# Patient Record
Sex: FEMALE | Race: BLACK OR AFRICAN AMERICAN | NOT HISPANIC OR LATINO | Employment: UNEMPLOYED | URBAN - METROPOLITAN AREA
[De-identification: names, ages, dates, MRNs, and addresses within clinical notes are randomized per-mention and may not be internally consistent; named-entity substitution may affect disease eponyms.]

---

## 2022-12-05 ENCOUNTER — HOSPITAL ENCOUNTER (EMERGENCY)
Facility: OTHER | Age: 47
Discharge: HOME | End: 2022-12-05
Attending: EMERGENCY MEDICINE

## 2022-12-05 VITALS
BODY MASS INDEX: 39.27 KG/M2 | TEMPERATURE: 99 F | RESPIRATION RATE: 18 BRPM | SYSTOLIC BLOOD PRESSURE: 97 MMHG | HEIGHT: 60 IN | HEART RATE: 79 BPM | DIASTOLIC BLOOD PRESSURE: 55 MMHG | OXYGEN SATURATION: 97 % | WEIGHT: 200 LBS

## 2022-12-05 DIAGNOSIS — D64.9 ANEMIA, UNSPECIFIED TYPE: ICD-10-CM

## 2022-12-05 DIAGNOSIS — T40.1X1A HEROIN OVERDOSE, ACCIDENTAL OR UNINTENTIONAL, INITIAL ENCOUNTER: Primary | ICD-10-CM

## 2022-12-05 LAB
ALBUMIN SERPL BCP-MCNC: 2.8 G/DL (ref 3.5–5.2)
ALP SERPL-CCNC: 62 U/L (ref 55–135)
ALT SERPL W/O P-5'-P-CCNC: 8 U/L (ref 10–44)
ANION GAP SERPL CALC-SCNC: 9 MMOL/L (ref 8–16)
AST SERPL-CCNC: 13 U/L (ref 10–40)
BASOPHILS # BLD AUTO: 0.03 K/UL (ref 0–0.2)
BASOPHILS NFR BLD: 0.2 % (ref 0–1.9)
BILIRUB SERPL-MCNC: 0.2 MG/DL (ref 0.1–1)
BUN SERPL-MCNC: 7 MG/DL (ref 6–20)
CALCIUM SERPL-MCNC: 8.6 MG/DL (ref 8.7–10.5)
CHLORIDE SERPL-SCNC: 104 MMOL/L (ref 95–110)
CK SERPL-CCNC: 160 U/L (ref 20–180)
CO2 SERPL-SCNC: 25 MMOL/L (ref 23–29)
CREAT SERPL-MCNC: 0.7 MG/DL (ref 0.5–1.4)
CREAT SERPL-MCNC: 0.8 MG/DL (ref 0.5–1.4)
DIFFERENTIAL METHOD: ABNORMAL
EOSINOPHIL # BLD AUTO: 0 K/UL (ref 0–0.5)
EOSINOPHIL NFR BLD: 0.2 % (ref 0–8)
ERYTHROCYTE [DISTWIDTH] IN BLOOD BY AUTOMATED COUNT: 16.9 % (ref 11.5–14.5)
EST. GFR  (NO RACE VARIABLE): >60 ML/MIN/1.73 M^2
GLUCOSE SERPL-MCNC: 110 MG/DL (ref 70–110)
HCT VFR BLD AUTO: 26.5 % (ref 37–48.5)
HCT VFR BLD CALC: 25 %PCV (ref 36–54)
HGB BLD-MCNC: 7.5 G/DL (ref 12–16)
HGB BLD-MCNC: 9 G/DL
IMM GRANULOCYTES # BLD AUTO: 0.03 K/UL (ref 0–0.04)
IMM GRANULOCYTES NFR BLD AUTO: 0.2 % (ref 0–0.5)
LYMPHOCYTES # BLD AUTO: 2.3 K/UL (ref 1–4.8)
LYMPHOCYTES NFR BLD: 16.9 % (ref 18–48)
MCH RBC QN AUTO: 19.8 PG (ref 27–31)
MCHC RBC AUTO-ENTMCNC: 28.3 G/DL (ref 32–36)
MCV RBC AUTO: 70 FL (ref 82–98)
MONOCYTES # BLD AUTO: 0.6 K/UL (ref 0.3–1)
MONOCYTES NFR BLD: 4.1 % (ref 4–15)
NEUTROPHILS # BLD AUTO: 10.5 K/UL (ref 1.8–7.7)
NEUTROPHILS NFR BLD: 78.4 % (ref 38–73)
NRBC BLD-RTO: 0 /100 WBC
PLATELET # BLD AUTO: 319 K/UL (ref 150–450)
PMV BLD AUTO: 10.5 FL (ref 9.2–12.9)
POC IONIZED CALCIUM: 1.14 MMOL/L (ref 1.06–1.42)
POTASSIUM BLD-SCNC: 4 MMOL/L (ref 3.5–5.1)
POTASSIUM SERPL-SCNC: 4.1 MMOL/L (ref 3.5–5.1)
PROT SERPL-MCNC: 7.1 G/DL (ref 6–8.4)
RBC # BLD AUTO: 3.78 M/UL (ref 4–5.4)
SAMPLE: ABNORMAL
SAMPLE: NORMAL
SODIUM BLD-SCNC: 138 MMOL/L (ref 136–145)
SODIUM SERPL-SCNC: 138 MMOL/L (ref 136–145)
TROPONIN I SERPL DL<=0.01 NG/ML-MCNC: 0.01 NG/ML (ref 0–0.03)
WBC # BLD AUTO: 13.35 K/UL (ref 3.9–12.7)

## 2022-12-05 PROCEDURE — 80053 COMPREHEN METABOLIC PANEL: CPT | Performed by: EMERGENCY MEDICINE

## 2022-12-05 PROCEDURE — 83540 ASSAY OF IRON: CPT | Performed by: EMERGENCY MEDICINE

## 2022-12-05 PROCEDURE — 36415 COLL VENOUS BLD VENIPUNCTURE: CPT | Performed by: EMERGENCY MEDICINE

## 2022-12-05 PROCEDURE — 82550 ASSAY OF CK (CPK): CPT | Performed by: EMERGENCY MEDICINE

## 2022-12-05 PROCEDURE — 82728 ASSAY OF FERRITIN: CPT | Performed by: EMERGENCY MEDICINE

## 2022-12-05 PROCEDURE — 84484 ASSAY OF TROPONIN QUANT: CPT | Performed by: EMERGENCY MEDICINE

## 2022-12-05 PROCEDURE — 63600175 PHARM REV CODE 636 W HCPCS: Performed by: EMERGENCY MEDICINE

## 2022-12-05 PROCEDURE — 99283 EMERGENCY DEPT VISIT LOW MDM: CPT | Mod: 25

## 2022-12-05 PROCEDURE — 85025 COMPLETE CBC W/AUTO DIFF WBC: CPT | Performed by: EMERGENCY MEDICINE

## 2022-12-05 RX ORDER — NALOXONE HYDROCHLORIDE 4 MG/.1ML
SPRAY NASAL
Qty: 1 EACH | Refills: 11 | Status: SHIPPED | OUTPATIENT
Start: 2022-12-05

## 2022-12-05 RX ORDER — NALOXONE HYDROCHLORIDE 4 MG/.1ML
SPRAY NASAL
Qty: 1 EACH | Refills: 11 | Status: SHIPPED | OUTPATIENT
Start: 2022-12-05 | End: 2022-12-05 | Stop reason: SDUPTHER

## 2022-12-05 RX ORDER — NALOXONE HYDROCHLORIDE 4 MG/.1ML
SPRAY NASAL
Qty: 1 EACH | Refills: 11 | Status: SHIPPED | OUTPATIENT
Start: 2022-12-05 | End: 2022-12-05 | Stop reason: ALTCHOICE

## 2022-12-05 RX ADMIN — SODIUM CHLORIDE, SODIUM LACTATE, POTASSIUM CHLORIDE, AND CALCIUM CHLORIDE 1000 ML: .6; .31; .03; .02 INJECTION, SOLUTION INTRAVENOUS at 05:12
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
## 2022-12-05 NOTE — ED NOTES
1st contact with pt, in stretcher, restless, moving all over stretcher, extending arms, kicking legs, moving from left to right in stretcher,multiple staff members at bedside, charge nurse Johnnie, ICU nurse, multiple security guards, multiple tech, Vannesa RN, Dr. Long, informed pt was dropped of by family in the street for  drug overdose and narcan given by family member

## 2022-12-05 NOTE — ED NOTES
Pt resting quietly on stretcher with eyes closed; opens eyes to verbal stimuli.  Pt remains on continuous cardiac and pulse ox monitoring with non-invasive blood pressure to cycle every 30 minutes. Pt is hypotensive with a BP of 80/40's. Dr Long made aware; states we will continue to monitor pt. VS otherwise stable; NSR noted. Pt denies pain at this time; no acute distress or discomfort reported or observed.  Pt denies restroom needs at this time; is able to reposition self on stretcher. Bed locked in lowest position; side rails up and locked x 2; call light, bedside table, and personal belongings within reach. Room assessed for safety measures and cleanliness; no action needed at this time. Plan of care discussed.  Pt instructed to alert nurse for assistance and before attempting to get out of bed; verbalizes understanding. Pt denies needs or complaints at this time; will continue to monitor.

## 2022-12-05 NOTE — ED TRIAGE NOTES
Present to the er due to overdose,informed by staff, pt's family dropped pt off for c/o overdose, narcan  given by family pta, on arrival, awake, restless,anxious,admits to Heroin use,

## 2022-12-06 ENCOUNTER — TELEPHONE (OUTPATIENT)
Dept: ADMINISTRATIVE | Facility: OTHER | Age: 47
End: 2022-12-06

## 2022-12-06 LAB
FERRITIN SERPL-MCNC: 5 NG/ML (ref 20–300)
IRON SERPL-MCNC: 13 UG/DL (ref 30–160)

## 2022-12-06 NOTE — DISCHARGE INSTRUCTIONS
Thank you for letting us take care of you today! It was nice meeting you, and I hope you feel better soon.     Call your primary care doctor to make the first available appointment.     Keep all your medical appointments.     Take your regular medication as prescribed. Contact your primary care provider before running out of medication, or for any problems obtaining them.    Do not drive or operate heavy machinery while taking opioid or muscle relaxing medications. Examples include norco, percocet, xanax, valium, flexeril.     Overuse or long term use of pain and sedating medication may lead to addiction, dependence, organ failure, family and work problems, legal problems, accidental overdose and death.    If you do not have health insurance, you probably can afford it:  Call [phone] ([facility] hotline) or go to www[place].gov    Your evaluation in the ED does not suggest any emergent or life threatening medical condition requiring admission or immediate intervention beyond that provided in the ED.   However, the signs of a serious problem sometimes take more time to appear.     Do not hesitate to return to the ER if any of the following occur:    Weakness, dizziness, fainting, or loss of consciousness   Fever of 100.4ºF (38ºC) or higher  Any worse symptoms  Any new or concerning symptoms        To protect yourself and others from COVID19:  Get vaccinated.   Anyone over 5 years old is eligible for vaccination.   Everyone 18 and older should get 3 total vaccine doses. Anyone over 50 years old or with certain chronic conditions should get a 4th dose.   Vaccination is shown to prevent getting sick, ending up in the hospital, or dying because of COVID19.   If you are vaccinated, help friends and family get the vaccine.    If not vaccinated:  Your shot is waiting for you. To get it:   Text your ZIP code to GETVAX ([id number]) or VACUNA ([id number]) in [place]  call 311, or [phone], or [phone], or [phone],    go to www.vaccines.gov, or  Call your health provider  If exposed to someone with cold, flu, or COVID19 symptoms, you must quarantine for at least 5 days.   Even if you have no symptoms   Otherwise you could give the virus to someone who dies from it  Some symptoms of COVID19 include fever, cough, sore throat, breathing troubles, loss of taste/smell, headaches, stomach upset, diarrhea.     For help with substance abuse problems:    If you do not have insurance contact [facility] at [phone].    You may also contact [facility] ([phone]) or [facility] ([phone]).    [facility] ([facility]) Crisis Services for problems with mental health (suicidal, overwhelmed, agitated, despressed, withdrawn, etc.), problems with drugs/alcohol, or developmental disabilities. Accepts uninsured and medicaid:   [phone] or [phone]   [facility] websites:   [email or web address]/services/crisis   www.[email or web address].org     [facility]:   Accepts Medicaid and uninsured [place] residents.   Accepts detox walk-ins weekdays between 7 a.m. and 3 p.m. Acceptance is based on bed availability.  [place] PHOTO ID REQUIRED.   Requirements: must be at least 22 years old.   [facility] also has residential substance treatment/rehab programs after intial detox is accomplished.   [phone]   www.[facility].org       [facility] (men, at least 18 years old) & [facility] (women, at least 18 years old):   NO initial Detox. They don't do initial detox nor medically assisted withdrawals. Clients can go to their choice of initial detox at [facility], [facility], [place], [place], or wherever [facility] would advise.   Has long-term residential substance abuse treatment programs, where clients live and participate in intense rehabilitation.   [phone]   www.[email or web address]     [facility]:   Accepts Medicaid, Medicare, , and many private insurances.  Call daily at 10:30 a.m. to see  if a bed is available.   Usually has a waiting list, call for more info.  [phone] or [phone]   www.[place]     [facility]:   [address], [place] [address]   [phone]   Accepts Medicaid ([employer] Car[employer], Healthy Blue, and Aetna) and private insurances. Does not accept Medicare.     [facility] ([place], [place]):  [phone]  Accepts Medicaid and many private insurances.   Call daily between 8:30 and 9 a.m. to see if a bed is available.   Provides transportation to and from facility.    [facility] [address], [place] [address]   [phone]   Accepts Medicaid, Medicare, , and many private insurances.   Call for more information.     [facility] ([place], [place]):  [phone]  Accepts Medicaid, uninsured, and many private insurances.   Usually has a waiting list, call for more info.     [facility] ([place], [place]):  [phone]  Accepts certain Medicaid plans and many private insurances.  Usually has a waiting list, call for more info.    [facility] ([place], [place]):  [address] [place] [address] [phone]    OUT-Patient resources:   [facility] (No age limits)   Offers intensive outpatient treatment, medically-assisted outpatient detox with suboxone, counseling, AA/12 Steps, etc  Accepts uninsured residents of [place], [place], or [facility]. Residents of other [facility] must contact the [facility] in their [place] for options.   [facility] locations:   [place] [phone]   [place]: [phone]   [place]: [phone]           [place]:   Accepts insurances, cash, credit card or financing plan for payment  [phone]   In [place]: [phone]   In [place]: [phone]   ---- ----       ADDITIONAL Addiction & Mental Health RESOURCES:   Call [facility] or the 24/7 [facility] Crisis Response Team at [phone].   Call for information on current local resources for addiction,  suicide prevention, help on how to cope, obtain services, etc.       [facility] - Crisis Services (For . Residents needing care for mental health, addiction or developmental disabilities)   [phone]   www.[facility].org     [facility] - Substance Abuse & Mental Health Services Administration (Educational, not a Crisis resource)   www.[facility].org   Various meds used for M.A.T. (medically assisted treatment) of opioid addiction:   http://www.samhsa.gov/medication-assisted-treatment         Other Mental Health Clinics include:     [facility] [address] [facility] [phone]     [facility] [address] [phone]     [facility] [address] [phone]     [facility] [address] [phone]     Alcoholics Anonymous:  Go to www.[place].org for locations and times  Locations with multiple meetings per day:  [facility] - [address] ([facility])  [facility] - [address] ([facility])  [facility] - [address] ([facility])  [facility] - [address] ([place])    Anyone who is suicidal may receive immediate help by going to the ER, calling 911, going to Suicide.org or by calling [phone]. Suicide is preventable, and if you are feeling suicidal, you must get help.

## 2023-03-25 ENCOUNTER — HOSPITAL ENCOUNTER (EMERGENCY)
Facility: OTHER | Age: 48
Discharge: HOME | End: 2023-03-25
Attending: EMERGENCY MEDICINE

## 2023-03-25 VITALS
HEIGHT: 60 IN | BODY MASS INDEX: 38.68 KG/M2 | OXYGEN SATURATION: 99 % | DIASTOLIC BLOOD PRESSURE: 64 MMHG | TEMPERATURE: 98 F | WEIGHT: 197 LBS | HEART RATE: 68 BPM | RESPIRATION RATE: 17 BRPM | SYSTOLIC BLOOD PRESSURE: 118 MMHG

## 2023-03-25 DIAGNOSIS — M79.89 SWELLING OF RIGHT FOOT: Primary | ICD-10-CM

## 2023-03-25 DIAGNOSIS — M79.89 LEG SWELLING: ICD-10-CM

## 2023-03-25 DIAGNOSIS — M79.673 FOOT PAIN: ICD-10-CM

## 2023-03-25 LAB
ALBUMIN SERPL BCP-MCNC: 2.9 G/DL (ref 3.5–5.2)
ALP SERPL-CCNC: 74 U/L (ref 55–135)
ALT SERPL W/O P-5'-P-CCNC: <5 U/L (ref 10–44)
ANION GAP SERPL CALC-SCNC: 8 MMOL/L (ref 8–16)
AST SERPL-CCNC: 9 U/L (ref 10–40)
B-HCG UR QL: NEGATIVE
BACTERIA #/AREA URNS HPF: ABNORMAL /HPF
BASOPHILS # BLD AUTO: 0.03 K/UL (ref 0–0.2)
BASOPHILS NFR BLD: 0.4 % (ref 0–1.9)
BILIRUB SERPL-MCNC: 0.2 MG/DL (ref 0.1–1)
BILIRUB UR QL STRIP: NEGATIVE
BNP SERPL-MCNC: <10 PG/ML (ref 0–99)
BUN SERPL-MCNC: 9 MG/DL (ref 6–20)
CALCIUM SERPL-MCNC: 8.3 MG/DL (ref 8.7–10.5)
CHLORIDE SERPL-SCNC: 104 MMOL/L (ref 95–110)
CLARITY UR: CLEAR
CO2 SERPL-SCNC: 25 MMOL/L (ref 23–29)
COLOR UR: YELLOW
CREAT SERPL-MCNC: 0.9 MG/DL (ref 0.5–1.4)
CTP QC/QA: YES
DIFFERENTIAL METHOD: ABNORMAL
EOSINOPHIL # BLD AUTO: 0.2 K/UL (ref 0–0.5)
EOSINOPHIL NFR BLD: 2.1 % (ref 0–8)
ERYTHROCYTE [DISTWIDTH] IN BLOOD BY AUTOMATED COUNT: 17.2 % (ref 11.5–14.5)
EST. GFR  (NO RACE VARIABLE): >60 ML/MIN/1.73 M^2
GLUCOSE SERPL-MCNC: 104 MG/DL (ref 70–110)
GLUCOSE UR QL STRIP: NEGATIVE
HCT VFR BLD AUTO: 26.9 % (ref 37–48.5)
HGB BLD-MCNC: 7.2 G/DL (ref 12–16)
HGB UR QL STRIP: NEGATIVE
IMM GRANULOCYTES # BLD AUTO: 0.02 K/UL (ref 0–0.04)
IMM GRANULOCYTES NFR BLD AUTO: 0.2 % (ref 0–0.5)
KETONES UR QL STRIP: NEGATIVE
LEUKOCYTE ESTERASE UR QL STRIP: ABNORMAL
LYMPHOCYTES # BLD AUTO: 2.5 K/UL (ref 1–4.8)
LYMPHOCYTES NFR BLD: 28.8 % (ref 18–48)
MCH RBC QN AUTO: 19.1 PG (ref 27–31)
MCHC RBC AUTO-ENTMCNC: 26.8 G/DL (ref 32–36)
MCV RBC AUTO: 71 FL (ref 82–98)
MICROSCOPIC COMMENT: ABNORMAL
MONOCYTES # BLD AUTO: 0.9 K/UL (ref 0.3–1)
MONOCYTES NFR BLD: 11.1 % (ref 4–15)
NEUTROPHILS # BLD AUTO: 4.9 K/UL (ref 1.8–7.7)
NEUTROPHILS NFR BLD: 57.4 % (ref 38–73)
NITRITE UR QL STRIP: NEGATIVE
NRBC BLD-RTO: 0 /100 WBC
PH UR STRIP: 6 [PH] (ref 5–8)
PLATELET # BLD AUTO: 304 K/UL (ref 150–450)
PMV BLD AUTO: 10.1 FL (ref 9.2–12.9)
POTASSIUM SERPL-SCNC: 4 MMOL/L (ref 3.5–5.1)
PROT SERPL-MCNC: 8 G/DL (ref 6–8.4)
PROT UR QL STRIP: NEGATIVE
RBC # BLD AUTO: 3.77 M/UL (ref 4–5.4)
RBC #/AREA URNS HPF: 3 /HPF (ref 0–4)
SODIUM SERPL-SCNC: 137 MMOL/L (ref 136–145)
SP GR UR STRIP: 1.01 (ref 1–1.03)
SQUAMOUS #/AREA URNS HPF: 1 /HPF
URN SPEC COLLECT METH UR: ABNORMAL
UROBILINOGEN UR STRIP-ACNC: NEGATIVE EU/DL
WBC # BLD AUTO: 8.5 K/UL (ref 3.9–12.7)
WBC #/AREA URNS HPF: 31 /HPF (ref 0–5)
WBC CLUMPS URNS QL MICRO: ABNORMAL

## 2023-03-25 PROCEDURE — 25000003 PHARM REV CODE 250: Performed by: EMERGENCY MEDICINE

## 2023-03-25 PROCEDURE — 81025 URINE PREGNANCY TEST: CPT | Performed by: EMERGENCY MEDICINE

## 2023-03-25 PROCEDURE — 99285 EMERGENCY DEPT VISIT HI MDM: CPT | Mod: 25

## 2023-03-25 PROCEDURE — 96374 THER/PROPH/DIAG INJ IV PUSH: CPT

## 2023-03-25 PROCEDURE — 83880 ASSAY OF NATRIURETIC PEPTIDE: CPT | Performed by: EMERGENCY MEDICINE

## 2023-03-25 PROCEDURE — 87186 SC STD MICRODIL/AGAR DIL: CPT | Performed by: EMERGENCY MEDICINE

## 2023-03-25 PROCEDURE — 96361 HYDRATE IV INFUSION ADD-ON: CPT

## 2023-03-25 PROCEDURE — 87086 URINE CULTURE/COLONY COUNT: CPT | Performed by: EMERGENCY MEDICINE

## 2023-03-25 PROCEDURE — 85025 COMPLETE CBC W/AUTO DIFF WBC: CPT | Performed by: EMERGENCY MEDICINE

## 2023-03-25 PROCEDURE — 87088 URINE BACTERIA CULTURE: CPT | Performed by: EMERGENCY MEDICINE

## 2023-03-25 PROCEDURE — 87077 CULTURE AEROBIC IDENTIFY: CPT | Performed by: EMERGENCY MEDICINE

## 2023-03-25 PROCEDURE — 80053 COMPREHEN METABOLIC PANEL: CPT | Performed by: EMERGENCY MEDICINE

## 2023-03-25 PROCEDURE — 63600175 PHARM REV CODE 636 W HCPCS: Performed by: EMERGENCY MEDICINE

## 2023-03-25 PROCEDURE — 81000 URINALYSIS NONAUTO W/SCOPE: CPT | Performed by: EMERGENCY MEDICINE

## 2023-03-25 RX ORDER — KETOROLAC TROMETHAMINE 30 MG/ML
30 INJECTION, SOLUTION INTRAMUSCULAR; INTRAVENOUS
Status: COMPLETED | OUTPATIENT
Start: 2023-03-25 | End: 2023-03-25

## 2023-03-25 RX ORDER — IBUPROFEN 800 MG/1
800 TABLET ORAL EVERY 6 HOURS PRN
Qty: 20 TABLET | Refills: 0 | Status: SHIPPED | OUTPATIENT
Start: 2023-03-25

## 2023-03-25 RX ADMIN — KETOROLAC TROMETHAMINE 30 MG: 30 INJECTION, SOLUTION INTRAMUSCULAR; INTRAVENOUS at 03:03

## 2023-03-25 RX ADMIN — SODIUM CHLORIDE 1000 ML: 9 INJECTION, SOLUTION INTRAVENOUS at 04:03

## 2023-03-25 NOTE — ED PROVIDER NOTES
Encounter Date: 3/25/2023    SCRIBE #1 NOTE: I, Danilo Suarez, am scribing for, and in the presence of,  Rajwinder Alvares MD. I have scribed the following portions of the note - Other sections scribed: HPI, ROS, PE.     History     Chief Complaint   Patient presents with    Foot Pain     Pain to R foot that started 3 days ago the pain starts at the top of the foot and radiates down to the bottom. Swelling noted to the foot.      Time seen by provider: 2:11 AM    This is a 47 y.o. female who presents with complaint of pain and swelling to the top of the right foot for three days. Patient states the pain is worse at night or when she tries to elevate the foot, but it does not change when walking or bearing weight. She has been taking Tylenol and ibuprofen with minimal relief. Patient denies any preceding injury, although she recalls an instance of similar pain and swelling to the right lateral ankle three months ago that self resolved. She has not experienced any chest pain, shortness of breath, nausea, vomiting, or diarrhea.  Denies numbness/tingling.  No PMHx of DVT. This is the extent of the patient's complaints at this time.    The history is provided by the patient.   Review of patient's allergies indicates:  No Known Allergies  No past medical history on file.  No past surgical history on file.  No family history on file.     Review of Systems   Constitutional:  Negative for chills and fever.   HENT:  Negative for congestion, rhinorrhea and sore throat.    Respiratory:  Negative for chest tightness and shortness of breath.    Cardiovascular:  Positive for leg swelling. Negative for chest pain and palpitations.   Gastrointestinal:  Negative for abdominal pain, diarrhea, nausea and vomiting.   Genitourinary:  Negative for dysuria and flank pain.   Musculoskeletal:  Negative for back pain and neck pain.        Positive for swelling.   Skin:  Negative for color change, rash and wound.   Neurological:  Negative for  dizziness, weakness and headaches.   Hematological:  Does not bruise/bleed easily.     Physical Exam     Initial Vitals   BP Pulse Resp Temp SpO2   03/25/23 0156 03/25/23 0156 03/25/23 0211 03/25/23 0156 03/25/23 0156   (!) 105/58 79 18 99.5 °F (37.5 °C) 100 %      MAP       --                Physical Exam    Nursing note and vitals reviewed.  Constitutional: She appears well-developed and well-nourished. She is not diaphoretic. No distress.   HENT:   Head: Normocephalic and atraumatic.   Eyes: Conjunctivae are normal.   Neck: Neck supple.   Normal range of motion.  Cardiovascular:  Normal rate, regular rhythm and normal heart sounds.           2+ DP/PT pulses bilaterally   Pulmonary/Chest: Breath sounds normal. No respiratory distress. She has no wheezes. She has no rales.   Abdominal: Abdomen is soft. Bowel sounds are normal. She exhibits no distension. There is no abdominal tenderness. There is no rebound.   Musculoskeletal:         General: Normal range of motion.      Cervical back: Normal range of motion and neck supple.      Comments: Right lower extremity:  Diffuse swelling with warmth to the dorsal aspect of the right foot, extending proximally up the extremity towards the knee.  No overlying erythema.  Mild tenderness to touch.  Full range of motion intact      Neurological: She is alert and oriented to person, place, and time.   Ambulatory with steady gait.   Skin: Skin is warm and dry. Capillary refill takes less than 2 seconds.   Large open wound to left arm.  No surrounding erythema or purulent drainage.   Psychiatric: She has a normal mood and affect.       ED Course   Procedures  Labs Reviewed   COMPREHENSIVE METABOLIC PANEL - Abnormal; Notable for the following components:       Result Value    Calcium 8.3 (*)     Albumin 2.9 (*)     AST 9 (*)     ALT <5 (*)     All other components within normal limits   CBC W/ AUTO DIFFERENTIAL - Abnormal; Notable for the following components:    RBC 3.77 (*)      Hemoglobin 7.2 (*)     Hematocrit 26.9 (*)     MCV 71 (*)     MCH 19.1 (*)     MCHC 26.8 (*)     RDW 17.2 (*)     All other components within normal limits   URINALYSIS - Abnormal; Notable for the following components:    Leukocytes, UA 3+ (*)     All other components within normal limits   URINALYSIS MICROSCOPIC - Abnormal; Notable for the following components:    WBC, UA 31 (*)     WBC Clumps, UA Occasional (*)     Bacteria Moderate (*)     All other components within normal limits   CULTURE, URINE   B-TYPE NATRIURETIC PEPTIDE   POCT URINE PREGNANCY          Imaging Results              X-Ray Foot Complete Right (Final result)  Result time 03/25/23 04:18:36      Final result by Manan Mae MD (03/25/23 04:18:36)                   Impression:      No radiographic evidence of acute displaced fracture or dislocation of the right foot.  Mild nonspecific dorsal subcutaneous soft tissue edema.  Correlation with physical exam advised.      Electronically signed by: Manan Mae MD  Date:    03/25/2023  Time:    04:18               Narrative:    EXAMINATION:  XR FOOT COMPLETE 3 VIEW RIGHT    CLINICAL HISTORY:  . Pain in unspecified foot    TECHNIQUE:  AP, lateral, and oblique views of the right foot were performed.    COMPARISON:  None    FINDINGS:  There is no radiographic evidence of acute displaced fracture or dislocation of the right foot.  No abnormal widening of the Lisfranc interval appreciated.  Joint spaces appear appropriately maintained.  There is calcaneal enthesopathic change at the distal Achilles insertion and plantar fascia attachment..  There is mild nonspecific soft tissue edema along the dorsum of the foot at the level of the metatarsals.  No retained radiopaque foreign body identified.                                       US Lower Extremity Veins Right (Final result)  Result time 03/25/23 03:30:44      Final result by Manan Mae MD (03/25/23 03:30:44)                   Impression:       No evidence of deep venous thrombosis in the right lower extremity.      Electronically signed by: Manan Mae MD  Date:    03/25/2023  Time:    03:30               Narrative:    EXAMINATION:  US LOWER EXTREMITY VEINS RIGHT    CLINICAL HISTORY:  Other specified soft tissue disorders    TECHNIQUE:  Duplex and color flow Doppler evaluation and graded compression of the right lower extremity veins was performed.    COMPARISON:  None    FINDINGS:  Right thigh veins: The common femoral, femoral, popliteal, upper greater saphenous, and deep femoral veins are patent and free of thrombus. The veins are normally compressible and have normal phasic flow and augmentation response.    Right calf veins: The visualized calf veins are patent.    Contralateral CFV: The contralateral (left) common femoral vein is patent and free of thrombus.    Miscellaneous: None                                    X-Rays:   Independently Interpreted Readings:   Other Readings:  Right foot XR: no fracture or dislocation.  Medications   ketorolac injection 30 mg (30 mg Intravenous Given 3/25/23 0308)   sodium chloride 0.9% bolus 1,000 mL 1,000 mL (0 mLs Intravenous Stopped 3/25/23 0536)     Medical Decision Making:   History:   Old Medical Records: I decided to obtain old medical records.  Old Records Summarized: other records and records from another hospital.  Initial Assessment:   2:11AM:  Patient is a 47-year-old female who presents to the emergency department with right foot pain and swelling.  Patient appears well, nontoxic.  No inciting injury noted.  Will plan to check labs, imaging, will continue to follow and reassess.  Clinical Tests:   Lab Tests: Ordered and Reviewed  Radiological Study: Ordered and Reviewed     7:01 AM:  Patient doing well, remains stable.  She is feeling better.  Her ultrasound is negative for any acute findings and her x-ray is negative as well.  Her labs are otherwise unremarkable with no evidence of liver  failure, kidney failure or cardiac failure.  No clear etiology of her leg swelling at this time, though given her reassuring workup, I do feel the patient can be further managed as an outpatient.  Will plan for NSAIDs, Ace wrap here along with crutches to use as needed.  Her UA does show evidence of a possible urinary tract infection with 3+ leukocytes and moderate bacteria.  However the patient denies any symptoms of a UTI and therefore will plan for urine culture and defer treatment at this time.  I do not feel that further work up in the ED is indicated at this time.  I updated pt regarding results and I counseled pt regarding supportive care measures.  I have discussed with the pt ED return warnings and need for close PCP f/u.  Pt agreeable to plan and all questions answered.  I feel that pt is stable for discharge and management as an outpatient and no further intervention is needed at this time.  Pt is comfortable returning to the ED if needed.  Will DC home in stable condition.       Scribe Attestation:   Scribe #1: I performed the above scribed service and the documentation accurately describes the services I performed. I attest to the accuracy of the note.  Physician Attestation for Scribe: I, Rajwinder Alvares, reviewed documentation as scribed in my presence, which is both accurate and complete.                     Clinical Impression:   Final diagnoses:  [M79.89] Leg swelling  [M79.673] Foot pain  [M79.89] Swelling of right foot (Primary)        ED Disposition Condition    Discharge Stable          ED Prescriptions       Medication Sig Dispense Start Date End Date Auth. Provider    ibuprofen (ADVIL,MOTRIN) 800 MG tablet Take 1 tablet (800 mg total) by mouth every 6 (six) hours as needed for Pain. 20 tablet 3/25/2023 -- Rajwinder Alvares MD          Follow-up Information       Follow up With Specialties Details Why Contact Info    Primary Care Physician                 Rajwinder Alvares MD  03/25/23 3365

## 2023-03-25 NOTE — ED TRIAGE NOTES
Pt to ER with complaint of pain and swelling to R foot. Pt reports pain has been there for several days and has progressively gotten worse. Pt noted to have 1+ edema to R foot and ankle. Pt noted to have DP pulse detected with doppler. Bilateral feet are same temperature with no obvious redness. Pt in no acute distress at this time with chest noted to equal rise and fall. Pt AAOx4.   detailed exam PERRL/conjunctiva clear

## 2023-03-27 LAB — BACTERIA UR CULT: ABNORMAL

## 2023-06-29 ENCOUNTER — HOSPITAL ENCOUNTER (INPATIENT)
Facility: HOSPITAL | Age: 48
LOS: 4 days | Discharge: HOME | DRG: 918 | End: 2023-07-03
Attending: EMERGENCY MEDICINE | Admitting: STUDENT IN AN ORGANIZED HEALTH CARE EDUCATION/TRAINING PROGRAM
Payer: MEDICAID

## 2023-06-29 DIAGNOSIS — R07.9 RIGHT-SIDED CHEST PAIN: ICD-10-CM

## 2023-06-29 DIAGNOSIS — R00.1 BRADYCARDIA: ICD-10-CM

## 2023-06-29 DIAGNOSIS — D50.9 IRON DEFICIENCY ANEMIA, UNSPECIFIED IRON DEFICIENCY ANEMIA TYPE: ICD-10-CM

## 2023-06-29 DIAGNOSIS — D64.9 SYMPTOMATIC ANEMIA: Primary | ICD-10-CM

## 2023-06-29 DIAGNOSIS — R06.00 DYSPNEA: ICD-10-CM

## 2023-06-29 DIAGNOSIS — I49.9 ABNORMAL HEART RHYTHM: ICD-10-CM

## 2023-06-29 DIAGNOSIS — R07.9 CHEST PAIN: ICD-10-CM

## 2023-06-29 LAB
ALBUMIN SERPL BCP-MCNC: 3.3 G/DL (ref 3.5–5.2)
ALP SERPL-CCNC: 60 U/L (ref 38–126)
ALT SERPL W/O P-5'-P-CCNC: 10 U/L (ref 10–44)
ANION GAP SERPL CALC-SCNC: 7 MMOL/L (ref 8–16)
AST SERPL-CCNC: 16 U/L (ref 15–46)
BASOPHILS # BLD AUTO: 0.03 K/UL (ref 0–0.2)
BASOPHILS NFR BLD: 0.4 % (ref 0–1.9)
BILIRUB SERPL-MCNC: <0.1 MG/DL (ref 0.1–1)
CALCIUM SERPL-MCNC: 8.5 MG/DL (ref 8.7–10.5)
CHLORIDE SERPL-SCNC: 106 MMOL/L (ref 95–110)
CO2 SERPL-SCNC: 29 MMOL/L (ref 23–29)
CREAT SERPL-MCNC: 0.83 MG/DL (ref 0.5–1.4)
DIFFERENTIAL METHOD: ABNORMAL
EOSINOPHIL # BLD AUTO: 0.1 K/UL (ref 0–0.5)
EOSINOPHIL NFR BLD: 1.8 % (ref 0–8)
ERYTHROCYTE [DISTWIDTH] IN BLOOD BY AUTOMATED COUNT: 17.5 % (ref 11.5–14.5)
EST. GFR  (NO RACE VARIABLE): >60 ML/MIN/1.73 M^2
GLUCOSE SERPL-MCNC: 122 MG/DL (ref 70–110)
HCT VFR BLD AUTO: 22.4 % (ref 37–48.5)
HGB BLD-MCNC: 6.2 G/DL (ref 12–16)
IMM GRANULOCYTES # BLD AUTO: 0.02 K/UL (ref 0–0.04)
IMM GRANULOCYTES NFR BLD AUTO: 0.3 % (ref 0–0.5)
LYMPHOCYTES # BLD AUTO: 1.8 K/UL (ref 1–4.8)
LYMPHOCYTES NFR BLD: 27.3 % (ref 18–48)
MCH RBC QN AUTO: 18.9 PG (ref 27–31)
MCHC RBC AUTO-ENTMCNC: 27.7 G/DL (ref 32–36)
MCV RBC AUTO: 68 FL (ref 82–98)
MONOCYTES # BLD AUTO: 0.4 K/UL (ref 0.3–1)
MONOCYTES NFR BLD: 6.1 % (ref 4–15)
NEUTROPHILS # BLD AUTO: 4.3 K/UL (ref 1.8–7.7)
NEUTROPHILS NFR BLD: 64.1 % (ref 38–73)
NRBC BLD-RTO: 0 /100 WBC
PLATELET # BLD AUTO: 324 K/UL (ref 150–450)
PMV BLD AUTO: 10.4 FL (ref 9.2–12.9)
POTASSIUM SERPL-SCNC: 3.7 MMOL/L (ref 3.5–5.1)
PROT SERPL-MCNC: 7.5 G/DL (ref 6–8.4)
RBC # BLD AUTO: 3.28 M/UL (ref 4–5.4)
SODIUM SERPL-SCNC: 142 MMOL/L (ref 136–145)
TROPONIN I SERPL-MCNC: <0.012 NG/ML (ref 0.01–0.03)
UUN UR-MCNC: 11 MG/DL (ref 7–17)
WBC # BLD AUTO: 6.67 K/UL (ref 3.9–12.7)

## 2023-06-29 PROCEDURE — 84484 ASSAY OF TROPONIN QUANT: CPT | Mod: ER | Performed by: NURSE PRACTITIONER

## 2023-06-29 PROCEDURE — 93005 ELECTROCARDIOGRAM TRACING: CPT | Mod: ER

## 2023-06-29 PROCEDURE — 80053 COMPREHEN METABOLIC PANEL: CPT | Mod: ER | Performed by: NURSE PRACTITIONER

## 2023-06-29 PROCEDURE — 93010 ELECTROCARDIOGRAM REPORT: CPT | Mod: ,,, | Performed by: INTERNAL MEDICINE

## 2023-06-29 PROCEDURE — 99285 EMERGENCY DEPT VISIT HI MDM: CPT | Mod: 25,ER

## 2023-06-29 PROCEDURE — 93010 EKG 12-LEAD: ICD-10-PCS | Mod: ,,, | Performed by: INTERNAL MEDICINE

## 2023-06-29 PROCEDURE — 11000001 HC ACUTE MED/SURG PRIVATE ROOM

## 2023-06-29 PROCEDURE — 85025 COMPLETE CBC W/AUTO DIFF WBC: CPT | Mod: ER | Performed by: NURSE PRACTITIONER

## 2023-06-30 ENCOUNTER — ANESTHESIA (OUTPATIENT)
Dept: INTENSIVE CARE | Facility: HOSPITAL | Age: 48
DRG: 918 | End: 2023-06-30
Payer: MEDICAID

## 2023-06-30 ENCOUNTER — CLINICAL SUPPORT (OUTPATIENT)
Dept: SMOKING CESSATION | Facility: CLINIC | Age: 48
End: 2023-06-30
Payer: MEDICAID

## 2023-06-30 ENCOUNTER — ANESTHESIA EVENT (OUTPATIENT)
Dept: INTENSIVE CARE | Facility: HOSPITAL | Age: 48
DRG: 918 | End: 2023-06-30
Payer: MEDICAID

## 2023-06-30 DIAGNOSIS — F17.210 CIGARETTE SMOKER: Primary | ICD-10-CM

## 2023-06-30 PROBLEM — T40.2X1A OPIOID OVERDOSE: Status: ACTIVE | Noted: 2023-06-30

## 2023-06-30 PROBLEM — I44.1 SECOND DEGREE HEART BLOCK: Status: ACTIVE | Noted: 2023-06-30

## 2023-06-30 PROBLEM — R00.1 BRADYCARDIA: Status: ACTIVE | Noted: 2023-06-30

## 2023-06-30 PROBLEM — R93.89 ABNORMAL CT OF THE CHEST: Status: ACTIVE | Noted: 2023-06-30

## 2023-06-30 PROBLEM — D64.9 ACUTE ON CHRONIC ANEMIA: Status: ACTIVE | Noted: 2023-06-30

## 2023-06-30 PROBLEM — F19.10 DRUG ABUSE, IV: Status: ACTIVE | Noted: 2023-06-30

## 2023-06-30 PROBLEM — L02.419 ARM ABSCESS: Status: ACTIVE | Noted: 2023-06-30

## 2023-06-30 LAB
ABO + RH BLD: NORMAL
ALBUMIN SERPL BCP-MCNC: 2.7 G/DL (ref 3.5–5.2)
ALLENS TEST: ABNORMAL
ALP SERPL-CCNC: 71 U/L (ref 55–135)
ALT SERPL W/O P-5'-P-CCNC: <5 U/L (ref 10–44)
AMPHET+METHAMPHET UR QL: NEGATIVE
ANION GAP SERPL CALC-SCNC: 5 MMOL/L (ref 8–16)
ANISOCYTOSIS BLD QL SMEAR: SLIGHT
ANISOCYTOSIS BLD QL SMEAR: SLIGHT
ASCENDING AORTA: 2.55 CM
AST SERPL-CCNC: 11 U/L (ref 10–40)
AV INDEX (PROSTH): 0.78
AV MEAN GRADIENT: 7 MMHG
AV PEAK GRADIENT: 14 MMHG
AV VALVE AREA: 2.06 CM2
AV VELOCITY RATIO: 0.78
BARBITURATES UR QL SCN>200 NG/ML: NEGATIVE
BASOPHILS # BLD AUTO: 0.05 K/UL (ref 0–0.2)
BASOPHILS NFR BLD: 0 % (ref 0–1.9)
BASOPHILS NFR BLD: 0.8 % (ref 0–1.9)
BENZODIAZ UR QL SCN>200 NG/ML: NEGATIVE
BILIRUB SERPL-MCNC: 0.2 MG/DL (ref 0.1–1)
BLD GP AB SCN CELLS X3 SERPL QL: NORMAL
BLD PROD TYP BPU: NORMAL
BLOOD UNIT EXPIRATION DATE: NORMAL
BLOOD UNIT TYPE CODE: 5100
BLOOD UNIT TYPE: NORMAL
BSA FOR ECHO PROCEDURE: 1.82 M2
BUN SERPL-MCNC: 11 MG/DL (ref 6–20)
BURR CELLS BLD QL SMEAR: ABNORMAL
BURR CELLS BLD QL SMEAR: ABNORMAL
BZE UR QL SCN: ABNORMAL
CALCIUM SERPL-MCNC: 8.4 MG/DL (ref 8.7–10.5)
CANNABINOIDS UR QL SCN: NEGATIVE
CHLORIDE SERPL-SCNC: 109 MMOL/L (ref 95–110)
CO2 SERPL-SCNC: 26 MMOL/L (ref 23–29)
CODING SYSTEM: NORMAL
CREAT SERPL-MCNC: 0.7 MG/DL (ref 0.5–1.4)
CREAT UR-MCNC: 150.2 MG/DL (ref 15–325)
CROSSMATCH INTERPRETATION: NORMAL
CV ECHO LV RWT: 0.42 CM
DACRYOCYTES BLD QL SMEAR: ABNORMAL
DACRYOCYTES BLD QL SMEAR: ABNORMAL
DELSYS: ABNORMAL
DIFFERENTIAL METHOD: ABNORMAL
DIFFERENTIAL METHOD: ABNORMAL
DISPENSE STATUS: NORMAL
DOP CALC AO PEAK VEL: 1.89 M/S
DOP CALC AO VTI: 44.4 CM
DOP CALC LVOT AREA: 2.6 CM2
DOP CALC LVOT DIAMETER: 1.83 CM
DOP CALC LVOT PEAK VEL: 1.47 M/S
DOP CALC LVOT STROKE VOLUME: 91.49 CM3
DOP CALCLVOT PEAK VEL VTI: 34.8 CM
E WAVE DECELERATION TIME: 316.64 MSEC
E/A RATIO: 1.8
E/E' RATIO: 10.64 M/S
ECHO LV POSTERIOR WALL: 1.02 CM (ref 0.6–1.1)
EJECTION FRACTION: 65 %
EOSINOPHIL # BLD AUTO: 0.1 K/UL (ref 0–0.5)
EOSINOPHIL NFR BLD: 0 % (ref 0–8)
EOSINOPHIL NFR BLD: 1.1 % (ref 0–8)
ERYTHROCYTE [DISTWIDTH] IN BLOOD BY AUTOMATED COUNT: 18 % (ref 11.5–14.5)
ERYTHROCYTE [DISTWIDTH] IN BLOOD BY AUTOMATED COUNT: 19.8 % (ref 11.5–14.5)
EST. GFR  (NO RACE VARIABLE): >60 ML/MIN/1.73 M^2
FERRITIN SERPL-MCNC: 10 NG/ML (ref 20–300)
FRACTIONAL SHORTENING: 44 % (ref 28–44)
GIANT PLATELETS BLD QL SMEAR: PRESENT
GLUCOSE SERPL-MCNC: 100 MG/DL (ref 70–110)
HCO3 UR-SCNC: 25.1 MMOL/L (ref 24–28)
HCT VFR BLD AUTO: 23.9 % (ref 37–48.5)
HCT VFR BLD AUTO: 28.7 % (ref 37–48.5)
HCT VFR BLD CALC: 29 %PCV (ref 36–54)
HGB BLD-MCNC: 10 G/DL
HGB BLD-MCNC: 6.5 G/DL (ref 12–16)
HGB BLD-MCNC: 8.2 G/DL (ref 12–16)
HYPOCHROMIA BLD QL SMEAR: ABNORMAL
HYPOCHROMIA BLD QL SMEAR: ABNORMAL
IMM GRANULOCYTES # BLD AUTO: 0.04 K/UL (ref 0–0.04)
IMM GRANULOCYTES # BLD AUTO: ABNORMAL K/UL (ref 0–0.04)
IMM GRANULOCYTES NFR BLD AUTO: 0.6 % (ref 0–0.5)
IMM GRANULOCYTES NFR BLD AUTO: ABNORMAL % (ref 0–0.5)
INTERVENTRICULAR SEPTUM: 0.62 CM (ref 0.6–1.1)
IRON SERPL-MCNC: 17 UG/DL (ref 30–160)
IVC DIAMETER: 1.54 CM
LA MAJOR: 5.83 CM
LA MINOR: 4.39 CM
LA WIDTH: 4.4 CM
LEFT ATRIUM SIZE: 3.37 CM
LEFT ATRIUM VOLUME INDEX MOD: 33.7 ML/M2
LEFT ATRIUM VOLUME INDEX: 35.9 ML/M2
LEFT ATRIUM VOLUME MOD: 59.33 CM3
LEFT ATRIUM VOLUME: 63.13 CM3
LEFT INTERNAL DIMENSION IN SYSTOLE: 2.68 CM (ref 2.1–4)
LEFT VENTRICLE DIASTOLIC VOLUME INDEX: 61.26 ML/M2
LEFT VENTRICLE DIASTOLIC VOLUME: 107.82 ML
LEFT VENTRICLE MASS INDEX: 75 G/M2
LEFT VENTRICLE SYSTOLIC VOLUME INDEX: 15.1 ML/M2
LEFT VENTRICLE SYSTOLIC VOLUME: 26.52 ML
LEFT VENTRICULAR INTERNAL DIMENSION IN DIASTOLE: 4.81 CM (ref 3.5–6)
LEFT VENTRICULAR MASS: 131.27 G
LV LATERAL E/E' RATIO: 9.75 M/S
LV SEPTAL E/E' RATIO: 11.7 M/S
LVOT MG: 4.42 MMHG
LVOT MV: 0.97 CM/S
LYMPHOCYTES # BLD AUTO: 1.4 K/UL (ref 1–4.8)
LYMPHOCYTES NFR BLD: 16 % (ref 18–48)
LYMPHOCYTES NFR BLD: 22.2 % (ref 18–48)
MCH RBC QN AUTO: 18.4 PG (ref 27–31)
MCH RBC QN AUTO: 19.5 PG (ref 27–31)
MCHC RBC AUTO-ENTMCNC: 27.2 G/DL (ref 32–36)
MCHC RBC AUTO-ENTMCNC: 28.6 G/DL (ref 32–36)
MCV RBC AUTO: 68 FL (ref 82–98)
MCV RBC AUTO: 68 FL (ref 82–98)
METHADONE UR QL SCN>300 NG/ML: NEGATIVE
MONOCYTES # BLD AUTO: 0.3 K/UL (ref 0.3–1)
MONOCYTES NFR BLD: 5 % (ref 4–15)
MONOCYTES NFR BLD: 5.3 % (ref 4–15)
MV PEAK A VEL: 0.65 M/S
MV PEAK E VEL: 1.17 M/S
MV STENOSIS PRESSURE HALF TIME: 69 MS
MV VALVE AREA P 1/2 METHOD: 3.19 CM2
NEUTROPHILS # BLD AUTO: 4.4 K/UL (ref 1.8–7.7)
NEUTROPHILS NFR BLD: 70 % (ref 38–73)
NEUTROPHILS NFR BLD: 79 % (ref 38–73)
NRBC BLD-RTO: 0 /100 WBC
NRBC BLD-RTO: 0 /100 WBC
OHS LV EJECTION FRACTION SIMPSONS BIPLANE MOD: 7 %
OPIATES UR QL SCN: ABNORMAL
OVALOCYTES BLD QL SMEAR: ABNORMAL
OVALOCYTES BLD QL SMEAR: ABNORMAL
PCO2 BLDA: 33.3 MMHG (ref 35–45)
PCP UR QL SCN>25 NG/ML: NEGATIVE
PH SMN: 7.48 [PH] (ref 7.35–7.45)
PISA TR MAX VEL: 2.71 M/S
PLATELET # BLD AUTO: 302 K/UL (ref 150–450)
PLATELET # BLD AUTO: 315 K/UL (ref 150–450)
PLATELET BLD QL SMEAR: ABNORMAL
PLATELET BLD QL SMEAR: ABNORMAL
PMV BLD AUTO: 11.2 FL (ref 9.2–12.9)
PMV BLD AUTO: ABNORMAL FL (ref 9.2–12.9)
PO2 BLDA: 166 MMHG (ref 80–100)
POC BE: 2 MMOL/L
POC SATURATED O2: 100 % (ref 95–100)
POC TCO2: 26 MMOL/L (ref 23–27)
POCT GLUCOSE: 79 MG/DL (ref 70–110)
POCT GLUCOSE: 94 MG/DL (ref 70–110)
POIKILOCYTOSIS BLD QL SMEAR: SLIGHT
POIKILOCYTOSIS BLD QL SMEAR: SLIGHT
POLYCHROMASIA BLD QL SMEAR: ABNORMAL
POLYCHROMASIA BLD QL SMEAR: ABNORMAL
POTASSIUM BLD-SCNC: 3.6 MMOL/L (ref 3.5–5.1)
POTASSIUM SERPL-SCNC: 3.8 MMOL/L (ref 3.5–5.1)
PROT SERPL-MCNC: 7 G/DL (ref 6–8.4)
PULM VEIN S/D RATIO: 1
PV MV: 0.81 M/S
PV PEAK D VEL: 0.68 M/S
PV PEAK S VEL: 0.68 M/S
PV PEAK VELOCITY: 1.12 CM/S
RA MAJOR: 4.6 CM
RA PRESSURE: 3 MMHG
RA VOL SYS: 45.98 ML
RA WIDTH: 4.1 CM
RBC # BLD AUTO: 3.54 M/UL (ref 4–5.4)
RBC # BLD AUTO: 4.21 M/UL (ref 4–5.4)
RETICS/RBC NFR AUTO: 1.3 % (ref 0.5–2.5)
RIGHT ATRIAL AREA: 16.1 CM2
SAMPLE: ABNORMAL
SATURATED IRON: 5 % (ref 20–50)
SCHISTOCYTES BLD QL SMEAR: ABNORMAL
SCHISTOCYTES BLD QL SMEAR: ABNORMAL
SITE: ABNORMAL
SODIUM BLD-SCNC: 141 MMOL/L (ref 136–145)
SODIUM SERPL-SCNC: 140 MMOL/L (ref 136–145)
SPECIMEN OUTDATE: NORMAL
STJ: 2.21 CM
TARGETS BLD QL SMEAR: ABNORMAL
TARGETS BLD QL SMEAR: ABNORMAL
TDI LATERAL: 0.12 M/S
TDI SEPTAL: 0.1 M/S
TDI: 0.11 M/S
TOTAL IRON BINDING CAPACITY: 370 UG/DL (ref 250–450)
TOXICOLOGY INFORMATION: ABNORMAL
TR MAX PG: 29 MMHG
TRANS ERYTHROCYTES VOL PATIENT: NORMAL ML
TRANSFERRIN SERPL-MCNC: 250 MG/DL (ref 200–375)
TSH SERPL DL<=0.005 MIU/L-ACNC: 1.9 UIU/ML (ref 0.4–4)
TV REST PULMONARY ARTERY PRESSURE: 32 MMHG
WBC # BLD AUTO: 13.96 K/UL (ref 3.9–12.7)
WBC # BLD AUTO: 6.23 K/UL (ref 3.9–12.7)

## 2023-06-30 PROCEDURE — P9021 RED BLOOD CELLS UNIT: HCPCS | Performed by: INTERNAL MEDICINE

## 2023-06-30 PROCEDURE — 63600175 PHARM REV CODE 636 W HCPCS: Performed by: INTERNAL MEDICINE

## 2023-06-30 PROCEDURE — 63600175 PHARM REV CODE 636 W HCPCS

## 2023-06-30 PROCEDURE — 36410 VNPNXR 3YR/> PHY/QHP DX/THER: CPT | Mod: ,,, | Performed by: ANESTHESIOLOGY

## 2023-06-30 PROCEDURE — 93005 ELECTROCARDIOGRAM TRACING: CPT

## 2023-06-30 PROCEDURE — 36600 WITHDRAWAL OF ARTERIAL BLOOD: CPT

## 2023-06-30 PROCEDURE — 82728 ASSAY OF FERRITIN: CPT | Performed by: INTERNAL MEDICINE

## 2023-06-30 PROCEDURE — 80307 DRUG TEST PRSMV CHEM ANLYZR: CPT | Performed by: INTERNAL MEDICINE

## 2023-06-30 PROCEDURE — 11000001 HC ACUTE MED/SURG PRIVATE ROOM

## 2023-06-30 PROCEDURE — 93010 ELECTROCARDIOGRAM REPORT: CPT | Mod: 59,,, | Performed by: INTERNAL MEDICINE

## 2023-06-30 PROCEDURE — 63600175 PHARM REV CODE 636 W HCPCS: Performed by: STUDENT IN AN ORGANIZED HEALTH CARE EDUCATION/TRAINING PROGRAM

## 2023-06-30 PROCEDURE — 93010 EKG 12-LEAD: ICD-10-PCS | Mod: ,,, | Performed by: INTERNAL MEDICINE

## 2023-06-30 PROCEDURE — 25000003 PHARM REV CODE 250

## 2023-06-30 PROCEDURE — 86920 COMPATIBILITY TEST SPIN: CPT | Performed by: INTERNAL MEDICINE

## 2023-06-30 PROCEDURE — 93010 EKG 12-LEAD: ICD-10-PCS | Mod: 59,,, | Performed by: INTERNAL MEDICINE

## 2023-06-30 PROCEDURE — 99222 1ST HOSP IP/OBS MODERATE 55: CPT | Mod: ,,, | Performed by: NURSE PRACTITIONER

## 2023-06-30 PROCEDURE — 36000 PLACE NEEDLE IN VEIN: CPT | Performed by: ANESTHESIOLOGY

## 2023-06-30 PROCEDURE — 85027 COMPLETE CBC AUTOMATED: CPT | Performed by: FAMILY MEDICINE

## 2023-06-30 PROCEDURE — 36410 PERIPHERAL IV INSERTION: ICD-10-PCS | Mod: ,,, | Performed by: ANESTHESIOLOGY

## 2023-06-30 PROCEDURE — 93010 ELECTROCARDIOGRAM REPORT: CPT | Mod: ,,, | Performed by: INTERNAL MEDICINE

## 2023-06-30 PROCEDURE — 99406 PT REFUSED TOBACCO CESSATION: ICD-10-PCS | Mod: S$PBB,,,

## 2023-06-30 PROCEDURE — 85025 COMPLETE CBC W/AUTO DIFF WBC: CPT | Performed by: INTERNAL MEDICINE

## 2023-06-30 PROCEDURE — 27000221 HC OXYGEN, UP TO 24 HOURS

## 2023-06-30 PROCEDURE — 99900035 HC TECH TIME PER 15 MIN (STAT)

## 2023-06-30 PROCEDURE — 36415 COLL VENOUS BLD VENIPUNCTURE: CPT | Performed by: FAMILY MEDICINE

## 2023-06-30 PROCEDURE — 86900 BLOOD TYPING SEROLOGIC ABO: CPT | Performed by: INTERNAL MEDICINE

## 2023-06-30 PROCEDURE — 84466 ASSAY OF TRANSFERRIN: CPT | Performed by: INTERNAL MEDICINE

## 2023-06-30 PROCEDURE — 85045 AUTOMATED RETICULOCYTE COUNT: CPT | Performed by: INTERNAL MEDICINE

## 2023-06-30 PROCEDURE — 94761 N-INVAS EAR/PLS OXIMETRY MLT: CPT

## 2023-06-30 PROCEDURE — 25000003 PHARM REV CODE 250: Performed by: STUDENT IN AN ORGANIZED HEALTH CARE EDUCATION/TRAINING PROGRAM

## 2023-06-30 PROCEDURE — 85007 BL SMEAR W/DIFF WBC COUNT: CPT | Performed by: FAMILY MEDICINE

## 2023-06-30 PROCEDURE — 93010 ELECTROCARDIOGRAM REPORT: CPT | Mod: 59,76,, | Performed by: INTERNAL MEDICINE

## 2023-06-30 PROCEDURE — 36430 TRANSFUSION BLD/BLD COMPNT: CPT

## 2023-06-30 PROCEDURE — 99406 BEHAV CHNG SMOKING 3-10 MIN: CPT | Mod: S$PBB,,,

## 2023-06-30 PROCEDURE — 84443 ASSAY THYROID STIM HORMONE: CPT | Performed by: INTERNAL MEDICINE

## 2023-06-30 PROCEDURE — 36415 COLL VENOUS BLD VENIPUNCTURE: CPT | Performed by: INTERNAL MEDICINE

## 2023-06-30 PROCEDURE — 82803 BLOOD GASES ANY COMBINATION: CPT

## 2023-06-30 PROCEDURE — 99222 PR INITIAL HOSPITAL CARE,LEVL II: ICD-10-PCS | Mod: ,,, | Performed by: NURSE PRACTITIONER

## 2023-06-30 PROCEDURE — 80053 COMPREHEN METABOLIC PANEL: CPT | Performed by: INTERNAL MEDICINE

## 2023-06-30 RX ORDER — POTASSIUM CHLORIDE 7.45 MG/ML
80 INJECTION INTRAVENOUS
Status: DISCONTINUED | OUTPATIENT
Start: 2023-06-30 | End: 2023-07-02

## 2023-06-30 RX ORDER — LIDOCAINE HYDROCHLORIDE 10 MG/ML
1 INJECTION, SOLUTION EPIDURAL; INFILTRATION; INTRACAUDAL; PERINEURAL ONCE
Status: COMPLETED | OUTPATIENT
Start: 2023-06-30 | End: 2023-06-30

## 2023-06-30 RX ORDER — HYDROXYZINE HYDROCHLORIDE 25 MG/1
50 TABLET, FILM COATED ORAL NIGHTLY PRN
Status: DISCONTINUED | OUTPATIENT
Start: 2023-06-30 | End: 2023-07-03 | Stop reason: HOSPADM

## 2023-06-30 RX ORDER — NALOXONE HCL 0.4 MG/ML
0.4 VIAL (ML) INJECTION
Status: DISCONTINUED | OUTPATIENT
Start: 2023-06-30 | End: 2023-07-03 | Stop reason: HOSPADM

## 2023-06-30 RX ORDER — HYDROCODONE BITARTRATE AND ACETAMINOPHEN 500; 5 MG/1; MG/1
TABLET ORAL
Status: DISCONTINUED | OUTPATIENT
Start: 2023-06-30 | End: 2023-06-30

## 2023-06-30 RX ORDER — LIDOCAINE HYDROCHLORIDE 10 MG/ML
INJECTION, SOLUTION EPIDURAL; INFILTRATION; INTRACAUDAL; PERINEURAL
Status: COMPLETED
Start: 2023-06-30 | End: 2023-06-30

## 2023-06-30 RX ORDER — NALOXONE HCL 0.4 MG/ML
VIAL (ML) INJECTION
Status: DISPENSED
Start: 2023-06-30 | End: 2023-06-30

## 2023-06-30 RX ORDER — SODIUM CHLORIDE 0.9 % (FLUSH) 0.9 %
10 SYRINGE (ML) INJECTION
Status: DISCONTINUED | OUTPATIENT
Start: 2023-06-30 | End: 2023-07-03 | Stop reason: HOSPADM

## 2023-06-30 RX ORDER — LOPERAMIDE HYDROCHLORIDE 2 MG/1
2 CAPSULE ORAL
Status: DISCONTINUED | OUTPATIENT
Start: 2023-06-30 | End: 2023-07-03 | Stop reason: HOSPADM

## 2023-06-30 RX ORDER — MAGNESIUM SULFATE HEPTAHYDRATE 40 MG/ML
2 INJECTION, SOLUTION INTRAVENOUS
Status: DISCONTINUED | OUTPATIENT
Start: 2023-06-30 | End: 2023-07-02

## 2023-06-30 RX ORDER — NALOXONE HCL 0.4 MG/ML
0.4 VIAL (ML) INJECTION ONCE
Status: COMPLETED | OUTPATIENT
Start: 2023-06-30 | End: 2023-06-30

## 2023-06-30 RX ORDER — ACETAMINOPHEN 325 MG/1
650 TABLET ORAL EVERY 4 HOURS PRN
Status: DISCONTINUED | OUTPATIENT
Start: 2023-06-30 | End: 2023-07-03 | Stop reason: HOSPADM

## 2023-06-30 RX ORDER — ONDANSETRON 2 MG/ML
4 INJECTION INTRAMUSCULAR; INTRAVENOUS EVERY 8 HOURS PRN
Status: DISCONTINUED | OUTPATIENT
Start: 2023-06-30 | End: 2023-07-03 | Stop reason: HOSPADM

## 2023-06-30 RX ORDER — POTASSIUM CHLORIDE 7.45 MG/ML
40 INJECTION INTRAVENOUS
Status: DISCONTINUED | OUTPATIENT
Start: 2023-06-30 | End: 2023-07-02

## 2023-06-30 RX ORDER — LOPERAMIDE HYDROCHLORIDE 2 MG/1
4 CAPSULE ORAL ONCE
Status: COMPLETED | OUTPATIENT
Start: 2023-06-30 | End: 2023-06-30

## 2023-06-30 RX ORDER — POTASSIUM CHLORIDE 7.45 MG/ML
60 INJECTION INTRAVENOUS
Status: DISCONTINUED | OUTPATIENT
Start: 2023-06-30 | End: 2023-07-02

## 2023-06-30 RX ORDER — NALOXONE HCL 0.4 MG/ML
0.4 VIAL (ML) INJECTION ONCE
Status: DISCONTINUED | OUTPATIENT
Start: 2023-06-30 | End: 2023-06-30

## 2023-06-30 RX ORDER — ATROPINE SULFATE 0.1 MG/ML
1 INJECTION INTRAVENOUS
Status: DISCONTINUED | OUTPATIENT
Start: 2023-06-30 | End: 2023-07-03 | Stop reason: HOSPADM

## 2023-06-30 RX ORDER — DICYCLOMINE HYDROCHLORIDE 10 MG/1
10 CAPSULE ORAL EVERY 6 HOURS PRN
Status: DISCONTINUED | OUTPATIENT
Start: 2023-06-30 | End: 2023-07-03 | Stop reason: HOSPADM

## 2023-06-30 RX ORDER — NALOXONE HCL 0.4 MG/ML
VIAL (ML) INJECTION
Status: COMPLETED
Start: 2023-06-30 | End: 2023-06-30

## 2023-06-30 RX ORDER — MAGNESIUM SULFATE HEPTAHYDRATE 40 MG/ML
4 INJECTION, SOLUTION INTRAVENOUS
Status: DISCONTINUED | OUTPATIENT
Start: 2023-06-30 | End: 2023-07-02

## 2023-06-30 RX ADMIN — NALXONE HYDROCHLORIDE 0.4 MG: 0.4 INJECTION INTRAMUSCULAR; INTRAVENOUS; SUBCUTANEOUS at 01:06

## 2023-06-30 RX ADMIN — IRON SUCROSE 200 MG: 20 INJECTION, SOLUTION INTRAVENOUS at 11:06

## 2023-06-30 RX ADMIN — NALXONE HYDROCHLORIDE 0.4 MG: 0.4 INJECTION INTRAMUSCULAR; INTRAVENOUS; SUBCUTANEOUS at 12:06

## 2023-06-30 RX ADMIN — NALOXONE HYDROCHLORIDE 0.4 MG/HR: 1 INJECTION PARENTERAL at 03:06

## 2023-06-30 RX ADMIN — LOPERAMIDE HYDROCHLORIDE 4 MG: 2 CAPSULE ORAL at 03:06

## 2023-06-30 RX ADMIN — DICYCLOMINE HYDROCHLORIDE 10 MG: 10 CAPSULE ORAL at 03:06

## 2023-06-30 RX ADMIN — NALXONE HYDROCHLORIDE 0.4 MG: 0.4 INJECTION INTRAMUSCULAR; INTRAVENOUS; SUBCUTANEOUS at 09:06

## 2023-06-30 RX ADMIN — LIDOCAINE HYDROCHLORIDE 10 MG: 10 INJECTION, SOLUTION EPIDURAL; INFILTRATION; INTRACAUDAL; PERINEURAL at 03:06

## 2023-06-30 NOTE — H&P
"[facility] Medicine  History & Physical    Patient Name: Margarita Wiseman  MRN: [id number]  Patient Class: OP- Observation  Admission Date: 6/29/2023  Attending Physician: Kim Solitario MD   Primary Care Provider: Primary Doctor No         Patient information was obtained from patient, past medical records and ER records.     Subjective:     Principal Problem:Opioid overdose    Chief Complaint:   Chief Complaint   Patient presents with    Chest Pain     Reports to ED c c/o chest pain. Pt involved in a shoplifting case that turned into an aggravated flight. [place] Pt c/o chest pain started intermittently ever since her R leg pain started.         HPI: 47 yo female with PMHx of IVDU here for apparent chest pain.    Per ED note: "Patient brought in by police after a shop lifting incident.  The vehicle that patient was in elevated police [place]  Patient then got out and ran [place] the [place].  After the incident and when patient was detained by police she states that she had increased shortness of breath and felt like she was having an asthma attack."    After further discussion with patient, it turns out that she was lying about her symptoms.  When I found her on interview, she was lethargic and difficult to arouse despite sternal rub.  She then got Narcan 0.4 mg with a terrific response.  However, she continued to deny any recent drugs.  Her mental status than waned again to lethargy.  We gave her 2nd dose of Narcan with somewhat good response.  Due to continued Narcan pushes and mental status changes with concern for airway protection, she was transferred to the ICU.    Further medical history includes labs today showing a hemoglobin of 6.1 down from a baseline of 7s.  She apparently has a history of iron-deficient anemia, but she is not on any medications.      History reviewed. No pertinent past medical " history.    History reviewed. No pertinent surgical history.    Review of patient's allergies indicates:  No Known Allergies    No current facility-administered medications on file prior to encounter.     Current Outpatient Medications on File Prior to Encounter   Medication Sig    ibuprofen (ADVIL,MOTRIN) 800 MG tablet Take 1 tablet (800 mg total) by mouth every 6 (six) hours as needed for Pain.    naloxone (NARCAN) 4 mg/actuation Spry 4mg by nasal route as needed for opioid overdose; may repeat every 2-3 minutes in alternating nostrils until medical help arrives. Call 911     Family History    None       Tobacco Use    Smoking status: Every Day     Types: Cigarettes    Smokeless tobacco: Not on file   Substance and Sexual Activity    Alcohol use: Not Currently    Drug use: Not on file    Sexual activity: Not Currently     Review of Systems   Unable to perform ROS: Mental status change   Objective:     Vital Signs (Most Recent):  Temp: 97.3 °F (36.3 °C) (06/30/23 0140)  Pulse: (!) 51 (06/30/23 0215)  Resp: (!) 24 (06/30/23 0215)  BP: (!) 108/54 (06/30/23 0215)  SpO2: 100 % (06/30/23 0215) Vital Signs (24h Range):  Temp:  [96.2 °F (35.7 °C)-98.6 °F (37 °C)] 97.3 °F (36.3 °C)  Pulse:  [39-83] 51  Resp:  [16-45] 24  SpO2:  [94 %-100 %] 100 %  BP: ()/(54-64) 108/54     Weight: 78.5 kg (173 lb 1 oz)  Body mass index is 33.8 kg/m².     Physical Exam  Vitals and nursing note reviewed.   Constitutional:       Appearance: She is obese. She is ill-appearing and diaphoretic.   HENT:      Head: Normocephalic and atraumatic.      Mouth/Throat:      Mouth: Mucous membranes are dry.   Eyes:      Extraocular Movements: Extraocular movements intact.      Pupils: Pupils are equal, round, and reactive to light.   Cardiovascular:      Rate and Rhythm: Regular rhythm. Bradycardia present.      Pulses: Normal pulses.      Heart sounds: Normal heart sounds. No murmur heard.  Pulmonary:      Effort: Pulmonary effort is  normal.      Breath sounds: Normal breath sounds. No wheezing or rales.   Abdominal:      General: Abdomen is flat. Bowel sounds are normal. There is no distension.      Palpations: Abdomen is soft.      Tenderness: There is no abdominal tenderness.   Musculoskeletal:         General: No swelling.      Comments: Left forearm with ulcerated wound that is healing without purulent drainage or erythema   Skin:     General: Skin is warm.      Capillary Refill: Capillary refill takes less than 2 seconds.      Comments: Multiple skin scars from needles? along legs bilaterally   Neurological:      Mental Status: She is lethargic and confused.            CRANIAL NERVES     CN III, IV, VI   Pupils are equal, round, and reactive to light.     Significant Labs: All pertinent labs within the past 24 hours have been reviewed.    Significant Imaging: I have reviewed all pertinent imaging results/findings within the past 24 hours.    Assessment/Plan:     * Opioid overdose  - S/p Narcan 0.4mg x 2 with terrific response acutely. Patient woke up, moved around, starting showing signs of opioid withdrawal -- yawning, salivation, pain, etc. However, narcan did not have continued response  - Will start Narcan gtt @ 0.4 mg/hr and titrate as needed for RAAS 0 to -1  - UDS pending  - Repeat labs in general  - Patient denying any drug use. Does have history of Iv drug    Acute on chronic anemia  - Hgb 6.1 down from 7s. Initially thought to be symptomatic anemia but patient said she lied about symptoms  - Unsure of etiology but did deny any hematochezia, hematemesis, or melena  - Type and screen now  - transfuse 1U pRBC; consent obtained  - CBC q8h  - Iron panel, ferritin, retic pending  - TSH pending      Arm abscess  - Chronic abscess on arm that is healing. No drainage or erythema  - No signs of infection  - Consult wound care    Drug abuse, IV  - Will need counseling on drug abuse  - Social work consult  - UDS pending        VTE Risk  Mitigation (From admission, onward)         Ordered     IP VTE HIGH RISK PATIENT  Once         06/30/23 0112     Place sequential compression device  Until discontinued         06/30/23 0112              Critical care time spent on the evaluation and treatment of severe organ dysfunction, review of pertinent labs and imaging studies, discussions with consulting providers and discussions with patient/family: > 60 minutes.         Latrell Santiago MD  Department of Hospital Medicine  [facility] - Intensive Care

## 2023-06-30 NOTE — PLAN OF CARE
Problem: Adult Inpatient Plan of Care  Goal: Plan of Care Review  Outcome: Ongoing, Progressing  Goal: Patient-Specific Goal (Individualized)  Outcome: Ongoing, Progressing  Goal: Absence of Hospital-Acquired Illness or Injury  Outcome: Ongoing, Progressing  Goal: Optimal Comfort and Wellbeing  Outcome: Ongoing, Progressing  Goal: Readiness for Transition of Care  Outcome: Ongoing, Progressing     Problem: Anemia  Goal: Anemia Symptom Improvement  Outcome: Ongoing, Progressing     Problem: Fall Injury Risk  Goal: Absence of Fall and Fall-Related Injury  Outcome: Ongoing, Progressing     Problem: Asthma Comorbidity  Goal: Maintenance of Asthma Control  Outcome: Ongoing, Progressing     Problem: Hypertension Comorbidity  Goal: Blood Pressure in Desired Range  Outcome: Ongoing, Progressing     Problem: Pain Chronic (Persistent) (Comorbidity Management)  Goal: Acceptable Pain Control and Functional Ability  Outcome: Ongoing, Progressing     Problem: Physiologic Impairment (Excessive Substance Use)  Goal: Improved Physiologic Symptoms (Excessive Substance Use)  Outcome: Ongoing, Progressing     Problem: Social, Occupational or Functional Impairment (Excessive Substance Use)  Goal: Enhanced Social, Occupational or Functional Skills (Excessive Substance Use)  Outcome: Ongoing, Progressing     Problem: Impaired Wound Healing  Goal: Optimal Wound Healing  Outcome: Ongoing, Progressing

## 2023-06-30 NOTE — CONSULTS
[facility] - Intensive Care  Pulmonology  Consult Note    Patient Name: Margarita Wiseman  MRN: [id number]  Admission Date: 6/29/2023  Hospital Length of Stay: 0 days  Code Status: Full Code  Attending Physician: Shelbie He*  Primary Care Provider: Primary Doctor No   Principal Problem: Opioid overdose    Consults  Subjective:     HPI:  48 year old with no known past medical history who presented to [facility] with an opioid overdose. Patient is somonlent and minimal arousable so history is provided via chart review. It is unclear what the events leading up to her presentation were but it sounded as if patient was initially agitated, and was detained by police and complaining of an asthma attack with shortness of breath. Those symptoms appaered to have resolved and by the time she presented to the ED she was unresponsive and somnolent. She received 2 narcans with excellent response. She was admitted to the ICU on a continuous narcan gtt. Patient was somnolent at the time of my interview, bradypneic because narcan gtt had to be paused to administer blood. She received an additional narcan with excellent response and narcan gtt resumed. Critical care was consulted for comanagement for opiate overdose.      History reviewed. No pertinent past medical history.    History reviewed. No pertinent surgical history.    Review of patient's allergies indicates:  No Known Allergies    Family History    None       Tobacco Use    Smoking status: Every Day     Types: Cigarettes    Smokeless tobacco: Not on file   Substance and Sexual Activity    Alcohol use: Not Currently    Drug use: Not on file    Sexual activity: Not Currently         Review of Systems   Unable to perform ROS: Mental status change   Objective:     Vital Signs (Most Recent):  Temp: 99 °F (37.2 °C) (06/30/23 1115)  Pulse: (!) 54 (06/30/23 1115)  Resp: (!) 31 (06/30/23 1115)  BP: 112/62 (06/30/23 0900)  SpO2: 97 % (06/30/23 1115) Vital Signs (24h  Range):  Temp:  [96.2 °F (35.7 °C)-99.3 °F (37.4 °C)] 99 °F (37.2 °C)  Pulse:  [39-83] 54  Resp:  [8-45] 31  SpO2:  [87 %-100 %] 97 %  BP: ()/(52-64) 112/62     Weight: 78.5 kg (173 lb 1 oz)  Body mass index is 33.8 kg/m².      Intake/Output Summary (Last 24 hours) at 6/30/2023 1327  Last data filed at 6/30/2023 0800  Gross per 24 hour   Intake 227.18 ml   Output --   Net 227.18 ml        Physical Exam  Vitals and nursing note reviewed.   Constitutional:       Appearance: She is obese.      Comments: Somnolent, intermittently responsive   HENT:      Head: Normocephalic and atraumatic.      Nose: Nose normal.      Mouth/Throat:      Mouth: Mucous membranes are dry.   Eyes:      Extraocular Movements: Extraocular movements intact.      Pupils: Pupils are equal, round, and reactive to light.   Cardiovascular:      Rate and Rhythm: Regular rhythm. Bradycardia present.      Pulses: Normal pulses.      Heart sounds: Normal heart sounds. No murmur heard.  Pulmonary:      Effort: Pulmonary effort is normal.      Breath sounds: Normal breath sounds. No wheezing or rales.   Abdominal:      General: Abdomen is flat. Bowel sounds are normal. There is no distension.      Palpations: Abdomen is soft.      Tenderness: There is no abdominal tenderness.   Musculoskeletal:         General: No swelling.      Comments: Left arm wound, does not appear infected   Skin:     General: Skin is warm.      Capillary Refill: Capillary refill takes less than 2 seconds.   Neurological:      General: No focal deficit present.      Mental Status: She is lethargic and confused.        Vents:  Oxygen Concentration (%): 35 (06/30/23 0134)    Lines/Drains/Airways       Drain  Duration             Female External Urinary Catheter 06/30/23 0140 <1 day              Peripheral Intravenous Line  Duration                  Peripheral IV - Single Lumen 06/30/23 0235 18 G <1 day                    Significant Labs:    CBC/Anemia Profile:  Recent Labs    Lab 06/29/23 2044 06/30/23  0135 06/30/23  0340   WBC 6.67  --  6.23   HGB 6.2*  --  6.5*   HCT 22.4* 29* 23.9*     --  315   MCV 68*  --  68*   RDW 17.5*  --  18.0*   IRON  --   --  17*   FERRITIN  --   --  10*   RETIC  --   --  1.3        Chemistries:  Recent Labs   Lab 06/29/23 2044 06/30/23  0340    140   K 3.7 3.8    109   CO2 29 26   BUN 11 11   CREATININE 0.83 0.7   CALCIUM 8.5* 8.4*   ALBUMIN 3.3* 2.7*   PROT 7.5 7.0   BILITOT <0.1* 0.2   ALKPHOS 60 71   ALT 10 <5*   AST 16 11       All pertinent labs within the past 24 hours have been reviewed.    Significant Imaging:   I have reviewed all pertinent imaging results/findings within the past 24 hours.    Assessment/Plan:     Other  * Opioid overdose  Bradycardia    Patient admitted with opiate overdose. Tox screen positive for opiates and coccaine. Methadone was negative, but unclear what patient took so can't  how long it will take for opiate to metabolize out. Bradypneic while off narcan gtt briefly, rebolused and started back on drip. Patient had an episode of bradycardia with what appears to be a 2nd degree type 1 heart block briefly but patient's blood pressure remained normal, easily woken up and heart rate ayleen appropriately with stimulation. No medications patient has received appear to cause this. Cardiology has been consulted and feel it was due to a vagal episode, which I agree with.    - continue narcan gtt  - NPO until patient more alert and able to eat  - Continue to monitor on telemetry             Thank you for your consult. I will follow-up with patient. Please contact us if you have any additional questions.      Reyes Ontiveros MD  Pulmonology  [facility] - Intensive Care    Pt seen and examined with Pulmonary/Critical Care team. Critical Care time was spent validating the history and physical exam, reviewing the lab and imaging results, and discussing the care of the patient with the bedside nurse. The following  additional comments are made:    We had to redose her with 0.4 mg IVP for bradypnea and respiratory depression a full 8 hours after her presentation. This suggests that she had injected a longer acting opioid.  She has intermittent Mobitz II 2o heart block and 3o heart block with rates in the 30s.  BP unchanged. It seems to resolve with activity.  Perhaps it is vagally mediated.  Will observe with pacing pads on.    Critical Care time 40 minutes    Alverto Nguyen MD  Phone [phone]

## 2023-06-30 NOTE — PROGRESS NOTES
"The sw met with the pt to complete the assessment but she kept falling asleep during the interaction. The pt screamed "She wants to sleep" and rolled over and went to sleep. The sw kept trying to wake the pt but was unsuccessful. The pt reviewed old notes from [facility] and found contact info for the pt's mother Ashley Wiseman [phone] and Dontae Monsivais(friend)[phone]. The pt's mother phone just rang but the sw did speak with Dontae. He states he hasn't seen the pt in a minute but states he will attempt to reach some of her family and give them this sw's name and contact info. The sw completed the white board in the pt's room with her name and contact info. The sw will continue to follow the pt throughout her transitions of care and will assist with any d/c needs.   "

## 2023-06-30 NOTE — ASSESSMENT & PLAN NOTE
- Chronic abscess on arm that is healing. No drainage or erythema  - No signs of infection  - Consult wound care

## 2023-06-30 NOTE — ED NOTES
This RN atempted x 2 for IV access unsuccessfully, pt tolerated well. Pt reports she is an IV drug user. Large open abscess noted to L forearm, provider has evaluated, no d/c or surrounding redness/warmth noted. This RN cleansed and dressed wound. Anatoly, paramedic, at bedside at this time to obtain bloodwork. Pt lethargic and is sleeping between care, responding only to loud voice or touch to answer questions. Requiring frequent redirection to participate in exam . Provider notified of pt's mental status. Pt is oriented x 4, no apnea noted w/ SPO2 100%. Pt denies drug use PTA. Will continue to monitor closely

## 2023-06-30 NOTE — SUBJECTIVE & OBJECTIVE
History reviewed. No pertinent past medical history.    History reviewed. No pertinent surgical history.    Review of patient's allergies indicates:  No Known Allergies    No current facility-administered medications on file prior to encounter.     Current Outpatient Medications on File Prior to Encounter   Medication Sig    ibuprofen (ADVIL,MOTRIN) 800 MG tablet Take 1 tablet (800 mg total) by mouth every 6 (six) hours as needed for Pain.    naloxone (NARCAN) 4 mg/actuation Spry 4mg by nasal route as needed for opioid overdose; may repeat every 2-3 minutes in alternating nostrils until medical help arrives. Call 911     Family History    None       Tobacco Use    Smoking status: Every Day     Types: Cigarettes    Smokeless tobacco: Not on file   Substance and Sexual Activity    Alcohol use: Not Currently    Drug use: Not on file    Sexual activity: Not Currently     Review of Systems   Unable to perform ROS: Mental status change   Objective:     Vital Signs (Most Recent):  Temp: 97.3 °F (36.3 °C) (06/30/23 0140)  Pulse: (!) 51 (06/30/23 0215)  Resp: (!) 24 (06/30/23 0215)  BP: (!) 108/54 (06/30/23 0215)  SpO2: 100 % (06/30/23 0215) Vital Signs (24h Range):  Temp:  [96.2 °F (35.7 °C)-98.6 °F (37 °C)] 97.3 °F (36.3 °C)  Pulse:  [39-83] 51  Resp:  [16-45] 24  SpO2:  [94 %-100 %] 100 %  BP: ()/(54-64) 108/54     Weight: 78.5 kg (173 lb 1 oz)  Body mass index is 33.8 kg/m².     Physical Exam  Vitals and nursing note reviewed.   Constitutional:       Appearance: She is obese. She is ill-appearing and diaphoretic.   HENT:      Head: Normocephalic and atraumatic.      Mouth/Throat:      Mouth: Mucous membranes are dry.   Eyes:      Extraocular Movements: Extraocular movements intact.      Pupils: Pupils are equal, round, and reactive to light.   Cardiovascular:      Rate and Rhythm: Regular rhythm. Bradycardia present.      Pulses: Normal pulses.      Heart sounds: Normal heart sounds. No murmur heard.  Pulmonary:       Effort: Pulmonary effort is normal.      Breath sounds: Normal breath sounds. No wheezing or rales.   Abdominal:      General: Abdomen is flat. Bowel sounds are normal. There is no distension.      Palpations: Abdomen is soft.      Tenderness: There is no abdominal tenderness.   Musculoskeletal:         General: No swelling.      Comments: Left forearm with ulcerated wound that is healing without purulent drainage or erythema   Skin:     General: Skin is warm.      Capillary Refill: Capillary refill takes less than 2 seconds.      Comments: Multiple skin scars from needles? along legs bilaterally   Neurological:      Mental Status: She is lethargic and confused.            CRANIAL NERVES     CN III, IV, VI   Pupils are equal, round, and reactive to light.     Significant Labs: All pertinent labs within the past 24 hours have been reviewed.    Significant Imaging: I have reviewed all pertinent imaging results/findings within the past 24 hours.

## 2023-06-30 NOTE — NURSING
RAPID RESPONSE NURSE PROACTIVE ROUNDING NOTE     Time of Visit: 0058    Admit Date: 2023  LOS: 0  Code Status: Full Code   Date of Visit: 2023  : 1975  Age: 48 y.o.  Sex: female  Race: Black or   Bed: K559/K559 A:   MRN: [id number]  Was the patient discharged from an ICU this admission? No   Was the patient discharged from a PACU within last 24 hours?  No  Did the patient receive conscious sedation/general anesthesia in last 24 hours?  No  Was the patient in the ED within the past 24 hours?  Yes  Was the patient started on NIPPV within the past 24 hours?  No  Attending Physician: Kim Solitario MD  Primary Service: Networked reference to record PCT     ASSESSMENT     Notified by charge RN via phone call.  Reason for alert: AMS    Diagnosis: <principal problem not specified>    Abnormal Vital Signs: BP (!) 109/54   Pulse (!) 53   Temp 97.3 °F (36.3 °C) (Axillary)   Resp (!) 39   Ht 5' (1.524 m)   Wt 78.5 kg (173 lb 1 oz)   LMP 2023   SpO2 100%   Breastfeeding No   BMI 33.80 kg/m²      Clinical Issues: Neuro    Patient  has no past medical history on file.      On arrival pt noted to be agitated and thrashing in bed. Admitting MD, charge, and primary RN at bedside. Pavan, RN verbalizing that on pt arrival from [facility] ED pt AAOx4, and as RN started bedside assessment pt became minimally responsive to voice/pain and becoming agitated once Narcan admin. On MO RN assessment pt noted to become minimally responsive to voice/pain and Bradycardic (45-55), Dr Santiago ordering second narcan dose.Second dose of Narcan admin and pt continuing to be minimally responsive to voice/pain.       INTERVENTIONS/ RECOMMENDATIONS   -MD placing orders for ICU transfer  -Stat ABG   -Blood transfusion for H/H: 6.5/23.9  -Drug tox lab  -Narcan gtt  -Close Neuro and Respiratory monitoring      Discussed plan of care with RN, Pavan.    PHYSICIAN ESCALATION     Yes/No  Yes    Orders received and  case discussed with Dr. Santiago .    Disposition: Tx in ICU bed 559.    FOLLOW-UP     Call back the Rapid Response Nurse, MOLINA MACK RN at [phone] for additional questions or concerns.

## 2023-06-30 NOTE — CARE UPDATE
Report bradycardia into the 130s with decreased respiration rate into single digit.  Reported a new second-degree AV block on 12 lead   UDS with cocaine and opiate.  Restart Narcan drip   TTE once clinically stable  Initial chest x-ray-no acute finding.  May have to repeat if dyspnea progressive  Consult cardiology

## 2023-06-30 NOTE — CONSULTS
[facility] - Intensive Care  Wound Care    Patient Name:  Margarita Wiseman   MRN:  [id number]  Date: 6/30/2023  Diagnosis: Opioid overdose    History:     History reviewed. No pertinent past medical history.    Social History     Socioeconomic History    Marital status: Unknown   Tobacco Use    Smoking status: Every Day     Types: Cigarettes   Substance and Sexual Activity    Alcohol use: Not Currently    Sexual activity: Not Currently       Precautions:     Allergies as of 06/29/2023    (No Known Allergies)       WOC Assessment Details/Treatment       L arm- dried ulceration- 4x2x.1cm- no drainage      Recommendations discussed with nurse and Dr. Milligan:  - Xeroform dressing daily    06/30/2023

## 2023-06-30 NOTE — PROGRESS NOTES
Smoking cessation education note: Pt is arousable, but very lethargic. Will follow up at a later time.

## 2023-06-30 NOTE — PROGRESS NOTES
eICU Brief Note    Issue: 48 y old woman with possible drug overdose or drug effect with response to narcan. Had reported chest pain that was later reported as not accurate per bedside MD who saw patient. Now awaiting IV access and plan for narcan infusion.    Anemia noted and awaiting access for PRBC    On Ventimask with oxygen with good pO2 on ABG   BP (!) 106/58   Pulse (!) 56   Temp 97.3 °F (36.3 °C) (Axillary)   Resp (!) 29   Ht 5' (1.524 m)   Wt 78.5 kg (173 lb 1 oz)   LMP 05/01/2023   SpO2 100%   Breastfeeding No   BMI 33.80 kg/m²   Intake/Output - Last 3 Shifts       None              Plan :  Monitor mental status and assess narcan response   Watch for seizures as pt may have chronic opiate use  Anemia- recheck   June 2022 CT chest with lymph nodes and RUL consolidation- when stable to reimage     SCD    D/w RN and MD

## 2023-06-30 NOTE — ED NOTES
Discharge phone call attempted. Voicemail left with return phone number for any questions or concerns.         Report given to Marjan NEELY at [facility] telemetry unit, awaiting EMS at this time. Pt remains in stable condition, VSS, NSR on cardiac monitor

## 2023-06-30 NOTE — NURSING
Patient went braycardic into the 30s as well as decreased respirations around 7. Critical Care team was at bedside and ordered a push of narcan and to restart the narcan drip. We did a 12 lead and it showed a new second degree AV block. Dr. Milligan was notified,

## 2023-06-30 NOTE — PLAN OF CARE
Problem: Physiologic Impairment (Excessive Substance Use)  Goal: Improved Physiologic Symptoms (Excessive Substance Use)  Outcome: Ongoing, Progressing     Problem: Adult Inpatient Plan of Care  Goal: Plan of Care Review  Outcome: Met     Problem: Impaired Wound Healing  Goal: Optimal Wound Healing  Outcome: Met      12/11/2020 NPLR    12/10/2021 NPLR upcoming appt    Isosorbide was d/c 10/2020, please advise if you would like to prescribe?

## 2023-06-30 NOTE — ANESTHESIA PROCEDURE NOTES
Peripheral IV Insertion    Diagnosis: I87.9 Disorder of vein, unspecified.    Patient location during procedure: ICU  Procedure start time: 6/30/2023 2:35 AM  Timeout: 6/30/2023 2:30 AM  Procedure end time: 6/30/2023 3:15 AM    Staffing  Authorizing Provider: Hugo Turcios MD  Performing Provider: Hugo Turcios MD    Anesthesiologist was present at the time of the procedure.  Peripheral IV Insertion  Catheter Type: peripheral IV (single lumen)  Catheter Size: 18 G  Catheter placement by Ultrasound guidance. Heme positive aspiration all ports.   Vessel Caliber: patent  Needle advanced into vessel with real time Ultrasound guidance.Insertion Attempts: 2  Assessment  Dressing: secured with tape and tegaderm  Patient: Tolerated well  Line flushed easily.

## 2023-06-30 NOTE — ASSESSMENT & PLAN NOTE
- Hgb 6.1 down from 7s. Initially thought to be symptomatic anemia but patient said she lied about symptoms  - Unsure of etiology but did deny any hematochezia, hematemesis, or melena  - Type and screen now  - transfuse 1U pRBC; consent obtained  - CBC q8h  - Iron panel, ferritin, retic pending  - TSH pending

## 2023-06-30 NOTE — ED NOTES
Explained to Lt C Jamal pt is being admitted for hospitalization. Per lt and arresting officer. Pt is being released on summons.

## 2023-06-30 NOTE — HPI
"HPI retrieved from chart, patient minimally verbal with constant tactile stimulation. "Patient brought in by police after a shop lifting incident.  The vehicle that patient was in elevated [place]  [place] then got out and [place] the [place].  After the incident and when patient was detained by police she states that she had increased shortness of breath and felt like she was having an asthma attack."     After further discussion with patient, it turns out that she was lying about her symptoms.  When I found her on interview, she was lethargic and difficult to arouse despite sternal rub.  She then got Narcan 0.4 mg with a terrific response.  However, she continued to deny any recent drugs.  Her mental status than waned again to lethargy.  We gave her 2nd dose of Narcan with somewhat good response.  Due to continued Narcan pushes and mental status changes with concern for airway protection, she was transferred to the ICU.     Further medical history includes labs today showing a hemoglobin of 6.1 down from a baseline of 7s.  She apparently has a history of iron-deficient anemia, but she is not on any medications.  Overnight while on the floor patient's respiratory and mental status declined, ? Additional IVDU. She admits to IV heroin use and cocaine use. Presently denies any chest pain, palpitations, SOB. Currently on a Narcan gtt. Noted isolated event of 2nd degree 2:1 HB which resolved spontaneously. Patient noted bradycardic with HR in the 40s. HR responds appropriately with tactile stimulation.   "

## 2023-06-30 NOTE — ED PROVIDER NOTES
Source of History:  Patient, chart, police    Chief complaint:  Chest Pain (Reports to ED c c/o chest pain. Pt involved in a shoplifting case that turned into an aggravated flight. [place]. Pt c/o chest pain started intermittently ever since her R leg pain started. )      HPI:  Margarita Wiseman is a 48 y.o. female with medical history of IV drug abuse presenting with chest pain and shortness of breath after being detained by police.  Patient also endorsing chronic right leg pain.  Patient states she recently got off crutches.  Patient states pain started after being detained. Patient brought in by police after a shot lifting incident.  The vehicle that patient was in elevated police [place] the [place].  Patient then got out and ran [place] the interstate.  After the incident and when patient was detained by police she states that she had increased shortness of breath and felt like she was having an asthma attack.  Patient states she does have a history of asthma.  Patient is unsure of her last menstrual cycle.  Patient states chest pain has been intermittent for the past few months and started again 3 days ago.    This is the extent to the patients complaints today here in the emergency department.    ROS: As per HPI and below:    Constitutional: No fever.  No chills.  Eyes: No visual changes.  ENT: No sore throat. No ear pain    Cardiovascular:  Positive for chest pain.  Respiratory: No shortness of breath.  GI: No abdominal pain.  No nausea or vomiting.  Genitourinary: No abnormal urination.  Neurologic: No headache. No focal weakness.  No numbness.  MSK:  Positive for right leg pain.  no back pain.  Integument:  Positive for scattered abscesses.  Hematologic: No easy bruising.  Endocrine: No excessive thirst or urination.    Review of patient's allergies indicates:  No Known Allergies    PMH:  As per HPI and below:  History reviewed. No pertinent past medical  history.  History reviewed. No pertinent surgical history.    Social History     Tobacco Use    Smoking status: Every Day     Types: Cigarettes   Substance Use Topics    Alcohol use: Not Currently       Physical Exam:    BP (!) 104/57 (BP Location: Left arm, Patient Position: Sitting)   Pulse 83   Temp 98.6 °F (37 °C) (Oral)   Resp 19   Ht 5' (1.524 m)   Wt 89.4 kg (197 lb)   LMP 05/01/2023   SpO2 100%   Breastfeeding No   BMI 38.47 kg/m²     Nursing note and vital signs reviewed.    Constitutional: No acute distress.  Nontoxic  Eyes: No conjunctival injection.Extraocular muscles are intact.  ENT: Oropharynx clear.  Normal phonation.   Cardiovascular: Regular rate and rhythm.  No murmurs. No gallops. No rubs  Respiratory: Clear to auscultation bilaterally.  Good air movement.  No wheezes.  No rhonchi. No rales. No accessory muscle use..  Patient is speaking in full sentences.  No tachypnea.  No accessory muscle usage.  Abdomen: Soft.  Not distended.  Nontender.  No guarding.  No rebound. Non-peritoneal.  Musculoskeletal: Good range of motion all joints.  No deformities.  Neck supple.  No meningismus.  Skin:  Multiple small abrasions and abscesses known to bilateral upper extremity.  Large abscess noted to right forearm.  No active draining.  Area appears like an open wound.  Patient states she gets care at [facility] for abscess.  No rashes seen.  Good turgor.  No abrasions.  No ecchymoses.  Neurologic: Motor intact.  Sensation intact.  No ataxia. No focal neurological deficits.  Psych: Appropriate, conversant    MDM    Emergent evaluation of a 47 yo female presenting for chest pain and right leg pain after being detained by police. On exam pt is A&Ox3. VSS. Nonfebrile and nontoxic appearing.  Pupils equal round reactive 3-2 mm.  Mucous membranes pink and moist. Breath sounds clear bilaterally.  Abdomen soft and nontender. No rebound or guarding appreciated on exam.   BS WNL.  Multiple small abrasions and  abscesses known to bilateral upper extremity.  Large abscess noted to right forearm.  No active draining.  Area appears like an open wound.  Patient states she gets care at [facility] for abscess.   Pt speaking in full sentences.  Steady gait appreciated. Cap refill < 3 seconds.      History Acquisition   Additional history was acquired from other historians.  Police  The patient's list of active medical problems, social history, medications, and allergies as documented per RN staff has been reviewed.     Differential Diagnoses   Based on available information and the initial assessment, the working differential diagnoses considered during this evaluation include but are not limited to pneumonia, bronchitis, ischemic chest pain, electrolyte abnormality, malingering, reactive airway disease, others.    I will get labs, imaging and reassess.      LABS     Labs Reviewed   CBC W/ AUTO DIFFERENTIAL - Abnormal; Notable for the following components:       Result Value    RBC 3.28 (*)     Hemoglobin 6.2 (*)     Hematocrit 22.4 (*)     MCV 68 (*)     MCH 18.9 (*)     MCHC 27.7 (*)     RDW 17.5 (*)     All other components within normal limits   COMPREHENSIVE METABOLIC PANEL - Abnormal; Notable for the following components:    Glucose 122 (*)     Calcium 8.5 (*)     Albumin 3.3 (*)     Total Bilirubin <0.1 (*)     Anion Gap 7 (*)     All other components within normal limits   TROPONIN I         Imaging     Imaging Results              X-Ray Chest AP Portable (Final result)  Result time 06/29/23 21:02:18      Final result by Blaise Alcantara MD (06/29/23 21:02:18)                   Impression:      No acute abnormality.      Electronically signed by: Blaise Alcantara  Date:    06/29/2023  Time:    21:02               Narrative:    EXAMINATION:  XR CHEST AP PORTABLE    CLINICAL HISTORY:  Chest pain, unspecified    TECHNIQUE:  Single frontal view of the chest was performed.    COMPARISON:  None    FINDINGS:  The lungs are clear, with  normal appearance of pulmonary vasculature and no pleural effusion or pneumothorax.    The cardiac silhouette is normal in size. The hilar and mediastinal contours are unremarkable.    Bones are intact.                                      A radiology report was available for my review at the time of the encounter.    EKG   EKG Readings: (Independently Interpreted)   Initial Reading: No STEMI. Previous EKG: Compared with most recent EKG Rhythm: Normal Sinus Rhythm. Heart Rate: 72. Ectopy: No Ectopy. Conduction: Normal. ST Segments: Normal ST Segments. T Waves: Normal. Clinical Impression: Normal Sinus Rhythm   My interpretation     Additional Consideration   All available testing was considered during the course of this workup.  Comorbidities taken into consideration during the patient's evaluation and treatment include weight, age.    Social determinants of health were taken into consideration during development of our treatment plan.    Medications - No data to display   ED Course as of 06/29/23 2205   Thu Jun 29, 2023 2113 Chest x-ray negative for any acute abnormalities.  CBC shows anemia with a hemoglobin of 6.2.  Hematocrit 22.4.  Reviewed patient's chart.  Last labs were 3 months ago and showed a hemoglobin of 7.2.  Chest pain with exertion could be due to symptomatic anemia.  Awaiting CMP for admission. [RZ]   2201 CMP unremarkable.  Will discussed case with hospital medicine at [facility] for admission.  Patient does not have a PCP and is self pay so will contact [facility] hospitalist for admission.  Awaiting bed assignment. [RZ]      ED Course User Index  [RZ] Akanksha Hobbs NP             CLINICAL IMPRESSION  1. Symptomatic anemia    2. Right-sided chest pain    3. Chest pain    4. Iron deficiency anemia, unspecified iron deficiency anemia type         Akanksha Hobbs NP  06/29/23 2205

## 2023-06-30 NOTE — HPI
48 year old with no known past medical history who presented to [facility] with an opioid overdose. Patient is somonlent and minimal arousable so history is provided via chart review. It is unclear what the events leading up to her presentation were but it sounded as if patient was initially agitated, and was detained by police and complaining of an asthma attack with shortness of breath. Those symptoms appaered to have resolved and by the time she presented to the ED she was unresponsive and somnolent. She received 2 narcans with excellent response. She was admitted to the ICU on a continuous narcan gtt. Patient was somnolent at the time of my interview, bradypneic because narcan gtt had to be paused to administer blood. She received an additional narcan with excellent response and narcan gtt resumed. Critical care was consulted for comanagement for opiate overdose.

## 2023-06-30 NOTE — ASSESSMENT & PLAN NOTE
Isolated, spontaneously resolved   ? Etiology- apneic episode  Presently SB with appropriate response to tactile stimulation   Place pacer pads  Atropine to bedside for symptomatic bradycardia unresponsive to tactile stimuli   Continue to monitor on tele   TTE pending

## 2023-06-30 NOTE — HPI
"47 yo female with PMHx of IVDU here for apparent chest pain.    Per ED note: "Patient brought in by police after a shop lifting incident.  The [place] that patient was in [place] police [place]  Patient then got out and [place] the interstate.  After the incident and when patient was detained by police she states that she had increased shortness of breath and felt like she was having an asthma attack."    After further discussion with patient, it turns out that she was lying about her symptoms.  When I found her on interview, she was lethargic and difficult to arouse despite sternal rub.  She then got Narcan 0.4 mg with a terrific response.  However, she continued to deny any recent drugs.  Her mental status than waned again to lethargy.  We gave her 2nd dose of Narcan with somewhat good response.  Due to continued Narcan pushes and mental status changes with concern for airway protection, she was transferred to the ICU.    Further medical history includes labs today showing a hemoglobin of 6.1 down from a baseline of 7s.  She apparently has a history of iron-deficient anemia, but she is not on any medications.  "

## 2023-06-30 NOTE — ED NOTES
Report given to [facility], pt in stable condition at time of d/c. This RN and Charge RN Margaret went to bedside to evaluate pt as she remains lethargic and difficult to arouse for medics. Pt responsive to speech, was able to sit upright independently and converse w/ staff. Remains oriented x 4, respirations regular/unlabored, VSS

## 2023-06-30 NOTE — SUBJECTIVE & OBJECTIVE
History reviewed. No pertinent past medical history.    History reviewed. No pertinent surgical history.    Review of patient's allergies indicates:  No Known Allergies    Family History    None       Tobacco Use    Smoking status: Every Day     Types: Cigarettes    Smokeless tobacco: Not on file   Substance and Sexual Activity    Alcohol use: Not Currently    Drug use: Not on file    Sexual activity: Not Currently         Review of Systems   Unable to perform ROS: Mental status change   Objective:     Vital Signs (Most Recent):  Temp: 99 °F (37.2 °C) (06/30/23 1115)  Pulse: (!) 54 (06/30/23 1115)  Resp: (!) 31 (06/30/23 1115)  BP: 112/62 (06/30/23 0900)  SpO2: 97 % (06/30/23 1115) Vital Signs (24h Range):  Temp:  [96.2 °F (35.7 °C)-99.3 °F (37.4 °C)] 99 °F (37.2 °C)  Pulse:  [39-83] 54  Resp:  [8-45] 31  SpO2:  [87 %-100 %] 97 %  BP: ()/(52-64) 112/62     Weight: 78.5 kg (173 lb 1 oz)  Body mass index is 33.8 kg/m².      Intake/Output Summary (Last 24 hours) at 6/30/2023 1327  Last data filed at 6/30/2023 0800  Gross per 24 hour   Intake 227.18 ml   Output --   Net 227.18 ml        Physical Exam  Vitals and nursing note reviewed.   Constitutional:       Appearance: She is obese.      Comments: Somnolent, intermittently responsive   HENT:      Head: Normocephalic and atraumatic.      Nose: Nose normal.      Mouth/Throat:      Mouth: Mucous membranes are dry.   Eyes:      Extraocular Movements: Extraocular movements intact.      Pupils: Pupils are equal, round, and reactive to light.   Cardiovascular:      Rate and Rhythm: Regular rhythm. Bradycardia present.      Pulses: Normal pulses.      Heart sounds: Normal heart sounds. No murmur heard.  Pulmonary:      Effort: Pulmonary effort is normal.      Breath sounds: Normal breath sounds. No wheezing or rales.   Abdominal:      General: Abdomen is flat. Bowel sounds are normal. There is no distension.      Palpations: Abdomen is soft.      Tenderness: There is no  abdominal tenderness.   Musculoskeletal:         General: No swelling.      Comments: Left arm wound, does not appear infected   Skin:     General: Skin is warm.      Capillary Refill: Capillary refill takes less than 2 seconds.   Neurological:      General: No focal deficit present.      Mental Status: She is lethargic and confused.        Vents:  Oxygen Concentration (%): 35 (06/30/23 0134)    Lines/Drains/Airways       Drain  Duration             Female External Urinary Catheter 06/30/23 0140 <1 day              Peripheral Intravenous Line  Duration                  Peripheral IV - Single Lumen 06/30/23 0235 18 G <1 day                    Significant Labs:    CBC/Anemia Profile:  Recent Labs   Lab 06/29/23 2044 06/30/23 0135 06/30/23  0340   WBC 6.67  --  6.23   HGB 6.2*  --  6.5*   HCT 22.4* 29* 23.9*     --  315   MCV 68*  --  68*   RDW 17.5*  --  18.0*   IRON  --   --  17*   FERRITIN  --   --  10*   RETIC  --   --  1.3        Chemistries:  Recent Labs   Lab 06/29/23 2044 06/30/23  0340    140   K 3.7 3.8    109   CO2 29 26   BUN 11 11   CREATININE 0.83 0.7   CALCIUM 8.5* 8.4*   ALBUMIN 3.3* 2.7*   PROT 7.5 7.0   BILITOT <0.1* 0.2   ALKPHOS 60 71   ALT 10 <5*   AST 16 11       All pertinent labs within the past 24 hours have been reviewed.    Significant Imaging:   I have reviewed all pertinent imaging results/findings within the past 24 hours.

## 2023-06-30 NOTE — CONSULTS
"[facility] - Intensive Care  Cardiology  Consult Note    Patient Name: Margarita Wiseman  MRN: [id number]  Admission Date: 6/29/2023  Hospital Length of Stay: 0 days  Code Status: Full Code   Attending Provider: Shelbie He*   Consulting Provider: Ben Meyers NP  Primary Care Physician: Primary Doctor No  Principal Problem:Opioid overdose    Patient information was obtained from ER records.     Inpatient consult to [facility]iology[facility]  Consult performed by: Ben Meyers NP  Consult ordered by: Shelbie He MD        Subjective:     Chief Complaint:  Overdose      HPI:   HPI retrieved from chart, patient minimally verbal with constant tactile stimulation. "Patient brought in by police after a shop lifting incident.  The [place] that patient was in [place] police [place]  Patient then got out and [place] the interstate.  After the incident and when patient was detained by police she states that she had increased shortness of breath and felt like she was having an asthma attack."     After further discussion with patient, it turns out that she was lying about her symptoms.  When I found her on interview, she was lethargic and difficult to arouse despite sternal rub.  She then got Narcan 0.4 mg with a terrific response.  However, she continued to deny any recent drugs.  Her mental status than waned again to lethargy.  We gave her 2nd dose of Narcan with somewhat good response.  Due to continued Narcan pushes and mental status changes with concern for airway protection, she was transferred to the ICU.     Further medical history includes labs today showing a hemoglobin of 6.1 down from a baseline of 7s.  She apparently has a history of iron-deficient anemia, but she is not on any medications.  Overnight while on the floor patient's respiratory and mental status declined, ? Additional IVDU. She admits to IV heroin use and cocaine use. Presently denies any chest " pain, palpitations, SOB. Currently on a Narcan gtt. Noted isolated event of 2nd degree 2:1 HB which resolved spontaneously. Patient noted bradycardic with HR in the 40s. HR responds appropriately with tactile stimulation.       History reviewed. No pertinent past medical history.    History reviewed. No pertinent surgical history.    Review of patient's allergies indicates:  No Known Allergies    No current facility-administered medications on file prior to encounter.     Current Outpatient Medications on File Prior to Encounter   Medication Sig    ibuprofen (ADVIL,MOTRIN) 800 MG tablet Take 1 tablet (800 mg total) by mouth every 6 (six) hours as needed for Pain.    naloxone (NARCAN) 4 mg/actuation Spry 4mg by nasal route as needed for opioid overdose; may repeat every 2-3 minutes in alternating nostrils until medical help arrives. Call 911     Family History    None       Tobacco Use    Smoking status: Every Day     Types: Cigarettes    Smokeless tobacco: Not on file   Substance and Sexual Activity    Alcohol use: Not Currently    Drug use: Not on file    Sexual activity: Not Currently     Review of Systems   Unable to perform ROS: Other   Objective:     Vital Signs (Most Recent):  Temp: 99 °F (37.2 °C) (06/30/23 1115)  Pulse: (!) 54 (06/30/23 1115)  Resp: (!) 31 (06/30/23 1115)  BP: 112/62 (06/30/23 0900)  SpO2: 97 % (06/30/23 1115) Vital Signs (24h Range):  Temp:  [96.2 °F (35.7 °C)-99.3 °F (37.4 °C)] 99 °F (37.2 °C)  Pulse:  [39-83] 54  Resp:  [8-45] 31  SpO2:  [87 %-100 %] 97 %  BP: ()/(52-64) 112/62     Weight: 78.5 kg (173 lb 1 oz)  Body mass index is 33.8 kg/m².    SpO2: 97 %         Intake/Output Summary (Last 24 hours) at 6/30/2023 1208  Last data filed at 6/30/2023 0800  Gross per 24 hour   Intake 227.18 ml   Output --   Net 227.18 ml       Lines/Drains/Airways       Drain  Duration             Female External Urinary Catheter 06/30/23 0140 <1 day              Peripheral Intravenous Line   Duration                  Peripheral IV - Single Lumen 06/30/23 0235 18 G <1 day                     Physical Exam  Constitutional:       General: She is not in acute distress.     Appearance: She is diaphoretic.   HENT:      Head: Atraumatic.   Cardiovascular:      Rate and Rhythm: Regular rhythm. Bradycardia present.      Heart sounds: Murmur heard.   Pulmonary:      Effort: Bradypnea present.      Breath sounds: Decreased air movement present. Decreased breath sounds present.   Musculoskeletal:      Right lower leg: No edema.      Left lower leg: No edema.   Skin:     General: Skin is warm.        Significant Labs: BMP:   Recent Labs   Lab 06/29/23 2044 06/30/23  0340   * 100    140   K 3.7 3.8    109   CO2 29 26   BUN 11 11   CREATININE 0.83 0.7   CALCIUM 8.5* 8.4*   , CMP   Recent Labs   Lab 06/29/23 2044 06/30/23  0340    140   K 3.7 3.8    109   CO2 29 26   * 100   BUN 11 11   CREATININE 0.83 0.7   CALCIUM 8.5* 8.4*   PROT 7.5 7.0   ALBUMIN 3.3* 2.7*   BILITOT <0.1* 0.2   ALKPHOS 60 71   AST 16 11   ALT 10 <5*   ANIONGAP 7* 5*   , CBC   Recent Labs   Lab 06/29/23 2044 06/30/23  0135 06/30/23  0340   WBC 6.67  --  6.23   HGB 6.2*  --  6.5*   HCT 22.4*   < > 23.9*     --  315    < > = values in this interval not displayed.   , INR No results for input(s): INR, PROTIME in the last 48 hours., Lipid Panel No results for input(s): CHOL, HDL, LDLCALC, TRIG, CHOLHDL in the last 48 hours., Troponin   Recent Labs   Lab 06/29/23 2044   TROPONINI <0.012   , and All pertinent lab results from the last 24 hours have been reviewed.    Significant Imaging: Echocardiogram: Transthoracic echo (TTE) complete ([facility] Only): No results found for this or any previous visit.    Assessment and Plan:     Second degree heart block  Isolated, spontaneously resolved   ? Etiology- apneic episode  Presently SB with appropriate response to tactile stimulation   Place pacer pads  Atropine to  bedside for symptomatic bradycardia unresponsive to tactile stimuli   Continue to monitor on tele   TTE pending     Bradycardia  Per second degree AVB        VTE Risk Mitigation (From admission, onward)         Ordered     IP VTE HIGH RISK PATIENT  Once         06/30/23 0112     Place sequential compression device  Until discontinued         06/30/23 0112                Thank you for your consult. I will follow-up with patient. Please contact us if you have any additional questions.    Ben Meyers NP  Cardiology   [place] - Intensive Care

## 2023-06-30 NOTE — SUBJECTIVE & OBJECTIVE
History reviewed. No pertinent past medical history.    History reviewed. No pertinent surgical history.    Review of patient's allergies indicates:  No Known Allergies    No current facility-administered medications on file prior to encounter.     Current Outpatient Medications on File Prior to Encounter   Medication Sig    ibuprofen (ADVIL,MOTRIN) 800 MG tablet Take 1 tablet (800 mg total) by mouth every 6 (six) hours as needed for Pain.    naloxone (NARCAN) 4 mg/actuation Spry 4mg by nasal route as needed for opioid overdose; may repeat every 2-3 minutes in alternating nostrils until medical help arrives. Call 911     Family History    None       Tobacco Use    Smoking status: Every Day     Types: Cigarettes    Smokeless tobacco: Not on file   Substance and Sexual Activity    Alcohol use: Not Currently    Drug use: Not on file    Sexual activity: Not Currently     Review of Systems   Unable to perform ROS: Other   Objective:     Vital Signs (Most Recent):  Temp: 99 °F (37.2 °C) (06/30/23 1115)  Pulse: (!) 54 (06/30/23 1115)  Resp: (!) 31 (06/30/23 1115)  BP: 112/62 (06/30/23 0900)  SpO2: 97 % (06/30/23 1115) Vital Signs (24h Range):  Temp:  [96.2 °F (35.7 °C)-99.3 °F (37.4 °C)] 99 °F (37.2 °C)  Pulse:  [39-83] 54  Resp:  [8-45] 31  SpO2:  [87 %-100 %] 97 %  BP: ()/(52-64) 112/62     Weight: 78.5 kg (173 lb 1 oz)  Body mass index is 33.8 kg/m².    SpO2: 97 %         Intake/Output Summary (Last 24 hours) at 6/30/2023 1208  Last data filed at 6/30/2023 0800  Gross per 24 hour   Intake 227.18 ml   Output --   Net 227.18 ml       Lines/Drains/Airways       Drain  Duration             Female External Urinary Catheter 06/30/23 0140 <1 day              Peripheral Intravenous Line  Duration                  Peripheral IV - Single Lumen 06/30/23 0235 18 G <1 day                     Physical Exam  Constitutional:       General: She is not in acute distress.     Appearance: She is diaphoretic.   HENT:      Head:  Atraumatic.   Cardiovascular:      Rate and Rhythm: Regular rhythm. Bradycardia present.      Heart sounds: Murmur heard.   Pulmonary:      Effort: Bradypnea present.      Breath sounds: Decreased air movement present. Decreased breath sounds present.   Musculoskeletal:      Right lower leg: No edema.      Left lower leg: No edema.   Skin:     General: Skin is warm.        Significant Labs: BMP:   Recent Labs   Lab 06/29/23 2044 06/30/23  0340   * 100    140   K 3.7 3.8    109   CO2 29 26   BUN 11 11   CREATININE 0.83 0.7   CALCIUM 8.5* 8.4*   , CMP   Recent Labs   Lab 06/29/23 2044 06/30/23  0340    140   K 3.7 3.8    109   CO2 29 26   * 100   BUN 11 11   CREATININE 0.83 0.7   CALCIUM 8.5* 8.4*   PROT 7.5 7.0   ALBUMIN 3.3* 2.7*   BILITOT <0.1* 0.2   ALKPHOS 60 71   AST 16 11   ALT 10 <5*   ANIONGAP 7* 5*   , CBC   Recent Labs   Lab 06/29/23 2044 06/30/23 0135 06/30/23 0340   WBC 6.67  --  6.23   HGB 6.2*  --  6.5*   HCT 22.4*   < > 23.9*     --  315    < > = values in this interval not displayed.   , INR No results for input(s): INR, PROTIME in the last 48 hours., Lipid Panel No results for input(s): CHOL, HDL, LDLCALC, TRIG, CHOLHDL in the last 48 hours., Troponin   Recent Labs   Lab 06/29/23 2044   TROPONINI <0.012   , and All pertinent lab results from the last 24 hours have been reviewed.    Significant Imaging: Echocardiogram: Transthoracic echo (TTE) complete ([facility] Only): No results found for this or any previous visit.

## 2023-06-30 NOTE — ASSESSMENT & PLAN NOTE
- S/p Narcan 0.4mg x 2 with terrific response acutely. Patient woke up, moved around, starting showing signs of opioid withdrawal -- yawning, salivation, pain, etc. However, narcan did not have continued response  - Will start Narcan gtt @ 0.4 mg/hr and titrate as needed for RAAS 0 to -1  - UDS pending  - Repeat labs in general  - Patient denying any drug use. Does have history of Iv drug

## 2023-06-30 NOTE — ASSESSMENT & PLAN NOTE
Bradycardia    Patient admitted with opiate overdose. Tox screen positive for opiates and coccaine. Methadone was negative, but unclear what patient took so can't  how long it will take for opiate to metabolize out. Bradypneic while off narcan gtt briefly, rebolused and started back on drip. Patient had an episode of bradycardia with what appears to be a 2nd degree type 1 heart block briefly but patient's blood pressure remained normal, easily woken up and heart rate ayleen appropriately with stimulation. No medications patient has received appear to cause this. Cardiology has been consulted and feel it was due to a vagal episode, which I agree with.    - continue narcan gtt  - NPO until patient more alert and able to eat  - Continue to monitor on telemetry

## 2023-07-01 PROBLEM — D50.9 IRON DEFICIENCY ANEMIA: Status: ACTIVE | Noted: 2023-07-01

## 2023-07-01 LAB
ANION GAP SERPL CALC-SCNC: 13 MMOL/L (ref 8–16)
BUN SERPL-MCNC: 14 MG/DL (ref 6–20)
CALCIUM SERPL-MCNC: 8.8 MG/DL (ref 8.7–10.5)
CHLORIDE SERPL-SCNC: 107 MMOL/L (ref 95–110)
CO2 SERPL-SCNC: 18 MMOL/L (ref 23–29)
CREAT SERPL-MCNC: 0.8 MG/DL (ref 0.5–1.4)
EST. GFR  (NO RACE VARIABLE): >60 ML/MIN/1.73 M^2
GLUCOSE SERPL-MCNC: 116 MG/DL (ref 70–110)
MAGNESIUM SERPL-MCNC: 1.8 MG/DL (ref 1.6–2.6)
PHOSPHATE SERPL-MCNC: 3.5 MG/DL (ref 2.7–4.5)
POTASSIUM SERPL-SCNC: 3.6 MMOL/L (ref 3.5–5.1)
SODIUM SERPL-SCNC: 138 MMOL/L (ref 136–145)

## 2023-07-01 PROCEDURE — 36415 COLL VENOUS BLD VENIPUNCTURE: CPT | Performed by: STUDENT IN AN ORGANIZED HEALTH CARE EDUCATION/TRAINING PROGRAM

## 2023-07-01 PROCEDURE — 83735 ASSAY OF MAGNESIUM: CPT | Performed by: STUDENT IN AN ORGANIZED HEALTH CARE EDUCATION/TRAINING PROGRAM

## 2023-07-01 PROCEDURE — 80048 BASIC METABOLIC PNL TOTAL CA: CPT | Performed by: STUDENT IN AN ORGANIZED HEALTH CARE EDUCATION/TRAINING PROGRAM

## 2023-07-01 PROCEDURE — 11000001 HC ACUTE MED/SURG PRIVATE ROOM

## 2023-07-01 PROCEDURE — 63600175 PHARM REV CODE 636 W HCPCS: Performed by: STUDENT IN AN ORGANIZED HEALTH CARE EDUCATION/TRAINING PROGRAM

## 2023-07-01 PROCEDURE — 25000003 PHARM REV CODE 250: Performed by: FAMILY MEDICINE

## 2023-07-01 PROCEDURE — 63600175 PHARM REV CODE 636 W HCPCS: Performed by: FAMILY MEDICINE

## 2023-07-01 PROCEDURE — 25000003 PHARM REV CODE 250: Performed by: STUDENT IN AN ORGANIZED HEALTH CARE EDUCATION/TRAINING PROGRAM

## 2023-07-01 PROCEDURE — 84100 ASSAY OF PHOSPHORUS: CPT | Performed by: STUDENT IN AN ORGANIZED HEALTH CARE EDUCATION/TRAINING PROGRAM

## 2023-07-01 PROCEDURE — 94761 N-INVAS EAR/PLS OXIMETRY MLT: CPT

## 2023-07-01 PROCEDURE — 63600175 PHARM REV CODE 636 W HCPCS: Performed by: INTERNAL MEDICINE

## 2023-07-01 RX ORDER — MUPIROCIN 20 MG/G
OINTMENT TOPICAL 2 TIMES DAILY
Status: DISCONTINUED | OUTPATIENT
Start: 2023-07-01 | End: 2023-07-03 | Stop reason: HOSPADM

## 2023-07-01 RX ADMIN — MUPIROCIN: 20 OINTMENT TOPICAL at 11:07

## 2023-07-01 RX ADMIN — MUPIROCIN: 20 OINTMENT TOPICAL at 08:07

## 2023-07-01 RX ADMIN — ONDANSETRON 4 MG: 2 INJECTION INTRAMUSCULAR; INTRAVENOUS at 12:07

## 2023-07-01 RX ADMIN — NALOXONE HYDROCHLORIDE 0.4 MG/HR: 1 INJECTION PARENTERAL at 02:07

## 2023-07-01 RX ADMIN — IRON SUCROSE 200 MG: 20 INJECTION, SOLUTION INTRAVENOUS at 11:07

## 2023-07-01 NOTE — ASSESSMENT & PLAN NOTE
- S/p Narcan 0.4mg x 2 with terrific response acutely. Patient woke up, moved around, starting showing signs of opioid withdrawal -- yawning, salivation, pain, etc. However, narcan did not have continued response  - Will start Narcan gtt @ 0.4 mg/hr and titrate as needed for RAAS 0 to -1  - UDS positive for opiates and cocaine  - Repeat labs in general  - Patient denying any drug use. Does have history of Iv drug

## 2023-07-01 NOTE — SUBJECTIVE & OBJECTIVE
Interval History: awake and alert, resume Narcan drip yesterday with heart rate dipping into the 30s  Heart rate stable in the 40s with Narcan drip-pulse and monitor  Resume diet  Patient with severe microcytic anemia likely due to iron deficiency-Venofer infusion    Review of Systems   Reason unable to perform ROS: Drowsy.   Constitutional:  Positive for activity change.   Objective:     Vital Signs (Most Recent):  Temp: 98.4 °F (36.9 °C) (07/01/23 0730)  Pulse: (!) 41 (07/01/23 0750)  Resp: (!) 47 (07/01/23 0750)  BP: 90/60 (07/01/23 0730)  SpO2: 98 % (07/01/23 0750) Vital Signs (24h Range):  Temp:  [98.4 °F (36.9 °C)-99 °F (37.2 °C)] 98.4 °F (36.9 °C)  Pulse:  [41-54] 41  Resp:  [0-47] 47  SpO2:  [96 %-100 %] 98 %  BP: ()/(41-68) 90/60     Weight: 78.5 kg (173 lb)  Body mass index is 33.79 kg/m².    Intake/Output Summary (Last 24 hours) at 7/1/2023 1022  Last data filed at 7/1/2023 0700  Gross per 24 hour   Intake 186.91 ml   Output 100 ml   Net 86.91 ml         Physical Exam  Vitals and nursing note reviewed.   Constitutional:       Appearance: She is obese. She is not ill-appearing or diaphoretic.   HENT:      Head: Normocephalic and atraumatic.   Cardiovascular:      Rate and Rhythm: Regular rhythm. Bradycardia present.      Pulses: Normal pulses.      Heart sounds: Normal heart sounds. No murmur heard.  Pulmonary:      Effort: Pulmonary effort is normal.      Breath sounds: Normal breath sounds. No wheezing or rales.   Abdominal:      General: Abdomen is flat. Bowel sounds are normal. There is no distension.      Palpations: Abdomen is soft.      Tenderness: There is no abdominal tenderness.   Musculoskeletal:         General: No swelling.      Comments: Left forearm with ulcerated wound that is healing without purulent drainage or erythema   Skin:     General: Skin is warm.      Capillary Refill: Capillary refill takes less than 2 seconds.      Comments: Multiple skin scars from needles? along legs  bilaterally   Neurological:      Mental Status: She is lethargic and confused.           Significant Labs: A1C: No results for input(s): HGBA1C in the last 4320 hours.  ABGs:   Recent Labs   Lab 06/30/23 0135   PH 7.484*   PCO2 33.3*   HCO3 25.1   POCSATURATED 100   BE 2   PO2 166*     Blood Culture: No results for input(s): LABBLOO in the last 48 hours.  CBC:   Recent Labs   Lab 06/29/23 2044 06/30/23 0135 06/30/23 0340 06/30/23 2124   WBC 6.67  --  6.23 13.96*   HGB 6.2*  --  6.5* 8.2*   HCT 22.4* 29* 23.9* 28.7*     --  315 302     CMP:   Recent Labs   Lab 06/29/23 2044 06/30/23 0340    140   K 3.7 3.8    109   CO2 29 26   * 100   BUN 11 11   CREATININE 0.83 0.7   CALCIUM 8.5* 8.4*   PROT 7.5 7.0   ALBUMIN 3.3* 2.7*   BILITOT <0.1* 0.2   ALKPHOS 60 71   AST 16 11   ALT 10 <5*   ANIONGAP 7* 5*     Cardiac Markers: No results for input(s): CKMB, MYOGLOBIN, BNP, TROPISTAT in the last 48 hours.  Lactic Acid: No results for input(s): LACTATE in the last 48 hours.  Lipase: No results for input(s): LIPASE in the last 48 hours.  Lipid Panel: No results for input(s): CHOL, HDL, LDLCALC, TRIG, CHOLHDL in the last 48 hours.  Magnesium: No results for input(s): MG in the last 48 hours.  Troponin:   Recent Labs   Lab 06/29/23 2044   TROPONINI <0.012     TSH:   Recent Labs   Lab 06/30/23 0340   TSH 1.895     Urine Culture: No results for input(s): LABURIN in the last 48 hours.  Urine Studies: No results for input(s): COLORU, APPEARANCEUA, PHUR, SPECGRAV, PROTEINUA, GLUCUA, KETONESU, BILIRUBINUA, OCCULTUA, NITRITE, UROBILINOGEN, LEUKOCYTESUR, RBCUA, WBCUA, BACTERIA, SQUAMEPITHEL, HYALINECASTS in the last 48 hours.    Invalid input(s): OBIE    Significant Imaging: I have reviewed all pertinent imaging results/findings within the past 24 hours.

## 2023-07-01 NOTE — PLAN OF CARE
Care Plan    Pt remains in ICU at this time. No acute events overnight. VSS. AAOX4. Sinus celio on telemetry. O2 sats 96%-100% on RA. 1 moderate sized BM throughout night. Narcan gtt remains infusing, will likely discontinue in AM. POC reviewed with pt and family. Questions and concerns addressed. Safety and infection precautions in place. See below and flowsheets for full assessment and VS info.     Neuro:  Alexandra Coma Scale  Best Eye Response: 4-->(E4) spontaneous  Best Motor Response: 6-->(M6) obeys commands  Best Verbal Response: 5-->(V5) oriented  [place] Coma Scale Score: 15  Assessment Qualifiers: no eye obstruction present, patient not sedated/intubated  Pupil PERRLA: yes  24 hr Temp:  [98.5 °F (36.9 °C)-99 °F (37.2 °C)]      CV:  Rhythm: sinus bradycardia  DVT prophylaxis: VTE Required Core Measure: Patient refused interventions    Resp:     Oxygen Concentration (%): 35    GI/:  GI prophylaxis: yes  Diet/Nutrition Received: NPO  Last Bowel Movement: 07/01/23  Voiding Characteristics: external catheter   Intake/Output Summary (Last 24 hours) at 7/1/2023 0737  Last data filed at 7/1/2023 0700  Gross per 24 hour   Intake 330.38 ml   Output 100 ml   Net 230.38 ml       Labs/Accuchecks:  Recent Labs   Lab 06/30/23 2124   WBC 13.96*   RBC 4.21   HGB 8.2*   HCT 28.7*         Recent Labs   Lab 06/30/23  0340      K 3.8   CO2 26      BUN 11   CREATININE 0.7   ALKPHOS 71   ALT <5*   AST 11   BILITOT 0.2    No results for input(s): PROTIME, INR, APTT, HEPANTIXA in the last 168 hours.   Recent Labs   Lab 06/29/23  2044   TROPONINI <0.012       Electrolytes: N/A - electrolytes WDL  Accuchecks: none    Gtts/LDAs:   naloxone (NARCAN) infusion 0.4 mg/hr (07/01/23 0206)       Lines/Drains/Airways       Drain  Duration             Female External Urinary Catheter 06/30/23 0140 1 day              Peripheral Intravenous Line  Duration                  Peripheral IV - Single Lumen 06/30/23 0235 18 G 1  day                    Skin/Wounds     Wounds: Yes  Wound care consulted: Yes    Consults  Consults (From admission, onward)          Status Ordering Provider     Inpatient consult to Anesthesiology  Once        Provider:  (Not yet assigned)    Acknowledged ARGENTINA PHELAN     Inpatient consult to Cardiology-[facility]  Once        Provider:  Teddy Berumen MD    Completed [facility] CHANG

## 2023-07-01 NOTE — PLAN OF CARE
A&O x 4, however sleeps much of the day. Wakes and talks appropriately. Remains bradycardic high 40's- 50's and occasionally higher. Afebrile. BP within normal limits. O2 sats remain between % on room air. Respiration WNL. Narcan gtt discontinued 1100 today and patient tolerating well. Tolerating regular diet however decreased appetite. Nausea and emesis x 1 approximately noon today. No more episodes. External catheter in place however patient moves around in bed and it is frequently dislodged. IV iron given x 1 today. PIV to left forearm without s/sx of infection. Wound care per order to left forearm. Patient tolerated well. Continue to monitor.         Problem: Adult Inpatient Plan of Care  Goal: Patient-Specific Goal (Individualized)  Outcome: Ongoing, Progressing  Goal: Readiness for Transition of Care  Outcome: Ongoing, Progressing     Problem: Hypertension Comorbidity  Goal: Blood Pressure in Desired Range  Outcome: Ongoing, Progressing     Problem: Skin Injury Risk Increased  Goal: Skin Health and Integrity  Outcome: Ongoing, Not Progressing

## 2023-07-01 NOTE — PROGRESS NOTES
"[facility] Medicine  Progress Note    Patient Name: Margarita Wiseman  MRN: [id number]  Patient Class: IP- Inpatient   Admission Date: 6/29/2023  Length of Stay: 1 days  Attending Physician: Shelbie He*  Primary Care Provider: Primary Doctor No        Subjective:     Principal Problem:Opioid overdose        HPI:  49 yo female with PMHx of IVDU here for apparent chest pain.    Per ED note: "Patient brought in by police after a shop lifting incident.  The vehicle that patient was in elevated police [place]  Patient then got out and ran [place] the interstate.  After the incident and when patient was detained by police she states that she had increased shortness of breath and felt like she was having an asthma attack."    After further discussion with patient, it turns out that she was lying about her symptoms.  When I found her on interview, she was lethargic and difficult to arouse despite sternal rub.  She then got Narcan 0.4 mg with a terrific response.  However, she continued to deny any recent drugs.  Her mental status than waned again to lethargy.  We gave her 2nd dose of Narcan with somewhat good response.  Due to continued Narcan pushes and mental status changes with concern for airway protection, she was transferred to the ICU.    Further medical history includes labs today showing a hemoglobin of 6.1 down from a baseline of 7s.  She apparently has a history of iron-deficient anemia, but she is not on any medications.      Overview/Hospital Course:  No notes on file    Interval History: awake and alert, resume Narcan drip yesterday with heart rate dipping into the 30s  Heart rate stable in the 40s with Narcan drip-pulse and monitor  Resume diet  Patient with severe microcytic anemia likely due to iron deficiency-Venofer infusion    Review of Systems   Reason unable to perform ROS: Drowsy.   Constitutional:  Positive for activity change.   Objective: "     Vital Signs (Most Recent):  Temp: 98.4 °F (36.9 °C) (07/01/23 0730)  Pulse: (!) 41 (07/01/23 0750)  Resp: (!) 47 (07/01/23 0750)  BP: 90/60 (07/01/23 0730)  SpO2: 98 % (07/01/23 0750) Vital Signs (24h Range):  Temp:  [98.4 °F (36.9 °C)-99 °F (37.2 °C)] 98.4 °F (36.9 °C)  Pulse:  [41-54] 41  Resp:  [0-47] 47  SpO2:  [96 %-100 %] 98 %  BP: ()/(41-68) 90/60     Weight: 78.5 kg (173 lb)  Body mass index is 33.79 kg/m².    Intake/Output Summary (Last 24 hours) at 7/1/2023 1022  Last data filed at 7/1/2023 0700  Gross per 24 hour   Intake 186.91 ml   Output 100 ml   Net 86.91 ml         Physical Exam  Vitals and nursing note reviewed.   Constitutional:       Appearance: She is obese. She is not ill-appearing or diaphoretic.   HENT:      Head: Normocephalic and atraumatic.   Cardiovascular:      Rate and Rhythm: Regular rhythm. Bradycardia present.      Pulses: Normal pulses.      Heart sounds: Normal heart sounds. No murmur heard.  Pulmonary:      Effort: Pulmonary effort is normal.      Breath sounds: Normal breath sounds. No wheezing or rales.   Abdominal:      General: Abdomen is flat. Bowel sounds are normal. There is no distension.      Palpations: Abdomen is soft.      Tenderness: There is no abdominal tenderness.   Musculoskeletal:         General: No swelling.      Comments: Left forearm with ulcerated wound that is healing without purulent drainage or erythema   Skin:     General: Skin is warm.      Capillary Refill: Capillary refill takes less than 2 seconds.      Comments: Multiple skin scars from needles? along legs bilaterally   Neurological:      Mental Status: She is lethargic and confused.           Significant Labs: A1C: No results for input(s): HGBA1C in the last 4320 hours.  ABGs:   Recent Labs   Lab 06/30/23  0135   PH 7.484*   PCO2 33.3*   HCO3 25.1   POCSATURATED 100   BE 2   PO2 166*     Blood Culture: No results for input(s): LABBLOO in the last 48 hours.  CBC:   Recent Labs   Lab  06/29/23 2044 06/30/23  0135 06/30/23  0340 06/30/23  2124   WBC 6.67  --  6.23 13.96*   HGB 6.2*  --  6.5* 8.2*   HCT 22.4* 29* 23.9* 28.7*     --  315 302     CMP:   Recent Labs   Lab 06/29/23 2044 06/30/23  0340    140   K 3.7 3.8    109   CO2 29 26   * 100   BUN 11 11   CREATININE 0.83 0.7   CALCIUM 8.5* 8.4*   PROT 7.5 7.0   ALBUMIN 3.3* 2.7*   BILITOT <0.1* 0.2   ALKPHOS 60 71   AST 16 11   ALT 10 <5*   ANIONGAP 7* 5*     Cardiac Markers: No results for input(s): CKMB, MYOGLOBIN, BNP, TROPISTAT in the last 48 hours.  Lactic Acid: No results for input(s): LACTATE in the last 48 hours.  Lipase: No results for input(s): LIPASE in the last 48 hours.  Lipid Panel: No results for input(s): CHOL, HDL, LDLCALC, TRIG, CHOLHDL in the last 48 hours.  Magnesium: No results for input(s): MG in the last 48 hours.  Troponin:   Recent Labs   Lab 06/29/23 2044   TROPONINI <0.012     TSH:   Recent Labs   Lab 06/30/23 0340   TSH 1.895     Urine Culture: No results for input(s): LABURIN in the last 48 hours.  Urine Studies: No results for input(s): COLORU, APPEARANCEUA, PHUR, SPECGRAV, PROTEINUA, GLUCUA, KETONESU, BILIRUBINUA, OCCULTUA, NITRITE, UROBILINOGEN, LEUKOCYTESUR, RBCUA, WBCUA, BACTERIA, SQUAMEPITHEL, HYALINECASTS in the last 48 hours.    Invalid input(s): OBIE    Significant Imaging: I have reviewed all pertinent imaging results/findings within the past 24 hours.      Assessment/Plan:      * Opioid overdose  - S/p Narcan 0.4mg x 2 with terrific response acutely. Patient woke up, moved around, starting showing signs of opioid withdrawal -- yawning, salivation, pain, etc. However, narcan did not have continued response  - Will start Narcan gtt @ 0.4 mg/hr and titrate as needed for RAAS 0 to -1  - UDS positive for opiates and cocaine  - Repeat labs in general  - Patient denying any drug use. Does have history of Iv drug    Second degree heart block  Questionable etiology  Patient with  intermittent apneic episodes-likely due to drug overdose    Consult cardiology-appreciate recs-place pacer pads, atopic to bedside for symptomatic bradycardia unresponsive to tactile stimuli.  Continue tele  TTE   Normal systolic function.   The estimated ejection fraction is 65%.   Normal left ventricular diastolic function.   Normal right ventricular size with normal right ventricular systolic function.   Mild left atrial enlargement.   Mild to moderate tricuspid regurgitation.   Normal central venous pressure (3 mmHg).   The estimated PA systolic pressure is 32 mmHg.      Drug abuse, IV  - Will need counseling on drug abuse  - Social work consult  - UDS pending      Arm abscess  - Chronic abscess on arm that is healing. No drainage or erythema  - No signs of infection  - Consult wound care-appreciate recs    Acute on chronic anemia  Iron deficiency anemia  - Hgb 6.1 down from 7s. Initially thought to be symptomatic anemia but patient said she lied about symptoms  - Unsure of etiology but did deny any hematochezia, hematemesis, or melena  - Type and screen now  - transfuse 1U pRBC; consent obtained  - CBC q8h  - Iron panel, ferritin, retic reviewed  - TSH -stable  Venofer while inpatient and discharge on p.o. iron      VTE Risk Mitigation (From admission, onward)         Ordered     IP VTE HIGH RISK PATIENT  Once         06/30/23 0112     Place sequential compression device  Until discontinued         06/30/23 0112                Discharge Planning   ABELARDO:      Code Status: Full Code   Is the patient medically ready for discharge?:     Reason for patient still in hospital (select all that apply): Patient trending condition               Critical care time spent on the evaluation and treatment of severe organ dysfunction, review of pertinent labs and imaging studies, discussions with consulting providers and discussions with patient/family: 35 minutes.      Shelbie He MD  Department of Hospital  [facility] - Intensive Care

## 2023-07-01 NOTE — ASSESSMENT & PLAN NOTE
Iron deficiency anemia  - Hgb 6.1 down from 7s. Initially thought to be symptomatic anemia but patient said she lied about symptoms  - Unsure of etiology but did deny any hematochezia, hematemesis, or melena  - Type and screen now  - transfuse 1U pRBC; consent obtained  - CBC q8h  - Iron panel, ferritin, retic reviewed  - TSH -stable  Venofer while inpatient and discharge on p.o. iron

## 2023-07-01 NOTE — ASSESSMENT & PLAN NOTE
- Chronic abscess on arm that is healing. No drainage or erythema  - No signs of infection  - Consult wound care-appreciate recs

## 2023-07-01 NOTE — ASSESSMENT & PLAN NOTE
Questionable etiology  Patient with intermittent apneic episodes-likely due to drug overdose    Consult cardiology-appreciate recs-place pacer pads, atopic to bedside for symptomatic bradycardia unresponsive to tactile stimuli.  Continue tele  TTE   Normal systolic function.   The estimated ejection fraction is 65%.   Normal left ventricular diastolic function.   Normal right ventricular size with normal right ventricular systolic function.   Mild left atrial enlargement.   Mild to moderate tricuspid regurgitation.   Normal central venous pressure (3 mmHg).   The estimated PA systolic pressure is 32 mmHg.

## 2023-07-01 NOTE — PROGRESS NOTES
[facility] Pulmonary/Critical Care Resident Progress Note:    Primary Attending:  Shelbie He*   Consultant Attending: Tesfaye Nguyen MD   Consultant Fellow: Vineet Perry MD     Admit Date: 2023   Hospital LOS: 1     Brief Summary of Hospitalization:  48 year old with no known past medical history who presented to [facility] with an opioid overdose. Patient is somonlent and minimal arousable so history is provided via chart review. It is unclear what the events leading up to her presentation were but it sounded as if patient was initially agitated, and was detained by police and complaining of an asthma attack with shortness of breath. Those symptoms appaered to have resolved and by the time she presented to the ED she was unresponsive and somnolent. She received 2 narcans with excellent response. She was admitted to the ICU on a continuous narcan gtt. On  patient was bradypneic because narcan gtt had to be paused to administer blood, and she received an additional narcan with excellent response and narcan gtt resumed. Critical care was consulted for comanagement for opiate overdose.    Interval Hx:  Patient remains somewhat somnolent this morning w/ no acute events overnight reported by nursing staff. Continuing on Narcan gtt at this time w/ sinus bradycardia present. S/p 1 U PRBC and 1 dose of Venofer yesterday.    Objective:  Last 24 Hour Vital Signs:  BP  Min: 93/54  Max: 137/63  Temp  Av.7 °F (37.1 °C)  Min: 98.3 °F (36.8 °C)  Max: 99 °F (37.2 °C)  Pulse  Av.3  Min: 43  Max: 54  Resp  Av.5  Min: 0  Max: 47  SpO2  Av.9 %  Min: 87 %  Max: 100 %  Height  Av' (152.4 cm)  Min: 5' (152.4 cm)  Max: 5' (152.4 cm)  Weight  Av.5 kg (173 lb)  Min: 78.5 kg (173 lb)  Max: 78.5 kg (173 lb)  Body mass index is 33.79 kg/m².  I & O (Last 24H):  Intake/Output Summary (Last 24 hours) at 2023 0716  Last data filed at 2023 2200  Gross per 24 hour   Intake  330.38 ml   Output --   Net 330.38 ml       Physical Exam:  Vitals and nursing note reviewed.     Constitutional:       Appearance: WNWD adult female, somewhat somnolent, intermittently responsive   HENT:      Head: Normocephalic and atraumatic.      Nose: Nose normal.      Mouth/Throat:      Mouth: Mucous membranes are dry.   Eyes:      Extraocular Movements: Extraocular movements intact.      Pupils: Pupils are equal, round, and reactive to light.   Cardiovascular:      Rate and Rhythm: Regular rhythm. Bradycardia present.      Pulses: Normal pulses.      Heart sounds: Normal heart sounds. No murmur heard.  Pulmonary:      Effort: Pulmonary effort is normal.      Breath sounds: Normal breath sounds. No wheezing or rales.   Abdominal:      General: Abdomen is flat. Bowel sounds are normal. There is no distension.      Palpations: Abdomen is soft.      Tenderness: There is no abdominal tenderness.   Musculoskeletal:         General: No swelling.      Comments: Healing left arm wound w/ no erythema or drainage  Skin:     General: Skin is warm.      Capillary Refill: Capillary refill takes less than 2 seconds.   Neurological:      General: No focal deficit present.      Mental Status: Still somewhat lethargic and confused.       I have reviewed all labs / images / cultures  Pertinent labs are as follows:     Recent Labs   Lab 06/30/23  2124   WBC 13.96*   RBC 4.21   HGB 8.2*   HCT 28.7*      MCV 68*   MCH 19.5*   MCHC 28.6*       Assessment & Plan:     Cardiovascular:  Bradycardia 2/2 Opioid Overdose  Drug screen positive for opiates and cocaine in ED. Unknown illicit substance taken that is taking an extended period to metabolize out. Has remained bradycardic w/ an episode of heart block yesterday when pausing Narcan gtt to administer blood. Pacer pads placed and Cards consulted w/ discussion that it is likely vagally mediated.  Plan:  - Continue Narcan gtt  - NPO until patient more alert and able to eat  -  Trial discontinuing Narcan gtt today while receiving additional dose of Venofer  - Atropine PRN for bradycardia unresponsive to tactile stimulation  - PRN's for opioid withdrawal  - Continue to monitor or telemetry    Heme/Onc:  Iron Deficiency Anemia  Hemoglobin 6.2 at time of admission, repeat 6.5 yesterday. Iron low at 17 w/ iron sat of 5% and ferritin of 10 yesterday. S/p 1 U PRBC and 1 dose Venofer yesterday.  Plan:  - Additional dose of Venofer today  - Continue to monitor hemoglobin and transfuse as needed to maintain > 7.0  - Plan to start on PO iron at time of discharge    Infectious:  Healing Abscess of Left Forearm  No drainage or erythema on exam to indicate active infection.  Plan:  - Wound care consulted  - Continue to monitor    Psych:  Intravenous Drug Use  Hx of IVDU w/ unknown opiate at this presentation. UDS positive for opiates and cocaine.  Plan:  -  on cessation  - Social work consulted      Patient seen and examined with Dr. Nguyen. Attestation to follow.    We will continue to follow with you, thank you for the opportunity to be involved in Ms. Wiseman's care.    Ruslan Josue DO  [facility] Internal [facility], PGY-II  [facility] Pulmonary/Critical Care    Pt seen and examined with Pulmonary/Critical Care team and this note reviewed and validated with the following additional comments:    Seems more alert today.  We are holding naloxone and observing. Should be able to step down.    Alverto Nguyen MD  Phone [phone]

## 2023-07-02 LAB
ANION GAP SERPL CALC-SCNC: 11 MMOL/L (ref 8–16)
BASOPHILS # BLD AUTO: 0.02 K/UL (ref 0–0.2)
BASOPHILS NFR BLD: 0.2 % (ref 0–1.9)
BUN SERPL-MCNC: 11 MG/DL (ref 6–20)
CALCIUM SERPL-MCNC: 8.5 MG/DL (ref 8.7–10.5)
CHLORIDE SERPL-SCNC: 105 MMOL/L (ref 95–110)
CO2 SERPL-SCNC: 21 MMOL/L (ref 23–29)
CREAT SERPL-MCNC: 0.8 MG/DL (ref 0.5–1.4)
DIFFERENTIAL METHOD: ABNORMAL
EOSINOPHIL # BLD AUTO: 0 K/UL (ref 0–0.5)
EOSINOPHIL NFR BLD: 0.2 % (ref 0–8)
ERYTHROCYTE [DISTWIDTH] IN BLOOD BY AUTOMATED COUNT: 20 % (ref 11.5–14.5)
EST. GFR  (NO RACE VARIABLE): >60 ML/MIN/1.73 M^2
GLUCOSE SERPL-MCNC: 87 MG/DL (ref 70–110)
HCT VFR BLD AUTO: 27.1 % (ref 37–48.5)
HGB BLD-MCNC: 7.8 G/DL (ref 12–16)
IMM GRANULOCYTES # BLD AUTO: 0.09 K/UL (ref 0–0.04)
IMM GRANULOCYTES NFR BLD AUTO: 0.8 % (ref 0–0.5)
LYMPHOCYTES # BLD AUTO: 2.4 K/UL (ref 1–4.8)
LYMPHOCYTES NFR BLD: 20.9 % (ref 18–48)
MCH RBC QN AUTO: 19.8 PG (ref 27–31)
MCHC RBC AUTO-ENTMCNC: 28.8 G/DL (ref 32–36)
MCV RBC AUTO: 69 FL (ref 82–98)
MONOCYTES # BLD AUTO: 0.7 K/UL (ref 0.3–1)
MONOCYTES NFR BLD: 5.9 % (ref 4–15)
NEUTROPHILS # BLD AUTO: 8.2 K/UL (ref 1.8–7.7)
NEUTROPHILS NFR BLD: 72 % (ref 38–73)
NRBC BLD-RTO: 1 /100 WBC
PLATELET # BLD AUTO: 305 K/UL (ref 150–450)
PMV BLD AUTO: 10.9 FL (ref 9.2–12.9)
POTASSIUM SERPL-SCNC: 3.9 MMOL/L (ref 3.5–5.1)
RBC # BLD AUTO: 3.93 M/UL (ref 4–5.4)
SODIUM SERPL-SCNC: 137 MMOL/L (ref 136–145)
WBC # BLD AUTO: 11.34 K/UL (ref 3.9–12.7)

## 2023-07-02 PROCEDURE — 36415 COLL VENOUS BLD VENIPUNCTURE: CPT | Performed by: FAMILY MEDICINE

## 2023-07-02 PROCEDURE — 63600175 PHARM REV CODE 636 W HCPCS: Performed by: FAMILY MEDICINE

## 2023-07-02 PROCEDURE — 85025 COMPLETE CBC W/AUTO DIFF WBC: CPT | Performed by: FAMILY MEDICINE

## 2023-07-02 PROCEDURE — 94761 N-INVAS EAR/PLS OXIMETRY MLT: CPT

## 2023-07-02 PROCEDURE — 21400001 HC TELEMETRY ROOM

## 2023-07-02 PROCEDURE — 80048 BASIC METABOLIC PNL TOTAL CA: CPT | Performed by: FAMILY MEDICINE

## 2023-07-02 RX ADMIN — MUPIROCIN: 20 OINTMENT TOPICAL at 09:07

## 2023-07-02 RX ADMIN — MUPIROCIN: 20 OINTMENT TOPICAL at 08:07

## 2023-07-02 RX ADMIN — IRON SUCROSE 200 MG: 20 INJECTION, SOLUTION INTRAVENOUS at 10:07

## 2023-07-02 NOTE — PROGRESS NOTES
"[facility] Medicine  Progress Note    Patient Name: Margarita Wiseman  MRN: [id number]  Patient Class: IP- Inpatient   Admission Date: 6/29/2023  Length of Stay: 2 days  Attending Physician: Shelbie He*  Primary Care Provider: Primary Doctor No        Subjective:     Principal Problem:Opioid overdose        HPI:  49 yo female with PMHx of IVDU here for apparent chest pain.    Per ED note: "Patient brought in by police after a shop lifting incident.  The vehicle that patient was in elevated police [place]  Patient then got out and [place] the interstate.  After the incident and when patient was detained by police she states that she had increased shortness of breath and felt like she was having an asthma attack."    After further discussion with patient, it turns out that she was lying about her symptoms.  When I found her on interview, she was lethargic and difficult to arouse despite sternal rub.  She then got Narcan 0.4 mg with a terrific response.  However, she continued to deny any recent drugs.  Her mental status than waned again to lethargy.  We gave her 2nd dose of Narcan with somewhat good response.  Due to continued Narcan pushes and mental status changes with concern for airway protection, she was transferred to the ICU.    Further medical history includes labs today showing a hemoglobin of 6.1 down from a baseline of 7s.  She apparently has a history of iron-deficient anemia, but she is not on any medications.      Overview/Hospital Course:  No notes on file    Interval History: awake and alert, d/c narcan drip yesterday.  HR in the 40-50s- Continue to monitor  H/H stable s/p transfusion.     Patient with severe microcytic anemia likely due to iron deficiency-Venofer infusion  Possible discharge tomorrow    Review of Systems   Constitutional:  Negative for activity change.   Respiratory:  Negative for chest tightness and shortness of breath.  "   Cardiovascular:  Negative for chest pain.   Genitourinary:  Negative for dysuria.   Objective:     Vital Signs (Most Recent):  Temp: 98 °F (36.7 °C) (07/02/23 0821)  Pulse: (!) 53 (07/02/23 0821)  Resp: 18 (07/02/23 0821)  BP: (!) 103/55 (07/02/23 0821)  SpO2: 100 % (07/02/23 0829) Vital Signs (24h Range):  Temp:  [97.4 °F (36.3 °C)-98.8 °F (37.1 °C)] 98 °F (36.7 °C)  Pulse:  [45-65] 53  Resp:  [0-33] 18  SpO2:  [88 %-100 %] 100 %  BP: ()/(51-69) 103/55     Weight: 78.5 kg (173 lb)  Body mass index is 33.79 kg/m².    Intake/Output Summary (Last 24 hours) at 7/2/2023 0857  Last data filed at 7/2/2023 0543  Gross per 24 hour   Intake 1070.13 ml   Output 600 ml   Net 470.13 ml           Physical Exam  Vitals and nursing note reviewed.   Constitutional:       Appearance: She is obese. She is not ill-appearing or diaphoretic.   HENT:      Head: Normocephalic and atraumatic.   Cardiovascular:      Rate and Rhythm: Regular rhythm. Bradycardia present.      Pulses: Normal pulses.      Heart sounds: Normal heart sounds. No murmur heard.  Pulmonary:      Effort: Pulmonary effort is normal.      Breath sounds: Normal breath sounds. No wheezing or rales.   Abdominal:      General: Abdomen is flat. Bowel sounds are normal. There is no distension.      Palpations: Abdomen is soft.      Tenderness: There is no abdominal tenderness.   Musculoskeletal:         General: No swelling.      Comments: Left forearm with ulcerated wound that is healing without purulent drainage or erythema   Skin:     General: Skin is warm.      Capillary Refill: Capillary refill takes less than 2 seconds.      Comments: Multiple skin scars from needles? along legs bilaterally   Neurological:      Mental Status: She is lethargic and confused.           Significant Labs: A1C: No results for input(s): HGBA1C in the last 4320 hours.  ABGs:   No results for input(s): PH, PCO2, HCO3, POCSATURATED, BE, TOTALHB, COHB, METHB, O2HB, POCFIO2, PO2 in the last  48 hours.    Blood Culture: No results for input(s): LABBLOO in the last 48 hours.  CBC:   Recent Labs   Lab 06/30/23  2124 07/02/23  0325   WBC 13.96* 11.34   HGB 8.2* 7.8*   HCT 28.7* 27.1*    305       CMP:   Recent Labs   Lab 07/01/23  1749 07/02/23  0325    137   K 3.6 3.9    105   CO2 18* 21*   * 87   BUN 14 11   CREATININE 0.8 0.8   CALCIUM 8.8 8.5*   ANIONGAP 13 11       Cardiac Markers: No results for input(s): CKMB, MYOGLOBIN, BNP, TROPISTAT in the last 48 hours.  Lactic Acid: No results for input(s): LACTATE in the last 48 hours.  Lipase: No results for input(s): LIPASE in the last 48 hours.  Lipid Panel: No results for input(s): CHOL, HDL, LDLCALC, TRIG, CHOLHDL in the last 48 hours.  Magnesium:   Recent Labs   Lab 07/01/23  1749   MG 1.8     Troponin:   No results for input(s): TROPONINI, TROPONINIHS in the last 48 hours.    TSH:   Recent Labs   Lab 06/30/23  0340   TSH 1.895       Urine Culture: No results for input(s): LABURIN in the last 48 hours.  Urine Studies: No results for input(s): COLORU, APPEARANCEUA, PHUR, SPECGRAV, PROTEINUA, GLUCUA, KETONESU, BILIRUBINUA, OCCULTUA, NITRITE, UROBILINOGEN, LEUKOCYTESUR, RBCUA, WBCUA, BACTERIA, SQUAMEPITHEL, HYALINECASTS in the last 48 hours.    Invalid input(s): OBIE    Significant Imaging: I have reviewed all pertinent imaging results/findings within the past 24 hours.      Assessment/Plan:      * Opioid overdose  - S/p Narcan 0.4mg x 2 with terrific response acutely. Patient woke up, moved around, starting showing signs of opioid withdrawal -- yawning, salivation, pain, etc. However, narcan did not have continued response  - Will start Narcan gtt @ 0.4 mg/hr and titrate as needed for RAAS 0 to -1  - UDS positive for opiates and cocaine  - Repeat labs in general  - Patient denying any drug use. Does have history of Iv drug    Second degree heart block  Questionable etiology  Patient with intermittent apneic episodes-likely due  to drug overdose    Consult cardiology-appreciate recs-place pacer pads, atopic to bedside for symptomatic bradycardia unresponsive to tactile stimuli.  Continue tele  TTE   Normal systolic function.   The estimated ejection fraction is 65%.   Normal left ventricular diastolic function.   Normal right ventricular size with normal right ventricular systolic function.   Mild left atrial enlargement.   Mild to moderate tricuspid regurgitation.   Normal central venous pressure (3 mmHg).   The estimated PA systolic pressure is 32 mmHg.      Drug abuse, IV  - Will need counseling on drug abuse  - Social work consult  - UDS pending      Arm abscess  - Chronic abscess on arm that is healing. No drainage or erythema  - No signs of infection  - Consult wound care-appreciate recs    Acute on chronic anemia  Iron deficiency anemia  - Hgb 6.1 down from 7s. Initially thought to be symptomatic anemia but patient said she lied about symptoms  - Unsure of etiology but did deny any hematochezia, hematemesis, or melena  - Type and screen now  - transfuse 1U pRBC; consent obtained  - CBC q8h  - Iron panel, ferritin, retic reviewed  - TSH -stable  Venofer while inpatient and discharge on p.o. iron      VTE Risk Mitigation (From admission, onward)         Ordered     IP VTE HIGH RISK PATIENT  Once         06/30/23 0112     Place sequential compression device  Until discontinued         06/30/23 0112                Discharge Planning   ABELARDO:      Code Status: Full Code   Is the patient medically ready for discharge?:     Reason for patient still in hospital (select all that apply): Patient trending condition                     Shelbie N BirdocentMD Zenaida  Department of Hospital Medicine   [facility]

## 2023-07-02 NOTE — PLAN OF CARE
Pt is AAOX3 with no complaints of pain or n/v. Pt up in chair for a few hours today. Pt to call for assist with ambulating to toilet. Drsg to L arm changed per orders.

## 2023-07-02 NOTE — NURSING
Report given to Anastasiia NEELY. Pt will transfer to room 530 with telebox soon. Full report given. Time allowed for questions. See assessment flowsheet.

## 2023-07-02 NOTE — PLAN OF CARE
Pts arrived safely form ICU via wheelchair, report and orders received. Safety maintained, bed alarm on, call bell in reach. No N/V, SOB, distress during night. Vitals stable.

## 2023-07-02 NOTE — PLAN OF CARE
Problem: Adult Inpatient Plan of Care  Goal: Patient-Specific Goal (Individualized)  Outcome: Ongoing, Progressing   VN note:   Patient's chart, labs and vital signs reviewed, will be available to intervene if needed.

## 2023-07-02 NOTE — SUBJECTIVE & OBJECTIVE
Interval History: awake and alert, d/c narcan drip yesterday.  HR in the 40-50s- Continue to monitor  H/H stable s/p transfusion.     Patient with severe microcytic anemia likely due to iron deficiency-Venofer infusion  Possible discharge tomorrow    Review of Systems   Constitutional:  Negative for activity change.   Respiratory:  Negative for chest tightness and shortness of breath.    Cardiovascular:  Negative for chest pain.   Genitourinary:  Negative for dysuria.   Objective:     Vital Signs (Most Recent):  Temp: 98 °F (36.7 °C) (07/02/23 0821)  Pulse: (!) 53 (07/02/23 0821)  Resp: 18 (07/02/23 0821)  BP: (!) 103/55 (07/02/23 0821)  SpO2: 100 % (07/02/23 0829) Vital Signs (24h Range):  Temp:  [97.4 °F (36.3 °C)-98.8 °F (37.1 °C)] 98 °F (36.7 °C)  Pulse:  [45-65] 53  Resp:  [0-33] 18  SpO2:  [88 %-100 %] 100 %  BP: ()/(51-69) 103/55     Weight: 78.5 kg (173 lb)  Body mass index is 33.79 kg/m².    Intake/Output Summary (Last 24 hours) at 7/2/2023 0857  Last data filed at 7/2/2023 0543  Gross per 24 hour   Intake 1070.13 ml   Output 600 ml   Net 470.13 ml           Physical Exam  Vitals and nursing note reviewed.   Constitutional:       Appearance: She is obese. She is not ill-appearing or diaphoretic.   HENT:      Head: Normocephalic and atraumatic.   Cardiovascular:      Rate and Rhythm: Regular rhythm. Bradycardia present.      Pulses: Normal pulses.      Heart sounds: Normal heart sounds. No murmur heard.  Pulmonary:      Effort: Pulmonary effort is normal.      Breath sounds: Normal breath sounds. No wheezing or rales.   Abdominal:      General: Abdomen is flat. Bowel sounds are normal. There is no distension.      Palpations: Abdomen is soft.      Tenderness: There is no abdominal tenderness.   Musculoskeletal:         General: No swelling.      Comments: Left forearm with ulcerated wound that is healing without purulent drainage or erythema   Skin:     General: Skin is warm.      Capillary Refill:  Capillary refill takes less than 2 seconds.      Comments: Multiple skin scars from needles? along legs bilaterally   Neurological:      Mental Status: She is lethargic and confused.           Significant Labs: A1C: No results for input(s): HGBA1C in the last 4320 hours.  ABGs:   No results for input(s): PH, PCO2, HCO3, POCSATURATED, BE, TOTALHB, COHB, METHB, O2HB, POCFIO2, PO2 in the last 48 hours.    Blood Culture: No results for input(s): LABBLOO in the last 48 hours.  CBC:   Recent Labs   Lab 06/30/23  2124 07/02/23  0325   WBC 13.96* 11.34   HGB 8.2* 7.8*   HCT 28.7* 27.1*    305       CMP:   Recent Labs   Lab 07/01/23  1749 07/02/23  0325    137   K 3.6 3.9    105   CO2 18* 21*   * 87   BUN 14 11   CREATININE 0.8 0.8   CALCIUM 8.8 8.5*   ANIONGAP 13 11       Cardiac Markers: No results for input(s): CKMB, MYOGLOBIN, BNP, TROPISTAT in the last 48 hours.  Lactic Acid: No results for input(s): LACTATE in the last 48 hours.  Lipase: No results for input(s): LIPASE in the last 48 hours.  Lipid Panel: No results for input(s): CHOL, HDL, LDLCALC, TRIG, CHOLHDL in the last 48 hours.  Magnesium:   Recent Labs   Lab 07/01/23  1749   MG 1.8     Troponin:   No results for input(s): TROPONINI, TROPONINIHS in the last 48 hours.    TSH:   Recent Labs   Lab 06/30/23  0340   TSH 1.895       Urine Culture: No results for input(s): LABURIN in the last 48 hours.  Urine Studies: No results for input(s): COLORU, APPEARANCEUA, PHUR, SPECGRAV, PROTEINUA, GLUCUA, KETONESU, BILIRUBINUA, OCCULTUA, NITRITE, UROBILINOGEN, LEUKOCYTESUR, RBCUA, WBCUA, BACTERIA, SQUAMEPITHEL, HYALINECASTS in the last 48 hours.    Invalid input(s): WRIGHTSUR    Significant Imaging: I have reviewed all pertinent imaging results/findings within the past 24 hours.   Attending Attestation (For Attendings USE Only)...

## 2023-07-02 NOTE — NURSING
Pt transferred to room 530 per WC and with tele box monitor on. Pt AAOX4 no distress. Pt accepted by Anastasiia NEELY Chart to sec. Mupirocin oint to RN. Bed alarm on.

## 2023-07-03 ENCOUNTER — CLINICAL SUPPORT (OUTPATIENT)
Dept: SMOKING CESSATION | Facility: CLINIC | Age: 48
End: 2023-07-03

## 2023-07-03 VITALS
HEIGHT: 60 IN | TEMPERATURE: 99 F | OXYGEN SATURATION: 100 % | RESPIRATION RATE: 17 BRPM | WEIGHT: 173 LBS | BODY MASS INDEX: 33.96 KG/M2 | HEART RATE: 50 BPM | SYSTOLIC BLOOD PRESSURE: 98 MMHG | DIASTOLIC BLOOD PRESSURE: 55 MMHG

## 2023-07-03 DIAGNOSIS — F17.210 CIGARETTE SMOKER: Primary | ICD-10-CM

## 2023-07-03 PROCEDURE — 99406 PT REFUSED TOBACCO CESSATION: ICD-10-PCS | Mod: S$GLB,,,

## 2023-07-03 PROCEDURE — 99406 BEHAV CHNG SMOKING 3-10 MIN: CPT | Mod: S$GLB,,,

## 2023-07-03 RX ORDER — HYDROXYZINE HYDROCHLORIDE 50 MG/1
50 TABLET, FILM COATED ORAL NIGHTLY PRN
Qty: 30 TABLET | Refills: 1 | Status: SHIPPED | OUTPATIENT
Start: 2023-07-03

## 2023-07-03 RX ORDER — FERROUS SULFATE 325(65) MG
325 TABLET ORAL 2 TIMES DAILY
Qty: 60 TABLET | Refills: 2 | Status: SHIPPED | OUTPATIENT
Start: 2023-07-03

## 2023-07-03 RX ADMIN — MUPIROCIN: 20 OINTMENT TOPICAL at 09:07

## 2023-07-03 NOTE — PLAN OF CARE
VIRTUAL NURSE:  Labs, notes, orders, and careplan reviewed. VN to be available as needed.    Problem: Adult Inpatient Plan of Care  Goal: Patient-Specific Goal (Individualized)  Outcome: Ongoing, Progressing  Goal: Absence of Hospital-Acquired Illness or Injury  Outcome: Ongoing, Progressing  Goal: Optimal Comfort and Wellbeing  Outcome: Ongoing, Progressing  Goal: Readiness for Transition of Care  Outcome: Ongoing, Progressing  Goal: Plan of Care Review  Outcome: Ongoing, Progressing

## 2023-07-03 NOTE — PROGRESS NOTES
Virtual Nurse:Discharge orders noted; additional clinical references attached.  and pharmacy tech notified.  Patient's discharge instruction packet given by bedside RN.    Cued into patient's room.  Permission received per patient to turn camera to view patient.  Introduced as VN that will be instructing on discharge instructions.  Family member at bedside.  Educated patient on reason for admission; medications to hold, continue, and start, appointment to follow-up with doctor, and when to return to ED. Teach back method used. Verbalized understanding  Number given for 24/7 Nurse Line. Opportunity given for questions and questions answered.  Bedside nurse updated. Transport to [facility] requested.        07/03/23 1157   Admission   Communication Issues? None   Shift   Virtual Nurse - Rounding Complete   Virtual Nurse - Patient Verbalized Approval Of VN Rounding;Camera Use   Safety/Activity   Patient Rounds visualized patient   Activity Management Up in chair - L3

## 2023-07-03 NOTE — PLAN OF CARE
Pts safety maintained, bed alarm on, call bell in reach. Dressing on L arm CDI. No N/V, SOB, distress during night. Vitals stable.

## 2023-07-03 NOTE — PROGRESS NOTES
Smoking cessation education provided. Pt is a 1 pk/day cigarette smoker x 26 yrs. She states that she cut down to 0.5 pk/day approx. 1 yr ago. Pt declines referral to Ambulatory Smoking Cessation clinic, stating not ready to quit. Handout provided.

## 2023-07-03 NOTE — ASSESSMENT & PLAN NOTE
- S/p Narcan 0.4mg x 2 with terrific response acutely. Patient woke up, moved around, starting showing signs of opioid withdrawal -- yawning, salivation, pain, etc. However, narcan did not have continued response  - Will start Narcan gtt @ 0.4 mg/hr and titrate as needed for RAAS 0 to -1  - UDS positive for opiates and cocaine  - Patient denying any drug use. Does have history of Iv drug

## 2023-07-03 NOTE — PROGRESS NOTES
"[facility] Medicine  Progress Note    Patient Name: Margarita Wiseman  MRN: [id number]  Patient Class: IP- Inpatient   Admission Date: 6/29/2023  Length of Stay: 3 days  Attending Physician: Shelbie He*  Primary Care Provider: Primary Doctor No        Subjective:     Principal Problem:Opioid overdose        HPI:  49 yo female with PMHx of IVDU here for apparent chest pain.    Per ED note: "Patient brought in by police after a shop lifting incident.  The [place] that patient was in [place]  [place] then [place].  After the incident and when patient was detained by police she states that she had increased shortness of breath and felt like she was having an asthma attack."    After further discussion with patient, it turns out that she was lying about her symptoms.  When I found her on interview, she was lethargic and difficult to arouse despite sternal rub.  She then got Narcan 0.4 mg with a terrific response.  However, she continued to deny any recent drugs.  Her mental status than waned again to lethargy.  We gave her 2nd dose of Narcan with somewhat good response.  Due to continued Narcan pushes and mental status changes with concern for airway protection, she was transferred to the ICU.    Further medical history includes labs today showing a hemoglobin of 6.1 down from a baseline of 7s.  She apparently has a history of iron-deficient anemia, but she is not on any medications.      Overview/Hospital Course:  No notes on file    Interval History: awake and alert, d/c narcan drip yesterday.  HR in the 50s- Continue to monitor  H/H stable s/p transfusion.     Patient with severe microcytic anemia likely due to iron deficiency-Venofer infusion  Possible discharge today    Review of Systems   Constitutional:  Negative for activity change.   Respiratory:  Negative for chest tightness and shortness of breath.  "   Cardiovascular:  Negative for chest pain.   Genitourinary:  Negative for dysuria.   Objective:     Vital Signs (Most Recent):  Temp: 98.5 °F (36.9 °C) (07/03/23 0725)  Pulse: (!) 50 (07/03/23 0725)  Resp: 17 (07/03/23 0725)  BP: (!) 98/55 (07/03/23 0725)  SpO2: 100 % (07/03/23 0725) Vital Signs (24h Range):  Temp:  [97.4 °F (36.3 °C)-99.2 °F (37.3 °C)] 98.5 °F (36.9 °C)  Pulse:  [46-54] 50  Resp:  [17-18] 17  SpO2:  [99 %-100 %] 100 %  BP: ()/(55-61) 98/55     Weight: 78.5 kg (173 lb)  Body mass index is 33.79 kg/m².    Intake/Output Summary (Last 24 hours) at 7/3/2023 0933  Last data filed at 7/2/2023 1200  Gross per 24 hour   Intake 120 ml   Output --   Net 120 ml           Physical Exam  Vitals and nursing note reviewed.   Constitutional:       Appearance: She is obese. She is not ill-appearing or diaphoretic.   HENT:      Head: Normocephalic and atraumatic.   Cardiovascular:      Rate and Rhythm: Regular rhythm. Bradycardia present.      Pulses: Normal pulses.      Heart sounds: Normal heart sounds. No murmur heard.  Pulmonary:      Effort: Pulmonary effort is normal.      Breath sounds: Normal breath sounds. No wheezing or rales.   Abdominal:      General: Abdomen is flat. Bowel sounds are normal. There is no distension.      Palpations: Abdomen is soft.      Tenderness: There is no abdominal tenderness.   Musculoskeletal:         General: No swelling.      Comments: Left forearm with ulcerated wound that is healing without purulent drainage or erythema   Skin:     General: Skin is warm.      Capillary Refill: Capillary refill takes less than 2 seconds.      Comments: Multiple skin scars from needles? along legs bilaterally           Significant Labs: A1C: No results for input(s): HGBA1C in the last 4320 hours.  ABGs:   No results for input(s): PH, PCO2, HCO3, POCSATURATED, BE, TOTALHB, COHB, METHB, O2HB, POCFIO2, PO2 in the last 48 hours.    Blood Culture: No results for input(s): LABBLOO in the last  48 hours.  CBC:   Recent Labs   Lab 07/02/23  0325   WBC 11.34   HGB 7.8*   HCT 27.1*          CMP:   Recent Labs   Lab 07/01/23  1749 07/02/23  0325    137   K 3.6 3.9    105   CO2 18* 21*   * 87   BUN 14 11   CREATININE 0.8 0.8   CALCIUM 8.8 8.5*   ANIONGAP 13 11       Cardiac Markers: No results for input(s): CKMB, MYOGLOBIN, BNP, TROPISTAT in the last 48 hours.  Lactic Acid: No results for input(s): LACTATE in the last 48 hours.  Lipase: No results for input(s): LIPASE in the last 48 hours.  Lipid Panel: No results for input(s): CHOL, HDL, LDLCALC, TRIG, CHOLHDL in the last 48 hours.  Magnesium:   Recent Labs   Lab 07/01/23  1749   MG 1.8       Troponin:   No results for input(s): TROPONINI, TROPONINIHS in the last 48 hours.    TSH:   Recent Labs   Lab 06/30/23  0340   TSH 1.895       Urine Culture: No results for input(s): LABURIN in the last 48 hours.  Urine Studies: No results for input(s): COLORU, APPEARANCEUA, PHUR, SPECGRAV, PROTEINUA, GLUCUA, KETONESU, BILIRUBINUA, OCCULTUA, NITRITE, UROBILINOGEN, LEUKOCYTESUR, RBCUA, WBCUA, BACTERIA, SQUAMEPITHEL, HYALINECASTS in the last 48 hours.    Invalid input(s): WRIGHTSUR    Significant Imaging: I have reviewed all pertinent imaging results/findings within the past 24 hours.      Assessment/Plan:      * Opioid overdose  - S/p Narcan 0.4mg x 2 with terrific response acutely. Patient woke up, moved around, starting showing signs of opioid withdrawal -- yawning, salivation, pain, etc. However, narcan did not have continued response  - Will start Narcan gtt @ 0.4 mg/hr and titrate as needed for RAAS 0 to -1  - UDS positive for opiates and cocaine  - Patient denying any drug use. Does have history of Iv drug    Second degree heart block  Questionable etiology  Patient with intermittent apneic episodes-likely due to drug overdose    Consult cardiology-appreciate recs-place pacer pads, atopic to bedside for symptomatic bradycardia unresponsive  to tactile stimuli.  Continue tele  TTE   Normal systolic function.   The estimated ejection fraction is 65%.   Normal left ventricular diastolic function.   Normal right ventricular size with normal right ventricular systolic function.   Mild left atrial enlargement.   Mild to moderate tricuspid regurgitation.   Normal central venous pressure (3 mmHg).   The estimated PA systolic pressure is 32 mmHg.      Drug abuse, IV  - Will need counseling on drug abuse  - Social work consult  - UDS- Cocaine and opioids positive      Arm abscess  - Chronic abscess on arm that is healing. No drainage or erythema  - No signs of infection  - Consult wound care-appreciate recs    Acute on chronic anemia  Iron deficiency anemia  - Hgb 6.1 down from 7s. Initially thought to be symptomatic anemia but patient said she lied about symptoms  - Unsure of etiology but did deny any hematochezia, hematemesis, or melena  - Type and screen now  - transfuse 1U pRBC; consent obtained  - CBC q8h  - Iron panel, ferritin, retic reviewed  - TSH -stable  Venofer while inpatient and discharge on p.o. iron      VTE Risk Mitigation (From admission, onward)         Ordered     IP VTE HIGH RISK PATIENT  Once         06/30/23 0112     Place sequential compression device  Until discontinued         06/30/23 0112                Discharge Planning   ABELARDO: 7/3/2023     Code Status: Full Code   Is the patient medically ready for discharge?:     Reason for patient still in hospital (select all that apply): Pending disposition                     Shelbie He MD  Department of Hospital Medicine   [facility]

## 2023-07-03 NOTE — DISCHARGE SUMMARY
"[facility] Medicine  Discharge Summary      Patient Name: Margarita Wiseman  MRN: [id number]  PALLAVI: [id number]  Patient Class: IP- Inpatient  Admission Date: 6/29/2023  Hospital Length of Stay: 3 days  Discharge Date and Time: 7/3/2023 12:10 PM  Attending Physician: Shelbie He*   Discharging Provider: Shelbie He MD  Primary Care Provider: Primary Doctor Doris    Primary Care Team: Networked reference to record PCT     HPI:   49 yo female with PMHx of IVDU here for apparent chest pain.    Per ED note: "Patient brought in by police after a shop lifting incident.  The vehicle that patient was in elevated police [place]  Patient then got out and ran [place] the interstate.  After the incident and when patient was detained by police she states that she had increased shortness of breath and felt like she was having an asthma attack."    After further discussion with patient, it turns out that she was lying about her symptoms.  When I found her on interview, she was lethargic and difficult to arouse despite sternal rub.  She then got Narcan 0.4 mg with a terrific response.  However, she continued to deny any recent drugs.  Her mental status than waned again to lethargy.  We gave her 2nd dose of Narcan with somewhat good response.  Due to continued Narcan pushes and mental status changes with concern for airway protection, she was transferred to the ICU.    Further medical history includes labs today showing a hemoglobin of 6.1 down from a baseline of 7s.  She apparently has a history of iron-deficient anemia, but she is not on any medications.      * No surgery found *      Hospital Course:   No notes on file     Goals of Care Treatment Preferences:  Code Status: Full Code      Consults:   Consults (From admission, onward)          Status Ordering Provider     Inpatient consult to Anesthesiology  Once        Provider:  (Not yet assigned)    Acknowledged " ARGENTINA PHELAN     [facility]  [facility]        Provider:  Teddy Berumen MD    Completed INNOCENT-ANTONIO CHANG            Cardiac/Vascular  Second degree heart block  Questionable etiology  Patient with intermittent apneic episodes-likely due to drug overdose    Consult cardiology-appreciate recs-place pacer pads, atopic to bedside for symptomatic bradycardia unresponsive to tactile stimuli.  Continue tele  TTE  Normal systolic function.  The estimated ejection fraction is 65%.  Normal left ventricular diastolic function.  Normal right ventricular size with normal right ventricular systolic function.  Mild left atrial enlargement.  Mild to moderate tricuspid regurgitation.  Normal central venous pressure (3 mmHg).  The estimated PA systolic pressure is 32 mmHg.      ID  Arm abscess  - Chronic abscess on arm that is healing. No drainage or erythema  - No signs of infection  - Consult wound care-appreciate recs    Oncology  Acute on chronic anemia  Iron deficiency anemia  - Hgb 6.1 down from 7s. Initially thought to be symptomatic anemia but patient said she lied about symptoms  - Unsure of etiology but did deny any hematochezia, hematemesis, or melena  - Type and screen now  - transfuse 1U pRBC; consent obtained  - CBC q8h  - Iron panel, ferritin, retic reviewed  - TSH -stable  Venofer while inpatient and discharge on p.o. iron    Other  * Opioid overdose  - S/p Narcan 0.4mg x 2 with terrific response acutely. Patient woke up, moved around, starting showing signs of opioid withdrawal -- yawning, salivation, pain, etc. However, narcan did not have continued response  - Will start Narcan gtt @ 0.4 mg/hr and titrate as needed for RAAS 0 to -1  - UDS positive for opiates and cocaine  - Patient denying any drug use. Does have history of Iv drug    Drug abuse, IV  - Will need counseling on drug abuse  - Social work consult  - UDS- Cocaine and opioids positive        Final Active  Diagnoses:    Diagnosis Date Noted POA    PRINCIPAL PROBLEM:  Opioid overdose [T40.2X1A] 06/30/2023 Yes    Iron deficiency anemia [D50.9] 07/01/2023 Yes    Acute on chronic anemia [D64.9] 06/30/2023 Yes    Arm abscess [L02.419] 06/30/2023 Yes    Drug abuse, IV [F19.10] 06/30/2023 Yes    Abnormal CT of the chest [R93.89] 06/30/2023 Yes    Bradycardia [R00.1] 06/30/2023 Yes    Second degree heart block [I44.1] 06/30/2023 Clinically Undetermined      Problems Resolved During this Admission:       Discharged Condition: stable    Disposition: Home or Self Care    Follow Up:    Patient Instructions:      Diet Adult Regular     Activity as tolerated       Significant Diagnostic Studies:     Pending Diagnostic Studies:       Procedure Component Value Units Date/Time    EKG 12-lead [[id number]] Collected: 06/30/23 1601    Order Status: Sent Lab Status: In process Updated: 07/03/23 0813    Narrative:      Test Reason : I49.9,    Vent. Rate : 042 BPM     Atrial Rate : 028 BPM     P-R Int : 000 ms          QRS Dur : 128 ms      QT Int : 522 ms       P-R-T Axes : 059 110 -44 degrees     QTc Int : 435 ms    Marked sinus bradycardia with A-V dissociation and Wide QRS rhythm with   Fusion complexes  Nonspecific intraventricular block  T wave abnormality, consider inferior ischemia  Abnormal ECG  When compared with ECG of 30-JUN-2023 12:24,  Wide QRS rhythm has replaced Sinus rhythm    Referred By: [email or web address]   SELF           Confirmed By:     EKG 12-lead [[id number]]     Order Status: Sent Lab Status: No result            Medications:  Reconciled Home Medications:      Medication List        START taking these medications      FeroSuL 325 mg (65 mg iron) Tab tablet  Generic drug: ferrous sulfate  Take 1 tablet (325 mg total) by mouth 2 (two) times daily.     hydrOXYzine 50 MG tablet  Commonly known as: ATARAX  Take 1 tablet (50 mg total) by mouth nightly as needed for Itching (insomnia).            CONTINUE taking these medications       ibuprofen 800 MG tablet  Commonly known as: ADVIL,MOTRIN  Take 1 tablet (800 mg total) by mouth every 6 (six) hours as needed for Pain.     naloxone 4 mg/actuation Spry  Commonly known as: NARCAN  4mg by nasal route as needed for opioid overdose; may repeat every 2-3 minutes in alternating nostrils until medical help arrives. Call 911              Indwelling Lines/Drains at time of discharge:   Lines/Drains/Airways       Drain  Duration             Female External Urinary Catheter 06/30/23 0140 3 days                    Time spent on the discharge of patient: 35 minutes         Shelbie He MD  Department of Hospital Medicine  [facility] Surg

## 2023-07-03 NOTE — PLAN OF CARE
Problem: Adult Inpatient Plan of Care  Goal: Patient-Specific Goal (Individualized)  Outcome: Ongoing, Progressing   Chart and care plan reviewed

## 2023-07-03 NOTE — PLAN OF CARE
[place] - Med Surg  Initial Discharge Assessment       Primary Care Provider: Primary Doctor No    Admission Diagnosis: Chest pain [R07.9]  Symptomatic anemia [D64.9]  Right-sided chest pain [R07.9]  Iron deficiency anemia, unspecified iron deficiency anemia type [D50.9]    Admission Date: 6/29/2023  Expected Discharge Date: 7/3/2023    Consult: pulm, card, & wound care    Payor: MEDICAID / Plan: [facility] COMMUNITY PLAN [facility] ([place] MEDICAID) / Product Type: Managed Medicaid /     Extended Emergency Contact Information  Primary Emergency Contact: Fernando Laurent  Address: [address] [place] [address]  Mobile Phone: [phone]  Relation: Friend  Secondary Emergency Contact: BowenAshley  Mobile Phone: [phone]  Relation: Mother  Preferred language: English   needed? No    Discharge Plan A: (P) Home  Discharge Plan B: (P) [id number] - [place], [place] - [address] AT [facility] [address]  [address]  [facility] [address]  Phone: [phone] Fax: [phone]      Initial Assessment (most recent)       Adult Discharge Assessment - 07/03/23 1125          Discharge Assessment    Assessment Type Discharge Planning Assessment     Confirmed/corrected address, phone number and insurance Yes     Confirmed Demographics Correct on Facesheet     Source of Information patient     Communicated ABELARDO with patient/caregiver Yes     People in Home friend(s)     Do you expect to return to your current living situation? Yes     Do you have help at home or someone to help you manage your care at home? Yes     Prior to hospitilization cognitive status: Alert/Oriented     Current cognitive status: Alert/Oriented     Equipment Currently Used at Home cane, straight     Readmission within 30 days? No     Patient currently being followed by outpatient case management? No     Do you currently have service(s) that help you manage your care at home? No     Do you  take prescription medications? Yes     Do you have prescription coverage? -- (P)    unsure; pt stated she has Medicaid of [place] but does not have card or ID #    Do you have any problems affording any of your prescribed medications? Yes (P)      How do you get to doctors appointments? public transportation (P)      Are you on dialysis? No (P)      Do you take coumadin? No (P)      Discharge Plan A Home (P)      Discharge Plan B Shelter (P)      DME Needed Upon Discharge  none (P)      Discharge Plan discussed with: Patient (P)         Physical Activity    On average, how many days per week do you engage in moderate to strenuous exercise (like a brisk walk)? 0 days (P)      On average, how many minutes do you engage in exercise at this level? 0 min (P)         Financial Resource Strain    How hard is it for you to pay for the very basics like food, housing, medical care, and heating? Very hard (P)         Housing Stability    In the last 12 months, was there a time when you were not able to pay the mortgage or rent on time? Yes (P)      In the last 12 months, was there a time when you did not have a steady place to sleep or slept in a shelter (including now)? Yes (P)         Transportation Needs    In the past 12 months, has lack of transportation kept you from medical appointments or from getting medications? Yes (P)      In the past 12 months, has lack of transportation kept you from meetings, work, or from getting things needed for daily living? Yes (P)         Food Insecurity    Within the past 12 months, you worried that your food would run out before you got the money to buy more. Never true (P)      Within the past 12 months, the food you bought just didn't last and you didn't have money to get more. Never true (P)         Stress    Do you feel stress - tense, restless, nervous, or anxious, or unable to sleep at night because your mind is troubled all the time - these days? Not at all (P)         Social  Connections    In a typical week, how many times do you talk on the phone with family, friends, or neighbors? More than three times a week (P)      How often do you get together with friends or relatives? More than three times a week (P)      How often do you attend Alevism or Faith services? Never (P)      Do you belong to any clubs or organizations such as Alevism groups, unions, fraternal or athletic groups, or school groups? No (P)      How often do you attend meetings of the clubs or organizations you belong to? Never (P)      Are you , , , , never , or living with a partner? Never  (P)         Alcohol Use    Q1: How often do you have a drink containing alcohol? Never (P)      Q2: How many drinks containing alcohol do you have on a typical day when you are drinking? Patient does not drink (P)      Q3: How often do you have six or more drinks on one occasion? Never (P)                    1125  Patient awake & alert sitting in recliner when CM rounded. No family present. Patient was admitted with chest pain & is being followed by pulm, cards, & wound care.    Patient is homeless but is currently staying with a friend, Fernando Laurent ([phone]), at [address] [place] [address], [facility]. Pt is independent of all ADLs & denied the need for assistance with transportation at time of discharge. Pt stated that she sees a psychiatrist at the [facility] on [facility]. Pt in agreement with plan to discharge today.     Hospital follow up appointment with MARTIN Stanton on 7/14/2023 at 1400. Information added to the pt's discharge paperwork.     CM updated patient's whiteboard with CM name & contact information.     9216  Message sent to nurse Rodríguez & virtual nurse Cristina informing that the pt is cleared to discharge.       Will continue to follow.

## 2023-07-03 NOTE — SUBJECTIVE & OBJECTIVE
Interval History: awake and alert, d/c narcan drip yesterday.  HR in the 50s- Continue to monitor  H/H stable s/p transfusion.     Patient with severe microcytic anemia likely due to iron deficiency-Venofer infusion  Possible discharge today    Review of Systems   Constitutional:  Negative for activity change.   Respiratory:  Negative for chest tightness and shortness of breath.    Cardiovascular:  Negative for chest pain.   Genitourinary:  Negative for dysuria.   Objective:     Vital Signs (Most Recent):  Temp: 98.5 °F (36.9 °C) (07/03/23 0725)  Pulse: (!) 50 (07/03/23 0725)  Resp: 17 (07/03/23 0725)  BP: (!) 98/55 (07/03/23 0725)  SpO2: 100 % (07/03/23 0725) Vital Signs (24h Range):  Temp:  [97.4 °F (36.3 °C)-99.2 °F (37.3 °C)] 98.5 °F (36.9 °C)  Pulse:  [46-54] 50  Resp:  [17-18] 17  SpO2:  [99 %-100 %] 100 %  BP: ()/(55-61) 98/55     Weight: 78.5 kg (173 lb)  Body mass index is 33.79 kg/m².    Intake/Output Summary (Last 24 hours) at 7/3/2023 0933  Last data filed at 7/2/2023 1200  Gross per 24 hour   Intake 120 ml   Output --   Net 120 ml           Physical Exam  Vitals and nursing note reviewed.   Constitutional:       Appearance: She is obese. She is not ill-appearing or diaphoretic.   HENT:      Head: Normocephalic and atraumatic.   Cardiovascular:      Rate and Rhythm: Regular rhythm. Bradycardia present.      Pulses: Normal pulses.      Heart sounds: Normal heart sounds. No murmur heard.  Pulmonary:      Effort: Pulmonary effort is normal.      Breath sounds: Normal breath sounds. No wheezing or rales.   Abdominal:      General: Abdomen is flat. Bowel sounds are normal. There is no distension.      Palpations: Abdomen is soft.      Tenderness: There is no abdominal tenderness.   Musculoskeletal:         General: No swelling.      Comments: Left forearm with ulcerated wound that is healing without purulent drainage or erythema   Skin:     General: Skin is warm.      Capillary Refill: Capillary refill  takes less than 2 seconds.      Comments: Multiple skin scars from needles? along legs bilaterally           Significant Labs: A1C: No results for input(s): HGBA1C in the last 4320 hours.  ABGs:   No results for input(s): PH, PCO2, HCO3, POCSATURATED, BE, TOTALHB, COHB, METHB, O2HB, POCFIO2, PO2 in the last 48 hours.    Blood Culture: No results for input(s): LABBLOO in the last 48 hours.  CBC:   Recent Labs   Lab 07/02/23  0325   WBC 11.34   HGB 7.8*   HCT 27.1*          CMP:   Recent Labs   Lab 07/01/23  1749 07/02/23  0325    137   K 3.6 3.9    105   CO2 18* 21*   * 87   BUN 14 11   CREATININE 0.8 0.8   CALCIUM 8.8 8.5*   ANIONGAP 13 11       Cardiac Markers: No results for input(s): CKMB, MYOGLOBIN, BNP, TROPISTAT in the last 48 hours.  Lactic Acid: No results for input(s): LACTATE in the last 48 hours.  Lipase: No results for input(s): LIPASE in the last 48 hours.  Lipid Panel: No results for input(s): CHOL, HDL, LDLCALC, TRIG, CHOLHDL in the last 48 hours.  Magnesium:   Recent Labs   Lab 07/01/23  1749   MG 1.8       Troponin:   No results for input(s): TROPONINI, TROPONINIHS in the last 48 hours.    TSH:   Recent Labs   Lab 06/30/23  0340   TSH 1.895       Urine Culture: No results for input(s): LABURIN in the last 48 hours.  Urine Studies: No results for input(s): COLORU, APPEARANCEUA, PHUR, SPECGRAV, PROTEINUA, GLUCUA, KETONESU, BILIRUBINUA, OCCULTUA, NITRITE, UROBILINOGEN, LEUKOCYTESUR, RBCUA, WBCUA, BACTERIA, SQUAMEPITHEL, HYALINECASTS in the last 48 hours.    Invalid input(s): WRIGHTSUR    Significant Imaging: I have reviewed all pertinent imaging results/findings within the past 24 hours.

## 2023-07-03 NOTE — ASSESSMENT & PLAN NOTE
- Will need counseling on drug abuse  - Social work consult  - UDS- Cocaine and opioids positive

## 2023-07-06 NOTE — PLAN OF CARE
[place] - Med Surg  Discharge Final Note    Primary Care Provider: Primary Doctor No    Expected Discharge Date: 7/3/2023    Final Discharge Note (most recent)       Final Note - 07/06/23 0746          Final Note    Assessment Type Final Discharge Note (P)      Anticipated Discharge Disposition Home or Self Care (P)      Hospital Resources/Appts/Education Provided Appointments scheduled and added to AVS (P)                      Contact Info       Jesusita Stanton NP    [facility]  [address] [place] [address] · ~[address]  [phone]       Next Steps: Follow up on 7/14/2023    Instructions: at 2:00pm; hospital follow up appointment          Discharge summary faxed to MARTIN Stanton (PCP).

## 2023-08-16 ENCOUNTER — HOSPITAL ENCOUNTER (EMERGENCY)
Facility: OTHER | Age: 48
Discharge: HOME | End: 2023-08-17
Attending: EMERGENCY MEDICINE
Payer: MEDICAID

## 2023-08-16 DIAGNOSIS — R10.13 ACUTE EPIGASTRIC PAIN: ICD-10-CM

## 2023-08-16 DIAGNOSIS — N30.00 ACUTE CYSTITIS WITHOUT HEMATURIA: Primary | ICD-10-CM

## 2023-08-16 PROCEDURE — 81025 URINE PREGNANCY TEST: CPT | Performed by: EMERGENCY MEDICINE

## 2023-08-16 PROCEDURE — 99284 EMERGENCY DEPT VISIT MOD MDM: CPT | Mod: 25

## 2023-08-16 RX ORDER — ONDANSETRON 2 MG/ML
4 INJECTION INTRAMUSCULAR; INTRAVENOUS
Status: COMPLETED | OUTPATIENT
Start: 2023-08-16 | End: 2023-08-17

## 2023-08-17 VITALS
SYSTOLIC BLOOD PRESSURE: 111 MMHG | DIASTOLIC BLOOD PRESSURE: 62 MMHG | HEART RATE: 71 BPM | HEIGHT: 60 IN | RESPIRATION RATE: 16 BRPM | TEMPERATURE: 99 F | OXYGEN SATURATION: 100 % | BODY MASS INDEX: 29.45 KG/M2 | WEIGHT: 150 LBS

## 2023-08-17 LAB
ALBUMIN SERPL BCP-MCNC: 3.2 G/DL (ref 3.5–5.2)
ALP SERPL-CCNC: 78 U/L (ref 55–135)
ALT SERPL W/O P-5'-P-CCNC: 6 U/L (ref 10–44)
ANION GAP SERPL CALC-SCNC: 9 MMOL/L (ref 8–16)
AST SERPL-CCNC: 12 U/L (ref 10–40)
B-HCG UR QL: NEGATIVE
BACTERIA #/AREA URNS HPF: ABNORMAL /HPF
BASOPHILS # BLD AUTO: 0.03 K/UL (ref 0–0.2)
BASOPHILS NFR BLD: 0.3 % (ref 0–1.9)
BILIRUB SERPL-MCNC: 0.3 MG/DL (ref 0.1–1)
BILIRUB UR QL STRIP: NEGATIVE
BUN SERPL-MCNC: 7 MG/DL (ref 6–20)
CALCIUM SERPL-MCNC: 7.7 MG/DL (ref 8.7–10.5)
CHLORIDE SERPL-SCNC: 105 MMOL/L (ref 95–110)
CLARITY UR: ABNORMAL
CO2 SERPL-SCNC: 24 MMOL/L (ref 23–29)
COLOR UR: YELLOW
CREAT SERPL-MCNC: 0.8 MG/DL (ref 0.5–1.4)
CTP QC/QA: YES
DIFFERENTIAL METHOD: ABNORMAL
EOSINOPHIL # BLD AUTO: 0.1 K/UL (ref 0–0.5)
EOSINOPHIL NFR BLD: 0.4 % (ref 0–8)
ERYTHROCYTE [DISTWIDTH] IN BLOOD BY AUTOMATED COUNT: 24 % (ref 11.5–14.5)
EST. GFR  (NO RACE VARIABLE): >60 ML/MIN/1.73 M^2
GLUCOSE SERPL-MCNC: 101 MG/DL (ref 70–110)
GLUCOSE UR QL STRIP: NEGATIVE
HCT VFR BLD AUTO: 33.6 % (ref 37–48.5)
HGB BLD-MCNC: 9.9 G/DL (ref 12–16)
HGB UR QL STRIP: ABNORMAL
HIV 1+2 AB+HIV1 P24 AG SERPL QL IA: NEGATIVE
HYALINE CASTS #/AREA URNS LPF: 0 /LPF
IMM GRANULOCYTES # BLD AUTO: 0.03 K/UL (ref 0–0.04)
IMM GRANULOCYTES NFR BLD AUTO: 0.3 % (ref 0–0.5)
KETONES UR QL STRIP: ABNORMAL
LEUKOCYTE ESTERASE UR QL STRIP: ABNORMAL
LIPASE SERPL-CCNC: <3 U/L (ref 4–60)
LYMPHOCYTES # BLD AUTO: 1.2 K/UL (ref 1–4.8)
LYMPHOCYTES NFR BLD: 10.2 % (ref 18–48)
MCH RBC QN AUTO: 23.2 PG (ref 27–31)
MCHC RBC AUTO-ENTMCNC: 29.5 G/DL (ref 32–36)
MCV RBC AUTO: 79 FL (ref 82–98)
MICROSCOPIC COMMENT: ABNORMAL
MONOCYTES # BLD AUTO: 0.6 K/UL (ref 0.3–1)
MONOCYTES NFR BLD: 5.2 % (ref 4–15)
NEUTROPHILS # BLD AUTO: 10.1 K/UL (ref 1.8–7.7)
NEUTROPHILS NFR BLD: 83.6 % (ref 38–73)
NITRITE UR QL STRIP: POSITIVE
NRBC BLD-RTO: 0 /100 WBC
PH UR STRIP: 7 [PH] (ref 5–8)
PLATELET # BLD AUTO: 277 K/UL (ref 150–450)
PMV BLD AUTO: 10.7 FL (ref 9.2–12.9)
POTASSIUM SERPL-SCNC: 3.5 MMOL/L (ref 3.5–5.1)
PROT SERPL-MCNC: 7.8 G/DL (ref 6–8.4)
PROT UR QL STRIP: ABNORMAL
RBC # BLD AUTO: 4.27 M/UL (ref 4–5.4)
RBC #/AREA URNS HPF: 3 /HPF (ref 0–4)
SODIUM SERPL-SCNC: 138 MMOL/L (ref 136–145)
SP GR UR STRIP: >=1.03 (ref 1–1.03)
SQUAMOUS #/AREA URNS HPF: 14 /HPF
URN SPEC COLLECT METH UR: ABNORMAL
UROBILINOGEN UR STRIP-ACNC: 1 EU/DL
WBC # BLD AUTO: 12 K/UL (ref 3.9–12.7)
WBC #/AREA URNS HPF: 25 /HPF (ref 0–5)

## 2023-08-17 PROCEDURE — 81000 URINALYSIS NONAUTO W/SCOPE: CPT | Performed by: EMERGENCY MEDICINE

## 2023-08-17 PROCEDURE — 80053 COMPREHEN METABOLIC PANEL: CPT | Performed by: PHYSICIAN ASSISTANT

## 2023-08-17 PROCEDURE — 87389 HIV-1 AG W/HIV-1&-2 AB AG IA: CPT | Performed by: EMERGENCY MEDICINE

## 2023-08-17 PROCEDURE — 85025 COMPLETE CBC W/AUTO DIFF WBC: CPT | Performed by: PHYSICIAN ASSISTANT

## 2023-08-17 PROCEDURE — 83690 ASSAY OF LIPASE: CPT | Performed by: PHYSICIAN ASSISTANT

## 2023-08-17 PROCEDURE — 87077 CULTURE AEROBIC IDENTIFY: CPT | Mod: 59 | Performed by: EMERGENCY MEDICINE

## 2023-08-17 PROCEDURE — 96375 TX/PRO/DX INJ NEW DRUG ADDON: CPT

## 2023-08-17 PROCEDURE — 63600175 PHARM REV CODE 636 W HCPCS: Performed by: PHYSICIAN ASSISTANT

## 2023-08-17 PROCEDURE — 96365 THER/PROPH/DIAG IV INF INIT: CPT

## 2023-08-17 PROCEDURE — 87088 URINE BACTERIA CULTURE: CPT | Performed by: EMERGENCY MEDICINE

## 2023-08-17 PROCEDURE — 96361 HYDRATE IV INFUSION ADD-ON: CPT

## 2023-08-17 PROCEDURE — 87086 URINE CULTURE/COLONY COUNT: CPT | Performed by: EMERGENCY MEDICINE

## 2023-08-17 PROCEDURE — 25000003 PHARM REV CODE 250: Performed by: EMERGENCY MEDICINE

## 2023-08-17 PROCEDURE — 63600175 PHARM REV CODE 636 W HCPCS: Performed by: EMERGENCY MEDICINE

## 2023-08-17 PROCEDURE — 96376 TX/PRO/DX INJ SAME DRUG ADON: CPT

## 2023-08-17 PROCEDURE — 87186 SC STD MICRODIL/AGAR DIL: CPT | Performed by: EMERGENCY MEDICINE

## 2023-08-17 PROCEDURE — 36415 COLL VENOUS BLD VENIPUNCTURE: CPT | Performed by: PHYSICIAN ASSISTANT

## 2023-08-17 RX ORDER — FAMOTIDINE 10 MG/ML
20 INJECTION INTRAVENOUS
Status: COMPLETED | OUTPATIENT
Start: 2023-08-17 | End: 2023-08-17

## 2023-08-17 RX ORDER — CEFTRIAXONE 1 G/1
INJECTION, POWDER, FOR SOLUTION INTRAMUSCULAR; INTRAVENOUS
Status: DISPENSED
Start: 2023-08-17 | End: 2023-08-17

## 2023-08-17 RX ORDER — ONDANSETRON 2 MG/ML
INJECTION INTRAMUSCULAR; INTRAVENOUS
Status: DISPENSED
Start: 2023-08-17 | End: 2023-08-17

## 2023-08-17 RX ORDER — ONDANSETRON 8 MG/1
8 TABLET, ORALLY DISINTEGRATING ORAL EVERY 6 HOURS PRN
Qty: 15 TABLET | Refills: 0 | Status: SHIPPED | OUTPATIENT
Start: 2023-08-17

## 2023-08-17 RX ORDER — KETOROLAC TROMETHAMINE 30 MG/ML
INJECTION, SOLUTION INTRAMUSCULAR; INTRAVENOUS
Status: DISPENSED
Start: 2023-08-17 | End: 2023-08-17

## 2023-08-17 RX ORDER — ONDANSETRON 2 MG/ML
4 INJECTION INTRAMUSCULAR; INTRAVENOUS
Status: COMPLETED | OUTPATIENT
Start: 2023-08-17 | End: 2023-08-17

## 2023-08-17 RX ORDER — KETOROLAC TROMETHAMINE 30 MG/ML
15 INJECTION, SOLUTION INTRAMUSCULAR; INTRAVENOUS
Status: COMPLETED | OUTPATIENT
Start: 2023-08-17 | End: 2023-08-17

## 2023-08-17 RX ORDER — CEPHALEXIN 500 MG/1
500 CAPSULE ORAL EVERY 12 HOURS
Qty: 6 CAPSULE | Refills: 0 | Status: SHIPPED | OUTPATIENT
Start: 2023-08-17 | End: 2023-08-20

## 2023-08-17 RX ADMIN — CEFTRIAXONE SODIUM 1 G: 1 INJECTION, POWDER, FOR SOLUTION INTRAMUSCULAR; INTRAVENOUS at 03:08

## 2023-08-17 RX ADMIN — KETOROLAC TROMETHAMINE 15 MG: 30 INJECTION, SOLUTION INTRAMUSCULAR; INTRAVENOUS at 03:08

## 2023-08-17 RX ADMIN — ONDANSETRON 4 MG: 2 INJECTION INTRAMUSCULAR; INTRAVENOUS at 04:08

## 2023-08-17 RX ADMIN — FAMOTIDINE 20 MG: 10 INJECTION, SOLUTION INTRAVENOUS at 04:08

## 2023-08-17 RX ADMIN — ONDANSETRON 4 MG: 2 INJECTION INTRAMUSCULAR; INTRAVENOUS at 12:08

## 2023-08-17 RX ADMIN — SODIUM CHLORIDE, POTASSIUM CHLORIDE, SODIUM LACTATE AND CALCIUM CHLORIDE 1000 ML: 600; 310; 30; 20 INJECTION, SOLUTION INTRAVENOUS at 12:08

## 2023-08-17 NOTE — ED PROVIDER NOTES
"Encounter Date: 8/16/2023    SCRIBE #1 NOTE: I, Maira Trujillo, am scribing for, and in the presence of,  Yamilex Sanderson MD. I have scribed the following portions of the note - Other sections scribed: HPI,ROS, and PE.       History     Chief Complaint   Patient presents with    Abdominal Pain     Pt presents to the ER with complaints of lower abdominal pain with nausea and vomiting since yesterday. Pt also reporting pain in the right elbow three days; no known injury.       Time seen by provider: 11:55 AM    This is a 48 y.o. female who presents with complaint of constant upper abdominal pain, onset approximately two days ago. Associated symptoms include vomiting,unable to tolerate food and/or fluids, and nausea. She denies fever, chills, myalgias,constipation, dysuria, or diarrhea. Pt has also been experiencing right elbow pain onset approx. one day ago. She states she is unable to extend arm outward, sensitive to touch, and exacerbated with movement. Of note, patient was previously in ICU one month ago due to "blood transfusion". This is the extent of the patient's complaints at this time.    The history is provided by the patient and medical records. No  was used.     Review of patient's allergies indicates:  No Known Allergies  No past medical history on file.  No past surgical history on file.  No family history on file.  Social History     Tobacco Use    Smoking status: Every Day     Current packs/day: 1.00     Average packs/day: 1 pack/day for 26.6 years (26.6 ttl pk-yrs)     Types: Cigarettes     Start date: 1997    Smokeless tobacco: Never    Tobacco comments:     Smoking cessation education provided. Pt is a 1 pk/day cigarette smoker x 26 yrs. She states that she cut down to 0.5 pk/day approx. 1 yr ago. Pt declines referral to Ambulatory Smoking Cessation clinic, stating not ready to quit. Handout provided.    Substance Use Topics    Alcohol use: Not Currently     Review of Systems "   Constitutional:  Positive for appetite change. Negative for chills and fever.   HENT:  Negative for congestion, sinus pressure and sore throat.    Eyes:  Negative for pain.   Respiratory:  Negative for cough and shortness of breath.    Cardiovascular:  Negative for chest pain.   Gastrointestinal:  Positive for abdominal pain, nausea and vomiting. Negative for constipation and diarrhea.   Genitourinary:  Negative for dysuria and hematuria.   Musculoskeletal:  Positive for arthralgias. Negative for myalgias.   Skin: Negative.    Neurological:  Negative for weakness, numbness and headaches.   Hematological: Negative.    Psychiatric/Behavioral:  Negative for agitation and confusion.        Physical Exam     Initial Vitals [08/16/23 2244]   BP Pulse Resp Temp SpO2   127/64 70 16 98.5 °F (36.9 °C) 99 %      MAP       --         Physical Exam    Constitutional: She appears well-developed and well-nourished. She is not diaphoretic. She does not have a sickly appearance. No distress.   HENT:   Head: Normocephalic and atraumatic.   Right Ear: External ear normal.   Left Ear: External ear normal.   Eyes: Conjunctivae, EOM and lids are normal. Right eye exhibits no discharge. Left eye exhibits no discharge. Right conjunctiva is not injected. Right conjunctiva has no hemorrhage. Left conjunctiva is not injected. Left conjunctiva has no hemorrhage. No scleral icterus.   Neck: Phonation normal. Neck supple. No stridor present. No tracheal deviation present.   Normal range of motion.  Cardiovascular:  Normal rate, regular rhythm and normal heart sounds.     Exam reveals no friction rub.       No murmur heard.  Pulses:       Radial pulses are 2+ on the right side and 2+ on the left side.        Dorsalis pedis pulses are 2+ on the right side and 2+ on the left side.   Pulmonary/Chest: Breath sounds normal. No respiratory distress. She has no wheezes. She has no rhonchi. She has no rales.   Abdominal: Abdomen is soft. Bowel sounds  are normal. She exhibits no distension. There is abdominal tenderness.   Diffuse abdominal tenderness. There is no rebound and no guarding.   Musculoskeletal:         General: No tenderness or edema. Normal range of motion.      Cervical back: Normal range of motion and neck supple.      Comments: Large healing wound to dorsal aspect of left arm.     Neurological: She is alert and oriented to person, place, and time. She has normal strength. GCS score is 15. GCS eye subscore is 4. GCS verbal subscore is 5. GCS motor subscore is 6.   Skin: Skin is warm.   Psychiatric: She has a normal mood and affect. Her speech is normal and behavior is normal. Judgment and thought content normal. Cognition and memory are normal.         ED Course   Procedures  Labs Reviewed   URINALYSIS, REFLEX TO URINE CULTURE - Abnormal; Notable for the following components:       Result Value    Appearance, UA Cloudy (*)     Specific Gravity, UA >=1.030 (*)     Protein, UA Trace (*)     Ketones, UA 2+ (*)     Occult Blood UA Trace (*)     Nitrite, UA Positive (*)     Leukocytes, UA 1+ (*)     All other components within normal limits    Narrative:     Specimen Source->Urine   CBC W/ AUTO DIFFERENTIAL - Abnormal; Notable for the following components:    Hemoglobin 9.9 (*)     Hematocrit 33.6 (*)     MCV 79 (*)     MCH 23.2 (*)     MCHC 29.5 (*)     RDW 24.0 (*)     Gran # (ANC) 10.1 (*)     Gran % 83.6 (*)     Lymph % 10.2 (*)     All other components within normal limits   COMPREHENSIVE METABOLIC PANEL - Abnormal; Notable for the following components:    Calcium 7.7 (*)     Albumin 3.2 (*)     ALT 6 (*)     All other components within normal limits   LIPASE - Abnormal; Notable for the following components:    Lipase <3 (*)     All other components within normal limits   URINALYSIS MICROSCOPIC - Abnormal; Notable for the following components:    WBC, UA 25 (*)     Bacteria Many (*)     All other components within normal limits    Narrative:      Specimen Source->Urine   CULTURE, URINE   HIV 1 / 2 ANTIBODY    Narrative:     Release to patient->Immediate   POCT URINE PREGNANCY          Imaging Results    None          Medications   lactated ringers bolus 1,000 mL (0 mLs Intravenous Stopped 8/17/23 0117)   ondansetron injection 4 mg (4 mg Intravenous Given 8/17/23 0018)   cefTRIAXone (ROCEPHIN) 1 g in dextrose 5 % in water (D5W) 100 mL IVPB (MB+) (0 g Intravenous Stopped 8/17/23 0405)   ketorolac injection 15 mg (15 mg Intravenous Given 8/17/23 0330)   ondansetron injection 4 mg (4 mg Intravenous Given 8/17/23 0424)   famotidine (PF) injection 20 mg (20 mg Intravenous Given 8/17/23 0423)     Medical Decision Making:   History:   Old Medical Records: I decided to obtain old medical records.  Clinical Tests:   Lab Tests: Ordered and Reviewed    Additional MDM:   Comments: Atraumatic right elbow pain - full range of motion without overlying skin changes.  Doubt septic joint, septic bursitis, fracture, dislocation, cellulitis.  Her initial complaint of elbow pain when she arrived, patient has been sleeping comfortably without any further complaint of elbow pain.  I do not feel any imaging is warranted at this time.    Abdominal pain - patient complains of epigastric abdominal pain.  On exam she no focal abdominal tenderness.  CBC with up trending H&H and normal white blood cell count.  CMP and lipase without significant abnormalities.  UA consistent with a UTI.  Patient sleeping comfortably after receiving IV fluids, Zofran and Toradol.  Rocephin given for UTI.  She was discharged home with a prescription for Keflex and Zofran.  PCP follow-up for re-evaluation..        Scribe Attestation:   Scribe #1: I performed the above scribed service and the documentation accurately describes the services I performed. I attest to the accuracy of the note.    Physician Attestation for Scribe: I, Yamilex Sanderson  , reviewed documentation as scribed in my presence, which is both  accurate and complete.                   Clinical Impression:   Final diagnoses:  [N30.00] Acute cystitis without hematuria (Primary)  [R10.13] Acute epigastric pain        ED Disposition Condition    Discharge Stable          ED Prescriptions       Medication Sig Dispense Start Date End Date Auth. Provider    cephALEXin (KEFLEX) 500 MG capsule Take 1 capsule (500 mg total) by mouth every 12 (twelve) hours. for 3 days 6 capsule 8/17/2023 8/20/2023 Yamilex Sanderson MD    ondansetron (ZOFRAN-ODT) 8 MG TbDL Take 1 tablet (8 mg total) by mouth every 6 (six) hours as needed (nausea). 15 tablet 8/17/2023 -- Yamilex Sanderson MD          Follow-up Information       Follow up With Specialties Details Why Contact Info    [facility] [address] [facility] Primary [facility] Schedule an appointment as soon as possible for a visit   [address] [address]  [phone]             Yamilex Sanderson MD  08/17/23 0647

## 2023-08-17 NOTE — FIRST PROVIDER EVALUATION
Emergency Department TeleTriage Encounter Note      CHIEF COMPLAINT    Chief Complaint   Patient presents with    Abdominal Pain     Pt presents to the ER with complaints of lower abdominal pain with nausea and vomiting since yesterday. Pt also reporting pain in the right elbow three days; no known injury.         VITAL SIGNS   Initial Vitals [08/16/23 2244]   BP Pulse Resp Temp SpO2   127/64 70 16 98.5 °F (36.9 °C) 99 %      MAP       --            ALLERGIES    Review of patient's allergies indicates:  No Known Allergies    PROVIDER TRIAGE NOTE  47 yo F with abdominal pain assoc, with nausea and emesis. Appears uncomfortable on exam. Vitals normal.       ORDERS  Labs Reviewed   HIV 1 / 2 ANTIBODY   URINALYSIS, REFLEX TO URINE CULTURE   CBC W/ AUTO DIFFERENTIAL   COMPREHENSIVE METABOLIC PANEL   LIPASE   POCT URINE PREGNANCY       ED Orders (720h ago, onward)      Start Ordered     Status Ordering Provider    08/16/23 2330 08/16/23 2322  lactated ringers bolus 1,000 mL  ED 1 Time         Ordered STEPHANIA MCKENZIE    08/16/23 2330 08/16/23 2322  ondansetron injection 4 mg  ED 1 Time         Ordered STEPHANIA MCKENZIE    08/16/23 2323 08/16/23 2322  Saline lock IV  Once         Ordered STEPHANIA MCKENZIE    08/16/23 2323 08/16/23 2322  CBC Auto Differential  STAT         Ordered STEPHANIA MCKENZIE    08/16/23 2323 08/16/23 2322  Comprehensive Metabolic Panel  STAT         Ordered STEPHANIA MCKENZIE    08/16/23 2323 08/16/23 2322  Lipase  STAT         Ordered STEPHANIA MCKENZIE    08/16/23 2306 08/16/23 2305  Urinalysis, Reflex to Urine Culture Urine, Clean Catch  STAT         Ordered OLGA SANFORD    08/16/23 2306 08/16/23 2305  POCT urine pregnancy  Once         Ordered OLGA SANFORD    08/16/23 2245 08/16/23 2245  HIV 1/2 Ag/Ab (4th Gen)  STAT         Ordered VIANNEY IVAN              Virtual Visit Note: The provider triage portion of this emergency department evaluation and documentation was performed via  [employer], a HIPAA-compliant telemedicine application, in concert with a tele-presenter in the room. A face to face patient evaluation with one of my colleagues will occur once the patient is placed in an emergency department room.      DISCLAIMER: This note was prepared with [employer] voice recognition transcription software. Garbled syntax, mangled pronouns, and other bizarre constructions may be attributed to that software system.

## 2023-08-21 LAB
BACTERIA UR CULT: ABNORMAL
BACTERIA UR CULT: ABNORMAL

## 2024-04-11 ENCOUNTER — HOSPITAL ENCOUNTER (INPATIENT)
Facility: OTHER | Age: 49
LOS: 2 days | Discharge: LEFT BEFORE DISCHARGE | DRG: 871 | End: 2024-04-13
Attending: EMERGENCY MEDICINE | Admitting: HOSPITALIST
Payer: MEDICAID

## 2024-04-11 DIAGNOSIS — R78.81 BACTEREMIA: ICD-10-CM

## 2024-04-11 DIAGNOSIS — L03.90 CELLULITIS: ICD-10-CM

## 2024-04-11 DIAGNOSIS — N17.9 AKI (ACUTE KIDNEY INJURY): Primary | ICD-10-CM

## 2024-04-11 DIAGNOSIS — A41.9 SEPTIC SHOCK: ICD-10-CM

## 2024-04-11 DIAGNOSIS — R65.21 SEPTIC SHOCK: ICD-10-CM

## 2024-04-11 DIAGNOSIS — R00.0 TACHYCARDIA: ICD-10-CM

## 2024-04-11 PROBLEM — R65.20 SEVERE SEPSIS: Status: ACTIVE | Noted: 2024-04-11

## 2024-04-11 PROBLEM — K80.20 CHOLELITHIASIS WITHOUT CHOLECYSTITIS: Status: ACTIVE | Noted: 2019-06-20

## 2024-04-11 PROBLEM — N30.00 ACUTE CYSTITIS: Status: ACTIVE | Noted: 2024-04-11

## 2024-04-11 PROBLEM — A59.9 TRICHOMONIASIS: Status: ACTIVE | Noted: 2024-04-11

## 2024-04-11 PROBLEM — F19.10 POLYSUBSTANCE ABUSE: Status: ACTIVE | Noted: 2019-06-20

## 2024-04-11 PROBLEM — L03.114 CELLULITIS OF LEFT ARM: Status: ACTIVE | Noted: 2024-04-11

## 2024-04-11 LAB
ALBUMIN SERPL BCP-MCNC: 2.4 G/DL (ref 3.5–5.2)
ALP SERPL-CCNC: 127 U/L (ref 55–135)
ALT SERPL W/O P-5'-P-CCNC: 18 U/L (ref 10–44)
AMPHET+METHAMPHET UR QL: NEGATIVE
ANION GAP SERPL CALC-SCNC: 11 MMOL/L (ref 8–16)
AST SERPL-CCNC: 23 U/L (ref 10–40)
BACTERIA #/AREA URNS HPF: ABNORMAL /HPF
BARBITURATES UR QL SCN>200 NG/ML: NEGATIVE
BASOPHILS # BLD AUTO: 0.04 K/UL (ref 0–0.2)
BASOPHILS NFR BLD: 0.2 % (ref 0–1.9)
BENZODIAZ UR QL SCN>200 NG/ML: NEGATIVE
BILIRUB SERPL-MCNC: 0.6 MG/DL (ref 0.1–1)
BILIRUB UR QL STRIP: NEGATIVE
BNP SERPL-MCNC: 29 PG/ML (ref 0–99)
BUN SERPL-MCNC: 45 MG/DL (ref 6–20)
BZE UR QL SCN: ABNORMAL
CALCIUM SERPL-MCNC: 8.7 MG/DL (ref 8.7–10.5)
CANNABINOIDS UR QL SCN: NEGATIVE
CHLORIDE SERPL-SCNC: 97 MMOL/L (ref 95–110)
CK SERPL-CCNC: 32 U/L (ref 20–180)
CLARITY UR: ABNORMAL
CO2 SERPL-SCNC: 22 MMOL/L (ref 23–29)
COLOR UR: YELLOW
CREAT SERPL-MCNC: 1.8 MG/DL (ref 0.5–1.4)
CREAT UR-MCNC: 105.6 MG/DL (ref 15–325)
CTP QC/QA: YES
CTP QC/QA: YES
DIFFERENTIAL METHOD BLD: ABNORMAL
EOSINOPHIL # BLD AUTO: 0 K/UL (ref 0–0.5)
EOSINOPHIL NFR BLD: 0 % (ref 0–8)
ERYTHROCYTE [DISTWIDTH] IN BLOOD BY AUTOMATED COUNT: 16.6 % (ref 11.5–14.5)
EST. GFR  (NO RACE VARIABLE): 35 ML/MIN/1.73 M^2
ETHANOL UR-MCNC: <10 MG/DL
GLUCOSE SERPL-MCNC: 117 MG/DL (ref 70–110)
GLUCOSE UR QL STRIP: NEGATIVE
HCT VFR BLD AUTO: 27.9 % (ref 37–48.5)
HGB BLD-MCNC: 8.8 G/DL (ref 12–16)
HGB UR QL STRIP: ABNORMAL
IMM GRANULOCYTES # BLD AUTO: 0.44 K/UL (ref 0–0.04)
IMM GRANULOCYTES NFR BLD AUTO: 1.8 % (ref 0–0.5)
KETONES UR QL STRIP: NEGATIVE
LDH SERPL L TO P-CCNC: 1.36 MMOL/L (ref 0.5–2.2)
LEUKOCYTE ESTERASE UR QL STRIP: ABNORMAL
LYMPHOCYTES # BLD AUTO: 1.1 K/UL (ref 1–4.8)
LYMPHOCYTES NFR BLD: 4.5 % (ref 18–48)
MAGNESIUM SERPL-MCNC: 1.8 MG/DL (ref 1.6–2.6)
MCH RBC QN AUTO: 23.3 PG (ref 27–31)
MCHC RBC AUTO-ENTMCNC: 31.5 G/DL (ref 32–36)
MCV RBC AUTO: 74 FL (ref 82–98)
METHADONE UR QL SCN>300 NG/ML: NEGATIVE
MICROSCOPIC COMMENT: ABNORMAL
MONOCYTES # BLD AUTO: 1.7 K/UL (ref 0.3–1)
MONOCYTES NFR BLD: 7 % (ref 4–15)
NEUTROPHILS # BLD AUTO: 20.6 K/UL (ref 1.8–7.7)
NEUTROPHILS NFR BLD: 86.5 % (ref 38–73)
NITRITE UR QL STRIP: NEGATIVE
NRBC BLD-RTO: 0 /100 WBC
OPIATES UR QL SCN: ABNORMAL
PCP UR QL SCN>25 NG/ML: NEGATIVE
PH UR STRIP: 5 [PH] (ref 5–8)
PLATELET # BLD AUTO: 133 K/UL (ref 150–450)
PLATELET BLD QL SMEAR: ABNORMAL
PMV BLD AUTO: ABNORMAL FL (ref 9.2–12.9)
POC MOLECULAR INFLUENZA A AGN: NEGATIVE
POC MOLECULAR INFLUENZA B AGN: NEGATIVE
POTASSIUM SERPL-SCNC: 3.9 MMOL/L (ref 3.5–5.1)
PROCALCITONIN SERPL IA-MCNC: 13.44 NG/ML
PROT SERPL-MCNC: 8 G/DL (ref 6–8.4)
PROT UR QL STRIP: ABNORMAL
RBC # BLD AUTO: 3.77 M/UL (ref 4–5.4)
RBC #/AREA URNS HPF: 4 /HPF (ref 0–4)
SAMPLE: NORMAL
SARS-COV-2 RDRP RESP QL NAA+PROBE: NEGATIVE
SODIUM SERPL-SCNC: 130 MMOL/L (ref 136–145)
SP GR UR STRIP: 1.01 (ref 1–1.03)
SQUAMOUS #/AREA URNS HPF: 0 /HPF
TOXICOLOGY INFORMATION: ABNORMAL
TRICHOMONAS UR QL MICRO: ABNORMAL
TROPONIN I SERPL DL<=0.01 NG/ML-MCNC: <0.006 NG/ML (ref 0–0.03)
UNIDENT CRYS URNS QL MICRO: ABNORMAL
URN SPEC COLLECT METH UR: ABNORMAL
UROBILINOGEN UR STRIP-ACNC: NEGATIVE EU/DL
WBC # BLD AUTO: 23.83 K/UL (ref 3.9–12.7)
WBC #/AREA URNS HPF: 32 /HPF (ref 0–5)
WBC CLUMPS URNS QL MICRO: ABNORMAL
YEAST URNS QL MICRO: ABNORMAL

## 2024-04-11 PROCEDURE — 93010 ELECTROCARDIOGRAM REPORT: CPT | Mod: ,,, | Performed by: INTERNAL MEDICINE

## 2024-04-11 PROCEDURE — 87502 INFLUENZA DNA AMP PROBE: CPT

## 2024-04-11 PROCEDURE — 83880 ASSAY OF NATRIURETIC PEPTIDE: CPT | Performed by: EMERGENCY MEDICINE

## 2024-04-11 PROCEDURE — A4216 STERILE WATER/SALINE, 10 ML: HCPCS | Performed by: PHYSICIAN ASSISTANT

## 2024-04-11 PROCEDURE — 25000003 PHARM REV CODE 250: Performed by: EMERGENCY MEDICINE

## 2024-04-11 PROCEDURE — 20000000 HC ICU ROOM

## 2024-04-11 PROCEDURE — 94761 N-INVAS EAR/PLS OXIMETRY MLT: CPT

## 2024-04-11 PROCEDURE — 80053 COMPREHEN METABOLIC PANEL: CPT | Performed by: EMERGENCY MEDICINE

## 2024-04-11 PROCEDURE — 63600175 PHARM REV CODE 636 W HCPCS: Performed by: HOSPITALIST

## 2024-04-11 PROCEDURE — 80307 DRUG TEST PRSMV CHEM ANLYZR: CPT | Performed by: EMERGENCY MEDICINE

## 2024-04-11 PROCEDURE — 99291 CRITICAL CARE FIRST HOUR: CPT

## 2024-04-11 PROCEDURE — 86850 RBC ANTIBODY SCREEN: CPT | Performed by: PHYSICIAN ASSISTANT

## 2024-04-11 PROCEDURE — 85025 COMPLETE CBC W/AUTO DIFF WBC: CPT | Performed by: EMERGENCY MEDICINE

## 2024-04-11 PROCEDURE — 84484 ASSAY OF TROPONIN QUANT: CPT | Performed by: EMERGENCY MEDICINE

## 2024-04-11 PROCEDURE — 25000003 PHARM REV CODE 250: Performed by: HOSPITALIST

## 2024-04-11 PROCEDURE — 87088 URINE BACTERIA CULTURE: CPT | Performed by: EMERGENCY MEDICINE

## 2024-04-11 PROCEDURE — 25000003 PHARM REV CODE 250: Performed by: PHYSICIAN ASSISTANT

## 2024-04-11 PROCEDURE — 87077 CULTURE AEROBIC IDENTIFY: CPT | Performed by: EMERGENCY MEDICINE

## 2024-04-11 PROCEDURE — 87040 BLOOD CULTURE FOR BACTERIA: CPT | Mod: 59 | Performed by: EMERGENCY MEDICINE

## 2024-04-11 PROCEDURE — 87086 URINE CULTURE/COLONY COUNT: CPT | Performed by: EMERGENCY MEDICINE

## 2024-04-11 PROCEDURE — 63600175 PHARM REV CODE 636 W HCPCS: Performed by: PHYSICIAN ASSISTANT

## 2024-04-11 PROCEDURE — 96366 THER/PROPH/DIAG IV INF ADDON: CPT

## 2024-04-11 PROCEDURE — 81000 URINALYSIS NONAUTO W/SCOPE: CPT | Performed by: EMERGENCY MEDICINE

## 2024-04-11 PROCEDURE — 86140 C-REACTIVE PROTEIN: CPT | Performed by: HOSPITALIST

## 2024-04-11 PROCEDURE — 87154 CUL TYP ID BLD PTHGN 6+ TRGT: CPT | Performed by: EMERGENCY MEDICINE

## 2024-04-11 PROCEDURE — 96365 THER/PROPH/DIAG IV INF INIT: CPT | Mod: 59

## 2024-04-11 PROCEDURE — 87635 SARS-COV-2 COVID-19 AMP PRB: CPT | Performed by: EMERGENCY MEDICINE

## 2024-04-11 PROCEDURE — 93005 ELECTROCARDIOGRAM TRACING: CPT

## 2024-04-11 PROCEDURE — 82550 ASSAY OF CK (CPK): CPT | Performed by: EMERGENCY MEDICINE

## 2024-04-11 PROCEDURE — 84145 PROCALCITONIN (PCT): CPT | Performed by: EMERGENCY MEDICINE

## 2024-04-11 PROCEDURE — 87186 SC STD MICRODIL/AGAR DIL: CPT | Performed by: EMERGENCY MEDICINE

## 2024-04-11 PROCEDURE — 83735 ASSAY OF MAGNESIUM: CPT | Performed by: EMERGENCY MEDICINE

## 2024-04-11 PROCEDURE — 83605 ASSAY OF LACTIC ACID: CPT | Performed by: EMERGENCY MEDICINE

## 2024-04-11 PROCEDURE — 63600175 PHARM REV CODE 636 W HCPCS: Performed by: EMERGENCY MEDICINE

## 2024-04-11 RX ORDER — NALOXONE HCL 0.4 MG/ML
0.02 VIAL (ML) INJECTION
Status: DISCONTINUED | OUTPATIENT
Start: 2024-04-11 | End: 2024-04-13 | Stop reason: HOSPADM

## 2024-04-11 RX ORDER — SODIUM CHLORIDE 0.9 % (FLUSH) 0.9 %
10 SYRINGE (ML) INJECTION EVERY 8 HOURS
Status: DISCONTINUED | OUTPATIENT
Start: 2024-04-11 | End: 2024-04-13 | Stop reason: HOSPADM

## 2024-04-11 RX ORDER — TRAZODONE HYDROCHLORIDE 50 MG/1
50 TABLET ORAL NIGHTLY
Status: DISCONTINUED | OUTPATIENT
Start: 2024-04-11 | End: 2024-04-13 | Stop reason: HOSPADM

## 2024-04-11 RX ORDER — IBUPROFEN 200 MG
16 TABLET ORAL
Status: DISCONTINUED | OUTPATIENT
Start: 2024-04-11 | End: 2024-04-13 | Stop reason: HOSPADM

## 2024-04-11 RX ORDER — MUPIROCIN 20 MG/G
OINTMENT TOPICAL 2 TIMES DAILY
Status: DISCONTINUED | OUTPATIENT
Start: 2024-04-12 | End: 2024-04-13 | Stop reason: HOSPADM

## 2024-04-11 RX ORDER — OLANZAPINE 5 MG/1
5 TABLET ORAL NIGHTLY
COMMUNITY
Start: 2023-12-20

## 2024-04-11 RX ORDER — NOREPINEPHRINE BITARTRATE/D5W 4MG/250ML
0-3 PLASTIC BAG, INJECTION (ML) INTRAVENOUS CONTINUOUS
Status: DISCONTINUED | OUTPATIENT
Start: 2024-04-11 | End: 2024-04-13 | Stop reason: HOSPADM

## 2024-04-11 RX ORDER — DIVALPROEX SODIUM 250 MG/1
250 TABLET, FILM COATED, EXTENDED RELEASE ORAL DAILY
COMMUNITY
Start: 2023-12-20

## 2024-04-11 RX ORDER — CLONIDINE HYDROCHLORIDE 0.1 MG/1
0.1 TABLET ORAL EVERY 8 HOURS PRN
Status: DISCONTINUED | OUTPATIENT
Start: 2024-04-11 | End: 2024-04-13 | Stop reason: HOSPADM

## 2024-04-11 RX ORDER — ACETAMINOPHEN 500 MG
1000 TABLET ORAL
Status: COMPLETED | OUTPATIENT
Start: 2024-04-11 | End: 2024-04-11

## 2024-04-11 RX ORDER — SERTRALINE HYDROCHLORIDE 25 MG/1
25 TABLET, FILM COATED ORAL DAILY
COMMUNITY
Start: 2023-12-20

## 2024-04-11 RX ORDER — LOPERAMIDE HYDROCHLORIDE 2 MG/1
2 CAPSULE ORAL
Status: DISCONTINUED | OUTPATIENT
Start: 2024-04-11 | End: 2024-04-13 | Stop reason: HOSPADM

## 2024-04-11 RX ORDER — METRONIDAZOLE 500 MG/1
500 TABLET ORAL EVERY 12 HOURS
Status: DISCONTINUED | OUTPATIENT
Start: 2024-04-11 | End: 2024-04-13 | Stop reason: HOSPADM

## 2024-04-11 RX ORDER — BUSPIRONE HYDROCHLORIDE 5 MG/1
5 TABLET ORAL 2 TIMES DAILY
COMMUNITY
Start: 2023-12-20

## 2024-04-11 RX ORDER — LANOLIN ALCOHOL/MO/W.PET/CERES
100 CREAM (GRAM) TOPICAL DAILY
Status: DISCONTINUED | OUTPATIENT
Start: 2024-04-12 | End: 2024-04-13 | Stop reason: HOSPADM

## 2024-04-11 RX ORDER — METHOCARBAMOL 500 MG/1
500 TABLET, FILM COATED ORAL EVERY 6 HOURS PRN
Status: DISCONTINUED | OUTPATIENT
Start: 2024-04-11 | End: 2024-04-13 | Stop reason: HOSPADM

## 2024-04-11 RX ORDER — FOLIC ACID 1 MG/1
1 TABLET ORAL DAILY
Status: DISCONTINUED | OUTPATIENT
Start: 2024-04-12 | End: 2024-04-13 | Stop reason: HOSPADM

## 2024-04-11 RX ORDER — ONDANSETRON 8 MG/1
8 TABLET, ORALLY DISINTEGRATING ORAL EVERY 6 HOURS PRN
Status: DISCONTINUED | OUTPATIENT
Start: 2024-04-11 | End: 2024-04-13 | Stop reason: HOSPADM

## 2024-04-11 RX ORDER — GLUCAGON 1 MG
1 KIT INJECTION
Status: DISCONTINUED | OUTPATIENT
Start: 2024-04-11 | End: 2024-04-13 | Stop reason: HOSPADM

## 2024-04-11 RX ORDER — AMOXICILLIN 250 MG
1 CAPSULE ORAL 2 TIMES DAILY PRN
Status: DISCONTINUED | OUTPATIENT
Start: 2024-04-11 | End: 2024-04-13 | Stop reason: HOSPADM

## 2024-04-11 RX ORDER — ACETAMINOPHEN 325 MG/1
650 TABLET ORAL EVERY 6 HOURS PRN
Status: DISCONTINUED | OUTPATIENT
Start: 2024-04-11 | End: 2024-04-13 | Stop reason: HOSPADM

## 2024-04-11 RX ORDER — TALC
6 POWDER (GRAM) TOPICAL NIGHTLY PRN
Status: DISCONTINUED | OUTPATIENT
Start: 2024-04-11 | End: 2024-04-12

## 2024-04-11 RX ORDER — DICYCLOMINE HYDROCHLORIDE 10 MG/1
10 CAPSULE ORAL EVERY 6 HOURS PRN
Status: DISCONTINUED | OUTPATIENT
Start: 2024-04-11 | End: 2024-04-13 | Stop reason: HOSPADM

## 2024-04-11 RX ORDER — METHADONE HYDROCHLORIDE 10 MG/1
10 TABLET ORAL EVERY 4 HOURS PRN
Status: DISCONTINUED | OUTPATIENT
Start: 2024-04-11 | End: 2024-04-11

## 2024-04-11 RX ORDER — IBUPROFEN 200 MG
24 TABLET ORAL
Status: DISCONTINUED | OUTPATIENT
Start: 2024-04-11 | End: 2024-04-13 | Stop reason: HOSPADM

## 2024-04-11 RX ADMIN — CEFEPIME 2 G: 2 INJECTION, POWDER, FOR SOLUTION INTRAVENOUS at 10:04

## 2024-04-11 RX ADMIN — Medication 10 ML: at 10:04

## 2024-04-11 RX ADMIN — PIPERACILLIN AND TAZOBACTAM 4.5 G: 4; .5 INJECTION, POWDER, LYOPHILIZED, FOR SOLUTION INTRAVENOUS; PARENTERAL at 06:04

## 2024-04-11 RX ADMIN — NOREPINEPHRINE BITARTRATE 0.02 MCG/KG/MIN: 4 INJECTION, SOLUTION INTRAVENOUS at 07:04

## 2024-04-11 RX ADMIN — VANCOMYCIN HYDROCHLORIDE 1000 MG: 1 INJECTION, POWDER, LYOPHILIZED, FOR SOLUTION INTRAVENOUS at 06:04

## 2024-04-11 RX ADMIN — METRONIDAZOLE 500 MG: 500 TABLET ORAL at 10:04

## 2024-04-11 RX ADMIN — ACETAMINOPHEN 1000 MG: 500 TABLET ORAL at 06:04

## 2024-04-11 RX ADMIN — SODIUM CHLORIDE, POTASSIUM CHLORIDE, SODIUM LACTATE AND CALCIUM CHLORIDE 2040 ML: 600; 310; 30; 20 INJECTION, SOLUTION INTRAVENOUS at 05:04

## 2024-04-11 RX ADMIN — VANCOMYCIN HYDROCHLORIDE 500 MG: 500 INJECTION, POWDER, LYOPHILIZED, FOR SOLUTION INTRAVENOUS at 10:04

## 2024-04-11 RX ADMIN — SODIUM CHLORIDE 1000 ML: 9 INJECTION, SOLUTION INTRAVENOUS at 08:04

## 2024-04-11 NOTE — ED NOTES
Patient identifiers verified and correct for   C/C: Patient has wound to left forearm due to IVDU.  APPEARANCE: awake and alert in NAD.  SKIN: Wound to left forearm.  MUSCULOSKELETAL: Patient moving all extremities spontaneously, no obvious swelling or deformities noted. Ambulates independently.  RESPIRATORY: Denies shortness of breath.Respirations unlabored.   CARDIAC: Denies CP,  distal pulses; no peripheral edema  ABDOMEN: denies abdominal pain and n/v/d   : voids spontaneously, denies difficulty  Neurologic: AAO x 4; follows commands equal strength in all extremities; denies numbness/tingling. Denies dizziness

## 2024-04-11 NOTE — ED PROVIDER NOTES
"Encounter Date: 4/11/2024       History     Chief Complaint   Patient presents with    Fever     EMS reports 104.3 temp. Hx IVDU. Wound to left arm. EMS reports pt living in abandoned house      47 yo W with pmhx IVDU, Fe deficiency anemia, second-degree heart block presents via EMS with a chief complaint of fever.  EMS noted a fever of 104.3.  She was picked up from a abandoned home.  According to the patient, she called EMS because "I could not take care of myself. " she has had a friend take care of her but he had to go back to work.  She does admit to IVDU but has had to resort to skin popping because she has been too weak to find veins.  Last use was earlier today.  She does admit to having a fever for the past 4 days as high as 102 last night.  She reports diffuse myalgias.  No cough, chest pain, back pain, abdominal pain, dysuria, urinary frequency.  She has had a previous abscess in the right upper extremity that required an I and D. She notes that she has a wound on her left forearm but it has been stable for over a year.    The history is provided by the patient.     Review of patient's allergies indicates:  No Known Allergies  History reviewed. No pertinent past medical history.  History reviewed. No pertinent surgical history.  History reviewed. No pertinent family history.  Social History     Tobacco Use    Smoking status: Every Day     Current packs/day: 1.00     Average packs/day: 1 pack/day for 27.3 years (27.3 ttl pk-yrs)     Types: Cigarettes     Start date: 1997    Smokeless tobacco: Never    Tobacco comments:     Smoking cessation education provided. Pt is a 1 pk/day cigarette smoker x 26 yrs. She states that she cut down to 0.5 pk/day approx. 1 yr ago. Pt declines referral to Ambulatory Smoking Cessation clinic, stating not ready to quit. Handout provided.    Substance Use Topics    Alcohol use: Not Currently     Review of Systems    Physical Exam     Initial Vitals   BP Pulse Resp Temp SpO2 "   04/11/24 1747 04/11/24 1741 04/11/24 1741 04/11/24 1736 04/11/24 1741   (!) 104/54 102 20 (!) 103.1 °F (39.5 °C) 98 %      MAP       --                Physical Exam    Nursing note and vitals reviewed.  Constitutional: She appears well-developed and well-nourished. She is not diaphoretic. No distress.   HENT:   Head: Normocephalic and atraumatic.   Eyes: Right eye exhibits no discharge. Left eye exhibits no discharge. No scleral icterus.   Neck: Neck supple. No JVD present.   Normal range of motion.  Cardiovascular:  Regular rhythm and normal heart sounds.   Tachycardia present.   Exam reveals no gallop and no friction rub.       No murmur heard.  Pulmonary/Chest: Breath sounds normal. No respiratory distress. She has no wheezes. She has no rhonchi. She has no rales.   Abdominal: Abdomen is soft. She exhibits no distension and no mass. There is no abdominal tenderness. There is no rebound and no guarding.   Musculoskeletal:         General: No tenderness or edema. Normal range of motion.      Cervical back: Normal range of motion and neck supple.     Neurological: She is alert and oriented to person, place, and time. She has normal strength. No sensory deficit.   Skin: Skin is warm and dry. Capillary refill takes less than 2 seconds.   Dorsal aspect of left forearm with 2 cm open ulcer with 5 cm surrounding induration.  No fluctuance.  No crepitus.   Psychiatric: Thought content normal.         ED Course   Procedures  Labs Reviewed   CBC W/ AUTO DIFFERENTIAL - Abnormal; Notable for the following components:       Result Value    WBC 23.83 (*)     RBC 3.77 (*)     Hemoglobin 8.8 (*)     Hematocrit 27.9 (*)     MCV 74 (*)     MCH 23.3 (*)     MCHC 31.5 (*)     RDW 16.6 (*)     Platelets 133 (*)     Immature Granulocytes 1.8 (*)     Gran # (ANC) 20.6 (*)     Immature Grans (Abs) 0.44 (*)     Mono # 1.7 (*)     Gran % 86.5 (*)     Lymph % 4.5 (*)     Platelet Estimate Decreased (*)     All other components within  normal limits   COMPREHENSIVE METABOLIC PANEL - Abnormal; Notable for the following components:    Sodium 130 (*)     CO2 22 (*)     Glucose 117 (*)     BUN 45 (*)     Creatinine 1.8 (*)     Albumin 2.4 (*)     eGFR 35 (*)     All other components within normal limits   PROCALCITONIN - Abnormal; Notable for the following components:    Procalcitonin 13.44 (*)     All other components within normal limits   CULTURE, BLOOD   CULTURE, BLOOD   MAGNESIUM   B-TYPE NATRIURETIC PEPTIDE   TROPONIN I   CK   LACTIC ACID, PLASMA   URINALYSIS, REFLEX TO URINE CULTURE   C-REACTIVE PROTEIN   SARS-COV-2 RDRP GENE   POCT INFLUENZA A/B MOLECULAR   ISTAT LACTATE     EKG Readings: (Independently Interpreted)   Initial Reading: No STEMI. Heart Rate: 100. ST Segments: Normal ST Segments. T Waves: Normal. Axis: Normal.       Imaging Results              X-Ray Forearm Left (Final result)  Result time 04/11/24 19:48:56      Final result by Dontae Wang MD (04/11/24 19:48:56)                   Impression:      1. Diffuse edema about the forearm suggesting cellulitis, no acute displaced fracture or dislocation.  2. There is a linear focus of increased attenuation along the thenar aspect of the radius on the frontal view, not seen on the lateral view.  This may reflect material extraneous to the patient however correlation is advised.      Electronically signed by: Dontae Wang MD  Date:    04/11/2024  Time:    19:48               Narrative:    EXAMINATION:  XR FOREARM LEFT    CLINICAL HISTORY:  Cellulitis, unspecified    TECHNIQUE:  AP and lateral views of the left forearm were performed.    COMPARISON:  None    FINDINGS:  Two views left forearm.    No acute displaced fracture or dislocation of the forearm.  There is diffuse edema about the soft tissues of the forearm noting soft tissue defect along the thenar aspect of the distal forearm.                                       X-Ray Chest AP Portable (Final result)  Result time  04/11/24 18:20:17      Final result by Alcides Woo MD (04/11/24 18:20:17)                   Impression:      No acute process.      Electronically signed by: Alcides Woo MD  Date:    04/11/2024  Time:    18:20               Narrative:    EXAMINATION:  XR CHEST AP PORTABLE    CLINICAL HISTORY:  Sepsis;    TECHNIQUE:  Single frontal view of the chest was performed.    COMPARISON:  06/29/2023.    FINDINGS:  Monitoring EKG leads are present.  The trachea is unremarkable.  The cardiomediastinal silhouette is within normal limits.  The hilar structures are unremarkable.  There is no evidence of free air beneath the hemidiaphragms.  There are no pleural effusions.  There is no evidence of a pneumothorax.  There is no evidence of pneumomediastinum.  No airspace opacity is present.  The osseous structures are unremarkable.                                       Medications   NORepinephrine 4 mg in dextrose 5% 250 mL infusion (premix) (0.02 mcg/kg/min × 68 kg Intravenous New Bag 4/11/24 1957)   sodium chloride 0.9% bolus 1,000 mL 1,000 mL (1,000 mLs Intravenous New Bag 4/11/24 2004)   acetaminophen tablet 1,000 mg (1,000 mg Oral Given 4/11/24 1814)   lactated ringers bolus 2,040 mL (0 mLs Intravenous Stopped 4/11/24 1950)   vancomycin (VANCOCIN) 1,000 mg in dextrose 5 % (D5W) 250 mL IVPB (Vial-Mate) (1,000 mg Intravenous New Bag 4/11/24 1854)   piperacillin-tazobactam (ZOSYN) 4.5 g in dextrose 5 % in water (D5W) 100 mL IVPB (MB+) (0 g Intravenous Stopped 4/11/24 1844)     Medical Decision Making  49 yo W with pmhx IVDU, Fe deficiency anemia, second-degree heart block presents via EMS with a chief complaint of fever with a left forearm infection.    Differential includes, but is not limited to:  COVID, sepsis, influenza, cellulitis, bacteremia    No fluctuance to suggest abscess.    Will initiate investigate for sepsis with labs, cultures.  Will administer IVF, acetaminophen and broad-spectrum antibiotics.    Reassessment:   Lactic acid normal.  Chest x-ray without acute process.    Reassessment:  COVID and influenza negative.  CBC with severe leukocytosis of 24,000. Hemoglobin 8.8 with a microcytic MCV.  CMP reveals hyponatremia of 130.  There is an acute kidney injury with a creatinine of 1.8 up from a normal baseline.  BUN is elevated at 45.  BNP and troponin are normal.  CPK is normal.  Procalcitonin is elevated at 13.44.  On repeat assessment, patient hypotensive at 75/41.  She only has 1 peripheral IV and it has not received her fluids.  Will place additional peripheral IV.  Will initiate peripheral Levophed for MAP > 65.  I considered necrotizing fasciitis given hypotension but patient notes that the wound on her left arm has been there for a year so would not suspect necrotizing soft tissue infection.  I do not palpate any crepitus.  Patient is nontoxic appearing.  She notes she suffers from chronic hypotension but this appears more severe than usual.    Reassessment:  2nd IV access obtained.  Levophed is infusing.  Plain films of left arm negative for any subcutaneous emphysema.  Patient has not yet completed her 2 L but is well-appearing, comfortable on room air.  Will administer 3 L of normal saline.  Will admit patient to ICU for septic shock, presumed 2/2 bacteremia 2/2 IVDU.    This patient does have evidence of infective focus  My overall impression is septic shock due to MAP < 65.  Source: Unknown  Antibiotics given- Antibiotics (72h ago, onward)    None      Latest lactate reviewed-  Lab             04/11/24                       1812          POCLAC       1.36          Organ dysfunction indicated by Acute kidney injury    Fluid challenge Ideal Body Weight- The patient's ideal body weight is Ideal body weight: 45.5 kg (100 lb 4.9 oz) which will be used to calculate fluid bolus of 30 ml/kg for treatment of septic shock.      Post- resuscitation assessment Yes Perfusion exam was performed within 6 hours of septic shock  presentation after bolus shows Inadequate tissue perfusion assessed by non-invasive monitoring       Will Start Pressors- Levophed for MAP of 65  Source control achieved by: abx      Amount and/or Complexity of Data Reviewed  Labs: ordered.  Radiology: ordered.    Risk  OTC drugs.  Prescription drug management.  Decision regarding hospitalization.              Attending Attestation:         Attending Critical Care:   Critical Care Times:   ==============================================================  Total Critical Care Time - exclusive of procedural time: 30 minutes.  ==============================================================  Critical care was necessary to treat or prevent imminent or life-threatening deterioration of the following conditions: sepsis.                                  Clinical Impression:  Final diagnoses:  [R00.0] Tachycardia  [L03.90] Cellulitis  [N17.9] DONNA (acute kidney injury) (Primary)  [A41.9, R65.21] Septic shock          ED Disposition Condition    Admit Critical                Broderick Perez MD  04/11/24 2026

## 2024-04-12 PROBLEM — N17.9 AKI (ACUTE KIDNEY INJURY): Status: ACTIVE | Noted: 2024-04-12

## 2024-04-12 PROBLEM — F43.10 PTSD (POST-TRAUMATIC STRESS DISORDER): Status: ACTIVE | Noted: 2024-04-12

## 2024-04-12 PROBLEM — Z72.0 TOBACCO ABUSE: Status: ACTIVE | Noted: 2024-04-12

## 2024-04-12 PROBLEM — R78.81 STAPHYLOCOCCUS AUREUS BACTEREMIA: Status: ACTIVE | Noted: 2024-04-12

## 2024-04-12 PROBLEM — D69.6 THROMBOCYTOPENIA: Status: ACTIVE | Noted: 2024-04-12

## 2024-04-12 PROBLEM — B18.2 CHRONIC HEPATITIS C VIRUS INFECTION: Status: ACTIVE | Noted: 2024-04-12

## 2024-04-12 PROBLEM — B95.61 STAPHYLOCOCCUS AUREUS BACTEREMIA: Status: ACTIVE | Noted: 2024-04-12

## 2024-04-12 LAB
ABO + RH BLD: NORMAL
ACINETOBACTER CALCOACETICUS/BAUMANNII COMPLEX: NOT DETECTED
ANION GAP SERPL CALC-SCNC: 6 MMOL/L (ref 8–16)
ANISOCYTOSIS BLD QL SMEAR: SLIGHT
ASCENDING AORTA: 2.7 CM
AV INDEX (PROSTH): 0.74
AV MEAN GRADIENT: 9 MMHG
AV PEAK GRADIENT: 16 MMHG
AV VALVE AREA BY VELOCITY RATIO: 1.5 CM²
AV VALVE AREA: 1.64 CM²
AV VELOCITY RATIO: 0.68
B-HCG UR QL: NEGATIVE
BACTEROIDES FRAGILIS: NOT DETECTED
BASOPHILS # BLD AUTO: 0.05 K/UL (ref 0–0.2)
BASOPHILS NFR BLD: 0.2 % (ref 0–1.9)
BLD GP AB SCN CELLS X3 SERPL QL: NORMAL
BSA FOR ECHO PROCEDURE: 1.8 M2
BUN SERPL-MCNC: 32 MG/DL (ref 6–20)
CALCIUM SERPL-MCNC: 8.4 MG/DL (ref 8.7–10.5)
CANDIDA ALBICANS: NOT DETECTED
CANDIDA AURIS: NOT DETECTED
CANDIDA GLABRATA: NOT DETECTED
CANDIDA KRUSEI: NOT DETECTED
CANDIDA PARAPSILOSIS: NOT DETECTED
CANDIDA TROPICALIS: NOT DETECTED
CHLORIDE SERPL-SCNC: 102 MMOL/L (ref 95–110)
CO2 SERPL-SCNC: 24 MMOL/L (ref 23–29)
CREAT SERPL-MCNC: 1.3 MG/DL (ref 0.5–1.4)
CRP SERPL-MCNC: 169 MG/L (ref 0–8.2)
CRYPTOCOCCUS NEOFORMANS/GATTII: NOT DETECTED
CTX-M GENE (ESBL PRODUCER): ABNORMAL
CV ECHO LV RWT: 0.37 CM
DIFFERENTIAL METHOD BLD: ABNORMAL
DOP CALC AO PEAK VEL: 1.99 M/S
DOP CALC AO VTI: 34.5 CM
DOP CALC LVOT AREA: 2.2 CM2
DOP CALC LVOT DIAMETER: 1.68 CM
DOP CALC LVOT PEAK VEL: 1.35 M/S
DOP CALC LVOT STROKE VOLUME: 56.5 CM3
DOP CALC RVOT PEAK VEL: 0.89 M/S
DOP CALCLVOT PEAK VEL VTI: 25.5 CM
E WAVE DECELERATION TIME: 228.64 MSEC
E/A RATIO: 1.77
E/E' RATIO: 8.73 M/S
ECHO LV POSTERIOR WALL: 0.89 CM (ref 0.6–1.1)
ENTEROBACTER CLOACAE COMPLEX: NOT DETECTED
ENTEROBACTERALES: NOT DETECTED
ENTEROCOCCUS FAECALIS: NOT DETECTED
ENTEROCOCCUS FAECIUM: NOT DETECTED
EOSINOPHIL # BLD AUTO: 0.1 K/UL (ref 0–0.5)
EOSINOPHIL NFR BLD: 0.3 % (ref 0–8)
ERYTHROCYTE [DISTWIDTH] IN BLOOD BY AUTOMATED COUNT: 16.7 % (ref 11.5–14.5)
ESCHERICHIA COLI: NOT DETECTED
EST. GFR  (NO RACE VARIABLE): 51 ML/MIN/1.73 M^2
FERRITIN SERPL-MCNC: 290 NG/ML (ref 20–300)
FOLATE SERPL-MCNC: 7.3 NG/ML (ref 4–24)
FRACTIONAL SHORTENING: 41 % (ref 28–44)
GLUCOSE SERPL-MCNC: 136 MG/DL (ref 70–110)
HAEMOPHILUS INFLUENZAE: NOT DETECTED
HCT VFR BLD AUTO: 25.6 % (ref 37–48.5)
HCV AB SERPL QL IA: POSITIVE
HGB BLD-MCNC: 8.1 G/DL (ref 12–16)
HIV1+2 IGG SERPL QL IA.RAPID: NORMAL
IMM GRANULOCYTES # BLD AUTO: 0.31 K/UL (ref 0–0.04)
IMM GRANULOCYTES NFR BLD AUTO: 1.4 % (ref 0–0.5)
IMP GENE (CARBAPENEM RESISTANT): ABNORMAL
INTERVENTRICULAR SEPTUM: 0.71 CM (ref 0.6–1.1)
IRON SERPL-MCNC: 42 UG/DL (ref 30–160)
IVC DIAMETER: 1.72 CM
KLEBSIELLA AEROGENES: NOT DETECTED
KLEBSIELLA OXYTOCA: NOT DETECTED
KLEBSIELLA PNEUMONIAE GROUP: NOT DETECTED
KPC RESISTANCE GENE (CARBAPENEM): ABNORMAL
LA MAJOR: 5.4 CM
LA MINOR: 4.52 CM
LA WIDTH: 3.6 CM
LACTATE SERPL-SCNC: 1.2 MMOL/L (ref 0.5–2.2)
LEFT ATRIUM SIZE: 3.17 CM
LEFT ATRIUM VOLUME INDEX MOD: 28.5 ML/M2
LEFT ATRIUM VOLUME INDEX: 27.4 ML/M2
LEFT ATRIUM VOLUME MOD: 49.6 CM3
LEFT ATRIUM VOLUME: 47.73 CM3
LEFT INTERNAL DIMENSION IN SYSTOLE: 2.87 CM (ref 2.1–4)
LEFT VENTRICLE DIASTOLIC VOLUME INDEX: 63.09 ML/M2
LEFT VENTRICLE DIASTOLIC VOLUME: 109.77 ML
LEFT VENTRICLE MASS INDEX: 74 G/M2
LEFT VENTRICLE SYSTOLIC VOLUME INDEX: 18 ML/M2
LEFT VENTRICLE SYSTOLIC VOLUME: 31.35 ML
LEFT VENTRICULAR INTERNAL DIMENSION IN DIASTOLE: 4.84 CM (ref 3.5–6)
LEFT VENTRICULAR MASS: 128.49 G
LISTERIA MONOCYTOGENES: NOT DETECTED
LV LATERAL E/E' RATIO: 8.73 M/S
LV SEPTAL E/E' RATIO: 8.73 M/S
LVOT MG: 4.3 MMHG
LVOT MV: 0.98 CM/S
LYMPHOCYTES # BLD AUTO: 1.9 K/UL (ref 1–4.8)
LYMPHOCYTES NFR BLD: 9.1 % (ref 18–48)
MAGNESIUM SERPL-MCNC: 2.1 MG/DL (ref 1.6–2.6)
MCH RBC QN AUTO: 23.3 PG (ref 27–31)
MCHC RBC AUTO-ENTMCNC: 31.6 G/DL (ref 32–36)
MCR-1: ABNORMAL
MCV RBC AUTO: 74 FL (ref 82–98)
MEC A/C AND MREJ (MRSA): NOT DETECTED
MEC A/C: ABNORMAL
MONOCYTES # BLD AUTO: 2.3 K/UL (ref 0.3–1)
MONOCYTES NFR BLD: 10.8 % (ref 4–15)
MV PEAK A VEL: 0.74 M/S
MV PEAK E VEL: 1.31 M/S
MV STENOSIS PRESSURE HALF TIME: 66.3 MS
MV VALVE AREA P 1/2 METHOD: 3.32 CM2
NDM GENE (CARBAPENEM RESISTANT): ABNORMAL
NEISSERIA MENINGITIDIS: NOT DETECTED
NEUTROPHILS # BLD AUTO: 16.7 K/UL (ref 1.8–7.7)
NEUTROPHILS NFR BLD: 78.2 % (ref 38–73)
NRBC BLD-RTO: 0 /100 WBC
OHS QRS DURATION: 70 MS
OHS QTC CALCULATION: 399 MS
OXA-48-LIKE (CARBAPENEM RESISTANT): ABNORMAL
PISA MRMAX VEL: 2.58 M/S
PISA TR MAX VEL: 3.23 M/S
PLATELET # BLD AUTO: 118 K/UL (ref 150–450)
PLATELET BLD QL SMEAR: ABNORMAL
PMV BLD AUTO: 12.1 FL (ref 9.2–12.9)
POTASSIUM SERPL-SCNC: 4.3 MMOL/L (ref 3.5–5.1)
PROTEUS SPECIES: NOT DETECTED
PSEUDOMONAS AERUGINOSA: NOT DETECTED
PULM VEIN S/D RATIO: 0.95
PV PEAK D VEL: 0.55 M/S
PV PEAK GRADIENT: 6 MMHG
PV PEAK S VEL: 0.52 M/S
PV PEAK VELOCITY: 1.2 M/S
RA MAJOR: 4.41 CM
RA PRESSURE ESTIMATED: 3 MMHG
RA WIDTH: 3.3 CM
RBC # BLD AUTO: 3.47 M/UL (ref 4–5.4)
RV TB RVSP: 6 MMHG
RV TISSUE DOPPLER FREE WALL SYSTOLIC VELOCITY 1 (APICAL 4 CHAMBER VIEW): 19.06 CM/S
SALMONELLA SP: NOT DETECTED
SATURATED IRON: 18 % (ref 20–50)
SERRATIA MARCESCENS: NOT DETECTED
SINUS: 2.2 CM
SODIUM SERPL-SCNC: 132 MMOL/L (ref 136–145)
SPECIMEN OUTDATE: NORMAL
STAPHYLOCOCCUS AUREUS: DETECTED
STAPHYLOCOCCUS EPIDERMIDIS: NOT DETECTED
STAPHYLOCOCCUS LUGDUNESIS: NOT DETECTED
STAPHYLOCOCCUS SPECIES: ABNORMAL
STENOTROPHOMONAS MALTOPHILIA: NOT DETECTED
STJ: 2.16 CM
STREPTOCOCCUS AGALACTIAE: NOT DETECTED
STREPTOCOCCUS PNEUMONIAE: NOT DETECTED
STREPTOCOCCUS PYOGENES: NOT DETECTED
STREPTOCOCCUS SPECIES: DETECTED
TDI LATERAL: 0.15 M/S
TDI SEPTAL: 0.15 M/S
TDI: 0.15 M/S
TOTAL IRON BINDING CAPACITY: 231 UG/DL (ref 250–450)
TR MAX PG: 42 MMHG
TR MEAN GRADIENT: 35 MMHG
TRANSFERRIN SERPL-MCNC: 156 MG/DL (ref 200–375)
TRICUSPID ANNULAR PLANE SYSTOLIC EXCURSION: 2.6 CM
TV REST PULMONARY ARTERY PRESSURE: 45 MMHG
VAN A/B (VRE GENE): ABNORMAL
VIM GENE (CARBAPENEM RESISTANT): ABNORMAL
VIT B12 SERPL-MCNC: 492 PG/ML (ref 210–950)
WBC # BLD AUTO: 21.39 K/UL (ref 3.9–12.7)
Z-SCORE OF LEFT VENTRICULAR DIMENSION IN END DIASTOLE: 0.05
Z-SCORE OF LEFT VENTRICULAR DIMENSION IN END SYSTOLE: -0.3

## 2024-04-12 PROCEDURE — 86703 HIV-1/HIV-2 1 RESULT ANTBDY: CPT | Performed by: HOSPITALIST

## 2024-04-12 PROCEDURE — 87521 HEPATITIS C PROBE&RVRS TRNSC: CPT | Performed by: HOSPITALIST

## 2024-04-12 PROCEDURE — 81025 URINE PREGNANCY TEST: CPT | Performed by: INTERNAL MEDICINE

## 2024-04-12 PROCEDURE — 87186 SC STD MICRODIL/AGAR DIL: CPT | Performed by: HOSPITALIST

## 2024-04-12 PROCEDURE — 94761 N-INVAS EAR/PLS OXIMETRY MLT: CPT

## 2024-04-12 PROCEDURE — 87077 CULTURE AEROBIC IDENTIFY: CPT | Performed by: HOSPITALIST

## 2024-04-12 PROCEDURE — 87040 BLOOD CULTURE FOR BACTERIA: CPT | Performed by: HOSPITALIST

## 2024-04-12 PROCEDURE — 25000003 PHARM REV CODE 250: Performed by: HOSPITALIST

## 2024-04-12 PROCEDURE — 25000003 PHARM REV CODE 250: Performed by: PHYSICIAN ASSISTANT

## 2024-04-12 PROCEDURE — S4991 NICOTINE PATCH NONLEGEND: HCPCS | Performed by: HOSPITALIST

## 2024-04-12 PROCEDURE — 63600175 PHARM REV CODE 636 W HCPCS: Performed by: PHYSICIAN ASSISTANT

## 2024-04-12 PROCEDURE — 83540 ASSAY OF IRON: CPT | Performed by: HOSPITALIST

## 2024-04-12 PROCEDURE — 99233 SBSQ HOSP IP/OBS HIGH 50: CPT | Mod: 95,,, | Performed by: STUDENT IN AN ORGANIZED HEALTH CARE EDUCATION/TRAINING PROGRAM

## 2024-04-12 PROCEDURE — 86803 HEPATITIS C AB TEST: CPT | Performed by: HOSPITALIST

## 2024-04-12 PROCEDURE — 20000000 HC ICU ROOM

## 2024-04-12 PROCEDURE — 80048 BASIC METABOLIC PNL TOTAL CA: CPT | Performed by: PHYSICIAN ASSISTANT

## 2024-04-12 PROCEDURE — 82728 ASSAY OF FERRITIN: CPT | Performed by: HOSPITALIST

## 2024-04-12 PROCEDURE — 82746 ASSAY OF FOLIC ACID SERUM: CPT | Performed by: HOSPITALIST

## 2024-04-12 PROCEDURE — 82607 VITAMIN B-12: CPT | Performed by: HOSPITALIST

## 2024-04-12 PROCEDURE — 36415 COLL VENOUS BLD VENIPUNCTURE: CPT | Performed by: HOSPITALIST

## 2024-04-12 PROCEDURE — A4216 STERILE WATER/SALINE, 10 ML: HCPCS | Performed by: PHYSICIAN ASSISTANT

## 2024-04-12 PROCEDURE — 85025 COMPLETE CBC W/AUTO DIFF WBC: CPT | Performed by: PHYSICIAN ASSISTANT

## 2024-04-12 PROCEDURE — 83735 ASSAY OF MAGNESIUM: CPT | Performed by: PHYSICIAN ASSISTANT

## 2024-04-12 RX ORDER — OXYCODONE HYDROCHLORIDE 5 MG/1
5 TABLET ORAL EVERY 4 HOURS PRN
Status: DISCONTINUED | OUTPATIENT
Start: 2024-04-12 | End: 2024-04-13 | Stop reason: HOSPADM

## 2024-04-12 RX ORDER — IBUPROFEN 200 MG
1 TABLET ORAL DAILY
Status: DISCONTINUED | OUTPATIENT
Start: 2024-04-12 | End: 2024-04-13 | Stop reason: HOSPADM

## 2024-04-12 RX ORDER — SODIUM CHLORIDE 450 MG/100ML
INJECTION, SOLUTION INTRAVENOUS CONTINUOUS
Status: DISCONTINUED | OUTPATIENT
Start: 2024-04-12 | End: 2024-04-13 | Stop reason: HOSPADM

## 2024-04-12 RX ORDER — TALC
3 POWDER (GRAM) TOPICAL
Status: DISCONTINUED | OUTPATIENT
Start: 2024-04-12 | End: 2024-04-13 | Stop reason: HOSPADM

## 2024-04-12 RX ADMIN — MUPIROCIN: 20 OINTMENT TOPICAL at 09:04

## 2024-04-12 RX ADMIN — METRONIDAZOLE 500 MG: 500 TABLET ORAL at 09:04

## 2024-04-12 RX ADMIN — Medication 10 ML: at 09:04

## 2024-04-12 RX ADMIN — OXYCODONE 5 MG: 5 TABLET ORAL at 06:04

## 2024-04-12 RX ADMIN — Medication 10 ML: at 01:04

## 2024-04-12 RX ADMIN — METHOCARBAMOL 500 MG: 500 TABLET ORAL at 04:04

## 2024-04-12 RX ADMIN — FOLIC ACID 1 MG: 1 TABLET ORAL at 08:04

## 2024-04-12 RX ADMIN — CEFEPIME 2 G: 2 INJECTION, POWDER, FOR SOLUTION INTRAVENOUS at 09:04

## 2024-04-12 RX ADMIN — TRAZODONE HYDROCHLORIDE 50 MG: 50 TABLET ORAL at 09:04

## 2024-04-12 RX ADMIN — MELATONIN TAB 3 MG 3 MG: 3 TAB at 06:04

## 2024-04-12 RX ADMIN — METHOCARBAMOL 500 MG: 500 TABLET ORAL at 01:04

## 2024-04-12 RX ADMIN — METRONIDAZOLE 500 MG: 500 TABLET ORAL at 08:04

## 2024-04-12 RX ADMIN — Medication 100 MG: at 08:04

## 2024-04-12 RX ADMIN — Medication 10 ML: at 06:04

## 2024-04-12 RX ADMIN — SODIUM CHLORIDE: 4.5 INJECTION, SOLUTION INTRAVENOUS at 08:04

## 2024-04-12 RX ADMIN — SODIUM CHLORIDE: 4.5 INJECTION, SOLUTION INTRAVENOUS at 09:04

## 2024-04-12 RX ADMIN — MUPIROCIN: 20 OINTMENT TOPICAL at 08:04

## 2024-04-12 RX ADMIN — THERA TABS 1 TABLET: TAB at 08:04

## 2024-04-12 NOTE — ASSESSMENT & PLAN NOTE
Patient's anemia is currently controlled. Has not received any PRBCs to date. Etiology likely d/t Iron deficiency  Current CBC reviewed-   Lab Results   Component Value Date    HGB 8.8 (L) 04/11/2024    HCT 27.9 (L) 04/11/2024     Monitor serial CBC and transfuse if patient becomes hemodynamically unstable, symptomatic or H/H drops below 7/21.  - H&H appear stable at present, but suspect she will drop below 7 with adequate hydration  - will obtain Type & screen in anticipation of potential need for transfusion
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
## 2024-04-12 NOTE — ASSESSMENT & PLAN NOTE
- history noted   - last drug use was via skin popping earlier today   - urine tox screen pending

## 2024-04-12 NOTE — ASSESSMENT & PLAN NOTE
-Cr on admit 1.8  -Likely due to sepsis with hypotension  -Avoid nephrotoxic agents and renally dose meds  -Avoid hypotension  -Cr improving today - now 1.3  -Continue IV fluids  -Repeat labs in AM
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
## 2024-04-12 NOTE — ASSESSMENT & PLAN NOTE
-On admit platelets 133 and now down to 118  -Etiology unclear  -HIV negative.  HCV positive  -Check folate, b12  -Repeat labs in AM

## 2024-04-12 NOTE — EICU
Brief Eicu admission review note.       Reason for admission:  Septic shock        Full H&P pending, ER notes, labs images reviewed.    Briefly 47 y/o with h/o IVDU, ETOH use with c/o fever and left forearm infection, found to be in septic shock, source likely UTI vs skin infection. On vanc, zosyn followed by cefepime/flagyl, s/p sepsis bolus, on levophed. CXR clear.     HR 63 ,BP   200/50  RR 18. sats 98%  RR 18RA    Problems:   Septic shock from UTI/pyelo vs skin infection   DONNA   H/o IVDU   Cellulilits  h/o ETOH use  Hypochronic anemia, stable  Trombobytopenia              Plan:   Continue Vanc/Cefepime, flagyl, change flagyl to clinda if there is any concern for extension of soft tissue infection- so far none   S/p 30 cc/kg fluid bolus, and prn boluses  On Levophed @ 0.2, wean  Follow up cxs, lytes, UO  CIWA protocol   Thiamine, folic acid  Follow H&H, plt         SUP:  SCDs for now

## 2024-04-12 NOTE — ED NOTES
Hourly rounds made, pt voices no complaints at present time. Call light within reach of pt. No distress noted.
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
## 2024-04-12 NOTE — ASSESSMENT & PLAN NOTE
-Hb 8.8 on admit and now 8.1  -No evidence of bleeding.  -Check iron, ferritin, b12 and folate  -Repeat labs in AM

## 2024-04-12 NOTE — ASSESSMENT & PLAN NOTE
- Ms. Margarita Wiseman presents with fever x 4 days   - she has h/o IVDU, abscess/cellulitis   - she is found to have UTI, as well as cellulitis of the LUE where she has been skin popping       This patient does have evidence of infective focus  My overall impression is sepsis.  Source: Urinary Tract and Skin and Soft Tissue (location LUE)  Antibiotics given-   Antibiotics (72h ago, onward)      Start     Stop Route Frequency Ordered    04/12/24 0900  mupirocin 2 % ointment         04/17/24 0859 Nasl 2 times daily 04/11/24 2110 04/11/24 2230  ceFEPIme (MAXIPIME) 2 g in dextrose 5 % in water (D5W) 100 mL IVPB (MB+)         -- IV Every 12 hours (non-standard times) 04/11/24 2127 04/11/24 2225  vancomycin - pharmacy to dose  (vancomycin IVPB (PEDS and ADULTS))        See Hyperspace for full Linked Orders Report.    -- IV pharmacy to manage frequency 04/11/24 2125          Latest lactate reviewed-  Recent Labs   Lab 04/11/24  1812   POCLAC 1.36     Organ dysfunction indicated by Acute kidney injury and Thrombocytopenia     Fluid challenge Ideal Body Weight- The patient's ideal body weight is Ideal body weight: 45.5 kg (100 lb 4.9 oz) which will be used to calculate fluid bolus of 30 ml/kg for treatment of septic shock.      Post- resuscitation assessment Yes Perfusion exam was performed within 6 hours of septic shock presentation after bolus shows Adequate tissue perfusion assessed by non-invasive monitoring       Will Start Pressors- Levophed for MAP of 65  Source control achieved by: vancomycin, cefepime

## 2024-04-12 NOTE — ASSESSMENT & PLAN NOTE
-Noted use of cocaine and heroin.  Usually IV use but too weak to find veins recently and has been skin popping  -She is honest about her drug use and expresses desire to stop  -No signs of withdrawal at this time - monitor closely.  Will hold on initiation of suboxone right now.  -Counseled on cessation  -Would benefit from inpatient subtance abuse treatment at discharge, but may not have resources - consult case management.

## 2024-04-12 NOTE — ASSESSMENT & PLAN NOTE
-Admitted to inpatient status  -Presented for evaluation of fever  -On admit febrile, tachycardic and hypotensive with leukocytosis, elevated procalcitonin and normal lactic acid.  -Source of infection is cellulitis in left arm around wound at site of iv drug use, UTI and Staph aureus bacteremia.  -UA consistent with UTI and she reports incontinence and polyuria without dysuria  -Blood cultures growing Staph aureus - await sensitivities  -Repeat blood cultures and check hiv, hcv and echocardiogram  -Required pressors but now off them.  Still with fever.  Continue IV fluids and broad spectrum antibiotics for now.  At risk for endocarditis.  Continue care in ICU

## 2024-04-12 NOTE — ASSESSMENT & PLAN NOTE
-HCV Ab positive - believed to be new diagnosis for patient  -LFTs preserved but noted low platelet count  -Check HCV RNA  -Will likely need referral to hepatology at discharge.

## 2024-04-12 NOTE — SUBJECTIVE & OBJECTIVE
History reviewed. No pertinent past medical history.    History reviewed. No pertinent surgical history.    Review of patient's allergies indicates:  No Known Allergies    No current facility-administered medications on file prior to encounter.     Current Outpatient Medications on File Prior to Encounter   Medication Sig    busPIRone (BUSPAR) 5 MG Tab Take 5 mg by mouth 2 (two) times daily.    divalproex ER (DEPAKOTE ER) 250 MG 24 hr tablet Take 250 mg by mouth once daily.    OLANZapine (ZYPREXA) 5 MG tablet Take 5 mg by mouth every evening.    sertraline (ZOLOFT) 25 MG tablet Take 25 mg by mouth once daily.    ferrous sulfate (FEOSOL) 325 mg (65 mg iron) Tab tablet Take 1 tablet (325 mg total) by mouth 2 (two) times daily.    hydrOXYzine (ATARAX) 50 MG tablet Take 1 tablet (50 mg total) by mouth nightly as needed for Itching (insomnia).    ibuprofen (ADVIL,MOTRIN) 800 MG tablet Take 1 tablet (800 mg total) by mouth every 6 (six) hours as needed for Pain.    naloxone (NARCAN) 4 mg/actuation Spry 4mg by nasal route as needed for opioid overdose; may repeat every 2-3 minutes in alternating nostrils until medical help arrives. Call 911    ondansetron (ZOFRAN-ODT) 8 MG TbDL Take 1 tablet (8 mg total) by mouth every 6 (six) hours as needed (nausea).     Family History    None       Tobacco Use    Smoking status: Every Day     Current packs/day: 1.00     Average packs/day: 1 pack/day for 27.3 years (27.3 ttl pk-yrs)     Types: Cigarettes     Start date: 1997    Smokeless tobacco: Never    Tobacco comments:     Smoking cessation education provided. Pt is a 1 pk/day cigarette smoker x 26 yrs. She states that she cut down to 0.5 pk/day approx. 1 yr ago. Pt declines referral to Ambulatory Smoking Cessation clinic, stating not ready to quit. Handout provided.    Substance and Sexual Activity    Alcohol use: Not Currently    Drug use: Not on file    Sexual activity: Not Currently     Review of Systems   Constitutional:   Negative for chills, diaphoresis, fatigue and fever.   Respiratory:  Negative for cough, shortness of breath and wheezing.    Cardiovascular:  Negative for chest pain and palpitations.   Gastrointestinal:  Negative for abdominal distention, abdominal pain, constipation, diarrhea, nausea and vomiting.   Genitourinary:  Positive for dysuria and frequency. Negative for decreased urine volume, difficulty urinating, flank pain, hematuria, urgency, vaginal discharge and vaginal pain.   Musculoskeletal:  Positive for arthralgias, back pain and myalgias. Negative for gait problem, joint swelling, neck pain and neck stiffness.   Skin:  Positive for color change and wound. Negative for pallor and rash.   Neurological:  Negative for dizziness, syncope, weakness, light-headedness and headaches.   Psychiatric/Behavioral:  Negative for agitation and confusion.      Objective:     Vital Signs (Most Recent):  Temp: 98.6 °F (37 °C) (04/12/24 0315)  Pulse: 68 (04/12/24 0400)  Resp: (!) 24 (04/12/24 0400)  BP: (!) 123/58 (04/12/24 0400)  SpO2: 97 % (04/12/24 0400) Vital Signs (24h Range):  Temp:  [98.5 °F (36.9 °C)-103.2 °F (39.6 °C)] 98.6 °F (37 °C)  Pulse:  [] 68  Resp:  [15-24] 24  SpO2:  [97 %-100 %] 97 %  BP: ()/(41-58) 123/58     Weight: 76.9 kg (169 lb 8.5 oz)  Body mass index is 33.11 kg/m².     Physical Exam  Vitals and nursing note reviewed.   Constitutional:       General: She is not in acute distress.     Appearance: She is well-developed and normal weight. She is not ill-appearing, toxic-appearing or diaphoretic.      Comments: Appears older than stated age. Thin, somewhat frail appearing.    HENT:      Head: Normocephalic and atraumatic.   Eyes:      General: No scleral icterus.        Right eye: No discharge.         Left eye: No discharge.      Conjunctiva/sclera: Conjunctivae normal.   Neck:      Trachea: No tracheal deviation.   Cardiovascular:      Rate and Rhythm: Normal rate and regular rhythm.       "Heart sounds: Normal heart sounds. No murmur heard.     No gallop.   Pulmonary:      Effort: Pulmonary effort is normal. No respiratory distress.      Breath sounds: Normal breath sounds. No stridor. No wheezing or rales.   Abdominal:      General: Bowel sounds are normal. There is no distension.      Palpations: Abdomen is soft. There is no mass.      Tenderness: There is no abdominal tenderness. There is no guarding.   Musculoskeletal:         General: No deformity. Normal range of motion.      Cervical back: Normal range of motion and neck supple.   Skin:     General: Skin is warm and dry.      Coloration: Skin is not pale.      Findings: No erythema or rash.      Comments: There is an area of skin ulceration with surrounding erythema and induration of the dorsal aspect of the left forearm. There is some warmth in this area, and tenderness.    Neurological:      General: No focal deficit present.      Mental Status: She is alert and oriented to person, place, and time.      Cranial Nerves: No cranial nerve deficit.      Motor: No abnormal muscle tone.   Psychiatric:         Mood and Affect: Mood normal.         Behavior: Behavior normal.         Thought Content: Thought content normal.         Judgment: Judgment normal.                Significant Labs: All pertinent labs within the past 24 hours have been reviewed.  BMP:   Recent Labs   Lab 04/11/24  1820   *   *   K 3.9   CL 97   CO2 22*   BUN 45*   CREATININE 1.8*   CALCIUM 8.7   MG 1.8     CBC:   Recent Labs   Lab 04/11/24  1820   WBC 23.83*   HGB 8.8*   HCT 27.9*   *     CMP:   Recent Labs   Lab 04/11/24  1820   *   K 3.9   CL 97   CO2 22*   *   BUN 45*   CREATININE 1.8*   CALCIUM 8.7   PROT 8.0   ALBUMIN 2.4*   BILITOT 0.6   ALKPHOS 127   AST 23   ALT 18   ANIONGAP 11     Urine Culture: No results for input(s): "LABURIN" in the last 48 hours.  Urine Studies:   Recent Labs   Lab 04/11/24 2035   COLORU Yellow   APPEARANCEUA " Hazy*   PHUR 5.0   SPECGRAV 1.010   PROTEINUA Trace*   GLUCUA Negative   KETONESU Negative   BILIRUBINUA Negative   OCCULTUA 1+*   NITRITE Negative   UROBILINOGEN Negative   LEUKOCYTESUR 3+*   RBCUA 4   WBCUA 32*   BACTERIA Many*   SQUAMEPITHEL 0       Significant Imaging: I have reviewed all pertinent imaging results/findings within the past 24 hours.  Imaging Results              X-Ray Forearm Left (Final result)  Result time 04/11/24 19:48:56      Final result by Dontae Wang MD (04/11/24 19:48:56)                   Impression:      1. Diffuse edema about the forearm suggesting cellulitis, no acute displaced fracture or dislocation.  2. There is a linear focus of increased attenuation along the thenar aspect of the radius on the frontal view, not seen on the lateral view.  This may reflect material extraneous to the patient however correlation is advised.      Electronically signed by: Dontae Wang MD  Date:    04/11/2024  Time:    19:48               Narrative:    EXAMINATION:  XR FOREARM LEFT    CLINICAL HISTORY:  Cellulitis, unspecified    TECHNIQUE:  AP and lateral views of the left forearm were performed.    COMPARISON:  None    FINDINGS:  Two views left forearm.    No acute displaced fracture or dislocation of the forearm.  There is diffuse edema about the soft tissues of the forearm noting soft tissue defect along the thenar aspect of the distal forearm.                                       X-Ray Chest AP Portable (Final result)  Result time 04/11/24 18:20:17      Final result by Alcides Woo MD (04/11/24 18:20:17)                   Impression:      No acute process.      Electronically signed by: Alcides Woo MD  Date:    04/11/2024  Time:    18:20               Narrative:    EXAMINATION:  XR CHEST AP PORTABLE    CLINICAL HISTORY:  Sepsis;    TECHNIQUE:  Single frontal view of the chest was performed.    COMPARISON:  06/29/2023.    FINDINGS:  Monitoring EKG leads are present.  The trachea is  unremarkable.  The cardiomediastinal silhouette is within normal limits.  The hilar structures are unremarkable.  There is no evidence of free air beneath the hemidiaphragms.  There are no pleural effusions.  There is no evidence of a pneumothorax.  There is no evidence of pneumomediastinum.  No airspace opacity is present.  The osseous structures are unremarkable.

## 2024-04-12 NOTE — H&P
[facility]  History & Physical    Patient Name: Margarita Wiseman  MRN: [id number]  Patient Class: IP- Inpatient  Admission Date: 4/11/2024  Attending Physician: Jed Aleman MD   Primary Care Provider: Doris Primary Doctor         Patient information was obtained from patient, past medical records, and ER records.     Subjective:     Principal Problem:Severe sepsis    Chief Complaint:   Chief Complaint   Patient presents with    Fever     EMS reports 104.3 temp. Hx IVDU. Wound to left arm. EMS reports pt living in abandoned house         HPI: Ms. Margarita Wiseman is a 48 y.o. female, with PMH of IVDU, PAWAN, second degree heart block, who presented to [facility] ED on 4/11/24 due to fever with tmax of 104.3F orally via EMS who found her in an abandoned home. She states she called EMS when her friend, who was taking care of her, had to go to work as she can not care for herself. She states she no longer uses IV drugs, but does skin pop as she has been too weak to find her own veins to inject into. She states she last used earlier today. She reports she has had a fever x 4 days. She previously had an abscess in her RUE that required I&D, and now has a wound on her left forearm that has been present for over a year. She denies myalgias, chest pain, cough, back pain, dysuria or UTI symptoms. She was evaluated in the ED with labs showing leukocytosis of 23K, with left shift as well as anemia with H&H of 8.8/27.9, and thrombocytopenia with PLT 133K. A metabolci panel showed hyponatremia of 130, with BUN 45, Cr 1.8 and normal potassium. A procalcitonin was elevated at 13.44. A UA showed a nitritie negative UTI with 32 WBChpf, many bacteria, 3+ leuk est, and occasional WBC clumps with occasional trichomonas. She was negative for flu and covid. A CXR was without acute process. An x-ray of the left forearm showed cellulitis. She was treated in the ED with sepsis dose LR, vanc, zosyn, and  an additional L NS. She was started on norepinephrine as she remained hypotensive. She was admitted to inpatient status to the ICU.     History reviewed. No pertinent past medical history.    History reviewed. No pertinent surgical history.    Review of patient's allergies indicates:  No Known Allergies    No current facility-administered medications on file prior to encounter.     Current Outpatient Medications on File Prior to Encounter   Medication Sig    busPIRone (BUSPAR) 5 MG Tab Take 5 mg by mouth 2 (two) times daily.    divalproex ER (DEPAKOTE ER) 250 MG 24 hr tablet Take 250 mg by mouth once daily.    OLANZapine (ZYPREXA) 5 MG tablet Take 5 mg by mouth every evening.    sertraline (ZOLOFT) 25 MG tablet Take 25 mg by mouth once daily.    ferrous sulfate (FEOSOL) 325 mg (65 mg iron) Tab tablet Take 1 tablet (325 mg total) by mouth 2 (two) times daily.    hydrOXYzine (ATARAX) 50 MG tablet Take 1 tablet (50 mg total) by mouth nightly as needed for Itching (insomnia).    ibuprofen (ADVIL,MOTRIN) 800 MG tablet Take 1 tablet (800 mg total) by mouth every 6 (six) hours as needed for Pain.    naloxone (NARCAN) 4 mg/actuation Spry 4mg by nasal route as needed for opioid overdose; may repeat every 2-3 minutes in alternating nostrils until medical help arrives. Call 911    ondansetron (ZOFRAN-ODT) 8 MG TbDL Take 1 tablet (8 mg total) by mouth every 6 (six) hours as needed (nausea).     Family History    None       Tobacco Use    Smoking status: Every Day     Current packs/day: 1.00     Average packs/day: 1 pack/day for 27.3 years (27.3 ttl pk-yrs)     Types: Cigarettes     Start date: 1997    Smokeless tobacco: Never    Tobacco comments:     Smoking cessation education provided. Pt is a 1 pk/day cigarette smoker x 26 yrs. She states that she cut down to 0.5 pk/day approx. 1 yr ago. Pt declines referral to Ambulatory Smoking Cessation clinic, stating not ready to quit. Handout provided.    Substance and Sexual Activity     Alcohol use: Not Currently    Drug use: Not on file    Sexual activity: Not Currently     Review of Systems   Constitutional:  Negative for chills, diaphoresis, fatigue and fever.   Respiratory:  Negative for cough, shortness of breath and wheezing.    Cardiovascular:  Negative for chest pain and palpitations.   Gastrointestinal:  Negative for abdominal distention, abdominal pain, constipation, diarrhea, nausea and vomiting.   Genitourinary:  Positive for dysuria and frequency. Negative for decreased urine volume, difficulty urinating, flank pain, hematuria, urgency, vaginal discharge and vaginal pain.   Musculoskeletal:  Positive for arthralgias, back pain and myalgias. Negative for gait problem, joint swelling, neck pain and neck stiffness.   Skin:  Positive for color change and wound. Negative for pallor and rash.   Neurological:  Negative for dizziness, syncope, weakness, light-headedness and headaches.   Psychiatric/Behavioral:  Negative for agitation and confusion.      Objective:     Vital Signs (Most Recent):  Temp: 98.6 °F (37 °C) (04/12/24 0315)  Pulse: 68 (04/12/24 0400)  Resp: (!) 24 (04/12/24 0400)  BP: (!) 123/58 (04/12/24 0400)  SpO2: 97 % (04/12/24 0400) Vital Signs (24h Range):  Temp:  [98.5 °F (36.9 °C)-103.2 °F (39.6 °C)] 98.6 °F (37 °C)  Pulse:  [] 68  Resp:  [15-24] 24  SpO2:  [97 %-100 %] 97 %  BP: ()/(41-58) 123/58     Weight: 76.9 kg (169 lb 8.5 oz)  Body mass index is 33.11 kg/m².     Physical Exam  Vitals and nursing note reviewed.   Constitutional:       General: She is not in acute distress.     Appearance: She is well-developed and normal weight. She is not ill-appearing, toxic-appearing or diaphoretic.      Comments: Appears older than stated age. Thin, somewhat frail appearing.    HENT:      Head: Normocephalic and atraumatic.   Eyes:      General: No scleral icterus.        Right eye: No discharge.         Left eye: No discharge.      Conjunctiva/sclera: Conjunctivae  normal.   Neck:      Trachea: No tracheal deviation.   Cardiovascular:      Rate and Rhythm: Normal rate and regular rhythm.      Heart sounds: Normal heart sounds. No murmur heard.     No gallop.   Pulmonary:      Effort: Pulmonary effort is normal. No respiratory distress.      Breath sounds: Normal breath sounds. No stridor. No wheezing or rales.   Abdominal:      General: Bowel sounds are normal. There is no distension.      Palpations: Abdomen is soft. There is no mass.      Tenderness: There is no abdominal tenderness. There is no guarding.   Musculoskeletal:         General: No deformity. Normal range of motion.      Cervical back: Normal range of motion and neck supple.   Skin:     General: Skin is warm and dry.      Coloration: Skin is not pale.      Findings: No erythema or rash.      Comments: There is an area of skin ulceration with surrounding erythema and induration of the dorsal aspect of the left forearm. There is some warmth in this area, and tenderness.    Neurological:      General: No focal deficit present.      Mental Status: She is alert and oriented to person, place, and time.      Cranial Nerves: No cranial nerve deficit.      Motor: No abnormal muscle tone.   Psychiatric:         Mood and Affect: Mood normal.         Behavior: Behavior normal.         Thought Content: Thought content normal.         Judgment: Judgment normal.                Significant Labs: All pertinent labs within the past 24 hours have been reviewed.  BMP:   Recent Labs   Lab 04/11/24  1820   *   *   K 3.9   CL 97   CO2 22*   BUN 45*   CREATININE 1.8*   CALCIUM 8.7   MG 1.8     CBC:   Recent Labs   Lab 04/11/24  1820   WBC 23.83*   HGB 8.8*   HCT 27.9*   *     CMP:   Recent Labs   Lab 04/11/24  1820   *   K 3.9   CL 97   CO2 22*   *   BUN 45*   CREATININE 1.8*   CALCIUM 8.7   PROT 8.0   ALBUMIN 2.4*   BILITOT 0.6   ALKPHOS 127   AST 23   ALT 18   ANIONGAP 11     Urine Culture: No results  "for input(s): "LABURIN" in the last 48 hours.  Urine Studies:   Recent Labs   Lab 04/11/24 2035   COLORU Yellow   APPEARANCEUA Hazy*   PHUR 5.0   SPECGRAV 1.010   PROTEINUA Trace*   GLUCUA Negative   KETONESU Negative   BILIRUBINUA Negative   OCCULTUA 1+*   NITRITE Negative   UROBILINOGEN Negative   LEUKOCYTESUR 3+*   RBCUA 4   WBCUA 32*   BACTERIA Many*   SQUAMEPITHEL 0       Significant Imaging: I have reviewed all pertinent imaging results/findings within the past 24 hours.  Imaging Results              X-Ray Forearm Left (Final result)  Result time 04/11/24 19:48:56      Final result by Dontae Wang MD (04/11/24 19:48:56)                   Impression:      1. Diffuse edema about the forearm suggesting cellulitis, no acute displaced fracture or dislocation.  2. There is a linear focus of increased attenuation along the thenar aspect of the radius on the frontal view, not seen on the lateral view.  This may reflect material extraneous to the patient however correlation is advised.      Electronically signed by: Dontae Wang MD  Date:    04/11/2024  Time:    19:48               Narrative:    EXAMINATION:  XR FOREARM LEFT    CLINICAL HISTORY:  Cellulitis, unspecified    TECHNIQUE:  AP and lateral views of the left forearm were performed.    COMPARISON:  None    FINDINGS:  Two views left forearm.    No acute displaced fracture or dislocation of the forearm.  There is diffuse edema about the soft tissues of the forearm noting soft tissue defect along the thenar aspect of the distal forearm.                                       X-Ray Chest AP Portable (Final result)  Result time 04/11/24 18:20:17      Final result by Alcides Woo MD (04/11/24 18:20:17)                   Impression:      No acute process.      Electronically signed by: Alcides Woo MD  Date:    04/11/2024  Time:    18:20               Narrative:    EXAMINATION:  XR CHEST AP PORTABLE    CLINICAL HISTORY:  Sepsis;    TECHNIQUE:  Single " frontal view of the chest was performed.    COMPARISON:  06/29/2023.    FINDINGS:  Monitoring EKG leads are present.  The trachea is unremarkable.  The cardiomediastinal silhouette is within normal limits.  The hilar structures are unremarkable.  There is no evidence of free air beneath the hemidiaphragms.  There are no pleural effusions.  There is no evidence of a pneumothorax.  There is no evidence of pneumomediastinum.  No airspace opacity is present.  The osseous structures are unremarkable.                                       Assessment/Plan:     * Severe sepsis  - Ms. Margarita Wiseman presents with fever x 4 days   - she has h/o IVDU, abscess/cellulitis   - she is found to have UTI, as well as cellulitis of the LUE where she has been skin popping       This patient does have evidence of infective focus  My overall impression is sepsis.  Source: Urinary Tract and Skin and Soft Tissue (location LUE)  Antibiotics given-   Antibiotics (72h ago, onward)      Start     Stop Route Frequency Ordered    04/12/24 0900  mupirocin 2 % ointment         04/17/24 0859 Nasl 2 times daily 04/11/24 2110 04/11/24 2230  ceFEPIme (MAXIPIME) 2 g in dextrose 5 % in water (D5W) 100 mL IVPB (MB+)         -- IV Every 12 hours (non-standard times) 04/11/24 2127 04/11/24 2225  vancomycin - pharmacy to dose  (vancomycin IVPB (PEDS and ADULTS))        See Hyperspace for full Linked Orders Report.    -- IV pharmacy to manage frequency 04/11/24 2125          Latest lactate reviewed-  Recent Labs   Lab 04/11/24  1812   POCLAC 1.36     Organ dysfunction indicated by Acute kidney injury and Thrombocytopenia     Fluid challenge Ideal Body Weight- The patient's ideal body weight is Ideal body weight: 45.5 kg (100 lb 4.9 oz) which will be used to calculate fluid bolus of 30 ml/kg for treatment of septic shock.      Post- resuscitation assessment Yes Perfusion exam was performed within 6 hours of septic shock presentation after bolus shows  "Adequate tissue perfusion assessed by non-invasive monitoring       Will Start Pressors- Levophed for MAP of 65  Source control achieved by: vancomycin, cefepime     Acute cystitis  - urine culture pending   - see "Severe Sepsis" for complete plan       Cellulitis of left arm  - imaging w/o abscess   - remainder of plan found in "Severe sepsis"       Trichomoniasis  - metronidazole ordered x 7 days   - suggest she have all sexual partners tested and treated     Polysubstance abuse  - history noted  - utox pending       Drug abuse, IV  - history noted   - last drug use was via skin popping earlier today   - urine tox screen pending     Chronic anemia  Patient's anemia is currently controlled. Has not received any PRBCs to date. Etiology likely d/t Iron deficiency  Current CBC reviewed-   Lab Results   Component Value Date    HGB 8.8 (L) 04/11/2024    HCT 27.9 (L) 04/11/2024     Monitor serial CBC and transfuse if patient becomes hemodynamically unstable, symptomatic or H/H drops below 7/21.  - H&H appear stable at present, but suspect she will drop below 7 with adequate hydration  - will obtain Type & screen in anticipation of potential need for transfusion       VTE Risk Mitigation (From admission, onward)           Ordered     IP VTE HIGH RISK PATIENT  Once         04/11/24 2157     Place sequential compression device  Until discontinued         04/11/24 2157     Reason for No Pharmacological VTE Prophylaxis  Once        Question:  Reasons:  Answer:  Thrombocytopenia    04/11/24 2157                               Pharmacokinetic Initial Assessment: IV Vancomycin    Assessment/Plan:    Initiate intravenous vancomycin with loading dose of 1500 mg once with subsequent doses when random concentrations are less than 20 mcg/mL  Desired empiric serum trough concentration is 10 to 20 mcg/mL  Draw vancomycin random level on 4/12/24 at 1900.  Pharmacy will continue to follow and monitor vancomycin.      Please contact " "pharmacy at [facility] [phone] with any questions regarding this assessment.     Thank you for the consult,   Kellen Devlin       Patient brief summary:  Margarita Wiseman is a 48 y.o. female initiated on antimicrobial therapy with IV Vancomycin for treatment of suspected bacteremia    Drug Allergies:   Review of patient's allergies indicates:  No Known Allergies    Actual Body Weight:   68 kg    Renal Function:   Estimated Creatinine Clearance: 33.6 mL/min (A) (based on SCr of 1.8 mg/dL (H)).,     Dialysis Method (if applicable):  N/A    CBC (last 72 hours):  Recent Labs   Lab Result Units 04/11/24 1820   WBC K/uL 23.83*   Hemoglobin g/dL 8.8*   Hematocrit % 27.9*   Platelets K/uL 133*   Gran % % 86.5*   Lymph % % 4.5*   Mono % % 7.0   Eosinophil % % 0.0   Basophil % % 0.2   Differential Method  Automated       Metabolic Panel (last 72 hours):  Recent Labs   Lab Result Units 04/11/24 1820 04/11/24 2035   Sodium mmol/L 130*  --    Potassium mmol/L 3.9  --    Chloride mmol/L 97  --    CO2 mmol/L 22*  --    Glucose mg/dL 117*  --    Glucose, UA   --  Negative   BUN mg/dL 45*  --    Creatinine mg/dL 1.8*  --    Albumin g/dL 2.4*  --    Total Bilirubin mg/dL 0.6  --    Alkaline Phosphatase U/L 127  --    AST U/L 23  --    ALT U/L 18  --    Magnesium mg/dL 1.8  --        Drug levels (last 3 results):  No results for input(s): "VANCOMYCINRA", "VANCORANDOM", "VANCOMYCINPE", "VANCOPEAK", "VANCOMYCINTR", "VANCOTROUGH" in the last 72 hours.    Microbiologic Results:  Microbiology Results (last 7 days)       Procedure Component Value Units Date/Time    Urine culture [[id number]] Collected: 04/11/24 2035    Order Status: No result Specimen: Urine Updated: 04/11/24 2101    Blood culture x two cultures. Draw prior to antibiotics. [[id number]] Collected: 04/11/24 1821    Order Status: Sent Specimen: Blood from Peripheral, Antecubital, Right     Blood culture x two cultures. Draw prior to antibiotics. [[id number]] Collected: " 04/11/24 1805    Order Status: Sent Specimen: Blood from Peripheral, Antecubital, Right               Wandy West PA-C  Department of Hospital Medicine  [facility] ([facility]

## 2024-04-12 NOTE — SUBJECTIVE & OBJECTIVE
Interval History: No acute events overnight.  Remains febrile but is off levophed.  States she is feeling better today.  All questions answered and patient had no further complaints.    Objective:     Vital Signs (Most Recent):  Temp: (!) 100.4 °F (38 °C) (04/12/24 0700)  Pulse: 79 (04/12/24 1100)  Resp: 19 (04/12/24 1100)  BP: (!) 105/51 (04/12/24 1100)  SpO2: 98 % (04/12/24 1100) Vital Signs (24h Range):  Temp:  [98.5 °F (36.9 °C)-103.2 °F (39.6 °C)] 100.4 °F (38 °C)  Pulse:  [] 79  Resp:  [13-35] 19  SpO2:  [95 %-100 %] 98 %  BP: ()/(41-58) 105/51     Weight: 76.9 kg (169 lb 8.5 oz)  Body mass index is 33.11 kg/m².    Intake/Output Summary (Last 24 hours) at 4/12/2024 1129  Last data filed at 4/12/2024 1105  Gross per 24 hour   Intake 545.66 ml   Output 1850 ml   Net -1304.34 ml         Physical Exam  Vitals and nursing note reviewed.   Constitutional:       General: She is not in acute distress.     Appearance: She is well-developed and normal weight. She is ill-appearing and toxic-appearing. She is not diaphoretic.      Comments: Appears older than stated age. Thin, somewhat frail appearing.    HENT:      Head: Normocephalic and atraumatic.      Mouth/Throat:      Mouth: Mucous membranes are moist.   Eyes:      General: No scleral icterus.        Right eye: No discharge.         Left eye: No discharge.      Extraocular Movements: Extraocular movements intact.   Neck:      Trachea: No tracheal deviation.   Cardiovascular:      Rate and Rhythm: Regular rhythm. Tachycardia present.      Heart sounds: Normal heart sounds. No murmur heard.     No gallop.   Pulmonary:      Effort: Pulmonary effort is normal. No respiratory distress.      Breath sounds: Normal breath sounds. No stridor. No wheezing or rales.   Abdominal:      General: Bowel sounds are normal. There is no distension.      Palpations: Abdomen is soft. There is no mass.      Tenderness: There is no abdominal tenderness. There is no guarding.    Musculoskeletal:         General: No deformity. Normal range of motion.      Cervical back: Normal range of motion and neck supple.   Skin:     General: Skin is warm and dry.      Coloration: Skin is not pale.      Findings: No erythema or rash.      Comments: There is an area of skin ulceration with surrounding erythema and induration of the dorsal aspect of the left forearm. There is some warmth in this area, and tenderness.    Neurological:      General: No focal deficit present.      Mental Status: She is alert and oriented to person, place, and time.      Cranial Nerves: No cranial nerve deficit.      Motor: No abnormal muscle tone.   Psychiatric:         Mood and Affect: Mood normal.         Behavior: Behavior normal.             Significant Labs: All pertinent labs within the past 24 hours have been reviewed.    Significant Imaging: I have reviewed all pertinent imaging results/findings within the past 24 hours.

## 2024-04-12 NOTE — PROGRESS NOTES
[facility] ([place])  Wound Care    Patient Name:  Margarita Wiseman   MRN:  [id number]  Date: 4/12/2024  Diagnosis: Severe sepsis    History:     History reviewed. No pertinent past medical history.    Social History     Socioeconomic History    Marital status: Unknown   Tobacco Use    Smoking status: Every Day     Current packs/day: 1.00     Average packs/day: 1 pack/day for 27.3 years (27.3 ttl pk-yrs)     Types: Cigarettes     Start date: 1997    Smokeless tobacco: Never    Tobacco comments:     Smoking cessation education provided. Pt is a 1 pk/day cigarette smoker x 26 yrs. She states that she cut down to 0.5 pk/day approx. 1 yr ago. Pt declines referral to Ambulatory Smoking Cessation clinic, stating not ready to quit. Handout provided.    Substance and Sexual Activity    Alcohol use: Not Currently    Sexual activity: Not Currently     Social Determinants of Health     Financial Resource Strain: High Risk (7/3/2023)    Overall Financial Resource Strain (CARDIA)     Difficulty of Paying Living Expenses: Very hard   Food Insecurity: No Food Insecurity (7/3/2023)    Hunger Vital Sign     Worried About Running Out of Food in the Last Year: Never true     Ran Out of Food in the Last Year: Never true   Transportation Needs: Unmet Transportation Needs (7/3/2023)    PRAPARE - Transportation     Lack of Transportation (Medical): Yes     Lack of Transportation (Non-Medical): Yes   Physical Activity: Inactive (7/3/2023)    Exercise Vital Sign     Days of Exercise per Week: 0 days     Minutes of Exercise per Session: 0 min   Stress: No Stress Concern Present (7/3/2023)    [employer] [place] [employer] - Occupational Stress Questionnaire     Feeling of Stress : Not at all   Social Connections: Socially Isolated (7/3/2023)    Social Connection and Isolation Panel [NHANES]     Frequency of Communication with Friends and Family: More than three times a week     Frequency of Social Gatherings with  Friends and Family: More than three times a week     Attends Gnosticist Services: Never     Active Member of Clubs or Organizations: No     Attends Club or Organization Meetings: Never     Marital Status: Never    Housing Stability: High Risk (7/3/2023)    Housing Stability Vital Sign     Unable to Pay for Housing in the Last Year: Yes     Unstable Housing in the Last Year: Yes       Precautions:     Allergies as of 04/11/2024    (No Known Allergies)       [facility] Assessment Details/Treatment     48 y.o. female, with PMH of IVDU, PAWAN, second degree heart block, who presented to [facility] ED on 4/11/24 due to fever with tmax of 104.3F orally via EMS who found her in an abandoned home. She states she called EMS when her friend, who was taking care of her, had to go to work as she can not care for herself. She states she no longer uses IV drugs, but does skin pop as she has been too weak to find her own veins to inject into. She states she last used earlier today. She reports she has had a fever x 4 days.   Wound care consulted  Full thickness wound covered with dry crusted exudate .  Soaked site with Vashe wound solution and lifted off dry debris from wound edges.   Applied medihoney to the site and covered with a mepilex border dressing. Will have nursing change every other day.    After debridement of crusted exudate         04/12/24 1105   Pain/Comfort/Sleep   Preferred Pain Scale number (Numeric Rating Pain Scale)   Pain Rating (0-10): Rest 0   Cognitive   Cognitive/Neuro/Behavioral WDL WDL   Level of Consciousness (AVPU) alert   CIWA-Ar (Alcohol Withdrawal Assessment)   Nausea and Vomiting 0-->no nausea and no vomiting   Tremor 0-->no tremor   Paroxysmal Sweats 0-->no sweat visible   Anxiety 0-->no anxiety, at ease   Agitation 0-->normal activity   Tactile Disturbances 0-->none   Auditory Disturbances 0-->not present   Visual Disturbances 0-->not present   Headache, Fullness in Head 0-->not present   Orientation and  Clouding of Sensorium 0-->oriented and can do serial additions   Score 0   COWS (Clinical Opiate Withdrawal Scale)   Resting Pulse Rate 0-->pulse rate 80 or below   Sweating 0-->no report of chills or flushing   Restlessness 0-->able to sit still   Pupil Size 0-->pupils pinned or normal size for room light   Bone or Joint Aches 0-->not present   Runny Nose or Tearing 0-->none present   GI Upset 0-->no GI symptoms   Tremor 0-->no tremor   Yawning 0-->no yawning   Anxiety or Irritability 0-->none   [place] Skin 0-->skin is smooth   Score 0   Pupils   Pupil PERRLA yes   [place] Coma Scale   Best Eye Response 4-->(E4) spontaneous   Best Motor Response 6-->(M6) obeys commands   Best Verbal Response 5-->(V5) oriented   [place] Coma Scale Score 15   Motor Response   Motor Response general motor response   General Motor Response purposeful motor response   HEENT   HEENT WDL ex;lips   Lip Symptoms dry   Mouth/Teeth WDL   Mouth/Teeth WDL ex;teeth   Teeth Symptoms tooth/teeth missing   Respiratory   Respiratory WDL WDL   Rhythm/Pattern, Respiratory unlabored;pattern regular;depth regular   Breath Sounds   Breath Sounds All Fields   All Lung Fields Breath Sounds Anterior:;Lateral:;clear;equal bilaterally   Oxygen Therapy   Device (Oxygen Therapy) room air   Daily Review of Necessity (O2 Therapy) completed   Cardiac   Cardiac WDL WDL   ECG   Lead Monitored Lead II   Rhythm normal sinus rhythm   Peripheral Neurovascular   Peripheral Neurovascular WDL WDL   Pulse Radial   Left Radial Pulse 2+ (normal)   Right Radial Pulse 2+ (normal)   Pulse Dorsalis Pedis   Left Dorsalis Pedis Pulse 2+ (normal)   Right Dorsalis Pedis Pulse 2+ (normal)        Peripheral IV - Single Lumen 04/11/24 1740 20 G Right Shoulder   Placement Date/Time: 04/11/24 1740   Present Prior to Hospital Arrival?: No  Inserted by: RN  Size/Length: 20 G  Orientation: Right  Location: Shoulder  Placement directed by: Anatomic Landmarks  Site Prep: Chlorhexidine    Local Anesthetic: None  Inse...   Site Assessment Clean;Dry;Intact;No redness;No swelling   Extremity Assessment Distal to IV No abnormal discoloration;No redness;No swelling   Line Status Flushed;Saline locked   Dressing Status Clean;Dry;Intact   Dressing Intervention Integrity maintained   Reason Not Rotated Not due        Peripheral IV - Single Lumen 04/11/24 1815 20 G Right Antecubital   Placement Date/Time: 04/11/24 1815   Present Prior to Hospital Arrival?: No  Inserted by: RN  Size/Length: 20 G  Orientation: Right  Location: Antecubital  Placement directed by: Anatomic Landmarks  Site Prep: Alcohol  Insertion attempts (enter commen...   Site Assessment Clean;Dry;Intact;No redness;No swelling   Extremity Assessment Distal to IV No abnormal discoloration;No swelling;No redness   Line Status Infusing   Dressing Status Clean;Intact;Dry   Dressing Intervention Integrity maintained        Peripheral IV - Single Lumen 04/11/24 2000 20 G Anterior;Distal;Left Upper Arm   Placement Date/Time: 04/11/24 2000   Present Prior to Hospital Arrival?: No  Inserted by: RN  Size/Length: 20 G  Orientation: Anterior;Distal;Left  Location: Upper Arm  Placement directed by: Ultrasound  Site Prep: Alcohol  Insertion attempts (enter c...   Site Assessment Clean;Dry;Intact;No redness;No swelling   Extremity Assessment Distal to IV No abnormal discoloration;No swelling;No redness   Line Status Flushed;Saline locked   Dressing Status Clean;Dry;Intact   Dressing Intervention Integrity maintained   Skin   Skin WDL ex;characteristics   Skin Color/Characteristics without discoloration   Skin Temperature warm   Skin Moisture dry   Skin Elasticity quick return to original state   Skin Integrity wound   Specialty Bed/Overlay Low air loss        Altered Skin Integrity 04/11/24 2115 Left anterior;lower Arm #1 Other (comment)   Date First Assessed/Time First Assessed: 04/11/24 2115   Altered Skin Integrity Present on Admission - Did Patient arrive  to the hospital with altered skin?: yes  Side: Left  Orientation: anterior;lower  Location: Arm  Wound Number: #1  Is this injury...   Wound Image    Dressing Appearance Clean;Dry;Intact   Drainage Amount None   Drainage Characteristics/Odor No odor   Appearance Red;Pink;Black  (crusted with brown dry exudate)   Tissue loss description Full thickness   Periwound Area Scar tissue;Dry  (crusted scab at edges)   Wound Edges Open;Irregular   Wound Length (cm) 4 cm   Wound Width (cm) 1.4 cm   Wound Depth (cm) 0.4 cm   Wound Volume (cm^3) 2.24 cm^3   Wound Surface Area (cm^2) 5.6 cm^2   Care Cleansed with:;Wound cleanser   Dressing Applied;Honey;Silicone;Gauze        Altered Skin Integrity 04/11/24 2115 Right anterior;distal;lower Arm #2 Other (comment)   Date First Assessed/Time First Assessed: 04/11/24 2115   Altered Skin Integrity Present on Admission - Did Patient arrive to the hospital with altered skin?: yes  Side: Right  Orientation: anterior;distal;lower  Location: Arm  Wound Number: #2  Is thi...   Dressing Appearance Open to air   Drainage Amount None   Skin Interventions   Device Skin Pressure Protection adhesive use limited   Pressure Reduction Devices positioning supports utilized   Pressure Reduction Techniques frequent weight shift encouraged   Skin Protection adhesive use limited   Musculoskeletal   Musculoskeletal WDL ex;mobility   General Mobility generalized weakness   Range of Motion active ROM (range of motion) encouraged   Gastrointestinal   GI WDL WDL   Last Bowel Movement 04/09/24   Genitourinary   Genitourinary WDL WDL   Voiding Characteristics voids spontaneously without difficulty   Female External Urinary Catheter w/ Suction 04/11/24 2130   Placement Date/Time: 04/11/24 2130   Present Prior to Hospital Arrival?: No  Inserted by: RN   Skin no redness;no breakdown   Tolerance no signs/symptoms of discomfort   Output (mL) 550 mL   Coping/Psychosocial   Observed Emotional State  accepting;calm;cooperative   Verbalized Emotional State acceptance   Plan of Care Reviewed With patient   Psychosocial Support   Trust Relationship/Rapport care explained   Nutrition   Diet/Nutrition Received regular   Safety   Safety WDL WDL   Safety Factors ID band on;wheels locked;bed in low position;call light in reach   Safety Precautions emergency equipment at bedside   Enhanced Safety Measures bed alarm set   Infection Prevention rest/sleep promoted   Isolation Precautions protective;precautions maintained   Safety Management   Safety Promotion/Fall Prevention assistive device/personal item within reach;pulse ox;medications reviewed   Medication Review/Management medications reviewed   Patient Rounds bed in low position;bed wheels locked;call light in patient/parent reach;clutter free environment maintained;ID band on;placement of personal items at bedside;toileting offered;visualized patient   Daily Care   Activity Management Arm raise - L1;Heel slide - L1;Leg kicks - L2   Activity Assistance Provided independent   Positioning   Body Position position changed independently   Head of Bed (HOB) Positioning HOB lowered   Positioning/Transfer Devices pillows;in use        04/12/2024

## 2024-04-12 NOTE — PROGRESS NOTES
"Pharmacokinetic Initial Assessment: IV Vancomycin    Assessment/Plan:    Initiate intravenous vancomycin with loading dose of 1500 mg once with subsequent doses when random concentrations are less than 20 mcg/mL  Desired empiric serum trough concentration is 10 to 20 mcg/mL  Draw vancomycin random level on 4/12/24 at 1900.  Pharmacy will continue to follow and monitor vancomycin.      Please contact pharmacy at extension [phone] with any questions regarding this assessment.     Thank you for the consult,   Kellen Devlin       Patient brief summary:  Margarita Wiseman is a 48 y.o. female initiated on antimicrobial therapy with IV Vancomycin for treatment of suspected bacteremia    Drug Allergies:   Review of patient's allergies indicates:  No Known Allergies    Actual Body Weight:   68 kg    Renal Function:   Estimated Creatinine Clearance: 33.6 mL/min (A) (based on SCr of 1.8 mg/dL (H)).,     Dialysis Method (if applicable):  N/A    CBC (last 72 hours):  Recent Labs   Lab Result Units 04/11/24  1820   WBC K/uL 23.83*   Hemoglobin g/dL 8.8*   Hematocrit % 27.9*   Platelets K/uL 133*   Gran % % 86.5*   Lymph % % 4.5*   Mono % % 7.0   Eosinophil % % 0.0   Basophil % % 0.2   Differential Method  Automated       Metabolic Panel (last 72 hours):  Recent Labs   Lab Result Units 04/11/24  1820 04/11/24  2035   Sodium mmol/L 130*  --    Potassium mmol/L 3.9  --    Chloride mmol/L 97  --    CO2 mmol/L 22*  --    Glucose mg/dL 117*  --    Glucose, UA   --  Negative   BUN mg/dL 45*  --    Creatinine mg/dL 1.8*  --    Albumin g/dL 2.4*  --    Total Bilirubin mg/dL 0.6  --    Alkaline Phosphatase U/L 127  --    AST U/L 23  --    ALT U/L 18  --    Magnesium mg/dL 1.8  --        Drug levels (last 3 results):  No results for input(s): "VANCOMYCINRA", "VANCORANDOM", "VANCOMYCINPE", "VANCOPEAK", "VANCOMYCINTR", "VANCOTROUGH" in the last 72 hours.    Microbiologic Results:  Microbiology Results (last 7 days)       Procedure Component " Value Units Date/Time    Urine culture [[id number]] Collected: 04/11/24 2035    Order Status: No result Specimen: Urine Updated: 04/11/24 2101    Blood culture x two cultures. Draw prior to antibiotics. [[id number]] Collected: 04/11/24 1821    Order Status: Sent Specimen: Blood from Peripheral, Antecubital, Right     Blood culture x two cultures. Draw prior to antibiotics. [[id number]] Collected: 04/11/24 1805    Order Status: Sent Specimen: Blood from Peripheral, Antecubital, Right

## 2024-04-13 VITALS
HEART RATE: 67 BPM | BODY MASS INDEX: 33.29 KG/M2 | TEMPERATURE: 99 F | HEIGHT: 60 IN | DIASTOLIC BLOOD PRESSURE: 59 MMHG | OXYGEN SATURATION: 100 % | RESPIRATION RATE: 34 BRPM | SYSTOLIC BLOOD PRESSURE: 106 MMHG | WEIGHT: 169.56 LBS

## 2024-04-13 LAB
ANION GAP SERPL CALC-SCNC: 8 MMOL/L (ref 8–16)
ANISOCYTOSIS BLD QL SMEAR: SLIGHT
BASOPHILS # BLD AUTO: 0.06 K/UL (ref 0–0.2)
BASOPHILS NFR BLD: 0.4 % (ref 0–1.9)
BUN SERPL-MCNC: 14 MG/DL (ref 6–20)
CALCIUM SERPL-MCNC: 8.4 MG/DL (ref 8.7–10.5)
CHLORIDE SERPL-SCNC: 106 MMOL/L (ref 95–110)
CO2 SERPL-SCNC: 23 MMOL/L (ref 23–29)
CREAT SERPL-MCNC: 0.8 MG/DL (ref 0.5–1.4)
DIFFERENTIAL METHOD BLD: ABNORMAL
EOSINOPHIL # BLD AUTO: 0.1 K/UL (ref 0–0.5)
EOSINOPHIL NFR BLD: 0.7 % (ref 0–8)
ERYTHROCYTE [DISTWIDTH] IN BLOOD BY AUTOMATED COUNT: 17.2 % (ref 11.5–14.5)
EST. GFR  (NO RACE VARIABLE): >60 ML/MIN/1.73 M^2
GLUCOSE SERPL-MCNC: 120 MG/DL (ref 70–110)
HCT VFR BLD AUTO: 30.4 % (ref 37–48.5)
HGB BLD-MCNC: 9.3 G/DL (ref 12–16)
IMM GRANULOCYTES # BLD AUTO: 0.39 K/UL (ref 0–0.04)
IMM GRANULOCYTES NFR BLD AUTO: 2.3 % (ref 0–0.5)
LYMPHOCYTES # BLD AUTO: 2 K/UL (ref 1–4.8)
LYMPHOCYTES NFR BLD: 12.1 % (ref 18–48)
MAGNESIUM SERPL-MCNC: 1.6 MG/DL (ref 1.6–2.6)
MCH RBC QN AUTO: 23.4 PG (ref 27–31)
MCHC RBC AUTO-ENTMCNC: 30.6 G/DL (ref 32–36)
MCV RBC AUTO: 77 FL (ref 82–98)
MONOCYTES # BLD AUTO: 1 K/UL (ref 0.3–1)
MONOCYTES NFR BLD: 6.1 % (ref 4–15)
MRSA ID BY PCR: NEGATIVE
NEUTROPHILS # BLD AUTO: 13.1 K/UL (ref 1.8–7.7)
NEUTROPHILS NFR BLD: 78.4 % (ref 38–73)
NRBC BLD-RTO: 0 /100 WBC
PLATELET # BLD AUTO: 135 K/UL (ref 150–450)
PLATELET BLD QL SMEAR: ABNORMAL
PMV BLD AUTO: 12.5 FL (ref 9.2–12.9)
POTASSIUM SERPL-SCNC: 3.8 MMOL/L (ref 3.5–5.1)
RBC # BLD AUTO: 3.97 M/UL (ref 4–5.4)
SODIUM SERPL-SCNC: 137 MMOL/L (ref 136–145)
STAPH AUREUS ID BY PCR: POSITIVE
VANCOMYCIN SERPL-MCNC: 3.2 UG/ML
WBC # BLD AUTO: 16.7 K/UL (ref 3.9–12.7)

## 2024-04-13 PROCEDURE — 63600175 PHARM REV CODE 636 W HCPCS: Performed by: PHYSICIAN ASSISTANT

## 2024-04-13 PROCEDURE — 80202 ASSAY OF VANCOMYCIN: CPT | Performed by: HOSPITALIST

## 2024-04-13 PROCEDURE — 36415 COLL VENOUS BLD VENIPUNCTURE: CPT | Performed by: HOSPITALIST

## 2024-04-13 PROCEDURE — 83735 ASSAY OF MAGNESIUM: CPT | Performed by: PHYSICIAN ASSISTANT

## 2024-04-13 PROCEDURE — 87150 DNA/RNA AMPLIFIED PROBE: CPT | Performed by: HOSPITALIST

## 2024-04-13 PROCEDURE — 80048 BASIC METABOLIC PNL TOTAL CA: CPT | Performed by: PHYSICIAN ASSISTANT

## 2024-04-13 PROCEDURE — A4216 STERILE WATER/SALINE, 10 ML: HCPCS | Performed by: PHYSICIAN ASSISTANT

## 2024-04-13 PROCEDURE — 63600175 PHARM REV CODE 636 W HCPCS: Performed by: HOSPITALIST

## 2024-04-13 PROCEDURE — 25000003 PHARM REV CODE 250: Performed by: PHYSICIAN ASSISTANT

## 2024-04-13 PROCEDURE — 85025 COMPLETE CBC W/AUTO DIFF WBC: CPT | Performed by: PHYSICIAN ASSISTANT

## 2024-04-13 PROCEDURE — 25000003 PHARM REV CODE 250: Performed by: HOSPITALIST

## 2024-04-13 PROCEDURE — 94761 N-INVAS EAR/PLS OXIMETRY MLT: CPT

## 2024-04-13 RX ADMIN — MUPIROCIN: 20 OINTMENT TOPICAL at 10:04

## 2024-04-13 RX ADMIN — VANCOMYCIN HYDROCHLORIDE 1250 MG: 1.25 INJECTION, POWDER, LYOPHILIZED, FOR SOLUTION INTRAVENOUS at 12:04

## 2024-04-13 RX ADMIN — THERA TABS 1 TABLET: TAB at 09:04

## 2024-04-13 RX ADMIN — Medication 100 MG: at 09:04

## 2024-04-13 RX ADMIN — METRONIDAZOLE 500 MG: 500 TABLET ORAL at 09:04

## 2024-04-13 RX ADMIN — Medication 10 ML: at 06:04

## 2024-04-13 RX ADMIN — FOLIC ACID 1 MG: 1 TABLET ORAL at 09:04

## 2024-04-13 RX ADMIN — SODIUM CHLORIDE: 4.5 INJECTION, SOLUTION INTRAVENOUS at 06:04

## 2024-04-13 RX ADMIN — OXYCODONE 5 MG: 5 TABLET ORAL at 09:04

## 2024-04-13 RX ADMIN — CEFEPIME 2 G: 2 INJECTION, POWDER, FOR SOLUTION INTRAVENOUS at 10:04
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
## 2024-04-13 NOTE — ASSESSMENT & PLAN NOTE
-Admitted to inpatient status  -Presented for evaluation of fever  -On admit febrile, tachycardic and hypotensive with leukocytosis, elevated procalcitonin and normal lactic acid.  -Source of infection is cellulitis in left arm around wound at site of iv drug use, UTI and Staph aureus and Streptococcal bacteremia.  -UA consistent with UTI and she reports incontinence and polyuria without dysuria  -She is hiv negative, but HCV positive.  -Blood cultures positive - rapid ID positive for Staph aureus and Strep species.  Repeat cultures 4/12 are also positive.  await full speciation and sensitivities  -TTE showed normal EF and no evidence of valvular lesions  -Required pressors initially but off since early on 4/12.  Remains afebrile since 7AM on 4/12 at 7AM.  Feeling better overall.  -Continue IV fluids and broad spectrum antibiotics for now.    -Will consult cardiology for evaluation for ARMAND and ID for recommendations on Monday.  -Repeat blood cultures tomorrow as she is a very hard stick.  -Transfer out of ICU today.
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
## 2024-04-13 NOTE — ASSESSMENT & PLAN NOTE
-Cr on admit 1.8  -Likely due to sepsis with hypotension  -Avoid nephrotoxic agents and renally dose meds  -Avoid hypotension  -Cr improving today - now 0.8  -Continue IV fluids  -Repeat labs in AM

## 2024-04-13 NOTE — NURSING
Patient left AMA. All IV's and telemetry removed. AMA release form signed and placed in chart; personal belongings sent w/ pt. MD Ash aware.

## 2024-04-13 NOTE — ASSESSMENT & PLAN NOTE
-HCV Ab positive - first positive 6/20/2019 and has not had treatment  -LFTs preserved but noted low platelet count  -HCV RNA ordered  -Will need referral to hepatology at discharge.

## 2024-04-13 NOTE — ASSESSMENT & PLAN NOTE
-Hb 8.8 on admit and now 9.3  -No evidence of bleeding.  -Folate and B12 are replete.    -Iron is low, Ferritin is normal and Tsat is 18% - doubt iron deficiency  -Repeat labs in AM

## 2024-04-13 NOTE — SUBJECTIVE & OBJECTIVE
Interval History: No acute events overnight.  Clinically is feeling better.  Denies pain and shortness of breath.  Afebrile since 7AM 4/12.  Remains off pressors.  All questions answered and patient had no further complaints.    Objective:     Vital Signs (Most Recent):  Temp: 97.7 °F (36.5 °C) (04/13/24 0315)  Pulse: (!) 55 (04/13/24 0803)  Resp: 18 (04/13/24 0947)  BP: 115/65 (04/13/24 0803)  SpO2: 100 % (04/13/24 0803) Vital Signs (24h Range):  Temp:  [97.7 °F (36.5 °C)-98.9 °F (37.2 °C)] 97.7 °F (36.5 °C)  Pulse:  [55-75] 55  Resp:  [12-33] 18  SpO2:  [98 %-100 %] 100 %  BP: ()/(52-71) 115/65     Weight: 76.9 kg (169 lb 8.5 oz)  Body mass index is 33.11 kg/m².    Intake/Output Summary (Last 24 hours) at 4/13/2024 1309  Last data filed at 4/13/2024 0641  Gross per 24 hour   Intake 2371.56 ml   Output 2000 ml   Net 371.56 ml         Physical Exam  Vitals and nursing note reviewed.   Constitutional:       General: She is not in acute distress.     Appearance: She is well-developed and normal weight. She is ill-appearing and toxic-appearing. She is not diaphoretic.      Comments: Appears older than stated age. Thin, somewhat frail appearing.    HENT:      Head: Normocephalic and atraumatic.      Mouth/Throat:      Mouth: Mucous membranes are moist.   Eyes:      Extraocular Movements: Extraocular movements intact.   Neck:      Trachea: No tracheal deviation.   Cardiovascular:      Rate and Rhythm: Normal rate and regular rhythm.      Heart sounds: Normal heart sounds. No murmur heard.     No gallop.   Pulmonary:      Effort: Pulmonary effort is normal. No respiratory distress.      Breath sounds: Normal breath sounds. No stridor. No wheezing or rales.   Abdominal:      General: Bowel sounds are normal. There is no distension.      Palpations: Abdomen is soft. There is no mass.      Tenderness: There is no abdominal tenderness. There is no guarding.   Musculoskeletal:         General: No deformity. Normal range  of motion.      Cervical back: Normal range of motion.   Skin:     General: Skin is warm and dry.      Coloration: Skin is not pale.      Findings: No erythema or rash.      Comments: There is an area of skin ulceration with surrounding erythema and induration of the dorsal aspect of the left forearm. There is some warmth in this area, and tenderness.    Neurological:      General: No focal deficit present.      Mental Status: She is alert and oriented to person, place, and time.      Cranial Nerves: No cranial nerve deficit.      Motor: No abnormal muscle tone.   Psychiatric:         Mood and Affect: Mood normal.         Behavior: Behavior normal.             Significant Labs: All pertinent labs within the past 24 hours have been reviewed.    Significant Imaging: I have reviewed all pertinent imaging results/findings within the past 24 hours.

## 2024-04-13 NOTE — ASSESSMENT & PLAN NOTE
-On admit platelets 133 and now 135  -Etiology unclear  -HIV negative.  HCV positive  -Folate and B12 are not low  -Repeat labs in AM

## 2024-04-13 NOTE — PROGRESS NOTES
Pharmacokinetic Assessment Follow Up: IV Vancomycin    Vancomycin serum concentration assessment(s):    The random level was drawn correctly and can be used to guide therapy at this time. The measurement is below the desired definitive target range of 10 to 20 mcg/mL.    Vancomycin Regimen Plan:    Give vancomycin 1250 mg (15 mg/kg) IV x 1 dose   Re-dose when the random level is less than 20 mcg/mL, next level to be drawn at 04:30 on 04/14/2024    Drug levels (last 3 results):  Recent Labs   Lab Result Units 04/13/24  0703   Vancomycin, Random ug/mL 3.2       Pharmacy will continue to follow and monitor vancomycin.    Please contact pharmacy at extension [phone] for questions regarding this assessment.    Thank you for the consult,   Yamilex León       Patient brief summary:  Margarita Wiseman is a 48 y.o. female initiated on antimicrobial therapy with IV Vancomycin for treatment of bacteremia    The patient's current regimen is pulse dosing    Drug Allergies:   Review of patient's allergies indicates:  No Known Allergies    Actual Body Weight:   76.9 kg    Renal Function:   Estimated Creatinine Clearance: 49.6 mL/min (based on SCr of 1.3 mg/dL).,     Dialysis Method (if applicable):  N/A    CBC (last 72 hours):  Recent Labs   Lab Result Units 04/11/24 1820 04/12/24 0458 04/13/24  0703   WBC K/uL 23.83* 21.39* 16.70*   Hemoglobin g/dL 8.8* 8.1* 9.3*   Hematocrit % 27.9* 25.6* 30.4*   Platelets K/uL 133* 118* 135*   Gran % % 86.5* 78.2* 78.4*   Lymph % % 4.5* 9.1* 12.1*   Mono % % 7.0 10.8 6.1   Eosinophil % % 0.0 0.3 0.7   Basophil % % 0.2 0.2 0.4   Differential Method  Automated Automated Automated       Metabolic Panel (last 72 hours):  Recent Labs   Lab Result Units 04/11/24 1820 04/11/24 2035 04/12/24 0458   Sodium mmol/L 130*  --  132*   Potassium mmol/L 3.9  --  4.3   Chloride mmol/L 97  --  102   CO2 mmol/L 22*  --  24   Glucose mg/dL 117*  --  136*   Glucose, UA   --  Negative  --    BUN mg/dL 45*  --   32*   Creatinine mg/dL 1.8*  --  1.3   Creatinine, Urine mg/dL  --  105.6  --    Albumin g/dL 2.4*  --   --    Total Bilirubin mg/dL 0.6  --   --    Alkaline Phosphatase U/L 127  --   --    AST U/L 23  --   --    ALT U/L 18  --   --    Magnesium mg/dL 1.8  --  2.1       Vancomycin Administrations:  vancomycin given in the last 96 hours                     vancomycin (VANCOCIN) 500 mg in dextrose 5 % in water (D5W) 100 mL IVPB (MB+) (mg) 500 mg New Bag 04/11/24 2243    vancomycin (VANCOCIN) 1,000 mg in dextrose 5 % (D5W) 250 mL IVPB (Vial-Mate) (mg) 1,000 mg New Bag 04/11/24 1854                    Microbiologic Results:  Microbiology Results (last 7 days)       Procedure Component Value Units Date/Time    Blood culture x two cultures. Draw prior to antibiotics. [[id number]] Collected: 04/11/24 1821    Order Status: Completed Specimen: Blood from Peripheral, Antecubital, Right Updated: 04/13/24 0756     Blood Culture, Routine Gram stain stephanie bottle: Gram positive cocci in chains resembling Strep      Results called to and read back by: Jose 04/12/2024  06:20      Gram stain aer bottle: Gram positive cocci in chains resembling Strep      Positive results previously called 04/12/2024  09:17    Narrative:      Aerobic and anaerobic    Urine culture [[id number]]  (Abnormal) Collected: 04/11/24 2035    Order Status: Completed Specimen: Urine Updated: 04/13/24 0309     Urine Culture, Routine GRAM NEGATIVE SUKUMAR  50,000 - 99,999 cfu/ml  Identification and susceptibility pending      Narrative:      Specimen Source->Urine    Blood culture [[id number]] Collected: 04/12/24 0950    Order Status: Completed Specimen: Blood Updated: 04/13/24 0151     Blood Culture, Routine No Growth to date    Blood culture [[id number]] Collected: 04/12/24 0957    Order Status: Completed Specimen: Blood Updated: 04/13/24 0151     Blood Culture, Routine No Growth to date    Blood culture x two cultures. Draw prior to antibiotics. [[id number]]  Collected: 04/11/24 1805    Order Status: Completed Specimen: Blood from Peripheral, Antecubital, Right Updated: 04/12/24 0917     Blood Culture, Routine Gram stain stephanie bottle: Gram positive cocci in chains resembling Strep      Results called to and read back by: FLORINDA Garcia. 04/12/2024  06:20      Gram stain aer bottle: Gram positive cocci in chains resembling Strep      Positive results previously called 04/12/2024  09:17    Narrative:      Aerobic and anaerobic    Rapid Organism ID by PCR (from Blood culture) [[id number]]  (Abnormal) Collected: 04/11/24 1805    Order Status: Completed Updated: 04/12/24 0740     Enterococcus faecalis Not Detected     Enterococcus faecium Not Detected     Listeria monocytogenes Not Detected     Staphylococcus spp. See species for ID     Staphylococcus aureus Detected     Staphylococcus epidermidis Not Detected     Staphylococcus lugdunensis Not Detected     Streptococcus species Detected     Streptococcus agalactiae Not Detected     Streptococcus pneumoniae Not Detected     Streptococcus pyogenes Not Detected     Acinetobacter calcoaceticus/baumannii complex Not Detected     Bacteroides fragilis Not Detected     Enterobacterales Not Detected     Enterobacter cloacae complex Not Detected     Escherichia coli Not Detected     Klebsiella aerogenes Not Detected     Klebsiella oxytoca Not Detected     Klebsiella pneumoniae group Not Detected     Proteus Not Detected     Salmonella sp Not Detected     Serratia marcescens Not Detected     Haemophilus influenzae Not Detected     Neisseria meningtidis Not Detected     Pseudomonas aeruginosa Not Detected     Stenotrophomonas maltophilia Not Detected     Candida albicans Not Detected     Candida auris Not Detected     Candida glabrata Not Detected     Candida krusei Not Detected     Candida parapsilosis Not Detected     Candida tropicalis Not Detected     Cryptococcus neoformans/gattii Not Detected     CTX-M (ESBL ) Test Not  Applicable     IMP (Carbapenem resistant) Test Not Applicable     KPC resistance gene (Carbapenem resistant) Test Not Applicable     mcr-1  Test Not Applicable     mec A/C  Test Not Applicable     mec A/C and MREJ (MRSA) gene Not Detected     NDM (Carbapenem resistant) Test Not Applicable     OXA-48-like (Carbapenem resistant) Test Not Applicable     van A/B (VRE gene) Test Not Applicable     VIM (Carbapenem resistant) Test Not Applicable    Narrative:      Aerobic and anaerobic

## 2024-04-13 NOTE — PLAN OF CARE
Patient rested all night minimal complaints of pain. Witnessed/noted a pus filled bump on patient's labia while day shift nurse was cleaning patient's perineum. No complaints of pain from the area through out night.     Patient refused 1900 Vancomycin random lab draw, Physician (Rylee Wahl) notified. Also refused 0430 am lab draw stated she wanted to wait until she woke up, and to come back in an hour. Pharmacy notified also.     VSS throughout shift. Patient remains afebrile. Remains free of falls/injuries. Safety measure maintained. Call-light within reach.       Problem: Adult Inpatient Plan of Care  Goal: Plan of Care Review  Outcome: Ongoing, Progressing  Goal: Patient-Specific Goal (Individualized)  Outcome: Ongoing, Progressing  Goal: Absence of Hospital-Acquired Illness or Injury  Outcome: Ongoing, Progressing  Goal: Optimal Comfort and Wellbeing  Outcome: Ongoing, Progressing  Goal: Readiness for Transition of Care  Outcome: Ongoing, Progressing     Problem: Adjustment to Illness (Sepsis/Septic Shock)  Goal: Optimal Coping  Outcome: Ongoing, Progressing     Problem: Bleeding (Sepsis/Septic Shock)  Goal: Absence of Bleeding  Outcome: Ongoing, Progressing     Problem: Glycemic Control Impaired (Sepsis/Septic Shock)  Goal: Blood Glucose Level Within Desired Range  Outcome: Ongoing, Progressing     Problem: Infection Progression (Sepsis/Septic Shock)  Goal: Absence of Infection Signs and Symptoms  Outcome: Ongoing, Progressing     Problem: Nutrition Impaired (Sepsis/Septic Shock)  Goal: Optimal Nutrition Intake  Outcome: Ongoing, Progressing     Problem: Skin Injury Risk Increased  Goal: Skin Health and Integrity  Outcome: Ongoing, Progressing     Problem: Impaired Wound Healing  Goal: Optimal Wound Healing  Outcome: Ongoing, Progressing     Problem: Fall Injury Risk  Goal: Absence of Fall and Fall-Related Injury  Outcome: Ongoing, Progressing     Problem: Fluid and Electrolyte Imbalance (Acute Kidney  Injury/Impairment)  Goal: Fluid and Electrolyte Balance  Outcome: Ongoing, Progressing     Problem: Oral Intake Inadequate (Acute Kidney Injury/Impairment)  Goal: Optimal Nutrition Intake  Outcome: Ongoing, Progressing     Problem: Renal Function Impairment (Acute Kidney Injury/Impairment)  Goal: Effective Renal Function  Outcome: Ongoing, Progressing

## 2024-04-13 NOTE — NURSING
While cleaning patient's perineum, noticed pus filled bump. Also showed to night shift bedside RN.

## 2024-04-14 LAB — BACTERIA UR CULT: ABNORMAL

## 2024-04-15 LAB
BACTERIA BLD CULT: ABNORMAL
HCV RNA SERPL QL NAA+PROBE: NOT DETECTED

## 2024-04-16 LAB
BACTERIA BLD CULT: ABNORMAL
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
## 2024-06-23 ENCOUNTER — HOSPITAL ENCOUNTER (INPATIENT)
Facility: OTHER | Age: 49
LOS: 16 days | Discharge: LEFT BEFORE DISCHARGE | DRG: 871 | End: 2024-07-09
Attending: EMERGENCY MEDICINE | Admitting: STUDENT IN AN ORGANIZED HEALTH CARE EDUCATION/TRAINING PROGRAM
Payer: COMMERCIAL

## 2024-06-23 DIAGNOSIS — I33.0 INFECTIVE ENDOCARDITIS OF TRICUSPID VALVE: ICD-10-CM

## 2024-06-23 DIAGNOSIS — I49.9 ARRHYTHMIA: ICD-10-CM

## 2024-06-23 DIAGNOSIS — M25.519 SHOULDER PAIN: ICD-10-CM

## 2024-06-23 DIAGNOSIS — R78.81 BACTEREMIA: ICD-10-CM

## 2024-06-23 DIAGNOSIS — R65.21 SEPTIC SHOCK: ICD-10-CM

## 2024-06-23 DIAGNOSIS — A41.9 SEPTIC SHOCK: ICD-10-CM

## 2024-06-23 DIAGNOSIS — L03.90 CELLULITIS, UNSPECIFIED CELLULITIS SITE: Primary | ICD-10-CM

## 2024-06-23 PROBLEM — R93.89 ABNORMAL FINDING ON CT SCAN: Status: ACTIVE | Noted: 2024-06-23

## 2024-06-23 PROBLEM — J18.9 MULTIFOCAL PNEUMONIA: Status: ACTIVE | Noted: 2024-06-23

## 2024-06-23 PROBLEM — Z59.819 HOUSING INSTABILITY: Status: ACTIVE | Noted: 2024-06-23

## 2024-06-23 LAB
ACANTHOCYTES BLD QL SMEAR: PRESENT
ALBUMIN SERPL BCP-MCNC: 2.2 G/DL (ref 3.5–5.2)
ALP SERPL-CCNC: 137 U/L (ref 55–135)
ALT SERPL W/O P-5'-P-CCNC: 10 U/L (ref 10–44)
AMORPH CRY URNS QL MICRO: ABNORMAL
AMPHET+METHAMPHET UR QL: NEGATIVE
AMPHET+METHAMPHET UR QL: NEGATIVE
ANION GAP SERPL CALC-SCNC: 17 MMOL/L (ref 8–16)
ANISOCYTOSIS BLD QL SMEAR: SLIGHT
AST SERPL-CCNC: 36 U/L (ref 10–40)
B-HCG UR QL: NEGATIVE
BACTERIA #/AREA URNS HPF: ABNORMAL /HPF
BARBITURATES UR QL SCN>200 NG/ML: NEGATIVE
BARBITURATES UR QL SCN>200 NG/ML: NEGATIVE
BASOPHILS # BLD AUTO: ABNORMAL K/UL (ref 0–0.2)
BASOPHILS NFR BLD: 0 % (ref 0–1.9)
BENZODIAZ UR QL SCN>200 NG/ML: NEGATIVE
BENZODIAZ UR QL SCN>200 NG/ML: NEGATIVE
BILIRUB SERPL-MCNC: 0.4 MG/DL (ref 0.1–1)
BILIRUB UR QL STRIP: NEGATIVE
BNP SERPL-MCNC: 243 PG/ML (ref 0–99)
BUN SERPL-MCNC: 50 MG/DL (ref 6–20)
BURR CELLS BLD QL SMEAR: ABNORMAL
BZE UR QL SCN: ABNORMAL
BZE UR QL SCN: ABNORMAL
CALCIUM SERPL-MCNC: 8.5 MG/DL (ref 8.7–10.5)
CANNABINOIDS UR QL SCN: NEGATIVE
CANNABINOIDS UR QL SCN: NEGATIVE
CHLORIDE SERPL-SCNC: 97 MMOL/L (ref 95–110)
CLARITY UR: ABNORMAL
CO2 SERPL-SCNC: 18 MMOL/L (ref 23–29)
COLOR UR: YELLOW
CREAT SERPL-MCNC: 3.1 MG/DL (ref 0.5–1.4)
CREAT UR-MCNC: 126.4 MG/DL (ref 15–325)
CREAT UR-MCNC: 126.4 MG/DL (ref 15–325)
CRP SERPL-MCNC: 222.2 MG/L (ref 0–8.2)
CTP QC/QA: YES
DIFFERENTIAL METHOD BLD: ABNORMAL
EOSINOPHIL # BLD AUTO: ABNORMAL K/UL (ref 0–0.5)
EOSINOPHIL NFR BLD: 0 % (ref 0–8)
ERYTHROCYTE [DISTWIDTH] IN BLOOD BY AUTOMATED COUNT: 16.5 % (ref 11.5–14.5)
ERYTHROCYTE [SEDIMENTATION RATE] IN BLOOD BY PHOTOMETRIC METHOD: >120 MM/HR (ref 0–36)
EST. GFR  (NO RACE VARIABLE): 18 ML/MIN/1.73 M^2
ETHANOL UR-MCNC: <10 MG/DL
FIO2: 21
FIO2: 21
GIANT PLATELETS BLD QL SMEAR: PRESENT
GLUCOSE SERPL-MCNC: 133 MG/DL (ref 70–110)
GLUCOSE UR QL STRIP: NEGATIVE
GRAN CASTS #/AREA URNS LPF: 2 /LPF
HCO3 UR-SCNC: 20.9 MMOL/L (ref 24–28)
HCT VFR BLD AUTO: 25.9 % (ref 37–48.5)
HCT VFR BLD CALC: 25 %PCV (ref 36–54)
HGB BLD-MCNC: 8.3 G/DL (ref 12–16)
HGB BLD-MCNC: 9 G/DL
HGB UR QL STRIP: ABNORMAL
HIV 1+2 AB+HIV1 P24 AG SERPL QL IA: NEGATIVE
HYALINE CASTS #/AREA URNS LPF: 2 /LPF
IMM GRANULOCYTES # BLD AUTO: ABNORMAL K/UL (ref 0–0.04)
IMM GRANULOCYTES NFR BLD AUTO: ABNORMAL % (ref 0–0.5)
KETONES UR QL STRIP: NEGATIVE
LACTATE SERPL-SCNC: 1.2 MMOL/L (ref 0.5–2.2)
LACTATE SERPL-SCNC: 1.4 MMOL/L (ref 0.5–2.2)
LDH SERPL L TO P-CCNC: 3.37 MMOL/L (ref 0.5–2.2)
LEUKOCYTE ESTERASE UR QL STRIP: ABNORMAL
LYMPHOCYTES # BLD AUTO: ABNORMAL K/UL (ref 1–4.8)
LYMPHOCYTES NFR BLD: 4 % (ref 18–48)
MAGNESIUM SERPL-MCNC: 1.9 MG/DL (ref 1.6–2.6)
MCH RBC QN AUTO: 24 PG (ref 27–31)
MCHC RBC AUTO-ENTMCNC: 32 G/DL (ref 32–36)
MCV RBC AUTO: 75 FL (ref 82–98)
METHADONE UR QL SCN>300 NG/ML: NEGATIVE
METHADONE UR QL SCN>300 NG/ML: NEGATIVE
MICROSCOPIC COMMENT: ABNORMAL
MONOCYTES # BLD AUTO: ABNORMAL K/UL (ref 0.3–1)
MONOCYTES NFR BLD: 9 % (ref 4–15)
NEUTROPHILS NFR BLD: 78 % (ref 38–73)
NEUTS BAND NFR BLD MANUAL: 9 %
NITRITE UR QL STRIP: POSITIVE
NRBC BLD-RTO: 0 /100 WBC
OPIATES UR QL SCN: NEGATIVE
OPIATES UR QL SCN: NEGATIVE
PCO2 BLDA: 32.2 MMHG (ref 35–45)
PCP UR QL SCN>25 NG/ML: NEGATIVE
PCP UR QL SCN>25 NG/ML: NEGATIVE
PH SMN: 7.42 [PH] (ref 7.35–7.45)
PH UR STRIP: 6 [PH] (ref 5–8)
PLATELET # BLD AUTO: 119 K/UL (ref 150–450)
PLATELET BLD QL SMEAR: ABNORMAL
PMV BLD AUTO: ABNORMAL FL (ref 9.2–12.9)
PO2 BLDA: 34 MMHG (ref 40–60)
POC BE: -4 MMOL/L
POC MOLECULAR INFLUENZA A AGN: NEGATIVE
POC MOLECULAR INFLUENZA B AGN: NEGATIVE
POC SATURATED O2: 67 % (ref 95–100)
POC TCO2: 22 MMOL/L (ref 24–29)
POIKILOCYTOSIS BLD QL SMEAR: ABNORMAL
POTASSIUM SERPL-SCNC: 3.5 MMOL/L (ref 3.5–5.1)
PROCALCITONIN SERPL IA-MCNC: 59.13 NG/ML
PROCALCITONIN SERPL IA-MCNC: 78.33 NG/ML
PROT SERPL-MCNC: 7 G/DL (ref 6–8.4)
PROT UR QL STRIP: ABNORMAL
RBC # BLD AUTO: 3.46 M/UL (ref 4–5.4)
RBC #/AREA URNS HPF: 18 /HPF (ref 0–4)
SAMPLE: ABNORMAL
SAMPLE: ABNORMAL
SARS-COV-2 RDRP RESP QL NAA+PROBE: NEGATIVE
SCHISTOCYTES BLD QL SMEAR: PRESENT
SODIUM SERPL-SCNC: 132 MMOL/L (ref 136–145)
SP GR UR STRIP: 1.01 (ref 1–1.03)
SQUAMOUS #/AREA URNS HPF: 25 /HPF
TOXIC GRANULES BLD QL SMEAR: PRESENT
TOXICOLOGY INFORMATION: ABNORMAL
TOXICOLOGY INFORMATION: ABNORMAL
TROPONIN I SERPL DL<=0.01 NG/ML-MCNC: 0.15 NG/ML (ref 0–0.03)
TROPONIN I SERPL DL<=0.01 NG/ML-MCNC: 0.23 NG/ML (ref 0–0.03)
URN SPEC COLLECT METH UR: ABNORMAL
UROBILINOGEN UR STRIP-ACNC: ABNORMAL EU/DL
WBC # BLD AUTO: 33.72 K/UL (ref 3.9–12.7)
WBC #/AREA URNS HPF: >100 /HPF (ref 0–5)
YEAST URNS QL MICRO: ABNORMAL

## 2024-06-23 PROCEDURE — 83735 ASSAY OF MAGNESIUM: CPT | Performed by: NURSE PRACTITIONER

## 2024-06-23 PROCEDURE — 25000003 PHARM REV CODE 250: Performed by: NURSE PRACTITIONER

## 2024-06-23 PROCEDURE — 93010 ELECTROCARDIOGRAM REPORT: CPT | Mod: ,,, | Performed by: INTERNAL MEDICINE

## 2024-06-23 PROCEDURE — 96361 HYDRATE IV INFUSION ADD-ON: CPT

## 2024-06-23 PROCEDURE — 81000 URINALYSIS NONAUTO W/SCOPE: CPT

## 2024-06-23 PROCEDURE — 87154 CUL TYP ID BLD PTHGN 6+ TRGT: CPT

## 2024-06-23 PROCEDURE — 87086 URINE CULTURE/COLONY COUNT: CPT

## 2024-06-23 PROCEDURE — 94761 N-INVAS EAR/PLS OXIMETRY MLT: CPT | Mod: XB

## 2024-06-23 PROCEDURE — 94799 UNLISTED PULMONARY SVC/PX: CPT

## 2024-06-23 PROCEDURE — 63600175 PHARM REV CODE 636 W HCPCS

## 2024-06-23 PROCEDURE — 96367 TX/PROPH/DG ADDL SEQ IV INF: CPT

## 2024-06-23 PROCEDURE — 20000000 HC ICU ROOM

## 2024-06-23 PROCEDURE — 86140 C-REACTIVE PROTEIN: CPT

## 2024-06-23 PROCEDURE — 96365 THER/PROPH/DIAG IV INF INIT: CPT

## 2024-06-23 PROCEDURE — 87077 CULTURE AEROBIC IDENTIFY: CPT

## 2024-06-23 PROCEDURE — 85652 RBC SED RATE AUTOMATED: CPT

## 2024-06-23 PROCEDURE — 85025 COMPLETE CBC W/AUTO DIFF WBC: CPT

## 2024-06-23 PROCEDURE — 80053 COMPREHEN METABOLIC PANEL: CPT

## 2024-06-23 PROCEDURE — 87040 BLOOD CULTURE FOR BACTERIA: CPT

## 2024-06-23 PROCEDURE — 87389 HIV-1 AG W/HIV-1&-2 AB AG IA: CPT

## 2024-06-23 PROCEDURE — 81025 URINE PREGNANCY TEST: CPT

## 2024-06-23 PROCEDURE — 83880 ASSAY OF NATRIURETIC PEPTIDE: CPT

## 2024-06-23 PROCEDURE — 25000003 PHARM REV CODE 250

## 2024-06-23 PROCEDURE — 25000003 PHARM REV CODE 250: Performed by: STUDENT IN AN ORGANIZED HEALTH CARE EDUCATION/TRAINING PROGRAM

## 2024-06-23 PROCEDURE — 82803 BLOOD GASES ANY COMBINATION: CPT

## 2024-06-23 PROCEDURE — 84484 ASSAY OF TROPONIN QUANT: CPT

## 2024-06-23 PROCEDURE — 36415 COLL VENOUS BLD VENIPUNCTURE: CPT | Performed by: NURSE PRACTITIONER

## 2024-06-23 PROCEDURE — 99900035 HC TECH TIME PER 15 MIN (STAT)

## 2024-06-23 PROCEDURE — 94664 DEMO&/EVAL PT USE INHALER: CPT

## 2024-06-23 PROCEDURE — 87635 SARS-COV-2 COVID-19 AMP PRB: CPT

## 2024-06-23 PROCEDURE — 93005 ELECTROCARDIOGRAM TRACING: CPT

## 2024-06-23 PROCEDURE — 27000646 HC AEROBIKA DEVICE

## 2024-06-23 PROCEDURE — 80307 DRUG TEST PRSMV CHEM ANLYZR: CPT | Performed by: NURSE PRACTITIONER

## 2024-06-23 PROCEDURE — 63600175 PHARM REV CODE 636 W HCPCS: Performed by: NURSE PRACTITIONER

## 2024-06-23 PROCEDURE — 99285 EMERGENCY DEPT VISIT HI MDM: CPT | Mod: 25

## 2024-06-23 PROCEDURE — 84145 PROCALCITONIN (PCT): CPT | Performed by: NURSE PRACTITIONER

## 2024-06-23 PROCEDURE — S4991 NICOTINE PATCH NONLEGEND: HCPCS | Performed by: NURSE PRACTITIONER

## 2024-06-23 PROCEDURE — 83605 ASSAY OF LACTIC ACID: CPT | Mod: 91 | Performed by: NURSE PRACTITIONER

## 2024-06-23 PROCEDURE — 87186 SC STD MICRODIL/AGAR DIL: CPT

## 2024-06-23 PROCEDURE — 02HV33Z INSERTION OF INFUSION DEVICE INTO SUPERIOR VENA CAVA, PERCUTANEOUS APPROACH: ICD-10-PCS | Performed by: HOSPITALIST

## 2024-06-23 RX ORDER — ONDANSETRON 8 MG/1
8 TABLET, ORALLY DISINTEGRATING ORAL EVERY 8 HOURS PRN
Status: DISCONTINUED | OUTPATIENT
Start: 2024-06-23 | End: 2024-06-25

## 2024-06-23 RX ORDER — HYDROCODONE BITARTRATE AND ACETAMINOPHEN 5; 325 MG/1; MG/1
1 TABLET ORAL EVERY 4 HOURS PRN
Status: DISCONTINUED | OUTPATIENT
Start: 2024-06-23 | End: 2024-07-05

## 2024-06-23 RX ORDER — SODIUM CHLORIDE 9 MG/ML
INJECTION, SOLUTION INTRAVENOUS CONTINUOUS
Status: DISCONTINUED | OUTPATIENT
Start: 2024-06-23 | End: 2024-06-25

## 2024-06-23 RX ORDER — HYDROXYZINE HYDROCHLORIDE 25 MG/1
50 TABLET, FILM COATED ORAL EVERY 6 HOURS PRN
Status: DISCONTINUED | OUTPATIENT
Start: 2024-06-23 | End: 2024-07-09 | Stop reason: HOSPADM

## 2024-06-23 RX ORDER — DICYCLOMINE HYDROCHLORIDE 10 MG/1
10 CAPSULE ORAL EVERY 6 HOURS PRN
Status: DISCONTINUED | OUTPATIENT
Start: 2024-06-23 | End: 2024-07-09 | Stop reason: HOSPADM

## 2024-06-23 RX ORDER — IBUPROFEN 200 MG
1 TABLET ORAL DAILY
Status: DISCONTINUED | OUTPATIENT
Start: 2024-06-23 | End: 2024-07-09 | Stop reason: HOSPADM

## 2024-06-23 RX ORDER — AMOXICILLIN 250 MG
1 CAPSULE ORAL 2 TIMES DAILY
Status: DISCONTINUED | OUTPATIENT
Start: 2024-06-23 | End: 2024-07-09 | Stop reason: HOSPADM

## 2024-06-23 RX ORDER — ACETAMINOPHEN 325 MG/1
650 TABLET ORAL EVERY 4 HOURS PRN
Status: DISCONTINUED | OUTPATIENT
Start: 2024-06-23 | End: 2024-07-09 | Stop reason: HOSPADM

## 2024-06-23 RX ORDER — BACLOFEN 10 MG/1
10 TABLET ORAL EVERY 8 HOURS PRN
Status: DISCONTINUED | OUTPATIENT
Start: 2024-06-23 | End: 2024-06-24

## 2024-06-23 RX ORDER — ONDANSETRON HYDROCHLORIDE 2 MG/ML
4 INJECTION, SOLUTION INTRAVENOUS EVERY 8 HOURS PRN
Status: DISCONTINUED | OUTPATIENT
Start: 2024-06-23 | End: 2024-07-09 | Stop reason: HOSPADM

## 2024-06-23 RX ORDER — NOREPINEPHRINE BITARTRATE/D5W 4MG/250ML
0-3 PLASTIC BAG, INJECTION (ML) INTRAVENOUS CONTINUOUS
Status: DISCONTINUED | OUTPATIENT
Start: 2024-06-23 | End: 2024-06-27

## 2024-06-23 RX ORDER — MUPIROCIN 20 MG/G
OINTMENT TOPICAL 2 TIMES DAILY
Status: DISPENSED | OUTPATIENT
Start: 2024-06-23 | End: 2024-06-28

## 2024-06-23 RX ORDER — HYDROMORPHONE HYDROCHLORIDE 1 MG/ML
1 INJECTION, SOLUTION INTRAMUSCULAR; INTRAVENOUS; SUBCUTANEOUS EVERY 4 HOURS PRN
Status: DISCONTINUED | OUTPATIENT
Start: 2024-06-23 | End: 2024-06-25

## 2024-06-23 RX ORDER — IBUPROFEN 600 MG/1
600 TABLET ORAL EVERY 6 HOURS PRN
Status: DISCONTINUED | OUTPATIENT
Start: 2024-06-23 | End: 2024-07-09 | Stop reason: HOSPADM

## 2024-06-23 RX ORDER — SODIUM CHLORIDE 0.9 % (FLUSH) 0.9 %
10 SYRINGE (ML) INJECTION
Status: DISCONTINUED | OUTPATIENT
Start: 2024-06-23 | End: 2024-07-09 | Stop reason: HOSPADM

## 2024-06-23 RX ORDER — ENOXAPARIN SODIUM 100 MG/ML
30 INJECTION SUBCUTANEOUS EVERY 24 HOURS
Status: DISCONTINUED | OUTPATIENT
Start: 2024-06-23 | End: 2024-06-25

## 2024-06-23 RX ORDER — POLYETHYLENE GLYCOL 3350 17 G/17G
17 POWDER, FOR SOLUTION ORAL 2 TIMES DAILY PRN
Status: DISCONTINUED | OUTPATIENT
Start: 2024-06-23 | End: 2024-07-09 | Stop reason: HOSPADM

## 2024-06-23 RX ADMIN — SODIUM CHLORIDE 1000 ML: 9 INJECTION, SOLUTION INTRAVENOUS at 04:06

## 2024-06-23 RX ADMIN — VANCOMYCIN HYDROCHLORIDE 1500 MG: 1.5 INJECTION, POWDER, LYOPHILIZED, FOR SOLUTION INTRAVENOUS at 05:06

## 2024-06-23 RX ADMIN — ENOXAPARIN SODIUM 30 MG: 30 INJECTION SUBCUTANEOUS at 07:06

## 2024-06-23 RX ADMIN — DOCUSATE SODIUM AND SENNOSIDES 1 TABLET: 8.6; 5 TABLET, FILM COATED ORAL at 08:06

## 2024-06-23 RX ADMIN — SODIUM CHLORIDE: 9 INJECTION, SOLUTION INTRAVENOUS at 06:06

## 2024-06-23 RX ADMIN — HYDROCODONE BITARTRATE AND ACETAMINOPHEN 1 TABLET: 5; 325 TABLET ORAL at 07:06

## 2024-06-23 RX ADMIN — SODIUM CHLORIDE 1000 ML: 9 INJECTION, SOLUTION INTRAVENOUS at 03:06

## 2024-06-23 RX ADMIN — NICOTINE 1 PATCH: 14 PATCH TRANSDERMAL at 08:06

## 2024-06-23 RX ADMIN — MUPIROCIN: 20 OINTMENT TOPICAL at 08:06

## 2024-06-23 RX ADMIN — PIPERACILLIN SODIUM AND TAZOBACTAM SODIUM 4.5 G: 4; .5 INJECTION, POWDER, LYOPHILIZED, FOR SOLUTION INTRAVENOUS at 03:06
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
## 2024-06-23 NOTE — PROGRESS NOTES
"Pharmacokinetic Initial Assessment: IV Vancomycin    Assessment/Plan:    Initiate intravenous vancomycin with loading dose of 1500 mg once (receive prior to consult) with subsequent doses when random concentrations are less than 20 mcg/mL  Desired empiric serum trough concentration is 10 to 20 mcg/mL  Draw vancomycin random level on 6/24 at 1600.  Pharmacy will continue to follow and monitor vancomycin.      Please contact pharmacy at extension [phone] with any questions regarding this assessment.     Thank you for the consult,   Yudelka Do       Patient brief summary:  Margarita Wiseman is a 49 y.o. female initiated on antimicrobial therapy with IV Vancomycin for treatment of suspected sepsis    Drug Allergies:   Review of patient's allergies indicates:  No Known Allergies    Actual Body Weight:   69.5kg    Renal Function:   Estimated Creatinine Clearance: 19.5 mL/min (A) (based on SCr of 3.1 mg/dL (H)).,     Dialysis Method (if applicable):  N/A    CBC (last 72 hours):  Recent Labs   Lab Result Units 06/23/24  1521   WBC K/uL 33.72*   Hemoglobin g/dL 8.3*   Hematocrit % 25.9*   Platelets K/uL 119*   Gran % % 78.0*   Lymph % % 4.0*   Mono % % 9.0   Eosinophil % % 0.0   Basophil % % 0.0   Differential Method  Automated       Metabolic Panel (last 72 hours):  Recent Labs   Lab Result Units 06/23/24  1521   Sodium mmol/L 132*   Potassium mmol/L 3.5   Chloride mmol/L 97   CO2 mmol/L 18*   Glucose mg/dL 133*   BUN mg/dL 50*   Creatinine mg/dL 3.1*   Albumin g/dL 2.2*   Total Bilirubin mg/dL 0.4   Alkaline Phosphatase U/L 137*   AST U/L 36   ALT U/L 10       Drug levels (last 3 results):  No results for input(s): "VANCOMYCINRA", "VANCORANDOM", "VANCOMYCINPE", "VANCOPEAK", "VANCOMYCINTR", "VANCOTROUGH" in the last 72 hours.    Microbiologic Results:  Microbiology Results (last 7 days)       Procedure Component Value Units Date/Time    Blood culture x two cultures. Draw prior to antibiotics. [[id number]] Collected: 06/23/24 " 1537    Order Status: Sent Specimen: Blood from Peripheral, Antecubital, Right     Blood culture x two cultures. Draw prior to antibiotics. [[id number]] Collected: 06/23/24 1537    Order Status: Sent Specimen: Blood from Peripheral, Antecubital, Right

## 2024-06-23 NOTE — PROGRESS NOTES
Pharmacist Renal Dose Adjustment Note    Margarita Wiseman is a 49 y.o. female being treated with the medication zosyn    Patient Data:    Vital Signs (Most Recent):  Temp: 98.2 °F (36.8 °C) (06/23/24 1510)  Pulse: 77 (06/23/24 1745)  Resp: (!) 28 (06/23/24 1745)  BP: (!) 74/49 (06/23/24 1745)  SpO2: (!) 93 % (06/23/24 1745) Vital Signs (72h Range):  Temp:  [98.2 °F (36.8 °C)]   Pulse:  [76-85]   Resp:  [15-29]   BP: (65-75)/(41-49)   SpO2:  [93 %-97 %]      Recent Labs   Lab 06/23/24  1521   CREATININE 3.1*     Serum creatinine: 3.1 mg/dL (H) 06/23/24 1521  Estimated creatinine clearance: 19.5 mL/min (A)     Medication Dose Route Frequency   Previous Order Zosyn 4.5g q8h   New Order Zosyn 4.5g q12h     Renal dose adjustments performed as noted above.  We will continue monitoring and adjusting as necessary.    Pharmacist: Yudelka Do, PharmD  [phone]

## 2024-06-23 NOTE — Clinical Note
Diagnosis: Septic shock [075250]   Future Attending Provider: STARR KAUFMAN [[id number]]   Reason for IP Medical Treatment  (Clinical interventions that can only be accomplished in the IP setting? ) :: septic shock   I certify that Inpatient services for greater than or equal to 2 midnights are medically necessary:: Yes   Plans for Post-Acute care--if anticipated (pick the single best option):: A. No post acute care anticipated at this time   Special Needs:: No Special Needs [1]
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
## 2024-06-23 NOTE — ED NOTES
Dr. Godfrey at the bedside putting in a central line.  Pt instructed not to move and frequent reminders given    Pt is still Hypotensive

## 2024-06-24 LAB
ACINETOBACTER CALCOACETICUS/BAUMANNII COMPLEX: NOT DETECTED
ALBUMIN SERPL BCP-MCNC: 1.8 G/DL (ref 3.5–5.2)
ALP SERPL-CCNC: 114 U/L (ref 55–135)
ALT SERPL W/O P-5'-P-CCNC: 8 U/L (ref 10–44)
ANION GAP SERPL CALC-SCNC: 10 MMOL/L (ref 8–16)
ANION GAP SERPL CALC-SCNC: 7 MMOL/L (ref 8–16)
ANION GAP SERPL CALC-SCNC: 8 MMOL/L (ref 8–16)
ANISOCYTOSIS BLD QL SMEAR: SLIGHT
AST SERPL-CCNC: 23 U/L (ref 10–40)
BACTEROIDES FRAGILIS: NOT DETECTED
BASOPHILS # BLD AUTO: ABNORMAL K/UL (ref 0–0.2)
BASOPHILS NFR BLD: 0 % (ref 0–1.9)
BILIRUB SERPL-MCNC: 0.3 MG/DL (ref 0.1–1)
BUN SERPL-MCNC: 28 MG/DL (ref 6–20)
BUN SERPL-MCNC: 34 MG/DL (ref 6–20)
BUN SERPL-MCNC: 43 MG/DL (ref 6–20)
BURR CELLS BLD QL SMEAR: ABNORMAL
CALCIUM SERPL-MCNC: 7.8 MG/DL (ref 8.7–10.5)
CANDIDA ALBICANS: NOT DETECTED
CANDIDA AURIS: NOT DETECTED
CANDIDA GLABRATA: NOT DETECTED
CANDIDA KRUSEI: NOT DETECTED
CANDIDA PARAPSILOSIS: NOT DETECTED
CANDIDA TROPICALIS: NOT DETECTED
CHLORIDE SERPL-SCNC: 104 MMOL/L (ref 95–110)
CHLORIDE SERPL-SCNC: 104 MMOL/L (ref 95–110)
CHLORIDE SERPL-SCNC: 109 MMOL/L (ref 95–110)
CO2 SERPL-SCNC: 19 MMOL/L (ref 23–29)
CO2 SERPL-SCNC: 19 MMOL/L (ref 23–29)
CO2 SERPL-SCNC: 20 MMOL/L (ref 23–29)
CREAT SERPL-MCNC: 1.2 MG/DL (ref 0.5–1.4)
CREAT SERPL-MCNC: 1.4 MG/DL (ref 0.5–1.4)
CREAT SERPL-MCNC: 2 MG/DL (ref 0.5–1.4)
CRYPTOCOCCUS NEOFORMANS/GATTII: NOT DETECTED
CTX-M GENE (ESBL PRODUCER): ABNORMAL
DACRYOCYTES BLD QL SMEAR: ABNORMAL
DIFFERENTIAL METHOD BLD: ABNORMAL
ENTEROBACTER CLOACAE COMPLEX: NOT DETECTED
ENTEROBACTERALES: NOT DETECTED
ENTEROCOCCUS FAECALIS: NOT DETECTED
ENTEROCOCCUS FAECIUM: NOT DETECTED
EOSINOPHIL # BLD AUTO: ABNORMAL K/UL (ref 0–0.5)
EOSINOPHIL NFR BLD: 0 % (ref 0–8)
ERYTHROCYTE [DISTWIDTH] IN BLOOD BY AUTOMATED COUNT: 16.4 % (ref 11.5–14.5)
ESCHERICHIA COLI: NOT DETECTED
EST. GFR  (NO RACE VARIABLE): 30 ML/MIN/1.73 M^2
EST. GFR  (NO RACE VARIABLE): 47 ML/MIN/1.73 M^2
EST. GFR  (NO RACE VARIABLE): 56 ML/MIN/1.73 M^2
GIANT PLATELETS BLD QL SMEAR: PRESENT
GLUCOSE SERPL-MCNC: 135 MG/DL (ref 70–110)
GLUCOSE SERPL-MCNC: 140 MG/DL (ref 70–110)
GLUCOSE SERPL-MCNC: 155 MG/DL (ref 70–110)
HAEMOPHILUS INFLUENZAE: NOT DETECTED
HCT VFR BLD AUTO: 26.3 % (ref 37–48.5)
HGB BLD-MCNC: 8.5 G/DL (ref 12–16)
IMM GRANULOCYTES # BLD AUTO: ABNORMAL K/UL (ref 0–0.04)
IMM GRANULOCYTES NFR BLD AUTO: ABNORMAL % (ref 0–0.5)
IMP GENE (CARBAPENEM RESISTANT): ABNORMAL
KLEBSIELLA AEROGENES: NOT DETECTED
KLEBSIELLA OXYTOCA: NOT DETECTED
KLEBSIELLA PNEUMONIAE GROUP: NOT DETECTED
KPC RESISTANCE GENE (CARBAPENEM): ABNORMAL
LACTATE SERPL-SCNC: 0.9 MMOL/L (ref 0.5–2.2)
LISTERIA MONOCYTOGENES: NOT DETECTED
LYMPHOCYTES # BLD AUTO: ABNORMAL K/UL (ref 1–4.8)
LYMPHOCYTES NFR BLD: 10 % (ref 18–48)
MAGNESIUM SERPL-MCNC: 2 MG/DL (ref 1.6–2.6)
MAGNESIUM SERPL-MCNC: 2 MG/DL (ref 1.6–2.6)
MCH RBC QN AUTO: 24.1 PG (ref 27–31)
MCHC RBC AUTO-ENTMCNC: 32.3 G/DL (ref 32–36)
MCR-1: ABNORMAL
MCV RBC AUTO: 75 FL (ref 82–98)
MEC A/C AND MREJ (MRSA): NOT DETECTED
MEC A/C: ABNORMAL
MONOCYTES # BLD AUTO: ABNORMAL K/UL (ref 0.3–1)
MONOCYTES NFR BLD: 4 % (ref 4–15)
NDM GENE (CARBAPENEM RESISTANT): ABNORMAL
NEISSERIA MENINGITIDIS: NOT DETECTED
NEUTROPHILS NFR BLD: 81 % (ref 38–73)
NEUTS BAND NFR BLD MANUAL: 5 %
NRBC BLD-RTO: 0 /100 WBC
OHS QRS DURATION: 86 MS
OHS QTC CALCULATION: 432 MS
OXA-48-LIKE (CARBAPENEM RESISTANT): ABNORMAL
PLATELET # BLD AUTO: 127 K/UL (ref 150–450)
PLATELET BLD QL SMEAR: ABNORMAL
PMV BLD AUTO: ABNORMAL FL (ref 9.2–12.9)
POIKILOCYTOSIS BLD QL SMEAR: SLIGHT
POTASSIUM SERPL-SCNC: 3.1 MMOL/L (ref 3.5–5.1)
POTASSIUM SERPL-SCNC: 3.2 MMOL/L (ref 3.5–5.1)
POTASSIUM SERPL-SCNC: 3.4 MMOL/L (ref 3.5–5.1)
PROT SERPL-MCNC: 6.4 G/DL (ref 6–8.4)
PROTEUS SPECIES: NOT DETECTED
PSEUDOMONAS AERUGINOSA: NOT DETECTED
RBC # BLD AUTO: 3.53 M/UL (ref 4–5.4)
SALMONELLA SP: NOT DETECTED
SERRATIA MARCESCENS: NOT DETECTED
SODIUM SERPL-SCNC: 131 MMOL/L (ref 136–145)
SODIUM SERPL-SCNC: 134 MMOL/L (ref 136–145)
SODIUM SERPL-SCNC: 135 MMOL/L (ref 136–145)
STAPHYLOCOCCUS AUREUS: DETECTED
STAPHYLOCOCCUS EPIDERMIDIS: NOT DETECTED
STAPHYLOCOCCUS LUGDUNESIS: NOT DETECTED
STAPHYLOCOCCUS SPECIES: ABNORMAL
STENOTROPHOMONAS MALTOPHILIA: NOT DETECTED
STREPTOCOCCUS AGALACTIAE: NOT DETECTED
STREPTOCOCCUS PNEUMONIAE: NOT DETECTED
STREPTOCOCCUS PYOGENES: NOT DETECTED
STREPTOCOCCUS SPECIES: NOT DETECTED
TOXIC GRANULES BLD QL SMEAR: PRESENT
TROPONIN I SERPL DL<=0.01 NG/ML-MCNC: 0.03 NG/ML (ref 0–0.03)
VAN A/B (VRE GENE): ABNORMAL
VANCOMYCIN SERPL-MCNC: 9.4 UG/ML
VIM GENE (CARBAPENEM RESISTANT): ABNORMAL
WBC # BLD AUTO: 32.94 K/UL (ref 3.9–12.7)
WBC TOXIC VACUOLES BLD QL SMEAR: PRESENT

## 2024-06-24 PROCEDURE — 99900035 HC TECH TIME PER 15 MIN (STAT)

## 2024-06-24 PROCEDURE — 94761 N-INVAS EAR/PLS OXIMETRY MLT: CPT

## 2024-06-24 PROCEDURE — 99233 SBSQ HOSP IP/OBS HIGH 50: CPT | Mod: AF,HB,95, | Performed by: PSYCHIATRY & NEUROLOGY

## 2024-06-24 PROCEDURE — 63600175 PHARM REV CODE 636 W HCPCS: Performed by: HOSPITALIST

## 2024-06-24 PROCEDURE — 63600175 PHARM REV CODE 636 W HCPCS: Performed by: NURSE PRACTITIONER

## 2024-06-24 PROCEDURE — 94799 UNLISTED PULMONARY SVC/PX: CPT

## 2024-06-24 PROCEDURE — 25000003 PHARM REV CODE 250: Performed by: NURSE PRACTITIONER

## 2024-06-24 PROCEDURE — 80048 BASIC METABOLIC PNL TOTAL CA: CPT | Mod: XB | Performed by: PHYSICIAN ASSISTANT

## 2024-06-24 PROCEDURE — 63600175 PHARM REV CODE 636 W HCPCS: Performed by: INTERNAL MEDICINE

## 2024-06-24 PROCEDURE — 25000003 PHARM REV CODE 250: Performed by: INTERNAL MEDICINE

## 2024-06-24 PROCEDURE — S4991 NICOTINE PATCH NONLEGEND: HCPCS | Performed by: NURSE PRACTITIONER

## 2024-06-24 PROCEDURE — 25000242 PHARM REV CODE 250 ALT 637 W/ HCPCS: Performed by: HOSPITALIST

## 2024-06-24 PROCEDURE — 94664 DEMO&/EVAL PT USE INHALER: CPT

## 2024-06-24 PROCEDURE — 25000003 PHARM REV CODE 250: Performed by: HOSPITALIST

## 2024-06-24 PROCEDURE — 83735 ASSAY OF MAGNESIUM: CPT | Mod: 91 | Performed by: PHYSICIAN ASSISTANT

## 2024-06-24 PROCEDURE — 80053 COMPREHEN METABOLIC PANEL: CPT | Performed by: NURSE PRACTITIONER

## 2024-06-24 PROCEDURE — 25500020 PHARM REV CODE 255: Performed by: HOSPITALIST

## 2024-06-24 PROCEDURE — 25000003 PHARM REV CODE 250

## 2024-06-24 PROCEDURE — 99223 1ST HOSP IP/OBS HIGH 75: CPT | Mod: ,,, | Performed by: INTERNAL MEDICINE

## 2024-06-24 PROCEDURE — 85025 COMPLETE CBC W/AUTO DIFF WBC: CPT | Performed by: NURSE PRACTITIONER

## 2024-06-24 PROCEDURE — 80202 ASSAY OF VANCOMYCIN: CPT | Performed by: STUDENT IN AN ORGANIZED HEALTH CARE EDUCATION/TRAINING PROGRAM

## 2024-06-24 PROCEDURE — 84484 ASSAY OF TROPONIN QUANT: CPT | Performed by: HOSPITALIST

## 2024-06-24 PROCEDURE — A9585 GADOBUTROL INJECTION: HCPCS | Performed by: HOSPITALIST

## 2024-06-24 PROCEDURE — 83735 ASSAY OF MAGNESIUM: CPT | Performed by: NURSE PRACTITIONER

## 2024-06-24 PROCEDURE — 20000000 HC ICU ROOM

## 2024-06-24 PROCEDURE — 83605 ASSAY OF LACTIC ACID: CPT | Performed by: NURSE PRACTITIONER

## 2024-06-24 PROCEDURE — G0427 INPT/ED TELECONSULT70: HCPCS | Mod: 95,,, | Performed by: PSYCHIATRY & NEUROLOGY

## 2024-06-24 PROCEDURE — 80048 BASIC METABOLIC PNL TOTAL CA: CPT | Mod: 91,XB | Performed by: NURSE PRACTITIONER

## 2024-06-24 PROCEDURE — 63600175 PHARM REV CODE 636 W HCPCS: Performed by: PHYSICIAN ASSISTANT

## 2024-06-24 RX ORDER — BACLOFEN 5 MG/1
10 TABLET ORAL 4 TIMES DAILY
Status: DISCONTINUED | OUTPATIENT
Start: 2024-06-24 | End: 2024-07-09 | Stop reason: HOSPADM

## 2024-06-24 RX ORDER — POTASSIUM CHLORIDE 7.45 MG/ML
10 INJECTION INTRAVENOUS
Status: COMPLETED | OUTPATIENT
Start: 2024-06-24 | End: 2024-06-24

## 2024-06-24 RX ORDER — NITROGLYCERIN 0.4 MG/1
0.4 TABLET SUBLINGUAL EVERY 5 MIN PRN
Status: DISCONTINUED | OUTPATIENT
Start: 2024-06-24 | End: 2024-07-09 | Stop reason: HOSPADM

## 2024-06-24 RX ORDER — BACLOFEN 5 MG/1
5 TABLET ORAL 3 TIMES DAILY
Status: DISCONTINUED | OUTPATIENT
Start: 2024-06-24 | End: 2024-06-24

## 2024-06-24 RX ORDER — POTASSIUM CHLORIDE 20 MEQ/1
40 TABLET, EXTENDED RELEASE ORAL EVERY 4 HOURS
Status: COMPLETED | OUTPATIENT
Start: 2024-06-24 | End: 2024-06-24

## 2024-06-24 RX ORDER — AMMONIUM LACTATE 12 G/100G
LOTION TOPICAL 2 TIMES DAILY
Status: DISCONTINUED | OUTPATIENT
Start: 2024-06-24 | End: 2024-07-09 | Stop reason: HOSPADM

## 2024-06-24 RX ORDER — GADOBUTROL 604.72 MG/ML
7 INJECTION INTRAVENOUS
Status: COMPLETED | OUTPATIENT
Start: 2024-06-24 | End: 2024-06-24

## 2024-06-24 RX ORDER — ALUMINUM HYDROXIDE, MAGNESIUM HYDROXIDE, AND SIMETHICONE 1200; 120; 1200 MG/30ML; MG/30ML; MG/30ML
30 SUSPENSION ORAL EVERY 6 HOURS PRN
Status: DISCONTINUED | OUTPATIENT
Start: 2024-06-24 | End: 2024-07-09 | Stop reason: HOSPADM

## 2024-06-24 RX ADMIN — HYDROCODONE BITARTRATE AND ACETAMINOPHEN 1 TABLET: 5; 325 TABLET ORAL at 04:06

## 2024-06-24 RX ADMIN — SODIUM CHLORIDE: 9 INJECTION, SOLUTION INTRAVENOUS at 01:06

## 2024-06-24 RX ADMIN — SODIUM CHLORIDE 500 ML: 9 INJECTION, SOLUTION INTRAVENOUS at 10:06

## 2024-06-24 RX ADMIN — POTASSIUM CHLORIDE 40 MEQ: 1500 TABLET, EXTENDED RELEASE ORAL at 05:06

## 2024-06-24 RX ADMIN — DOCUSATE SODIUM AND SENNOSIDES 1 TABLET: 8.6; 5 TABLET, FILM COATED ORAL at 08:06

## 2024-06-24 RX ADMIN — OXACILLIN 2 G: 2 INJECTION, POWDER, FOR SOLUTION INTRAMUSCULAR; INTRAVENOUS at 02:06

## 2024-06-24 RX ADMIN — OXACILLIN 2 G: 2 INJECTION, POWDER, FOR SOLUTION INTRAMUSCULAR; INTRAVENOUS at 04:06

## 2024-06-24 RX ADMIN — ENOXAPARIN SODIUM 30 MG: 30 INJECTION SUBCUTANEOUS at 04:06

## 2024-06-24 RX ADMIN — HYDROMORPHONE HYDROCHLORIDE 1 MG: 1 INJECTION, SOLUTION INTRAMUSCULAR; INTRAVENOUS; SUBCUTANEOUS at 12:06

## 2024-06-24 RX ADMIN — HYDROMORPHONE HYDROCHLORIDE 1 MG: 1 INJECTION, SOLUTION INTRAMUSCULAR; INTRAVENOUS; SUBCUTANEOUS at 04:06

## 2024-06-24 RX ADMIN — NICOTINE 1 PATCH: 14 PATCH TRANSDERMAL at 08:06

## 2024-06-24 RX ADMIN — POTASSIUM CHLORIDE 10 MEQ: 7.46 INJECTION, SOLUTION INTRAVENOUS at 07:06

## 2024-06-24 RX ADMIN — SODIUM CHLORIDE: 9 INJECTION, SOLUTION INTRAVENOUS at 07:06

## 2024-06-24 RX ADMIN — NOREPINEPHRINE BITARTRATE 0.08 MCG/KG/MIN: 4 INJECTION, SOLUTION INTRAVENOUS at 12:06

## 2024-06-24 RX ADMIN — HYDROCODONE BITARTRATE AND ACETAMINOPHEN 1 TABLET: 5; 325 TABLET ORAL at 01:06

## 2024-06-24 RX ADMIN — HYDROCODONE BITARTRATE AND ACETAMINOPHEN 1 TABLET: 5; 325 TABLET ORAL at 09:06

## 2024-06-24 RX ADMIN — PIPERACILLIN SODIUM AND TAZOBACTAM SODIUM 4.5 G: 4; .5 INJECTION, POWDER, LYOPHILIZED, FOR SOLUTION INTRAVENOUS at 04:06

## 2024-06-24 RX ADMIN — MUPIROCIN: 20 OINTMENT TOPICAL at 08:06

## 2024-06-24 RX ADMIN — BACLOFEN 10 MG: 5 TABLET ORAL at 09:06

## 2024-06-24 RX ADMIN — OXACILLIN 2 G: 2 INJECTION, POWDER, FOR SOLUTION INTRAMUSCULAR; INTRAVENOUS at 10:06

## 2024-06-24 RX ADMIN — AMMONIUM LACTATE: 120 LOTION TOPICAL at 08:06

## 2024-06-24 RX ADMIN — NITROGLYCERIN 0.4 MG: 0.4 TABLET, ORALLY DISINTEGRATING SUBLINGUAL at 10:06

## 2024-06-24 RX ADMIN — VANCOMYCIN HYDROCHLORIDE 1000 MG: 1 INJECTION, POWDER, LYOPHILIZED, FOR SOLUTION INTRAVENOUS at 04:06

## 2024-06-24 RX ADMIN — NOREPINEPHRINE BITARTRATE 0.1 MCG/KG/MIN: 4 INJECTION, SOLUTION INTRAVENOUS at 01:06

## 2024-06-24 RX ADMIN — BACLOFEN 10 MG: 5 TABLET ORAL at 01:06

## 2024-06-24 RX ADMIN — IBUPROFEN 600 MG: 600 TABLET, FILM COATED ORAL at 08:06

## 2024-06-24 RX ADMIN — ACETAMINOPHEN 650 MG: 325 TABLET, FILM COATED ORAL at 04:06

## 2024-06-24 RX ADMIN — GADOBUTROL 7 ML: 604.72 INJECTION INTRAVENOUS at 01:06

## 2024-06-24 RX ADMIN — BACLOFEN 10 MG: 5 TABLET ORAL at 08:06

## 2024-06-24 RX ADMIN — OXACILLIN 2 G: 2 INJECTION, POWDER, FOR SOLUTION INTRAMUSCULAR; INTRAVENOUS at 07:06

## 2024-06-24 RX ADMIN — POTASSIUM CHLORIDE 10 MEQ: 7.46 INJECTION, SOLUTION INTRAVENOUS at 06:06

## 2024-06-24 RX ADMIN — BACLOFEN 10 MG: 5 TABLET ORAL at 04:06

## 2024-06-24 RX ADMIN — HYDROMORPHONE HYDROCHLORIDE 1 MG: 1 INJECTION, SOLUTION INTRAMUSCULAR; INTRAVENOUS; SUBCUTANEOUS at 07:06

## 2024-06-24 RX ADMIN — POTASSIUM CHLORIDE 40 MEQ: 1500 TABLET, EXTENDED RELEASE ORAL at 09:06
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
## 2024-06-24 NOTE — ASSESSMENT & PLAN NOTE
Multifocal pneumonia   Staphylococcus aureus bacteremia   Cellulitis of left arm   -admitted due to severe sepsis/septic shock with hypotension requiring vasopressor support and complicated by DONNA in setting of known recent bacteremia with out complete treatment, bleeding multifocal pneumonia suspected on CXR, and acute on chronic left arm wound with recent IV injection/skin popping of heroin  -at admission remained hypotensive despite fluid resuscitation and required vasopressor support; otherwise HDS and afebrile  -admission labs:  -CBC with WBC 34, hemoglobin 8.3, hematocrit 26, platelets 119.  Chemistry was , K 3.5, chloride 97, CO2 18, BUN 50, SCr 3.1, glucose 133.  Magnesium 1.9.  ; AST, ALT, T bili unremarkable.  Lactic acid 3.37 >> 1.2.  Procalcitonin 78.33.  ESR > 120, .  PH 7.419, pCO2 32.2, PO2 34, HC03 21, be negative for.  .  Troponin 0.23.  Flu and COVID negative.  HIV negative.   -admission imaging:    -CXR with findings that may reflect multifocal infectious process.  Right shoulder x-ray without acute pathology.  Left wrist x-ray without acute pathology.  CT head and CT C-spine noted no acute cervical fracture.  Prevertebral soft tissue thickening.  Recommend MRI of the cervical spine to evaluate for prevertebral edema, when clinically appropriate.  Mild to moderate spondylitic changes.  -admission micro:   -blood cultures in process >>> please follow  -continue norepinephrine, wean as tolerated   -continue IV fluid hydration   -continue broad-spectrum antibiotics with vancomycin and Zosyn   -ID consulted   -Wound Care consulted   -pulmonary toileting with incentive spirometer and flutter valve as tolerated   -oxygen supplementation as needed  -continue tele monitoring   -EKG PRN concerns  -trend labs, address/replete electrolytes as indicated

## 2024-06-24 NOTE — CONSULTS
[facility] Intensive Care ([place])  Infectious Disease  Consult Note    Patient Name: Margarita Wiseman  MRN: [id number]  Admission Date: 6/23/2024  Hospital Length of Stay: 1 days  Attending Physician: Melissa Templeton MD  Primary Care Provider: Doris, Primary Doctor     Isolation Status: No active isolations    Patient information was obtained from patient, past medical records, and ER records.      Inpatient consult to Infectious Diseases  Consult performed by: Jean Carlos Payne MD  Consult ordered by: Arielle Gil NP        Assessment/Plan:     Multifocal pneumonia    48 yo woman with history of untreated MSSA bacteremia, now readmitted with bacteremia and sepsis related to IDU  - polymicrobial?  - likely IE with septic pulmonary emboli  - MSSA by BCID  - change to oxacillin (MSSA)  - continue vancomycin (GPR - Bacillus?)  - f/u MRI to r/o epidural abscess  - repeat blood cultures till she clears the bacteremia    Thank you for your consult. I will follow-up with patient. Please contact us if you have any additional questions.    Jean Carlos Payne MD  Infectious Disease  [facility]  Intensive Care ([place])    Subjective:     Principal Problem: Septic shock    HPI: Ms. Wiseman is a 49 year old woman with homelessness and IDU, admitted with sepsis and bacteremia. She has a PMHx significant for IVDU, PAWAN, second degree heart block who presents for evaluation of fatigue, which has persisted for the last several days. She notes an associated generalized body aches and pains. Pt with recent admission for severe sepsis with MSSA bacteremia in April 2024. [place]. Reports continued IV drug use. She was admitted and started on IV abx. CT c/w cervical infection?  ID is consulted for sepsis and bacteremia.    Today, the patient complaints of neck pain.    Blood cultures: MSSA (BCID) and GPR    No past medical history on file.    No past  "surgical history on file.    Review of patient's allergies indicates:  No Known Allergies    Medications:  Medications Prior to Admission   Medication Sig    busPIRone (BUSPAR) 5 MG Tab Take 5 mg by mouth 2 (two) times daily.    divalproex ER (DEPAKOTE ER) 250 MG 24 hr tablet Take 250 mg by mouth once daily.    ferrous sulfate (FEOSOL) 325 mg (65 mg iron) Tab tablet Take 1 tablet (325 mg total) by mouth 2 (two) times daily.    hydrOXYzine (ATARAX) 50 MG tablet Take 1 tablet (50 mg total) by mouth nightly as needed for Itching (insomnia).    ibuprofen (ADVIL,MOTRIN) 800 MG tablet Take 1 tablet (800 mg total) by mouth every 6 (six) hours as needed for Pain.    naloxone (NARCAN) 4 mg/actuation Spry 4mg by nasal route as needed for opioid overdose; may repeat every 2-3 minutes in alternating nostrils until medical help arrives. Call 911    OLANZapine (ZYPREXA) 5 MG tablet Take 5 mg by mouth every evening.    ondansetron (ZOFRAN-ODT) 8 MG TbDL Take 1 tablet (8 mg total) by mouth every 6 (six) hours as needed (nausea).    sertraline (ZOLOFT) 25 MG tablet Take 25 mg by mouth once daily.     Antibiotics (From admission, onward)      Start     Stop Route Frequency Ordered    06/24/24 0330  piperacillin-tazobactam (ZOSYN) 4.5 g in D5W 100 mL IVPB (MB+)         -- IV Every 12 hours (non-standard times) 06/23/24 1750    06/23/24 2100  mupirocin 2 % ointment         06/28/24 2059 Nasl 2 times daily 06/23/24 1906    06/23/24 1845  vancomycin - pharmacy to dose  (vancomycin IVPB (PEDS and ADULTS))        Placed in "And" Linked Group    -- IV pharmacy to manage frequency 06/23/24 1745          Antifungals (From admission, onward)      None          Antivirals (From admission, onward)      None               There is no immunization history on file for this patient.    Family History    None       Social History     Socioeconomic History    Marital status: Unknown   Tobacco Use    Smoking status: Every Day     Current packs/day: " 1.00     Average packs/day: 1 pack/day for 27.5 years (27.5 ttl pk-yrs)     Types: Cigarettes     Start date: 1997    Smokeless tobacco: Never    Tobacco comments:     Smoking cessation education provided. Pt is a 1 pk/day cigarette smoker x 26 yrs. She states that she cut down to 0.5 pk/day approx. 1 yr ago. Pt declines referral to Ambulatory Smoking Cessation clinic, stating not ready to quit. Handout provided.    Substance and Sexual Activity    Alcohol use: Not Currently    Sexual activity: Not Currently     Social Determinants of Health     Financial Resource Strain: High Risk (4/12/2024)    Overall Financial Resource Strain (CARDIA)     Difficulty of Paying Living Expenses: Very hard   Food Insecurity: Food Insecurity Present (4/12/2024)    Hunger Vital Sign     Worried About Running Out of Food in the Last Year: Often true     Ran Out of Food in the Last Year: Often true   Transportation Needs: Unmet Transportation Needs (4/12/2024)    PRAPARE - Transportation     Lack of Transportation (Medical): Yes     Lack of Transportation (Non-Medical): Yes   Physical Activity: Inactive (4/12/2024)    Exercise Vital Sign     Days of Exercise per Week: 0 days     Minutes of Exercise per Session: 0 min   Stress: Stress Concern Present (4/12/2024)    [employer] [place] [employer] - Occupational Stress Questionnaire     Feeling of Stress : To some extent   Housing Stability: High Risk (4/12/2024)    Housing Stability Vital Sign     Unable to Pay for Housing in the Last Year: Yes     Unstable Housing in the Last Year: Yes     Review of Systems   Constitutional:  Positive for chills, fatigue and fever.   Musculoskeletal:  Positive for arthralgias, neck pain and neck stiffness.   Skin:  Positive for wound.   All other systems reviewed and are negative.    Objective:     Vital Signs (Most Recent):  Temp: 97.7 °F (36.5 °C) (06/24/24 0715)  Pulse: 72 (06/24/24 0815)  Resp: (!) 23 (06/24/24 0920)  BP: 100/65 (06/24/24  0815)  SpO2: 97 % (06/24/24 0815) Vital Signs (24h Range):  Temp:  [97.7 °F (36.5 °C)-98.5 °F (36.9 °C)] 97.7 °F (36.5 °C)  Pulse:  [57-85] 72  Resp:  [11-30] 23  SpO2:  [75 %-100 %] 97 %  BP: ()/(41-79) 100/65     Weight: 69.2 kg (152 lb 8.9 oz)  Body mass index is 29.79 kg/m².    Estimated Creatinine Clearance: 30.2 mL/min (A) (based on SCr of 2 mg/dL (H)).     Physical Exam  Vitals and nursing note reviewed.   Constitutional:       General: She is not in acute distress.     Appearance: Normal appearance. She is not ill-appearing, toxic-appearing or diaphoretic.   HENT:      Head: Normocephalic and atraumatic.   Eyes:      Extraocular Movements: Extraocular movements intact.      Pupils: Pupils are equal, round, and reactive to light.   Cardiovascular:      Rate and Rhythm: Normal rate and regular rhythm.      Heart sounds: Murmur heard.   Abdominal:      Tenderness: There is no abdominal tenderness. There is no guarding.   Neurological:      General: No focal deficit present.      Mental Status: She is alert and oriented to person, place, and time.     Wound, left arm: dry     Significant Labs: CBC:   Recent Labs   Lab 06/23/24  1521 06/23/24  1554 06/24/24  0404   WBC 33.72*  --  32.94*   HGB 8.3*  --  8.5*   HCT 25.9* 25* 26.3*   *  --  127*     Microbiology Results (last 7 days)       Procedure Component Value Units Date/Time    Blood culture x two cultures. Draw prior to antibiotics. [[id number]] Collected: 06/23/24 1537    Order Status: Completed Specimen: Blood from Peripheral, Antecubital, Right Updated: 06/24/24 0917     Blood Culture, Routine Gram stain aer bottle: Gram positive cocci in clusters resembling Staph,      Gram positive rods      Positive results previously called 06/24/2024  06:13      Gram stain stephanie bottle: Gram positive cocci in clusters resembling Staph    Narrative:      Aerobic and anaerobic    Blood culture x two cultures. Draw prior to antibiotics. [[id number]]  Collected: 06/23/24 1537    Order Status: Completed Specimen: Blood from Peripheral, Antecubital, Right Updated: 06/24/24 0916     Blood Culture, Routine Gram stain aer bottle: Gram positive cocci in clusters resembling Staph,      Gram positive rods      Results called to and read back by: Cande NEELY 06/24/2024  06:12      Gram stain stephanie bottle: Gram positive cocci in clusters resembling Staph      Gram positive rods      Positive results previously called 06/24/2024    Narrative:      Aerobic and anaerobic    Rapid Organism ID by PCR (from Blood culture) [[id number]]  (Abnormal) Collected: 06/23/24 1537    Order Status: Completed Updated: 06/24/24 0723     Enterococcus faecalis Not Detected     Enterococcus faecium Not Detected     Listeria monocytogenes Not Detected     Staphylococcus spp. See species for ID     Staphylococcus aureus Detected     Staphylococcus epidermidis Not Detected     Staphylococcus lugdunensis Not Detected     Streptococcus species Not Detected     Streptococcus agalactiae Not Detected     Streptococcus pneumoniae Not Detected     Streptococcus pyogenes Not Detected     Acinetobacter calcoaceticus/baumannii complex Not Detected     Bacteroides fragilis Not Detected     Enterobacterales Not Detected     Enterobacter cloacae complex Not Detected     Escherichia coli Not Detected     Klebsiella aerogenes Not Detected     Klebsiella oxytoca Not Detected     Klebsiella pneumoniae group Not Detected     Proteus Not Detected     Salmonella sp Not Detected     Serratia marcescens Not Detected     Haemophilus influenzae Not Detected     Neisseria meningtidis Not Detected     Pseudomonas aeruginosa Not Detected     Stenotrophomonas maltophilia Not Detected     Candida albicans Not Detected     Candida auris Not Detected     Candida glabrata Not Detected     Candida krusei Not Detected     Candida parapsilosis Not Detected     Candida tropicalis Not Detected     Cryptococcus neoformans/gattii Not  Detected     CTX-M (ESBL ) Test Not Applicable     IMP (Carbapenem resistant) Test Not Applicable     KPC resistance gene (Carbapenem resistant) Test Not Applicable     mcr-1  Test Not Applicable     mec A/C  Test Not Applicable     mec A/C and MREJ (MRSA) gene Not Detected     NDM (Carbapenem resistant) Test Not Applicable     OXA-48-like (Carbapenem resistant) Test Not Applicable     van A/B (VRE gene) Test Not Applicable     VIM (Carbapenem resistant) Test Not Applicable    Narrative:      Aerobic and anaerobic    Urine culture [[id number]] Collected: 06/23/24 1929    Order Status: No result Specimen: Urine Updated: 06/23/24 1958            Significant Imaging: I have reviewed all pertinent imaging results/findings within the past 24 hours.
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
## 2024-06-24 NOTE — EICU
EICU Note    50 y/o female with a PMH of IVDA, polysubstance abuse ( cocaine and heroin),  Chronic hep C, chronic thrombocytopenia, history of second degree heart block, chronic left arm wound, bipolar disorder, and PTSD presents to the ED with complaints of hypotension, body aches, fever and chills. In the ED she remained hypotensive despite fluid and was started on vasopressors.    Currently;    BP (!) 88/54   Pulse 71   Temp 98.2 °F (36.8 °C) (Oral)   Resp 18   Wt 69.5 kg (153 lb 3.5 oz)   SpO2 96%   BMI 29.92 kg/m²     Labs:    CBC: WBC 33.72/ Hgb 8.3/ Hct 25.9/ plts 119 K    BMP: Na 132/ K 3.5/ Cl 97/ CO2 18/ BUN 50/ Cr 3.1/ glucose 133    Alk Phos: 137  CRP: 222.2  BNP: 243  Troponin I : 0.230  HIV: negative    UA: Positive nitrates/ Urobilinogen: 2-3/ Leukocytes 2+/ WBC > 100/ Bacteria many    Chest xray: Final reading  FINDINGS:  The cardiomediastinal silhouette is not enlarged.  There is no pleural effusion.  The trachea is midline.  The lungs are symmetrically expanded bilaterally with patchy foci of increased parenchymal attenuation projected over the left lower lung zone, right lower lung zone, and right midlung zone, new since the previous examination..  No large focal consolidation seen.  There is no pneumothorax.  The osseous structures are remarkable for degenerative change..  Impression:  1. Pulmonary findings may reflect multifocal infectious process.  Correlation and follow-up is advised, no greater than 3 months, to ensure resolution and to exclude underlying nodule/malignancy.     Xray Shoulder Trauma Right: Final reading:  Impression:  No acute process.    Xray Wrist Complete Left  Impression:  1. No acute displaced fracture or dislocation of the wrist. Wrist Complete Left:    CT Cervical Spine and Head w/o contrast: Final reading  Impression:  No acute intracranial abnormality detected.  No acute cervical fracture.  Prevertebral soft tissue thickening.  Recommend MRI of the cervical  spine to evaluate for prevertebral edema, when clinically appropriate.  Mild to moderate spondylitic changes.      Impression;    -Septic Shock liley from several sources  -Multifocal pneumonia  -Cellulitis of the left arm  -UTI  -DONNA  -Chronic Hep C infection  -Drug abuse, IVDA    Plan; Continue present management of IV vancomycin and pip/tazo. Norepinephrine to maintain BP. Taper as able. Psychiatry consult

## 2024-06-24 NOTE — SUBJECTIVE & OBJECTIVE
No past medical history on file.    No past surgical history on file.    Review of patient's allergies indicates:  No Known Allergies    No current facility-administered medications on file prior to encounter.     Current Outpatient Medications on File Prior to Encounter   Medication Sig    busPIRone (BUSPAR) 5 MG Tab Take 5 mg by mouth 2 (two) times daily.    divalproex ER (DEPAKOTE ER) 250 MG 24 hr tablet Take 250 mg by mouth once daily.    ferrous sulfate (FEOSOL) 325 mg (65 mg iron) Tab tablet Take 1 tablet (325 mg total) by mouth 2 (two) times daily.    hydrOXYzine (ATARAX) 50 MG tablet Take 1 tablet (50 mg total) by mouth nightly as needed for Itching (insomnia).    ibuprofen (ADVIL,MOTRIN) 800 MG tablet Take 1 tablet (800 mg total) by mouth every 6 (six) hours as needed for Pain.    naloxone (NARCAN) 4 mg/actuation Spry 4mg by nasal route as needed for opioid overdose; may repeat every 2-3 minutes in alternating nostrils until medical help arrives. Call 911    OLANZapine (ZYPREXA) 5 MG tablet Take 5 mg by mouth every evening.    ondansetron (ZOFRAN-ODT) 8 MG TbDL Take 1 tablet (8 mg total) by mouth every 6 (six) hours as needed (nausea).    sertraline (ZOLOFT) 25 MG tablet Take 25 mg by mouth once daily.     Family History    None       Tobacco Use    Smoking status: Every Day     Current packs/day: 1.00     Average packs/day: 1 pack/day for 27.5 years (27.5 ttl pk-yrs)     Types: Cigarettes     Start date: 1997    Smokeless tobacco: Never    Tobacco comments:     Smoking cessation education provided. Pt is a 1 pk/day cigarette smoker x 26 yrs. She states that she cut down to 0.5 pk/day approx. 1 yr ago. Pt declines referral to Ambulatory Smoking Cessation clinic, stating not ready to quit. Handout provided.    Substance and Sexual Activity    Alcohol use: Not Currently    Drug use: Not on file    Sexual activity: Not Currently     Review of Systems   Constitutional:  Positive for activity change, chills,  fatigue and fever. Negative for appetite change.   HENT:  Negative for congestion, sore throat and trouble swallowing.    Respiratory:  Negative for chest tightness, shortness of breath and wheezing.    Cardiovascular:  Negative for chest pain, palpitations and leg swelling.   Gastrointestinal:  Negative for abdominal pain, constipation, diarrhea, nausea and vomiting.   Genitourinary:  Negative for difficulty urinating, dysuria and urgency.   Musculoskeletal:  Positive for arthralgias, myalgias and neck pain. Negative for back pain.   Skin:  Positive for wound.   Neurological:  Positive for weakness. Negative for dizziness, syncope, light-headedness and headaches.     Objective:     Vital Signs (Most Recent):  Temp: 98.2 °F (36.8 °C) (06/23/24 1510)  Pulse: 71 (06/23/24 1947)  Resp: 18 (06/23/24 1947)  BP: (!) 88/54 (06/23/24 1947)  SpO2: 96 % (06/23/24 1947) Vital Signs (24h Range):  Temp:  [98.2 °F (36.8 °C)] 98.2 °F (36.8 °C)  Pulse:  [70-85] 71  Resp:  [13-29] 18  SpO2:  [93 %-100 %] 96 %  BP: (65-92)/(41-56) 88/54     Weight: 69.5 kg (153 lb 3.5 oz)  Body mass index is 29.92 kg/m².     Physical Exam  Vitals and nursing note reviewed.   Constitutional:       General: She is not in acute distress.     Appearance: Normal appearance. She is well-developed. She is ill-appearing and toxic-appearing.   HENT:      Head: Normocephalic and atraumatic.      Mouth/Throat:      Dentition: Normal dentition.   Eyes:      General: Lids are normal.      Extraocular Movements: Extraocular movements intact.      Conjunctiva/sclera: Conjunctivae normal.   Cardiovascular:      Rate and Rhythm: Normal rate and regular rhythm.      Heart sounds: Normal heart sounds. No murmur heard.  Pulmonary:      Effort: Pulmonary effort is normal.      Breath sounds: Normal breath sounds.   Abdominal:      General: Bowel sounds are normal. There is no distension.      Palpations: Abdomen is soft.      Tenderness: There is no abdominal tenderness.    Musculoskeletal:      Cervical back: Neck supple.      Right lower leg: No edema.      Left lower leg: No edema.   Skin:     General: Skin is warm and dry.      Findings: Lesion (see media) present. No erythema or rash.   Neurological:      Mental Status: She is alert and oriented to person, place, and time.           Significant Labs: All pertinent labs within the past 24 hours have been reviewed.  ABGs:   Recent Labs   Lab 06/23/24  1554   PH 7.419   PCO2 32.2*   HCO3 20.9*   POCSATURATED 67   BE -4*   PO2 34*     CBC:   Recent Labs   Lab 06/23/24  1521 06/23/24  1554   WBC 33.72*  --    HGB 8.3*  --    HCT 25.9* 25*   *  --      CMP:   Recent Labs   Lab 06/23/24  1521   *   K 3.5   CL 97   CO2 18*   *   BUN 50*   CREATININE 3.1*   CALCIUM 8.5*   PROT 7.0   ALBUMIN 2.2*   BILITOT 0.4   ALKPHOS 137*   AST 36   ALT 10   ANIONGAP 17*     Cardiac Markers:   Recent Labs   Lab 06/23/24  1521   *     Lactic Acid:   Recent Labs   Lab 06/23/24  1803   LACTATE 1.2     Magnesium:   Recent Labs   Lab 06/23/24  1803   MG 1.9     Troponin:   Recent Labs   Lab 06/23/24  1521 06/23/24  1803   TROPONINI 0.230* 0.152*     Urine Studies:   Recent Labs   Lab 06/23/24 1929   COLORU Yellow   APPEARANCEUA Cloudy*   PHUR 6.0   SPECGRAV 1.015   PROTEINUA 2+*   GLUCUA Negative   KETONESU Negative   BILIRUBINUA Negative   OCCULTUA 2+*   NITRITE Positive*   UROBILINOGEN 2.0-3.0*   LEUKOCYTESUR 2+*   RBCUA 18*   WBCUA >100*   BACTERIA Many*   SQUAMEPITHEL 25   HYALINECASTS 2*     Microbiology Results (last 7 days)       Procedure Component Value Units Date/Time    Urine culture [[id number]] Collected: 06/23/24 1929    Order Status: No result Specimen: Urine Updated: 06/23/24 1958    Blood culture x two cultures. Draw prior to antibiotics. [[id number]] Collected: 06/23/24 1537    Order Status: Sent Specimen: Blood from Peripheral, Antecubital, Right Updated: 06/23/24 1945    Blood culture x two cultures. Draw  prior to antibiotics. [[id number]] Collected: 06/23/24 1537    Order Status: Sent Specimen: Blood from Peripheral, Antecubital, Right Updated: 06/23/24 1945          Significant Imaging: I have reviewed all pertinent imaging results/findings within the past 24 hours.

## 2024-06-24 NOTE — ASSESSMENT & PLAN NOTE
Thrombocytopenia, chronic  -etiology unclear, previous anemia profiled detailed below  -at admission H/H 8.3/25, platelets 133  -baseline appears 8-9/25-34; 118-300  -no overt s/s of bleeding  -trend labs over course and transfuse as indicated   04/12/24    Iron 42   TIBC 231 (L)   Saturated Iron 18 (L)   Transferrin 156 (L)   Ferritin 290   Folate 7.3   Vitamin B12 492

## 2024-06-24 NOTE — CONSULTS
[facility] ([place])  Wound Care    Patient Name:  Margarita Wiseman   MRN:  [id number]  Date: 6/24/2024  Diagnosis: Septic shock    History:     No past medical history on file.    Social History     Socioeconomic History    Marital status: Unknown   Tobacco Use    Smoking status: Every Day     Current packs/day: 1.00     Average packs/day: 1 pack/day for 27.5 years (27.5 ttl pk-yrs)     Types: Cigarettes     Start date: 1997    Smokeless tobacco: Never    Tobacco comments:     Smoking cessation education provided. Pt is a 1 pk/day cigarette smoker x 26 yrs. She states that she cut down to 0.5 pk/day approx. 1 yr ago. Pt declines referral to Ambulatory Smoking Cessation clinic, stating not ready to quit. Handout provided.    Substance and Sexual Activity    Alcohol use: Not Currently    Sexual activity: Not Currently     Social Determinants of Health     Financial Resource Strain: High Risk (4/12/2024)    Overall Financial Resource Strain (CARDIA)     Difficulty of Paying Living Expenses: Very hard   Food Insecurity: Food Insecurity Present (4/12/2024)    Hunger Vital Sign     Worried About Running Out of Food in the Last Year: Often true     Ran Out of Food in the Last Year: Often true   Transportation Needs: Unmet Transportation Needs (4/12/2024)    PRAPARE - Transportation     Lack of Transportation (Medical): Yes     Lack of Transportation (Non-Medical): Yes   Physical Activity: Inactive (4/12/2024)    Exercise Vital Sign     Days of Exercise per Week: 0 days     Minutes of Exercise per Session: 0 min   Stress: Stress Concern Present (4/12/2024)    [employer] - Occupational Stress Questionnaire     Feeling of Stress : To some extent   Housing Stability: High Risk (4/12/2024)    Housing Stability Vital Sign     Unable to Pay for Housing in the Last Year: Yes     Unstable Housing in the Last Year: Yes       Precautions:     Allergies as of 06/23/2024    (No Known  Allergies)       [facility] Assessment Details/Treatment     Wound care consult received for assessment of upper and lower extremities. Patient is a 49 year old female with history of IVDA, polysubstance abuse (cocaine and heroin), chronic hep C, chronic anemia, chronic thrombocytopenia, history of second degree heart block, chronic L arm wound (currently skin popping drugs, present for several years), bipolar disorder, PTSD, anxiety/depression, and medication noncompliance.     Upon assessment to extremities noted scaring from previous skin injuries. Left and right forearm with intact pink scar tissue. Recommend lac hydrin lotion to left forearm BID. Nursing and MD team notified. Orders placed. Wound care to sign off.     Pink scar tissue    06/24/2024

## 2024-06-24 NOTE — CONSULTS
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 3.37 (H) 0.5 - 2.2 mmol/L    Sample VENOUS     FiO2 21    ISTAT PROCEDURE    Collection Time: 06/23/24  3:54 PM   Result Value Ref Range    POC PH 7.419 7.35 - 7.45    POC PCO2 32.2 (L) 35 - 45 mmHg    POC PO2 34 (LL) 40 - 60 mmHg    POC HCO3 20.9 (L) 24 - 28 mmol/L    POC BE -4 (L) -2 to 2 mmol/L    POC SATURATED O2 67 95 - 100 %    POC TCO2 22 (L) 24 - 29 mmol/L    POC Hematocrit 25 (L) 36 - 54 %PCV    POC HEMOGLOBIN 9 g/dL    Sample VENOUS     FiO2 21    POCT COVID-19 Rapid Screening    Collection Time: 06/23/24  4:13 PM   Result Value Ref Range    POC Rapid COVID Negative Negative     Acceptable Yes    POCT Influenza A/B Molecular    Collection Time: 06/23/24  4:13 PM   Result Value Ref Range    POC Molecular Influenza A Ag Negative Negative    POC Molecular Influenza B Ag Negative Negative     Acceptable Yes    Troponin I    Collection Time: 06/23/24  6:03 PM   Result Value Ref Range    Troponin I 0.152 (H) 0.000 - 0.026 ng/mL   Procalcitonin    Collection Time: 06/23/24  6:03 PM   Result Value Ref Range    Procalcitonin 78.33 (H) <0.25 ng/mL   Magnesium    Collection Time: 06/23/24  6:03 PM   Result Value Ref  Range    Magnesium 1.9 1.6 - 2.6 mg/dL   Lactic Acid, Plasma    Collection Time: 06/23/24  6:03 PM   Result Value Ref Range    Lactate (Lactic Acid) 1.2 0.5 - 2.2 mmol/L   Drug screen panel, emergency    Collection Time: 06/23/24  7:27 PM   Result Value Ref Range    Benzodiazepines Negative Negatiive    Methadone metabolites Negative Negative    Cocaine (Metab.) Presumptive Positive (A) Negative    Opiate Scrn, Ur Negative Negative    Barbiturate Screen, Ur Negative Negative    Amphetamine Screen, Ur Negative Negative    THC Negative Negative    Phencyclidine Negative Negative    Creatinine, Urine 126.4 15.0 - 325.0 mg/dL    Toxicology Information SEE COMMENT    Toxicology Screen, Urine    Collection Time: 06/23/24  7:27 PM   Result Value Ref Range    Alcohol, Urine <10 <10 mg/dL    Benzodiazepines Negative Negatiive    Methadone metabolites Negative Negative    Cocaine (Metab.) Presumptive Positive (A) Negative    Opiate Scrn, Ur Negative Negative    Barbiturate Screen, Ur Negative Negative    Amphetamine Screen, Ur Negative Negative    THC Negative Negative    Phencyclidine Negative Negative    Creatinine, Urine 126.4 15.0 - 325.0 mg/dL    Toxicology Information SEE COMMENT    Urinalysis, Reflex to Urine Culture Urine, Clean Catch    Collection Time: 06/23/24  7:29 PM    Specimen: Urine   Result Value Ref Range    Specimen UA Urine, Clean Catch     Color, UA Yellow Yellow, Straw, Paige    Appearance, UA Cloudy (A) Clear    pH, UA 6.0 5.0 - 8.0    Specific Gravity, UA 1.015 1.005 - 1.030    Protein, UA 2+ (A) Negative    Glucose, UA Negative Negative    Ketones, UA Negative Negative    Bilirubin (UA) Negative Negative    Occult Blood UA 2+ (A) Negative    Nitrite, UA Positive (A) Negative    Urobilinogen, UA 2.0-3.0 (A) <2.0 EU/dL    Leukocytes, UA 2+ (A) Negative   Urinalysis Microscopic    Collection Time: 06/23/24  7:29 PM   Result Value Ref Range    RBC, UA 18 (H) 0 - 4 /hpf    WBC, UA >100 (H) 0 - 5 /hpf     Bacteria Many (A) None-Occ /hpf    Yeast, UA Rare (A) None    Squam Epithel, UA 25 /hpf    Hyaline Casts, UA 2 (A) 0-1/lpf /lpf    Granular Casts, UA 2 (A) None /lpf    Amorphous, UA Moderate None-Moderate    Microscopic Comment SEE COMMENT    POCT urine pregnancy    Collection Time: 06/23/24  7:39 PM   Result Value Ref Range    POC Preg Test, Ur Negative Negative     Acceptable Yes    Procalcitonin    Collection Time: 06/23/24  9:03 PM   Result Value Ref Range    Procalcitonin 59.13 (H) <0.25 ng/mL   Lactic Acid, Plasma    Collection Time: 06/23/24  9:41 PM   Result Value Ref Range    Lactate (Lactic Acid) 1.4 0.5 - 2.2 mmol/L   Lactic Acid, Plasma    Collection Time: 06/24/24  4:04 AM   Result Value Ref Range    Lactate (Lactic Acid) 0.9 0.5 - 2.2 mmol/L   CBC Auto Differential    Collection Time: 06/24/24  4:04 AM   Result Value Ref Range    WBC 32.94 (H) 3.90 - 12.70 K/uL    RBC 3.53 (L) 4.00 - 5.40 M/uL    Hemoglobin 8.5 (L) 12.0 - 16.0 g/dL    Hematocrit 26.3 (L) 37.0 - 48.5 %    MCV 75 (L) 82 - 98 fL    MCH 24.1 (L) 27.0 - 31.0 pg    MCHC 32.3 32.0 - 36.0 g/dL    RDW 16.4 (H) 11.5 - 14.5 %    Platelets 127 (L) 150 - 450 K/uL    MPV SEE COMMENT 9.2 - 12.9 fL    Immature Granulocytes CANCELED 0.0 - 0.5 %    Immature Grans (Abs) CANCELED 0.00 - 0.04 K/uL    Lymph # CANCELED 1.0 - 4.8 K/uL    Mono # CANCELED 0.3 - 1.0 K/uL    Eos # CANCELED 0.0 - 0.5 K/uL    Baso # CANCELED 0.00 - 0.20 K/uL    nRBC 0 0 /100 WBC    Gran % 81.0 (H) 38.0 - 73.0 %    Lymph % 10.0 (L) 18.0 - 48.0 %    Mono % 4.0 4.0 - 15.0 %    Eosinophil % 0.0 0.0 - 8.0 %    Basophil % 0.0 0.0 - 1.9 %    Bands 5.0 %    Platelet Estimate Decreased (A)     Aniso Slight     Poik Slight     Tear Drop Cells Occasional     Daniella Cells Occasional     Toxic Granulation Present     Large/Giant Platelets Present     Vacuolated Granulocytes Present     Differential Method Automated    Comprehensive Metabolic Panel    Collection Time: 06/24/24   "4:04 AM   Result Value Ref Range    Sodium 131 (L) 136 - 145 mmol/L    Potassium 3.2 (L) 3.5 - 5.1 mmol/L    Chloride 104 95 - 110 mmol/L    CO2 19 (L) 23 - 29 mmol/L    Glucose 135 (H) 70 - 110 mg/dL    BUN 43 (H) 6 - 20 mg/dL    Creatinine 2.0 (H) 0.5 - 1.4 mg/dL    Calcium 7.8 (L) 8.7 - 10.5 mg/dL    Total Protein 6.4 6.0 - 8.4 g/dL    Albumin 1.8 (L) 3.5 - 5.2 g/dL    Total Bilirubin 0.3 0.1 - 1.0 mg/dL    Alkaline Phosphatase 114 55 - 135 U/L    AST 23 10 - 40 U/L    ALT 8 (L) 10 - 44 U/L    eGFR 30 (A) >60 mL/min/1.73 m^2    Anion Gap 8 8 - 16 mmol/L   Magnesium    Collection Time: 06/24/24  4:04 AM   Result Value Ref Range    Magnesium 2.0 1.6 - 2.6 mg/dL      No results found for: "PHENYTOIN", "PHENOBARB", "VALPROATE", "CBMZ"      EXAMINATION    VITALS   Vitals:    06/24/24 1045 06/24/24 1100 06/24/24 1115 06/24/24 1209   BP: (!) 90/54 (!) 89/53 (!) 91/58    BP Location:       Patient Position:       Pulse: 65 62 60    Resp: 18 (!) 22 (!) 22 (!) 27   Temp:       TempSrc:       SpO2: 100% (!) 84% 95%    Weight:       Height:            CONSTITUTIONAL  General Appearance: NAD, unremarkable, age appropriate, lying in bed, overweight, calm    MUSCULOSKELETAL  Muscle Strength and Tone: WNL    Abnormal Involuntary Movements: none observed   Gait and Station: Attempted but unable to assess due to medical acuity     PSYCHIATRIC   Behavior/Cooperation: friendly and cooperative, eye contact normal, calm  Speech:  normal tone, normal rate, normal pitch, normal volume  Language: grossly intact, able to name, able to repeat with spontaneous speech  Mood: "OK ; getting better"  Affect:  congruent with mood and mildly constricted   Associations: intact; no MELISSA  Thought Process: Linear and Future-Oriented / Goal-Directed toward sobriety   Thought Content: denies SI, HI, AH, VH, TH, delusions, or paranoia (no RIS observed)   Sensorium: Awake  Alert and Oriented: to person, place, city, state, month, year, and situation  "   Memory: 3/3 immediate, 2/3 at 5 minutes    Recent: Intact; able to report recent events   Remote: Limited / Intact; Named 2/4 past presidents   Attention/concentration: Intact. Appropriate for age/education. Able to spell w-o-r-l-d & d-l-r-o-w.   Similarities: Intact (difference between apple and orange?)  Abstract reasoning: Limited / Intact  Fund of Knowledge: Named 2/4 past presidents   Insight: Intact  Judgment: Intact    CAM ICU Delirium Assessment - NEGATIVE    Is the patient aware of the biomedical complications associated with substance abuse and mental illness? yes        MEDICAL DECISION MAKING    ASSESSMENT        Opioid Use Disorder, Severe, Dependence (with opiate withdrawal)   Cocaine Use Disorder, Severe, Dependence   Unspecified Trauma and Stressor Related Disorder   Unspecified Depressive Disorder  Unspecified Anxiety Disorder   Unspecified Insomnia        RECOMMENDATIONS       - With reasonable medical certainty, based on a present-state examination, the patient does not currently meet PEC criteria due to not being an imminent threat to self/others and not being gravely disabled 2/2 mental illness at this time.      - Patient would benefit from initiation of Suboxone (discussed risks/benefits/alt vs no treatment with patient) if this is something Hospital Medicine team is comfortable with. If this is initiated inpatient, would start with Suboxone 4 mg - 1 mg when COWS is >10 COWS Score for Opiate Withdrawal ([email or web address]) and monitor COWS q4hrs with a maximum Suboxone dosing of 12mg -16mg on day one of Suboxone initiation. Goal is for COWS to be <5-6.    - If Suboxone dosing is not initiated inpatient, would recommend below-listed OPIATE WITHDRAWAL PRECAUTIONS:    - OPIATE WITHDRAWAL PRECAUTIONS (discussed risks/benefits/alt vs no treatment with patient)  -clonidine 0.1 mg po q4 hours PRN opiate withdrawal anxiety ( hold if SBP<90, DBP<50 or HR <60)  -dicylomine 10 mg q6 hours PRN muscle cramps  "  -loperamide 2 mg q 1 hour PRN diarrhea (max= 16 mg/24 hours)   -methocarbamol 500 mg po q 6 hours PRN muscle spasm and body aches     - This patient would benefit from transfer to a residential substance use / detox & rehab facility once she is no longer deemed necessary for Hospital Medicine admission. At this time she is amenable to substance use treatment. Please have Case Management/SW assist with setting this up.    - Can use Trazodone 50 mg PO QHS PRN for insomnia (discussed risks/benefits/alt vs no treatment with patient).    - Hold off on initiation of all other scheduled psychotropic medications at this time due to patient's hyponatremia. She may benefit from an SSRI however would recommend a continued wash out period from recent and chronic substance use to better assess her psychiatric baseline. This is another reason for pursuing inpatient substance use treatment as patient would be in a controlled setting to facilitate a more thorough and accurate diagnosis.     - Psychotherapy was performed with patient as noted above with a focus on improving depression, anxiety, sleep, trauma response, and polysubstance cessation / total sobriety.     - Patient's most recent resulted labs, imaging, and EKG were reviewed today ; UDS positive for cocaine ; negative for opiates ; possibly due to being fentanyl ; CT Head with "No acute intracranial abnormality detected. No acute cervical fracture.  Prevertebral soft tissue thickening.  Recommend MRI of the cervical spine to evaluate for prevertebral edema, when clinically appropriate.  Mild to moderate spondylitic changes."  Sodium 131, Potassium 3.2, and Creatinine 2.0 today ; UPT negative      - Please have Hospital CM/SW assist patient with mental health & addiction f/u for s/p discharge from this facility.     - Please provide the patient with a Narcan prescription / education prior to discharge (discussed risks/benefits/alt vs no treatment with patient).     - " Patient was instructed to call 911 and/or [phone] and return to the nearest ED if she begins feeling suicidal, homicidal, or gravely disabled (for s/p this hospitalization).       - Thank you for this consult         Total time spent with patient face-to-face and/or managing/coordinating patient's care today (excluding the time spent on psychotherapy): 62 minutes   Time spent on psychotherapy today (as noted above): 18 minutes   Total time for encounter today including psychotherapy: 80 minutes      More than 50% of the time was spent counseling/coordinating care.     Consulting clinician was informed of the encounter and consult note.     Consultation ended: 6/24/2024 at 2:20 PM       STAFF:  Seb Nolen MD  [facility]   6/24/2024

## 2024-06-24 NOTE — SUBJECTIVE & OBJECTIVE
"No past medical history on file.    No past surgical history on file.    Review of patient's allergies indicates:  No Known Allergies    Medications:  Medications Prior to Admission   Medication Sig    busPIRone (BUSPAR) 5 MG Tab Take 5 mg by mouth 2 (two) times daily.    divalproex ER (DEPAKOTE ER) 250 MG 24 hr tablet Take 250 mg by mouth once daily.    ferrous sulfate (FEOSOL) 325 mg (65 mg iron) Tab tablet Take 1 tablet (325 mg total) by mouth 2 (two) times daily.    hydrOXYzine (ATARAX) 50 MG tablet Take 1 tablet (50 mg total) by mouth nightly as needed for Itching (insomnia).    ibuprofen (ADVIL,MOTRIN) 800 MG tablet Take 1 tablet (800 mg total) by mouth every 6 (six) hours as needed for Pain.    naloxone (NARCAN) 4 mg/actuation Spry 4mg by nasal route as needed for opioid overdose; may repeat every 2-3 minutes in alternating nostrils until medical help arrives. Call 911    OLANZapine (ZYPREXA) 5 MG tablet Take 5 mg by mouth every evening.    ondansetron (ZOFRAN-ODT) 8 MG TbDL Take 1 tablet (8 mg total) by mouth every 6 (six) hours as needed (nausea).    sertraline (ZOLOFT) 25 MG tablet Take 25 mg by mouth once daily.     Antibiotics (From admission, onward)      Start     Stop Route Frequency Ordered    06/24/24 0330  piperacillin-tazobactam (ZOSYN) 4.5 g in D5W 100 mL IVPB (MB+)         -- IV Every 12 hours (non-standard times) 06/23/24 1750    06/23/24 2100  mupirocin 2 % ointment         06/28/24 2059 Nasl 2 times daily 06/23/24 1906    06/23/24 1845  vancomycin - pharmacy to dose  (vancomycin IVPB (PEDS and ADULTS))        Placed in "And" Linked Group    -- IV pharmacy to manage frequency 06/23/24 1745          Antifungals (From admission, onward)      None          Antivirals (From admission, onward)      None               There is no immunization history on file for this patient.    Family History    None       Social History     Socioeconomic History    Marital status: Unknown   Tobacco Use    Smoking " status: Every Day     Current packs/day: 1.00     Average packs/day: 1 pack/day for 27.5 years (27.5 ttl pk-yrs)     Types: Cigarettes     Start date: 1997    Smokeless tobacco: Never    Tobacco comments:     Smoking cessation education provided. Pt is a 1 pk/day cigarette smoker x 26 yrs. She states that she cut down to 0.5 pk/day approx. 1 yr ago. Pt declines referral to Ambulatory Smoking Cessation clinic, stating not ready to quit. Handout provided.    Substance and Sexual Activity    Alcohol use: Not Currently    Sexual activity: Not Currently     Social Determinants of Health     Financial Resource Strain: High Risk (4/12/2024)    Overall Financial Resource Strain (CARDIA)     Difficulty of Paying Living Expenses: Very hard   Food Insecurity: Food Insecurity Present (4/12/2024)    Hunger Vital Sign     Worried About Running Out of Food in the Last Year: Often true     Ran Out of Food in the Last Year: Often true   Transportation Needs: Unmet Transportation Needs (4/12/2024)    PRAPARE - Transportation     Lack of Transportation (Medical): Yes     Lack of Transportation (Non-Medical): Yes   Physical Activity: Inactive (4/12/2024)    Exercise Vital Sign     Days of Exercise per Week: 0 days     Minutes of Exercise per Session: 0 min   Stress: Stress Concern Present (4/12/2024)    [employer] [place] [employer] - Occupational Stress Questionnaire     Feeling of Stress : To some extent   Housing Stability: High Risk (4/12/2024)    Housing Stability Vital Sign     Unable to Pay for Housing in the Last Year: Yes     Unstable Housing in the Last Year: Yes     Review of Systems   Constitutional:  Positive for chills, fatigue and fever.   Musculoskeletal:  Positive for arthralgias, neck pain and neck stiffness.   Skin:  Positive for wound.   All other systems reviewed and are negative.    Objective:     Vital Signs (Most Recent):  Temp: 97.7 °F (36.5 °C) (06/24/24 0715)  Pulse: 72 (06/24/24 0815)  Resp: (!)  23 (06/24/24 0920)  BP: 100/65 (06/24/24 0815)  SpO2: 97 % (06/24/24 0815) Vital Signs (24h Range):  Temp:  [97.7 °F (36.5 °C)-98.5 °F (36.9 °C)] 97.7 °F (36.5 °C)  Pulse:  [57-85] 72  Resp:  [11-30] 23  SpO2:  [75 %-100 %] 97 %  BP: ()/(41-79) 100/65     Weight: 69.2 kg (152 lb 8.9 oz)  Body mass index is 29.79 kg/m².    Estimated Creatinine Clearance: 30.2 mL/min (A) (based on SCr of 2 mg/dL (H)).     Physical Exam  Vitals and nursing note reviewed.   Constitutional:       General: She is not in acute distress.     Appearance: Normal appearance. She is not ill-appearing, toxic-appearing or diaphoretic.   HENT:      Head: Normocephalic and atraumatic.   Eyes:      Extraocular Movements: Extraocular movements intact.      Pupils: Pupils are equal, round, and reactive to light.   Cardiovascular:      Rate and Rhythm: Normal rate and regular rhythm.      Heart sounds: Murmur heard.   Abdominal:      Tenderness: There is no abdominal tenderness. There is no guarding.   Neurological:      General: No focal deficit present.      Mental Status: She is alert and oriented to person, place, and time.     Wound, left arm: dry     Significant Labs: CBC:   Recent Labs   Lab 06/23/24  1521 06/23/24  1554 06/24/24  0404   WBC 33.72*  --  32.94*   HGB 8.3*  --  8.5*   HCT 25.9* 25* 26.3*   *  --  127*     Microbiology Results (last 7 days)       Procedure Component Value Units Date/Time    Blood culture x two cultures. Draw prior to antibiotics. [[id number]] Collected: 06/23/24 1537    Order Status: Completed Specimen: Blood from Peripheral, Antecubital, Right Updated: 06/24/24 0917     Blood Culture, Routine Gram stain aer bottle: Gram positive cocci in clusters resembling Staph,      Gram positive rods      Positive results previously called 06/24/2024  06:13      Gram stain stephanie bottle: Gram positive cocci in clusters resembling Staph    Narrative:      Aerobic and anaerobic    Blood culture x two cultures. Draw  prior to antibiotics. [[id number]] Collected: 06/23/24 1537    Order Status: Completed Specimen: Blood from Peripheral, Antecubital, Right Updated: 06/24/24 0916     Blood Culture, Routine Gram stain aer bottle: Gram positive cocci in clusters resembling Staph,      Gram positive rods      Results called to and read back by: Cande NEELY 06/24/2024  06:12      Gram stain stephanie bottle: Gram positive cocci in clusters resembling Staph      Gram positive rods      Positive results previously called 06/24/2024    Narrative:      Aerobic and anaerobic    Rapid Organism ID by PCR (from Blood culture) [[id number]]  (Abnormal) Collected: 06/23/24 1537    Order Status: Completed Updated: 06/24/24 0723     Enterococcus faecalis Not Detected     Enterococcus faecium Not Detected     Listeria monocytogenes Not Detected     Staphylococcus spp. See species for ID     Staphylococcus aureus Detected     Staphylococcus epidermidis Not Detected     Staphylococcus lugdunensis Not Detected     Streptococcus species Not Detected     Streptococcus agalactiae Not Detected     Streptococcus pneumoniae Not Detected     Streptococcus pyogenes Not Detected     Acinetobacter calcoaceticus/baumannii complex Not Detected     Bacteroides fragilis Not Detected     Enterobacterales Not Detected     Enterobacter cloacae complex Not Detected     Escherichia coli Not Detected     Klebsiella aerogenes Not Detected     Klebsiella oxytoca Not Detected     Klebsiella pneumoniae group Not Detected     Proteus Not Detected     Salmonella sp Not Detected     Serratia marcescens Not Detected     Haemophilus influenzae Not Detected     Neisseria meningtidis Not Detected     Pseudomonas aeruginosa Not Detected     Stenotrophomonas maltophilia Not Detected     Candida albicans Not Detected     Candida auris Not Detected     Candida glabrata Not Detected     Candida krusei Not Detected     Candida parapsilosis Not Detected     Candida tropicalis Not Detected      Cryptococcus neoformans/gattii Not Detected     CTX-M (ESBL ) Test Not Applicable     IMP (Carbapenem resistant) Test Not Applicable     KPC resistance gene (Carbapenem resistant) Test Not Applicable     mcr-1  Test Not Applicable     mec A/C  Test Not Applicable     mec A/C and MREJ (MRSA) gene Not Detected     NDM (Carbapenem resistant) Test Not Applicable     OXA-48-like (Carbapenem resistant) Test Not Applicable     van A/B (VRE gene) Test Not Applicable     VIM (Carbapenem resistant) Test Not Applicable    Narrative:      Aerobic and anaerobic    Urine culture [[id number]] Collected: 06/23/24 1929    Order Status: No result Specimen: Urine Updated: 06/23/24 1958            Significant Imaging: I have reviewed all pertinent imaging results/findings within the past 24 hours.

## 2024-06-24 NOTE — PROGRESS NOTES
Pharmacokinetic Assessment Follow Up: IV Vancomycin    Vancomycin serum concentration assessment(s):    The random level was drawn correctly and can be used to guide therapy at this time. The measurement is below the desired definitive target range of 10 to 20 mcg/mL.    Vancomycin Regimen Plan:    Re-dose when the random level is less than 20 mcg/mL, next level to be drawn at 1500 on 6/25    Drug levels (last 3 results):  Recent Labs   Lab Result Units 06/24/24  1511   Vancomycin, Random ug/mL 9.4       Pharmacy will continue to follow and monitor vancomycin.    Please contact pharmacy at extension [phone] for questions regarding this assessment.    Thank you for the consult,   Yudelka Do       Patient brief summary:  Margarita Wiseman is a 49 y.o. female initiated on antimicrobial therapy with IV Vancomycin for treatment of sepsis    The patient's current regimen is pulse dosing    Drug Allergies:   Review of patient's allergies indicates:  No Known Allergies    Actual Body Weight:   69.2kg    Renal Function:   Estimated Creatinine Clearance: 43.1 mL/min (based on SCr of 1.4 mg/dL).,     Dialysis Method (if applicable):  N/A    CBC (last 72 hours):  Recent Labs   Lab Result Units 06/23/24  1521 06/24/24  0404   WBC K/uL 33.72* 32.94*   Hemoglobin g/dL 8.3* 8.5*   Hematocrit % 25.9* 26.3*   Platelets K/uL 119* 127*   Gran % % 78.0* 81.0*   Lymph % % 4.0* 10.0*   Mono % % 9.0 4.0   Eosinophil % % 0.0 0.0   Basophil % % 0.0 0.0   Differential Method  Automated Automated       Metabolic Panel (last 72 hours):  Recent Labs   Lab Result Units 06/23/24  1521 06/23/24  1803 06/23/24  1927 06/23/24  1929 06/24/24  0404 06/24/24  1511   Sodium mmol/L 132*  --   --   --  131* 134*   Potassium mmol/L 3.5  --   --   --  3.2* 3.1*   Chloride mmol/L 97  --   --   --  104 104   CO2 mmol/L 18*  --   --   --  19* 20*   Glucose mg/dL 133*  --   --   --  135* 140*   Glucose, UA   --   --   --  Negative  --   --    BUN mg/dL 50*  --    --   --  43* 34*   Creatinine mg/dL 3.1*  --   --   --  2.0* 1.4   Creatinine, Urine mg/dL  --   --  126.4  126.4  --   --   --    Albumin g/dL 2.2*  --   --   --  1.8*  --    Total Bilirubin mg/dL 0.4  --   --   --  0.3  --    Alkaline Phosphatase U/L 137*  --   --   --  114  --    AST U/L 36  --   --   --  23  --    ALT U/L 10  --   --   --  8*  --    Magnesium mg/dL  --  1.9  --   --  2.0 2.0       Vancomycin Administrations:  vancomycin given in the last 96 hours                     vancomycin 1,500 mg in D5W 250 mL IVPB (Vial-Mate) (mg) 1,500 mg New Bag 06/23/24 1700                    Microbiologic Results:  Microbiology Results (last 7 days)       Procedure Component Value Units Date/Time    Blood culture x two cultures. Draw prior to antibiotics. [[id number]] Collected: 06/23/24 1537    Order Status: Completed Specimen: Blood from Peripheral, Antecubital, Right Updated: 06/24/24 0917     Blood Culture, Routine Gram stain aer bottle: Gram positive cocci in clusters resembling Staph,      Gram positive rods      Positive results previously called 06/24/2024  06:13      Gram stain stephanie bottle: Gram positive cocci in clusters resembling Staph    Narrative:      Aerobic and anaerobic    Blood culture x two cultures. Draw prior to antibiotics. [[id number]] Collected: 06/23/24 1537    Order Status: Completed Specimen: Blood from Peripheral, Antecubital, Right Updated: 06/24/24 0916     Blood Culture, Routine Gram stain aer bottle: Gram positive cocci in clusters resembling Staph,      Gram positive rods      Results called to and read back by: Cande NEELY 06/24/2024  06:12      Gram stain stephanie bottle: Gram positive cocci in clusters resembling Staph      Gram positive rods      Positive results previously called 06/24/2024    Narrative:      Aerobic and anaerobic    Rapid Organism ID by PCR (from Blood culture) [[id number]]  (Abnormal) Collected: 06/23/24 1537    Order Status: Completed Updated: 06/24/24 0723      Enterococcus faecalis Not Detected     Enterococcus faecium Not Detected     Listeria monocytogenes Not Detected     Staphylococcus spp. See species for ID     Staphylococcus aureus Detected     Staphylococcus epidermidis Not Detected     Staphylococcus lugdunensis Not Detected     Streptococcus species Not Detected     Streptococcus agalactiae Not Detected     Streptococcus pneumoniae Not Detected     Streptococcus pyogenes Not Detected     Acinetobacter calcoaceticus/baumannii complex Not Detected     Bacteroides fragilis Not Detected     Enterobacterales Not Detected     Enterobacter cloacae complex Not Detected     Escherichia coli Not Detected     Klebsiella aerogenes Not Detected     Klebsiella oxytoca Not Detected     Klebsiella pneumoniae group Not Detected     Proteus Not Detected     Salmonella sp Not Detected     Serratia marcescens Not Detected     Haemophilus influenzae Not Detected     Neisseria meningtidis Not Detected     Pseudomonas aeruginosa Not Detected     Stenotrophomonas maltophilia Not Detected     Candida albicans Not Detected     Candida auris Not Detected     Candida glabrata Not Detected     Candida krusei Not Detected     Candida parapsilosis Not Detected     Candida tropicalis Not Detected     Cryptococcus neoformans/gattii Not Detected     CTX-M (ESBL ) Test Not Applicable     IMP (Carbapenem resistant) Test Not Applicable     KPC resistance gene (Carbapenem resistant) Test Not Applicable     mcr-1  Test Not Applicable     mec A/C  Test Not Applicable     mec A/C and MREJ (MRSA) gene Not Detected     NDM (Carbapenem resistant) Test Not Applicable     OXA-48-like (Carbapenem resistant) Test Not Applicable     van A/B (VRE gene) Test Not Applicable     VIM (Carbapenem resistant) Test Not Applicable    Narrative:      Aerobic and anaerobic    Urine culture [[id number]] Collected: 06/23/24 1929    Order Status: No result Specimen: Urine Updated: 06/23/24 1958

## 2024-06-24 NOTE — ASSESSMENT & PLAN NOTE
-at admission CT head and CT C-spine noted no acute cervical fracture.  Prevertebral soft tissue thickening.  Recommend MRI of the cervical spine to evaluate for prevertebral edema, when clinically appropriate.  Mild to moderate spondylitic changes.  -MRI C-spine pending

## 2024-06-24 NOTE — ASSESSMENT & PLAN NOTE
-at admission CT head and CT C-spine noted no acute cervical fracture.  Prevertebral soft tissue thickening.  Recommend MRI of the cervical spine to evaluate for prevertebral edema, when clinically appropriate.  Mild to moderate spondylitic changes.  -MRI C-spine pending >>> please follow
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
## 2024-06-24 NOTE — ASSESSMENT & PLAN NOTE
-in setting of septic shock/hypotension and component of dehydration due to poor PO intake   -admission SCr 3.1   -baseline 0.8-1.3  -s/p IV fluid resuscitation in the ED   -continue maintenance IV fluid hydration   -avoid nephrotoxins and hypotension as able, renally dose medications  -trend labs, address/replete electrolytes as indicated

## 2024-06-24 NOTE — ASSESSMENT & PLAN NOTE
50 yo woman with history of untreated MSSA bacteremia, now readmitted with bacteremia and sepsis related to IDU  - polymicrobial?  - likely IE with septic pulmonary emboli  - MSSA by BCID  - change to oxacillin (MSSA)  - continue vancomycin (GPR - Bacillus?)  - f/u MRI to r/o epidural abscess  - repeat blood cultures till she clears the bacteremia

## 2024-06-24 NOTE — ASSESSMENT & PLAN NOTE
"Polysubstance abuse   PTSD and depression   Tobacco abuse   -longstanding history of polysubstance abuse, IV drug abuse, PTSD, anxiety, depression  -per Care everywhere chart review multiple antidepressants and psychiatric meds of varying doses however patient reports has not been taking   -Psych consulted for assistance   -she does endorse some interest in rehab as "she is tired of being sick" however she is worried about her adult daughter who is also un-housed and would like to stay with her, she denies drug abuse in daughter and that she has "been out on the street with me to try and help me"  -opioid withdrawal supportive care is indicated   -nicotine patch ordered  -fall and delirium precautions  -monitor  "

## 2024-06-24 NOTE — HOSPITAL COURSE
Patient admitted to ICU on empiric antibiotics and pressors meeting SIRS criteria for sepsis.  MRI of the neck and shoulder were ordered, showed significant DJD and multiple areas of disc bulging as well as a layer of prevertebral edema in front of the upper cervical levels without evidence of an abscess.  There was no marrow replacement or marrow edema seen.  Indium scan ordered to evaluate for discitis and other potential septic emboli with 'no scintigraphic finding to suggest source of infection. ' Indium scan surprisingly negative given known endocarditis and septic emboli to the lungs.    TTE was done and was notable for the presence of a moderate sized vegetation on the tricuspid valve consistent with endocarditis.  ID was consulted to follow - patient with endocarditis, septic emboli to the lungs and possible cervical spine discitis.  Blood cultures 6/23 positive with MSSA.  Culture repeated 6/26 also turned positive with MSSA on the following day, and ertapenem was added for synergy with the oxacillin by ID.    Patient remained in ICU due to recurrent episodes of rigors with pleuritic chest pain and shortness of breath.  Critical care service was consulted as she was intermittently hypoxemic with decreased mental status and requiring NRB from time to time.  Her vital signs improved gradually.  Access was an issue as she had an IJ in her neck and persistent bacteremia.  Culture from 6/27 showed no growth at 48 hours and a PICC line was placed, IJ discontinued.  She was transferred to the floor on 6/29 but remained in ICU due to lack of beds on the floor until 6/30.    MRI of right shoulder was done due to her severe pain and reduced range of motion.  Results showed severe synovitis of the glenohumeral joint and subacromial subdeltoid bursitis.  Findings felt likely to represent a septic joint. Joint aspirated 7/2 by ortho. Cultures pending. Not enough fluid for cell count. Had fevers overnight so blood  cultures repeated. Hb 6.7 so 1 unit transfused 7/2. Blood cx and shoulder joint aspiration neg growth to date. Has been afebrile. CM looking into placement options. Plan per ID - tentatively, planning on a two week course of double coverage (oxacillin and ertapenem) followed by an additional 4-6 weeks of oxacillin. Seen by ID 7/8 and abx changed to cefazolin anticipating 6 weeks.

## 2024-06-24 NOTE — ASSESSMENT & PLAN NOTE
-chronic, per chart review HCV Ab positive - first positive 6/20/2019 and has not had treatment  -LFTs preserved but noted low platelet count  -will need referral to hepatology at discharge
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
## 2024-06-25 PROBLEM — D69.6 THROMBOCYTOPENIA: Status: RESOLVED | Noted: 2024-04-12 | Resolved: 2024-06-25

## 2024-06-25 PROBLEM — I33.0 ACUTE BACTERIAL ENDOCARDITIS: Status: ACTIVE | Noted: 2024-06-25

## 2024-06-25 LAB
ALBUMIN SERPL BCP-MCNC: 1.6 G/DL (ref 3.5–5.2)
ALP SERPL-CCNC: 90 U/L (ref 55–135)
ALT SERPL W/O P-5'-P-CCNC: 9 U/L (ref 10–44)
ANION GAP SERPL CALC-SCNC: 6 MMOL/L (ref 8–16)
ANISOCYTOSIS BLD QL SMEAR: SLIGHT
ASCENDING AORTA: 2.1 CM
AST SERPL-CCNC: 15 U/L (ref 10–40)
AV INDEX (PROSTH): 0.59
AV LVOT MEAN GRADIENT: 3 MMHG
AV MEAN GRADIENT: 7 MMHG
AV PEAK GRADIENT: 13 MMHG
AV VALVE AREA BY VELOCITY RATIO: 1.67 CM²
AV VALVE AREA: 1.5 CM²
AV VELOCITY RATIO: 0.66
BACTERIA UR CULT: NORMAL
BASOPHILS # BLD AUTO: ABNORMAL K/UL (ref 0–0.2)
BASOPHILS NFR BLD: 0 % (ref 0–1.9)
BILIRUB SERPL-MCNC: 0.2 MG/DL (ref 0.1–1)
BSA FOR ECHO PROCEDURE: 1.71 M2
BUN SERPL-MCNC: 23 MG/DL (ref 6–20)
BURR CELLS BLD QL SMEAR: ABNORMAL
CALCIUM SERPL-MCNC: 7.8 MG/DL (ref 8.7–10.5)
CHLORIDE SERPL-SCNC: 114 MMOL/L (ref 95–110)
CO2 SERPL-SCNC: 18 MMOL/L (ref 23–29)
CREAT SERPL-MCNC: 1 MG/DL (ref 0.5–1.4)
CV ECHO LV RWT: 0.36 CM
DIFFERENTIAL METHOD BLD: ABNORMAL
DOP CALC AO PEAK VEL: 1.8 M/S
DOP CALC AO VTI: 38.9 CM
DOP CALC LVOT AREA: 2.5 CM2
DOP CALC LVOT DIAMETER: 1.8 CM
DOP CALC LVOT PEAK VEL: 1.18 M/S
DOP CALC LVOT STROKE VOLUME: 58.5 CM3
DOP CALCLVOT PEAK VEL VTI: 23 CM
E WAVE DECELERATION TIME: 2.6 MSEC
ECHO LV POSTERIOR WALL: 0.8 CM (ref 0.6–1.1)
EJECTION FRACTION: 65 %
EOSINOPHIL # BLD AUTO: ABNORMAL K/UL (ref 0–0.5)
EOSINOPHIL NFR BLD: 0 % (ref 0–8)
ERYTHROCYTE [DISTWIDTH] IN BLOOD BY AUTOMATED COUNT: 16.6 % (ref 11.5–14.5)
EST. GFR  (NO RACE VARIABLE): >60 ML/MIN/1.73 M^2
FRACTIONAL SHORTENING: 34 % (ref 28–44)
GIANT PLATELETS BLD QL SMEAR: PRESENT
GLOBAL LONGITUIDAL STRAIN: 19 %
GLUCOSE SERPL-MCNC: 143 MG/DL (ref 70–110)
HCT VFR BLD AUTO: 26 % (ref 37–48.5)
HGB BLD-MCNC: 8.5 G/DL (ref 12–16)
IMM GRANULOCYTES # BLD AUTO: ABNORMAL K/UL (ref 0–0.04)
IMM GRANULOCYTES NFR BLD AUTO: ABNORMAL % (ref 0–0.5)
INTERVENTRICULAR SEPTUM: 0.8 CM (ref 0.6–1.1)
IVC DIAMETER: 1.7 CM
LA MAJOR: 4.8 CM
LA MINOR: 4.5 CM
LA WIDTH: 3.3 CM
LAAS-AP2: 14 CM2
LAAS-AP4: 13 CM2
LEFT ATRIUM SIZE: 3 CM
LEFT ATRIUM VOLUME INDEX MOD: 18.7 ML/M2
LEFT ATRIUM VOLUME INDEX: 23.5 ML/M2
LEFT ATRIUM VOLUME MOD: 31 CM3
LEFT ATRIUM VOLUME: 39.09 CM3
LEFT INTERNAL DIMENSION IN SYSTOLE: 2.9 CM (ref 2.1–4)
LEFT VENTRICLE MASS INDEX: 66 G/M2
LEFT VENTRICULAR INTERNAL DIMENSION IN DIASTOLE: 4.4 CM (ref 3.5–6)
LEFT VENTRICULAR MASS: 109.44 G
LEFT VENTRICULAR OUTFLOW TRACT PEAK GRADIENT REST: 6 MMHG
LEFT VENTRICULAR POSTERIOR WALL IN END SYSTOLE: 0.07 CM
LVOT MG: 0.8 MMHG
LYMPHOCYTES # BLD AUTO: ABNORMAL K/UL (ref 1–4.8)
LYMPHOCYTES NFR BLD: 4 % (ref 18–48)
Lab: 3.3 MMHG
MAGNESIUM SERPL-MCNC: 1.9 MG/DL (ref 1.6–2.6)
MCH RBC QN AUTO: 24.1 PG (ref 27–31)
MCHC RBC AUTO-ENTMCNC: 32.7 G/DL (ref 32–36)
MCV RBC AUTO: 74 FL (ref 82–98)
MITRAL VALVE PEAK VELOCITY: 1 CM/S
MONOCYTES # BLD AUTO: ABNORMAL K/UL (ref 0.3–1)
MONOCYTES NFR BLD: 7 % (ref 4–15)
MV PEAK A VEL: 0.65 M/S
MV STENOSIS PRESSURE HALF TIME: 108 MS
MV VALVE AREA P 1/2 METHOD: 2.04 CM2
NEUTROPHILS NFR BLD: 87 % (ref 38–73)
NEUTS BAND NFR BLD MANUAL: 2 %
NRBC BLD-RTO: 0 /100 WBC
OHS QRS DURATION: 82 MS
OHS QRS DURATION: 84 MS
OHS QTC CALCULATION: 431 MS
OHS QTC CALCULATION: 436 MS
PISA MRMAX VEL: 3.2 M/S
PISA TR MAX VEL: 2.9 M/S
PLATELET # BLD AUTO: 166 K/UL (ref 150–450)
PLATELET BLD QL SMEAR: ABNORMAL
PMV BLD AUTO: ABNORMAL FL (ref 9.2–12.9)
POIKILOCYTOSIS BLD QL SMEAR: SLIGHT
POTASSIUM SERPL-SCNC: 3.7 MMOL/L (ref 3.5–5.1)
PROT SERPL-MCNC: 6.1 G/DL (ref 6–8.4)
PV MEAN GRADIENT: 3 MMHG
PV PEAK GRADIENT: 5 MMHG
PV PEAK VELOCITY: 1.09 M/S
PV VTI: 28.1 CM
RA PRESSURE ESTIMATED: 3 MMHG
RA WIDTH: 3.7 CM
RBC # BLD AUTO: 3.52 M/UL (ref 4–5.4)
RIGHT VENTRICLE DIASTOLIC MID DIMENSION: 1.5 CM
RV TB RVSP: 6 MMHG
SCHISTOCYTES BLD QL SMEAR: PRESENT
SINUS: 2 CM
SODIUM SERPL-SCNC: 138 MMOL/L (ref 136–145)
STJ: 2.2 CM
TDI LATERAL: 0.13 M/S
TDI SEPTAL: 0.14 M/S
TDI: 0.14 M/S
TOXIC GRANULES BLD QL SMEAR: PRESENT
TR MAX PG: 34 MMHG
TV REST PULMONARY ARTERY PRESSURE: 37 MMHG
VANCOMYCIN SERPL-MCNC: 8.4 UG/ML
WBC # BLD AUTO: 30.41 K/UL (ref 3.9–12.7)
WBC TOXIC VACUOLES BLD QL SMEAR: PRESENT
Z-SCORE OF LEFT VENTRICULAR DIMENSION IN END DIASTOLE: -0.55
Z-SCORE OF LEFT VENTRICULAR DIMENSION IN END SYSTOLE: 0.05
Z-SCORE OF LEFT VENTRICULAR POSTERIOR WALL IN END SYSTOLE: -19.24

## 2024-06-25 PROCEDURE — 27000646 HC AEROBIKA DEVICE

## 2024-06-25 PROCEDURE — 94799 UNLISTED PULMONARY SVC/PX: CPT | Mod: XB

## 2024-06-25 PROCEDURE — 83735 ASSAY OF MAGNESIUM: CPT | Performed by: NURSE PRACTITIONER

## 2024-06-25 PROCEDURE — 93010 ELECTROCARDIOGRAM REPORT: CPT | Mod: ,,, | Performed by: INTERNAL MEDICINE

## 2024-06-25 PROCEDURE — 94761 N-INVAS EAR/PLS OXIMETRY MLT: CPT

## 2024-06-25 PROCEDURE — 94664 DEMO&/EVAL PT USE INHALER: CPT

## 2024-06-25 PROCEDURE — 25000003 PHARM REV CODE 250: Performed by: INTERNAL MEDICINE

## 2024-06-25 PROCEDURE — 20000000 HC ICU ROOM

## 2024-06-25 PROCEDURE — 80202 ASSAY OF VANCOMYCIN: CPT | Performed by: HOSPITALIST

## 2024-06-25 PROCEDURE — 99900035 HC TECH TIME PER 15 MIN (STAT)

## 2024-06-25 PROCEDURE — 36415 COLL VENOUS BLD VENIPUNCTURE: CPT | Performed by: HOSPITALIST

## 2024-06-25 PROCEDURE — 93005 ELECTROCARDIOGRAM TRACING: CPT

## 2024-06-25 PROCEDURE — S4991 NICOTINE PATCH NONLEGEND: HCPCS | Performed by: NURSE PRACTITIONER

## 2024-06-25 PROCEDURE — 85025 COMPLETE CBC W/AUTO DIFF WBC: CPT | Performed by: NURSE PRACTITIONER

## 2024-06-25 PROCEDURE — 25000003 PHARM REV CODE 250: Performed by: HOSPITALIST

## 2024-06-25 PROCEDURE — 63600175 PHARM REV CODE 636 W HCPCS: Performed by: HOSPITALIST

## 2024-06-25 PROCEDURE — 25000003 PHARM REV CODE 250: Performed by: NURSE PRACTITIONER

## 2024-06-25 PROCEDURE — 63600175 PHARM REV CODE 636 W HCPCS: Performed by: INTERNAL MEDICINE

## 2024-06-25 PROCEDURE — 25000003 PHARM REV CODE 250

## 2024-06-25 PROCEDURE — 80053 COMPREHEN METABOLIC PANEL: CPT | Performed by: NURSE PRACTITIONER

## 2024-06-25 RX ORDER — HYDROMORPHONE HYDROCHLORIDE 2 MG/ML
2 INJECTION, SOLUTION INTRAMUSCULAR; INTRAVENOUS; SUBCUTANEOUS EVERY 4 HOURS PRN
Status: DISCONTINUED | OUTPATIENT
Start: 2024-06-25 | End: 2024-06-25

## 2024-06-25 RX ORDER — ATROPINE SULFATE 0.1 MG/ML
INJECTION INTRAVENOUS
Status: DISPENSED
Start: 2024-06-25 | End: 2024-06-25

## 2024-06-25 RX ORDER — TRAZODONE HYDROCHLORIDE 50 MG/1
50 TABLET ORAL NIGHTLY PRN
Status: DISCONTINUED | OUTPATIENT
Start: 2024-06-25 | End: 2024-07-09 | Stop reason: HOSPADM

## 2024-06-25 RX ORDER — MORPHINE SULFATE 2 MG/ML
10 INJECTION, SOLUTION INTRAMUSCULAR; INTRAVENOUS EVERY 4 HOURS PRN
Status: DISCONTINUED | OUTPATIENT
Start: 2024-06-25 | End: 2024-06-30

## 2024-06-25 RX ORDER — SODIUM CHLORIDE, SODIUM LACTATE, POTASSIUM CHLORIDE, CALCIUM CHLORIDE 600; 310; 30; 20 MG/100ML; MG/100ML; MG/100ML; MG/100ML
INJECTION, SOLUTION INTRAVENOUS CONTINUOUS
Status: DISCONTINUED | OUTPATIENT
Start: 2024-06-25 | End: 2024-06-27

## 2024-06-25 RX ORDER — CLONIDINE HYDROCHLORIDE 0.1 MG/1
0.1 TABLET ORAL EVERY 4 HOURS PRN
Status: DISCONTINUED | OUTPATIENT
Start: 2024-06-25 | End: 2024-06-28

## 2024-06-25 RX ORDER — ENOXAPARIN SODIUM 100 MG/ML
40 INJECTION SUBCUTANEOUS EVERY 24 HOURS
Status: DISCONTINUED | OUTPATIENT
Start: 2024-06-25 | End: 2024-07-09 | Stop reason: HOSPADM

## 2024-06-25 RX ORDER — ATROPINE SULFATE 0.1 MG/ML
0.5 INJECTION INTRAVENOUS EVERY 5 MIN PRN
Status: DISCONTINUED | OUTPATIENT
Start: 2024-06-25 | End: 2024-06-29

## 2024-06-25 RX ORDER — POTASSIUM CHLORIDE 20 MEQ/1
40 TABLET, EXTENDED RELEASE ORAL ONCE
Status: COMPLETED | OUTPATIENT
Start: 2024-06-25 | End: 2024-06-25

## 2024-06-25 RX ORDER — METHOCARBAMOL 500 MG/1
500 TABLET, FILM COATED ORAL 4 TIMES DAILY PRN
Status: DISCONTINUED | OUTPATIENT
Start: 2024-06-25 | End: 2024-07-09 | Stop reason: HOSPADM

## 2024-06-25 RX ORDER — LOPERAMIDE HYDROCHLORIDE 2 MG/1
2 CAPSULE ORAL 4 TIMES DAILY PRN
Status: DISCONTINUED | OUTPATIENT
Start: 2024-06-25 | End: 2024-07-09 | Stop reason: HOSPADM

## 2024-06-25 RX ADMIN — SODIUM CHLORIDE, POTASSIUM CHLORIDE, SODIUM LACTATE AND CALCIUM CHLORIDE: 600; 310; 30; 20 INJECTION, SOLUTION INTRAVENOUS at 09:06

## 2024-06-25 RX ADMIN — MORPHINE SULFATE 10 MG: 2 INJECTION, SOLUTION INTRAMUSCULAR; INTRAVENOUS at 03:06

## 2024-06-25 RX ADMIN — BACLOFEN 10 MG: 5 TABLET ORAL at 08:06

## 2024-06-25 RX ADMIN — METHOCARBAMOL 500 MG: 500 TABLET ORAL at 09:06

## 2024-06-25 RX ADMIN — OXACILLIN 2 G: 2 INJECTION, POWDER, FOR SOLUTION INTRAMUSCULAR; INTRAVENOUS at 03:06

## 2024-06-25 RX ADMIN — SODIUM CHLORIDE: 9 INJECTION, SOLUTION INTRAVENOUS at 12:06

## 2024-06-25 RX ADMIN — SODIUM CHLORIDE: 9 INJECTION, SOLUTION INTRAVENOUS at 06:06

## 2024-06-25 RX ADMIN — MUPIROCIN: 20 OINTMENT TOPICAL at 09:06

## 2024-06-25 RX ADMIN — POTASSIUM CHLORIDE 40 MEQ: 1500 TABLET, EXTENDED RELEASE ORAL at 06:06

## 2024-06-25 RX ADMIN — OXACILLIN 2 G: 2 INJECTION, POWDER, FOR SOLUTION INTRAMUSCULAR; INTRAVENOUS at 09:06

## 2024-06-25 RX ADMIN — MORPHINE SULFATE 10 MG: 2 INJECTION, SOLUTION INTRAMUSCULAR; INTRAVENOUS at 08:06

## 2024-06-25 RX ADMIN — OXACILLIN 2 G: 2 INJECTION, POWDER, FOR SOLUTION INTRAMUSCULAR; INTRAVENOUS at 11:06

## 2024-06-25 RX ADMIN — OXACILLIN 2 G: 2 INJECTION, POWDER, FOR SOLUTION INTRAMUSCULAR; INTRAVENOUS at 12:06

## 2024-06-25 RX ADMIN — VANCOMYCIN HYDROCHLORIDE 1000 MG: 1 INJECTION, POWDER, LYOPHILIZED, FOR SOLUTION INTRAVENOUS at 05:06

## 2024-06-25 RX ADMIN — BACLOFEN 10 MG: 5 TABLET ORAL at 01:06

## 2024-06-25 RX ADMIN — ENOXAPARIN SODIUM 40 MG: 40 INJECTION SUBCUTANEOUS at 05:06

## 2024-06-25 RX ADMIN — MUPIROCIN: 20 OINTMENT TOPICAL at 08:06

## 2024-06-25 RX ADMIN — BACLOFEN 10 MG: 5 TABLET ORAL at 09:06

## 2024-06-25 RX ADMIN — MORPHINE SULFATE 10 MG: 2 INJECTION, SOLUTION INTRAMUSCULAR; INTRAVENOUS at 11:06

## 2024-06-25 RX ADMIN — NOREPINEPHRINE BITARTRATE 0.08 MCG/KG/MIN: 4 INJECTION, SOLUTION INTRAVENOUS at 01:06

## 2024-06-25 RX ADMIN — DOCUSATE SODIUM AND SENNOSIDES 1 TABLET: 8.6; 5 TABLET, FILM COATED ORAL at 09:06

## 2024-06-25 RX ADMIN — OXACILLIN 2 G: 2 INJECTION, POWDER, FOR SOLUTION INTRAMUSCULAR; INTRAVENOUS at 04:06

## 2024-06-25 RX ADMIN — SODIUM CHLORIDE, POTASSIUM CHLORIDE, SODIUM LACTATE AND CALCIUM CHLORIDE: 600; 310; 30; 20 INJECTION, SOLUTION INTRAVENOUS at 08:06

## 2024-06-25 RX ADMIN — AMMONIUM LACTATE: 120 LOTION TOPICAL at 08:06

## 2024-06-25 RX ADMIN — NOREPINEPHRINE BITARTRATE 0.06 MCG/KG/MIN: 4 INJECTION, SOLUTION INTRAVENOUS at 02:06

## 2024-06-25 RX ADMIN — AMMONIUM LACTATE: 120 LOTION TOPICAL at 09:06

## 2024-06-25 RX ADMIN — NICOTINE 1 PATCH: 14 PATCH TRANSDERMAL at 09:06

## 2024-06-25 RX ADMIN — OXACILLIN 2 G: 2 INJECTION, POWDER, FOR SOLUTION INTRAMUSCULAR; INTRAVENOUS at 08:06

## 2024-06-25 NOTE — NURSING
Complains of unrelieved pain under left and right sternum. Unable to express quality of pain.    Obtained 12 lead, Notified MD, collected labs    Gave sublingual nitro x1 pt states relief of pain after five minutes. MD notified.

## 2024-06-25 NOTE — ASSESSMENT & PLAN NOTE
"-at admission CT head and CT C-spine noted no acute cervical fracture.  Prevertebral soft tissue thickening.  Recommended MRI of the cervical spine to evaluate for prevertebral edema.  Mild to moderate spondylitic changes.  -MRI done:  "No marrow replacement or marrow edema seen.  There is no evidence of neoplasm.  The cord is normal in course, caliber, and signal.  There is a thin layer of prevertebral edema in front of upper cervical levels.  Between C3 and C6 there is mild-moderate DJD.  There is minimal DJD elsewhere.  Post contrast images demonstrate diffuse enhancement of the prevertebral soft tissues but no evidence of an abscess collection."  -ID consulted, likely needs additional imaging to rule out discitis such as gallium scan.  "

## 2024-06-25 NOTE — PROGRESS NOTES
Pharmacokinetic Assessment Follow Up: IV Vancomycin    Vancomycin serum concentration assessment(s):    The random level was drawn correctly and can be used to guide therapy at this time. The measurement is below the desired definitive target range of 10 to 20 mcg/mL.    Vancomycin Regimen Plan:    Give vancomycin 1000 mg (1 mg/kg) x 1 dose  Re-dose when the random level is less than 20 mcg/mL, next level to be drawn at 04:30 on 6/26/2024    Drug levels (last 3 results):  Recent Labs   Lab Result Units 06/24/24  1511 06/25/24  1513   Vancomycin, Random ug/mL 9.4 8.4       Pharmacy will continue to follow and monitor vancomycin.    Please contact pharmacy at extension [phone] for questions regarding this assessment.    Thank you for the consult,   Yamilex León       Patient brief summary:  Margarita Wiseman is a 49 y.o. female initiated on antimicrobial therapy with IV Vancomycin for treatment of sepsis    The patient's current regimen is pulse dosing    Drug Allergies:   Review of patient's allergies indicates:  No Known Allergies    Actual Body Weight:   68.9 kg    Renal Function:   Estimated Creatinine Clearance: 60.3 mL/min (based on SCr of 1 mg/dL).,     Dialysis Method (if applicable):  N/A    CBC (last 72 hours):  Recent Labs   Lab Result Units 06/23/24  1521 06/24/24  0404 06/25/24  0407   WBC K/uL 33.72* 32.94* 30.41*   Hemoglobin g/dL 8.3* 8.5* 8.5*   Hematocrit % 25.9* 26.3* 26.0*   Platelets K/uL 119* 127* 166   Gran % % 78.0* 81.0* 87.0*   Lymph % % 4.0* 10.0* 4.0*   Mono % % 9.0 4.0 7.0   Eosinophil % % 0.0 0.0 0.0   Basophil % % 0.0 0.0 0.0   Differential Method  Automated Automated Automated       Metabolic Panel (last 72 hours):  Recent Labs   Lab Result Units 06/23/24  1521 06/23/24  1803 06/23/24  1927 06/23/24  1929 06/24/24  0404 06/24/24  1511 06/24/24 2206 06/25/24  0407   Sodium mmol/L 132*  --   --   --  131* 134* 135* 138   Potassium mmol/L 3.5  --   --   --  3.2* 3.1* 3.4* 3.7   Chloride  mmol/L 97  --   --   --  104 104 109 114*   CO2 mmol/L 18*  --   --   --  19* 20* 19* 18*   Glucose mg/dL 133*  --   --   --  135* 140* 155* 143*   Glucose, UA   --   --   --  Negative  --   --   --   --    BUN mg/dL 50*  --   --   --  43* 34* 28* 23*   Creatinine mg/dL 3.1*  --   --   --  2.0* 1.4 1.2 1.0   Creatinine, Urine mg/dL  --   --  126.4  126.4  --   --   --   --   --    Albumin g/dL 2.2*  --   --   --  1.8*  --   --  1.6*   Total Bilirubin mg/dL 0.4  --   --   --  0.3  --   --  0.2   Alkaline Phosphatase U/L 137*  --   --   --  114  --   --  90   AST U/L 36  --   --   --  23  --   --  15   ALT U/L 10  --   --   --  8*  --   --  9*   Magnesium mg/dL  --  1.9  --   --  2.0 2.0  --  1.9       Vancomycin Administrations:  vancomycin given in the last 96 hours                     vancomycin (VANCOCIN) 1,000 mg in D5W 250 mL IVPB (Vial-Mate) (mg) 1,000 mg New Bag 06/24/24 1654    vancomycin 1,500 mg in D5W 250 mL IVPB (Vial-Mate) (mg) 1,500 mg New Bag 06/23/24 1700                    Microbiologic Results:  Microbiology Results (last 7 days)       Procedure Component Value Units Date/Time    Blood culture [[id number]]     Order Status: Sent Specimen: Blood     Blood culture [[id number]]     Order Status: Canceled Specimen: Blood     Urine culture [[id number]] Collected: 06/23/24 1929    Order Status: Completed Specimen: Urine Updated: 06/25/24 1334     Urine Culture, Routine No significant growth    Narrative:      Specimen Source->Urine    Blood culture [[id number]]     Order Status: Canceled Specimen: Blood     Blood culture x two cultures. Draw prior to antibiotics. [[id number]]  (Abnormal) Collected: 06/23/24 1537    Order Status: Completed Specimen: Blood from Peripheral, Antecubital, Right Updated: 06/25/24 0748     Blood Culture, Routine Gram stain aer bottle: Gram positive cocci in clusters resembling Staph,      Gram positive rods      Positive results previously called 06/24/2024  06:13      Gram  stain stephanie bottle: Gram positive cocci in clusters resembling Staph      STAPHYLOCOCCUS AUREUS  ID consult required at [facility].  For susceptibility see order #[id number]      Narrative:      Aerobic and anaerobic    Blood culture x two cultures. Draw prior to antibiotics. [[id number]]  (Abnormal) Collected: 06/23/24 1537    Order Status: Completed Specimen: Blood from Peripheral, Antecubital, Right Updated: 06/25/24 0745     Blood Culture, Routine Gram stain aer bottle: Gram positive cocci in clusters resembling Staph,      Gram positive rods      Results called to and read back by: Cande NEELY 06/24/2024  06:12      Gram stain stephanie bottle: Gram positive cocci in clusters resembling Staph      Gram positive rods      Positive results previously called 06/24/2024      STAPHYLOCOCCUS AUREUS  Susceptibility pending  ID consult required at [facility].      Narrative:      Aerobic and anaerobic    Rapid Organism ID by PCR (from Blood culture) [[id number]]  (Abnormal) Collected: 06/23/24 1537    Order Status: Completed Updated: 06/24/24 0723     Enterococcus faecalis Not Detected     Enterococcus faecium Not Detected     Listeria monocytogenes Not Detected     Staphylococcus spp. See species for ID     Staphylococcus aureus Detected     Staphylococcus epidermidis Not Detected     Staphylococcus lugdunensis Not Detected     Streptococcus species Not Detected     Streptococcus agalactiae Not Detected     Streptococcus pneumoniae Not Detected     Streptococcus pyogenes Not Detected     Acinetobacter calcoaceticus/baumannii complex Not Detected     Bacteroides fragilis Not Detected     Enterobacterales Not Detected     Enterobacter cloacae complex Not Detected     Escherichia coli Not Detected     Klebsiella aerogenes Not Detected     Klebsiella oxytoca Not Detected     Klebsiella pneumoniae group Not Detected     Proteus Not Detected     Salmonella sp Not Detected      Serratia marcescens Not Detected     Haemophilus influenzae Not Detected     Neisseria meningtidis Not Detected     Pseudomonas aeruginosa Not Detected     Stenotrophomonas maltophilia Not Detected     Candida albicans Not Detected     Candida auris Not Detected     Candida glabrata Not Detected     Candida krusei Not Detected     Candida parapsilosis Not Detected     Candida tropicalis Not Detected     Cryptococcus neoformans/gattii Not Detected     CTX-M (ESBL ) Test Not Applicable     IMP (Carbapenem resistant) Test Not Applicable     KPC resistance gene (Carbapenem resistant) Test Not Applicable     mcr-1  Test Not Applicable     mec A/C  Test Not Applicable     mec A/C and MREJ (MRSA) gene Not Detected     NDM (Carbapenem resistant) Test Not Applicable     OXA-48-like (Carbapenem resistant) Test Not Applicable     van A/B (VRE gene) Test Not Applicable     VIM (Carbapenem resistant) Test Not Applicable    Narrative:      Aerobic and anaerobic

## 2024-06-25 NOTE — PLAN OF CARE
SW tried to complete initial discharge assessment on patient, SW along with nurse and PCT at bedside unable to arouse patient to participate. Patient upright in bed with eye closed not answering questions. SW will continue to follow for discharge needs.

## 2024-06-25 NOTE — SUBJECTIVE & OBJECTIVE
Interval History: Patient in terrible neck pain and still requiring pressors.  MRI reviewed.  She reports the Dilaudid makes the pain worse.  We discussed her likely discitis and long term treatment with antibiotics.    Review of Systems   Constitutional:  Negative for chills and fever.   Respiratory:  Negative for cough and shortness of breath.    Cardiovascular:  Negative for chest pain and palpitations.   Musculoskeletal:  Positive for neck pain.        Right shoulder pain     Objective:     Vital Signs (Most Recent):  Temp: 97.8 °F (36.6 °C) (06/25/24 0715)  Pulse: (!) 56 (06/25/24 0815)  Resp: (!) 36 (06/25/24 0815)  BP: 127/60 (06/25/24 0815)  SpO2: 100 % (06/25/24 0815) Vital Signs (24h Range):  Temp:  [97.5 °F (36.4 °C)-98.8 °F (37.1 °C)] 97.8 °F (36.6 °C)  Pulse:  [53-76] 56  Resp:  [12-45] 36  SpO2:  [75 %-100 %] 100 %  BP: ()/(50-78) 127/60     Weight: 69.2 kg (152 lb 8.9 oz)  Body mass index is 29.79 kg/m².    Intake/Output Summary (Last 24 hours) at 6/25/2024 0859  Last data filed at 6/25/2024 0612  Gross per 24 hour   Intake 6971.55 ml   Output 2550 ml   Net 4421.55 ml         Physical Exam  Constitutional:       General: She is in acute distress.   Neck:      Comments: Neck tender to palpation over left strap muscles.  No point tenderness over cervical spine.  Patient unable to flex, extend or turn her neck to either side.  Cardiovascular:      Rate and Rhythm: Normal rate and regular rhythm.      Heart sounds: Murmur heard.      No gallop.   Pulmonary:      Effort: Pulmonary effort is normal.      Breath sounds: Normal breath sounds.   Abdominal:      General: Bowel sounds are normal.      Palpations: Abdomen is soft.   Musculoskeletal:      Cervical back: Tenderness present.   Skin:     General: Skin is warm and dry.      Comments: Chronic wound left forearm, extensor surface.  Surgical scar left wrist.  Chronic wound right forearm, scarred and dry.   Neurological:      General: No focal  deficit present.      Mental Status: She is alert.   Psychiatric:         Mood and Affect: Mood normal.         Behavior: Behavior normal.             Significant Labs: All pertinent labs within the past 24 hours have been reviewed.    Significant Imaging: I have reviewed all pertinent imaging results/findings within the past 24 hours.

## 2024-06-25 NOTE — PROGRESS NOTES
C/o sternal pain   EKG not s/o ant ST-T changes   - try NTG SL PRN; if fails, then try Maalox PRN   - get trop       10:11 PM   Decreased UOP   - bolus  ml     10:26 PM  Relived by NTG SL       4:17 AM   RN reports -- HR dropping to 40s, while sleeping; patient more drowsy   K 3.4   - KCl 40 po once   - get EKG   - keep Atropine & pacer ready at bed side     4:41 AM   C/o neck pain, which is getting worse   MRI cervical spine reviewed -- There is a thin layer of edema with enhancing soft tissues anterior to upper cervical levels   - will repeat CT cervical spine with contrast   - apply cervical collar for now   - may consult Neurosurgery

## 2024-06-25 NOTE — ASSESSMENT & PLAN NOTE
-Psych consulted above as patient does endorse desire for drug rehab - appreciate consult by Dr. Nolen  -JULI/JARRETT to follow   Discharged

## 2024-06-25 NOTE — ASSESSMENT & PLAN NOTE
"Polysubstance abuse   PTSD and depression   Tobacco abuse   -longstanding history of polysubstance abuse, IV drug abuse, PTSD, anxiety, depression  -per Care everywhere chart review multiple antidepressants and psychiatric meds of varying doses however patient reports has not been taking   -Psych consulted for assistance - she does endorse some interest in rehab as "she is tired of being sick" however she is worried about her adult daughter who is also un-housed and would like to stay with her, she denies drug abuse in daughter and that she has "been out on the street with me to try and help me"  -opioid withdrawal supportive care as indicated - protocol ordered  -clonidine 0.1 mg po q4 hours PRN opiate withdrawal anxiety ( hold if SBP<90, DBP<50 or HR <60)  -dicylomine 10 mg q6 hours PRN muscle cramps   -loperamide 2 mg q 1 hour PRN diarrhea (max= 16 mg/24 hours)   -methocarbamol 500 mg po q 6 hours PRN muscle spasm and body aches   -No Suboxone for now given need for pain medication - change to morphine as she is having side effects from Dilaudid.  -nicotine patch ordered  -fall and delirium precautions  -monitor  "

## 2024-06-25 NOTE — NURSING
Pt difficult to arouse. Answers orientation questions but drifts to sleep during conversation. HR bradycardic 50s. Unsustained in the 40s. Pt intermittently complains of headache. Notified MD of assessments.

## 2024-06-25 NOTE — PLAN OF CARE
Assessment completed vial chart review---patient was not easily aroused. Patient would not awaken and answer questions. SW will try to complete detailed initial assessment at a later date.    06/25/24 2335   Discharge Assessment   Assessment Type Discharge Planning Assessment   Confirmed/corrected address, phone number and insurance No   Reason No family to provide information/patient unable to answer   Source of Information health record   If unable to respond/provide information was family/caregiver contacted? Other (see comments)  (unable to make contact with family)   Communicated ABELARDO with patient/caregiver Date not available/Unable to determine   Do you expect to return to your current living situation? Yes   Prior to hospitilization cognitive status: Unable to Assess   Current cognitive status: Unable to Assess   Equipment Currently Used at Home none   Readmission within 30 days? No   Patient currently being followed by outpatient case management? No   Do you currently have service(s) that help you manage your care at home? No   Are you on dialysis? No   Do you take coumadin? No   Discharge Plan A Long-term acute care facility (LTAC)   Discharge Plan B Shelter   DME Needed Upon Discharge  none   Transition of Care Barriers Homeless;Does not adhere to care plan;No family/friends to help;Social;Substance Abuse   SDOH Housing/economic concerns   Housing/Economic Concerns Homeless   Physical Activity   On average, how many days per week do you engage in moderate to strenuous exercise (like a brisk walk)? 0 days   On average, how many minutes do you engage in exercise at this level? 0 min   Financial Resource Strain   How hard is it for you to pay for the very basics like food, housing, medical care, and heating? Very Hard   Housing Stability   In the last 12 months, was there a time when you were not able to pay the mortgage or rent on time? Y   At any time in the past 12 months, were you homeless or living in a  shelter (including now)? Y   Transportation Needs   Has the lack of transportation kept you from medical appointments, meetings, work or from getting things needed for daily living? Yes, it has kept me from medical appointments or from getting my medications.   Food Insecurity   Within the past 12 months, you worried that your food would run out before you got the money to buy more. Often true   Within the past 12 months, the food you bought just didn't last and you didn't have money to get more. Often true   Stress   Do you feel stress - tense, restless, nervous, or anxious, or unable to sleep at night because your mind is troubled all the time - these days? To some exte   Social Isolation   How often do you feel lonely or isolated from those around you?  Often   Alcohol Use   Q1: How often do you have a drink containing alcohol? Never   Q2: How many drinks containing alcohol do you have on a typical day when you are drinking? None   Q3: How often do you have six or more drinks on one occasion? Never   Utilities   In the past 12 months has the electric, gas, oil, or water company threatened to shut off services in your home? Pt Declined   Health Literacy   How often do you need to have someone help you when you read instructions, pamphlets, or other written material from your doctor or pharmacy? Always     [facility]  Initial Discharge Assessment       Primary Care Provider: No, Primary Doctor    Admission Diagnosis: Wrist pain [M25.539]  Shoulder pain [M25.519]  Encounter for central line placement [Z45.2]  Hypotension [I95.9]  Septic shock [A41.9, R65.21]    Admission Date: 6/23/2024  Expected Discharge Date:     Transition of Care Barriers: (P) Homeless, Does not adhere to care plan, No family/friends to help, Social, Substance Abuse    Payor: [employer] / Plan: [facility] EXCHANGE PLAN / Product Type: Commercial /     Extended Emergency Contact Information  Primary Emergency Contact:  Fernando Laurent  Address: [address] [place] [address]  Mobile Phone: [phone]  Relation: Friend  Secondary Emergency Contact: Ashley Wiseman  Mobile Phone: [phone]  Relation: Mother  Preferred language: English   needed? No    Discharge Plan A: (P) Long-term acute care facility (LTAC)  Discharge Plan B: (P) Shelter      [id number] - [place], [place] - [address] [place] [facility] [address]  [address]  [place] [address]  Phone: [phone] Fax: [phone]      Initial Assessment (most recent)       Adult Discharge Assessment - 06/25/24 9723          Discharge Assessment    Assessment Type Discharge Planning Assessment (P)      Confirmed/corrected address, phone number and insurance No (P)      Reason No family to provide information/patient unable to answer (P)      Source of Information health record (P)      If unable to respond/provide information was family/caregiver contacted? Other (see comments) (P)    unable to make contact with family    Communicated ABELARDO with patient/caregiver Date not available/Unable to determine (P)      Do you expect to return to your current living situation? Yes (P)      Prior to hospitilization cognitive status: Unable to Assess (P)      Current cognitive status: Unable to Assess (P)      Equipment Currently Used at Home none (P)      Readmission within 30 days? No (P)      Patient currently being followed by outpatient case management? No (P)      Do you currently have service(s) that help you manage your care at home? No (P)      Are you on dialysis? No (P)      Do you take coumadin? No (P)      Discharge Plan A Long-term acute care facility (LTAC) (P)      Discharge Plan B Shelter (P)      DME Needed Upon Discharge  none (P)      Transition of Care Barriers Homeless;Does not adhere to care plan;No family/friends to help;Social;Substance Abuse (P)      SDOH Housing/economic concerns (P)       Housing/Economic Concerns Homeless (P)         Physical Activity    On average, how many days per week do you engage in moderate to strenuous exercise (like a brisk walk)? 0 days (P)      On average, how many minutes do you engage in exercise at this level? 0 min (P)         Financial Resource Strain    How hard is it for you to pay for the very basics like food, housing, medical care, and heating? Very hard (P)         Housing Stability    In the last 12 months, was there a time when you were not able to pay the mortgage or rent on time? Yes (P)      At any time in the past 12 months, were you homeless or living in a shelter (including now)? Yes (P)         Transportation Needs    Has the lack of transportation kept you from medical appointments, meetings, work or from getting things needed for daily living? Yes, it has kept me from medical appointments or from getting my medications. (P)         Food Insecurity    Within the past 12 months, you worried that your food would run out before you got the money to buy more. Often true (P)      Within the past 12 months, the food you bought just didn't last and you didn't have money to get more. Often true (P)         Stress    Do you feel stress - tense, restless, nervous, or anxious, or unable to sleep at night because your mind is troubled all the time - these days? To some extent (P)         Social Isolation    How often do you feel lonely or isolated from those around you?  Often (P)         Alcohol Use    Q1: How often do you have a drink containing alcohol? Never (P)      Q2: How many drinks containing alcohol do you have on a typical day when you are drinking? Patient does not drink (P)      Q3: How often do you have six or more drinks on one occasion? Never (P)         Open Meities    In the past 12 months has the electric, gas, oil, or water company threatened to shut off services in your home? Patient declined (P)         Health Literacy    How often do you need  to have someone help you when you read instructions, pamphlets, or other written material from your doctor or pharmacy? Always (P)

## 2024-06-25 NOTE — ASSESSMENT & PLAN NOTE
-in setting of septic shock/hypotension and component of dehydration due to poor PO intake   -admission SCr 3.1   -baseline 0.8-1.3  -s/p IV fluid resuscitation in the ED   -continue maintenance IV fluid hydration   -avoid nephrotoxins and hypotension as able, renally dose medications  -trend labs, address/replete electrolytes as indicated  -Resolving - creatinine improved to 1 today (6/25)
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
## 2024-06-25 NOTE — PROGRESS NOTES
Pt has a large neck brace on. IS and Pep therapy were both completed as could be to maneuver around brace.

## 2024-06-25 NOTE — ASSESSMENT & PLAN NOTE
Thrombocytopenia, chronic  -etiology unclear, previous anemia profile detailed below with normal iron stores and elevated TIBC  -at admission H/H 8.3/25, platelets 133  -baseline appears 8-9/25-34; 118-300  -no overt s/s of bleeding  -trend labs over course and transfuse as indicated   04/12/24    Iron 42   TIBC 231 (L)   Saturated Iron 18 (L)   Transferrin 156 (L)   Ferritin 290   Folate 7.3   Vitamin B12 492

## 2024-06-25 NOTE — ASSESSMENT & PLAN NOTE
Multifocal pneumonia   Staphylococcus aureus bacteremia   Likely cervical spine discitis  Chronic left arm wound  -admitted due to severe sepsis/septic shock with hypotension requiring vasopressor support and complicated by DONNA in setting of known recent bacteremia without complete treatment resulting in seeding, bleeding multifocal pneumonia suspected on CXR, likely cervical spine discitis and acute on chronic left arm wound with recent IV injection/skin popping of heroin  -at admission remained hypotensive despite fluid resuscitation and required vasopressor support; otherwise HDS and afebrile  -admission labs:  -CBC with WBC 34, hemoglobin 8.3, hematocrit 26, platelets 119.  Chemistry was , K 3.5, chloride 97, CO2 18, BUN 50, SCr 3.1, glucose 133.  Magnesium 1.9.  ; AST, ALT, T bili unremarkable.  Lactic acid 3.37 >> 1.2.  Procalcitonin 78.33.  ESR > 120, .  PH 7.419, pCO2 32.2, PO2 34, HC03 21, be negative for.  .  Troponin 0.23.  Flu and COVID negative.  HIV negative.   -admission imaging:    -CXR with findings that may reflect multifocal infectious process.  Right shoulder x-ray without acute pathology.  Left wrist x-ray without acute pathology.  CT head and CT C-spine noted no acute cervical fracture.  Prevertebral cervical soft tissue thickening.  Recommend MRI of the cervical spine to evaluate for prevertebral edema.  Mild to moderate spondylitic changes.  -admission micro:   -blood cultures MSSA positive, also gram positive rods (likely contaminant but will follow)  -continue norepinephrine, wean as tolerated   -continue IV fluid hydration   -continue vancomycin and oxacillin per ID   -Wound Care consulted   -pulmonary toileting with incentive spirometer and flutter valve as tolerated   -oxygen supplementation as needed  -continue tele monitoring   -EKG PRN concerns  -trend labs, address/replete electrolytes as indicated  -Daily blood cultures ordered until she clears  bacteremia

## 2024-06-26 PROBLEM — I76 SEPTIC EMBOLISM: Status: ACTIVE | Noted: 2024-06-23

## 2024-06-26 PROBLEM — I07.9 ENDOCARDITIS OF TRICUSPID VALVE: Status: ACTIVE | Noted: 2024-06-26

## 2024-06-26 LAB
ALBUMIN SERPL BCP-MCNC: 1.5 G/DL (ref 3.5–5.2)
ALP SERPL-CCNC: 72 U/L (ref 55–135)
ALT SERPL W/O P-5'-P-CCNC: <5 U/L (ref 10–44)
ANION GAP SERPL CALC-SCNC: 7 MMOL/L (ref 8–16)
ANISOCYTOSIS BLD QL SMEAR: SLIGHT
AST SERPL-CCNC: 16 U/L (ref 10–40)
BACTERIA BLD CULT: ABNORMAL
BASOPHILS # BLD AUTO: 0.06 K/UL (ref 0–0.2)
BASOPHILS NFR BLD: 0.2 % (ref 0–1.9)
BILIRUB SERPL-MCNC: 0.1 MG/DL (ref 0.1–1)
BUN SERPL-MCNC: 10 MG/DL (ref 6–20)
CALCIUM SERPL-MCNC: 8 MG/DL (ref 8.7–10.5)
CHLORIDE SERPL-SCNC: 114 MMOL/L (ref 95–110)
CO2 SERPL-SCNC: 19 MMOL/L (ref 23–29)
CREAT SERPL-MCNC: 0.8 MG/DL (ref 0.5–1.4)
DIFFERENTIAL METHOD BLD: ABNORMAL
EOSINOPHIL # BLD AUTO: 0 K/UL (ref 0–0.5)
EOSINOPHIL NFR BLD: 0.2 % (ref 0–8)
ERYTHROCYTE [DISTWIDTH] IN BLOOD BY AUTOMATED COUNT: 16.8 % (ref 11.5–14.5)
EST. GFR  (NO RACE VARIABLE): >60 ML/MIN/1.73 M^2
GLUCOSE SERPL-MCNC: 94 MG/DL (ref 70–110)
HCT VFR BLD AUTO: 25.4 % (ref 37–48.5)
HGB BLD-MCNC: 8 G/DL (ref 12–16)
HYPOCHROMIA BLD QL SMEAR: ABNORMAL
IMM GRANULOCYTES # BLD AUTO: 1.06 K/UL (ref 0–0.04)
IMM GRANULOCYTES NFR BLD AUTO: 4 % (ref 0–0.5)
LYMPHOCYTES # BLD AUTO: 3.1 K/UL (ref 1–4.8)
LYMPHOCYTES NFR BLD: 11.9 % (ref 18–48)
MAGNESIUM SERPL-MCNC: 1.5 MG/DL (ref 1.6–2.6)
MCH RBC QN AUTO: 23.5 PG (ref 27–31)
MCHC RBC AUTO-ENTMCNC: 31.5 G/DL (ref 32–36)
MCV RBC AUTO: 75 FL (ref 82–98)
MONOCYTES # BLD AUTO: 1.7 K/UL (ref 0.3–1)
MONOCYTES NFR BLD: 6.2 % (ref 4–15)
NEUTROPHILS # BLD AUTO: 20.5 K/UL (ref 1.8–7.7)
NEUTROPHILS NFR BLD: 77.5 % (ref 38–73)
NRBC BLD-RTO: 0 /100 WBC
PHOSPHATE SERPL-MCNC: 3.2 MG/DL (ref 2.7–4.5)
PLATELET # BLD AUTO: 206 K/UL (ref 150–450)
PLATELET BLD QL SMEAR: ABNORMAL
PMV BLD AUTO: 11.2 FL (ref 9.2–12.9)
POTASSIUM SERPL-SCNC: 3.6 MMOL/L (ref 3.5–5.1)
PROT SERPL-MCNC: 6 G/DL (ref 6–8.4)
RBC # BLD AUTO: 3.4 M/UL (ref 4–5.4)
SCHISTOCYTES BLD QL SMEAR: PRESENT
SODIUM SERPL-SCNC: 140 MMOL/L (ref 136–145)
VANCOMYCIN SERPL-MCNC: 11.9 UG/ML
WBC # BLD AUTO: 26.44 K/UL (ref 3.9–12.7)

## 2024-06-26 PROCEDURE — 25000003 PHARM REV CODE 250: Performed by: HOSPITALIST

## 2024-06-26 PROCEDURE — 99233 SBSQ HOSP IP/OBS HIGH 50: CPT | Mod: ,,, | Performed by: INTERNAL MEDICINE

## 2024-06-26 PROCEDURE — 84100 ASSAY OF PHOSPHORUS: CPT | Performed by: NURSE PRACTITIONER

## 2024-06-26 PROCEDURE — 80053 COMPREHEN METABOLIC PANEL: CPT | Performed by: NURSE PRACTITIONER

## 2024-06-26 PROCEDURE — 87040 BLOOD CULTURE FOR BACTERIA: CPT | Performed by: HOSPITALIST

## 2024-06-26 PROCEDURE — 25000003 PHARM REV CODE 250: Performed by: INTERNAL MEDICINE

## 2024-06-26 PROCEDURE — 63600175 PHARM REV CODE 636 W HCPCS: Performed by: INTERNAL MEDICINE

## 2024-06-26 PROCEDURE — 94799 UNLISTED PULMONARY SVC/PX: CPT | Mod: XB

## 2024-06-26 PROCEDURE — 87186 SC STD MICRODIL/AGAR DIL: CPT | Performed by: HOSPITALIST

## 2024-06-26 PROCEDURE — 25000003 PHARM REV CODE 250: Performed by: NURSE PRACTITIONER

## 2024-06-26 PROCEDURE — 99900035 HC TECH TIME PER 15 MIN (STAT)

## 2024-06-26 PROCEDURE — 20000000 HC ICU ROOM

## 2024-06-26 PROCEDURE — 63600175 PHARM REV CODE 636 W HCPCS: Performed by: HOSPITALIST

## 2024-06-26 PROCEDURE — 36415 COLL VENOUS BLD VENIPUNCTURE: CPT | Performed by: HOSPITALIST

## 2024-06-26 PROCEDURE — 87077 CULTURE AEROBIC IDENTIFY: CPT | Performed by: HOSPITALIST

## 2024-06-26 PROCEDURE — 25000003 PHARM REV CODE 250: Performed by: STUDENT IN AN ORGANIZED HEALTH CARE EDUCATION/TRAINING PROGRAM

## 2024-06-26 PROCEDURE — 80202 ASSAY OF VANCOMYCIN: CPT | Performed by: HOSPITALIST

## 2024-06-26 PROCEDURE — 94664 DEMO&/EVAL PT USE INHALER: CPT

## 2024-06-26 PROCEDURE — A9570 INDIUM IN-111 AUTO WBC: HCPCS | Performed by: HOSPITALIST

## 2024-06-26 PROCEDURE — 83735 ASSAY OF MAGNESIUM: CPT | Performed by: NURSE PRACTITIONER

## 2024-06-26 PROCEDURE — 94761 N-INVAS EAR/PLS OXIMETRY MLT: CPT

## 2024-06-26 PROCEDURE — 85025 COMPLETE CBC W/AUTO DIFF WBC: CPT | Performed by: NURSE PRACTITIONER

## 2024-06-26 RX ORDER — INDIUM IN-111 OXYQUINOLINE 1 UG/ML
0.5 SOLUTION INTRAVENOUS
Status: COMPLETED | OUTPATIENT
Start: 2024-06-26 | End: 2024-06-26

## 2024-06-26 RX ADMIN — HYDROCODONE BITARTRATE AND ACETAMINOPHEN 1 TABLET: 5; 325 TABLET ORAL at 12:06

## 2024-06-26 RX ADMIN — OXACILLIN 2 G: 2 INJECTION, POWDER, FOR SOLUTION INTRAMUSCULAR; INTRAVENOUS at 10:06

## 2024-06-26 RX ADMIN — VANCOMYCIN HYDROCHLORIDE 1000 MG: 1 INJECTION, POWDER, LYOPHILIZED, FOR SOLUTION INTRAVENOUS at 06:06

## 2024-06-26 RX ADMIN — MORPHINE SULFATE 10 MG: 2 INJECTION, SOLUTION INTRAMUSCULAR; INTRAVENOUS at 08:06

## 2024-06-26 RX ADMIN — SODIUM CHLORIDE, POTASSIUM CHLORIDE, SODIUM LACTATE AND CALCIUM CHLORIDE: 600; 310; 30; 20 INJECTION, SOLUTION INTRAVENOUS at 02:06

## 2024-06-26 RX ADMIN — OXACILLIN 2 G: 2 INJECTION, POWDER, FOR SOLUTION INTRAMUSCULAR; INTRAVENOUS at 03:06

## 2024-06-26 RX ADMIN — OXACILLIN 2 G: 2 INJECTION, POWDER, FOR SOLUTION INTRAMUSCULAR; INTRAVENOUS at 07:06

## 2024-06-26 RX ADMIN — HYDROCODONE BITARTRATE AND ACETAMINOPHEN 1 TABLET: 5; 325 TABLET ORAL at 07:06

## 2024-06-26 RX ADMIN — MORPHINE SULFATE 10 MG: 2 INJECTION, SOLUTION INTRAMUSCULAR; INTRAVENOUS at 09:06

## 2024-06-26 RX ADMIN — SODIUM CHLORIDE, POTASSIUM CHLORIDE, SODIUM LACTATE AND CALCIUM CHLORIDE: 600; 310; 30; 20 INJECTION, SOLUTION INTRAVENOUS at 11:06

## 2024-06-26 RX ADMIN — MUPIROCIN: 20 OINTMENT TOPICAL at 08:06

## 2024-06-26 RX ADMIN — INDIUM IN-111 OXYQUINOLINE 0.5 MILLICURIE: 1 SOLUTION INTRAVENOUS at 10:06

## 2024-06-26 RX ADMIN — BACLOFEN 10 MG: 5 TABLET ORAL at 04:06

## 2024-06-26 RX ADMIN — MORPHINE SULFATE 10 MG: 2 INJECTION, SOLUTION INTRAMUSCULAR; INTRAVENOUS at 04:06

## 2024-06-26 RX ADMIN — BACLOFEN 10 MG: 5 TABLET ORAL at 08:06

## 2024-06-26 RX ADMIN — AMMONIUM LACTATE: 120 LOTION TOPICAL at 10:06

## 2024-06-26 RX ADMIN — SODIUM CHLORIDE, POTASSIUM CHLORIDE, SODIUM LACTATE AND CALCIUM CHLORIDE: 600; 310; 30; 20 INJECTION, SOLUTION INTRAVENOUS at 06:06

## 2024-06-26 RX ADMIN — OXACILLIN 2 G: 2 INJECTION, POWDER, FOR SOLUTION INTRAMUSCULAR; INTRAVENOUS at 11:06

## 2024-06-26 RX ADMIN — BACLOFEN 10 MG: 5 TABLET ORAL at 10:06

## 2024-06-26 RX ADMIN — MORPHINE SULFATE 10 MG: 2 INJECTION, SOLUTION INTRAMUSCULAR; INTRAVENOUS at 06:06

## 2024-06-26 RX ADMIN — ENOXAPARIN SODIUM 40 MG: 40 INJECTION SUBCUTANEOUS at 04:06

## 2024-06-26 RX ADMIN — AMMONIUM LACTATE: 120 LOTION TOPICAL at 08:06

## 2024-06-26 RX ADMIN — HYDROCODONE BITARTRATE AND ACETAMINOPHEN 1 TABLET: 5; 325 TABLET ORAL at 11:06

## 2024-06-26 RX ADMIN — MUPIROCIN: 20 OINTMENT TOPICAL at 10:06

## 2024-06-26 NOTE — ASSESSMENT & PLAN NOTE
-Psych consulted above as patient does endorse desire for drug rehab - appreciate consult by Dr. Nolen  -JULI/JARRETT to follow

## 2024-06-26 NOTE — PROGRESS NOTES
Pharmacokinetic Assessment Follow Up: IV Vancomycin    Vancomycin serum concentration assessment(s):    The random level was drawn correctly and can be used to guide therapy at this time. The measurement is within the desired definitive target range of 10 to 20 mcg/mL.    Vancomycin Regimen Plan:  Give one-time dose of vancomycin 1000 mg now (15 mg/kg).  Re-dose when the random level is less than 20 mcg/mL, next level to be drawn at 6/26/24 on 1730.    *DONNA seems to be resolving, consider switching to scheduled dosing if renal function remains stable.*    Drug levels (last 3 results):  Recent Labs   Lab Result Units 06/24/24  1511 06/25/24  1513 06/26/24  0351   Vancomycin, Random ug/mL 9.4 8.4 11.9       Pharmacy will continue to follow and monitor vancomycin.    Please contact pharmacy at extension [phone] for questions regarding this assessment.    Thank you for the consult,   Sangeeta Jaeger       Patient brief summary:  Margarita Wiseman is a 49 y.o. female initiated on antimicrobial therapy with IV Vancomycin for treatment of sepsis    The patient's current regimen is pulse dosing.    Drug Allergies:   Review of patient's allergies indicates:  No Known Allergies    Actual Body Weight:   68.9 kg    Renal Function:   Estimated Creatinine Clearance: 60.3 mL/min (based on SCr of 1 mg/dL).,     Dialysis Method (if applicable):  N/A    CBC (last 72 hours):  Recent Labs   Lab Result Units 06/23/24  1521 06/24/24  0404 06/25/24  0407 06/26/24  0351   WBC K/uL 33.72* 32.94* 30.41* 26.44*   Hemoglobin g/dL 8.3* 8.5* 8.5* 8.0*   Hematocrit % 25.9* 26.3* 26.0* 25.4*   Platelets K/uL 119* 127* 166 206   Gran % % 78.0* 81.0* 87.0* 77.5*   Lymph % % 4.0* 10.0* 4.0* 11.9*   Mono % % 9.0 4.0 7.0 6.2   Eosinophil % % 0.0 0.0 0.0 0.2   Basophil % % 0.0 0.0 0.0 0.2   Differential Method  Automated Automated Automated Automated       Metabolic Panel (last 72 hours):  Recent Labs   Lab Result Units 06/23/24  1521 06/23/24  180  06/23/24 1927 06/23/24 1929 06/24/24  0404 06/24/24  1511 06/24/24  2206 06/25/24  0407   Sodium mmol/L 132*  --   --   --  131* 134* 135* 138   Potassium mmol/L 3.5  --   --   --  3.2* 3.1* 3.4* 3.7   Chloride mmol/L 97  --   --   --  104 104 109 114*   CO2 mmol/L 18*  --   --   --  19* 20* 19* 18*   Glucose mg/dL 133*  --   --   --  135* 140* 155* 143*   Glucose, UA   --   --   --  Negative  --   --   --   --    BUN mg/dL 50*  --   --   --  43* 34* 28* 23*   Creatinine mg/dL 3.1*  --   --   --  2.0* 1.4 1.2 1.0   Creatinine, Urine mg/dL  --   --  126.4  126.4  --   --   --   --   --    Albumin g/dL 2.2*  --   --   --  1.8*  --   --  1.6*   Total Bilirubin mg/dL 0.4  --   --   --  0.3  --   --  0.2   Alkaline Phosphatase U/L 137*  --   --   --  114  --   --  90   AST U/L 36  --   --   --  23  --   --  15   ALT U/L 10  --   --   --  8*  --   --  9*   Magnesium mg/dL  --  1.9  --   --  2.0 2.0  --  1.9       Vancomycin Administrations:  vancomycin given in the last 96 hours                     vancomycin (VANCOCIN) 1,000 mg in D5W 250 mL IVPB (Vial-Mate) (mg) 1,000 mg New Bag 06/25/24 1716    vancomycin (VANCOCIN) 1,000 mg in D5W 250 mL IVPB (Vial-Mate) (mg) 1,000 mg New Bag 06/24/24 1654    vancomycin 1,500 mg in D5W 250 mL IVPB (Vial-Mate) (mg) 1,500 mg New Bag 06/23/24 1700                    Microbiologic Results:  Microbiology Results (last 7 days)       Procedure Component Value Units Date/Time    Blood culture [[id number]]     Order Status: Sent Specimen: Blood     Blood culture [[id number]]     Order Status: Canceled Specimen: Blood     Urine culture [[id number]] Collected: 06/23/24 1929    Order Status: Completed Specimen: Urine Updated: 06/25/24 1334     Urine Culture, Routine No significant growth    Narrative:      Specimen Source->Urine    Blood culture [[id number]]     Order Status: Canceled Specimen: Blood     Blood culture x two cultures. Draw prior to antibiotics. [[id number]]  (Abnormal)  Collected: 06/23/24 1537    Order Status: Completed Specimen: Blood from Peripheral, Antecubital, Right Updated: 06/25/24 0748     Blood Culture, Routine Gram stain aer bottle: Gram positive cocci in clusters resembling Staph,      Gram positive rods      Positive results previously called 06/24/2024  06:13      Gram stain stephanie bottle: Gram positive cocci in clusters resembling Staph      STAPHYLOCOCCUS AUREUS  ID consult required at [facility].  For susceptibility see order #[id number]      Narrative:      Aerobic and anaerobic    Blood culture x two cultures. Draw prior to antibiotics. [[id number]]  (Abnormal) Collected: 06/23/24 1537    Order Status: Completed Specimen: Blood from Peripheral, Antecubital, Right Updated: 06/25/24 0745     Blood Culture, Routine Gram stain aer bottle: Gram positive cocci in clusters resembling Staph,      Gram positive rods      Results called to and read back by: Cande NEELY 06/24/2024  06:12      Gram stain stephanie bottle: Gram positive cocci in clusters resembling Staph      Gram positive rods      Positive results previously called 06/24/2024      STAPHYLOCOCCUS AUREUS  Susceptibility pending  ID consult required at [facility].      Narrative:      Aerobic and anaerobic    Rapid Organism ID by PCR (from Blood culture) [[id number]]  (Abnormal) Collected: 06/23/24 1537    Order Status: Completed Updated: 06/24/24 0723     Enterococcus faecalis Not Detected     Enterococcus faecium Not Detected     Listeria monocytogenes Not Detected     Staphylococcus spp. See species for ID     Staphylococcus aureus Detected     Staphylococcus epidermidis Not Detected     Staphylococcus lugdunensis Not Detected     Streptococcus species Not Detected     Streptococcus agalactiae Not Detected     Streptococcus pneumoniae Not Detected     Streptococcus pyogenes Not Detected     Acinetobacter calcoaceticus/baumannii complex Not Detected      Bacteroides fragilis Not Detected     Enterobacterales Not Detected     Enterobacter cloacae complex Not Detected     Escherichia coli Not Detected     Klebsiella aerogenes Not Detected     Klebsiella oxytoca Not Detected     Klebsiella pneumoniae group Not Detected     Proteus Not Detected     Salmonella sp Not Detected     Serratia marcescens Not Detected     Haemophilus influenzae Not Detected     Neisseria meningtidis Not Detected     Pseudomonas aeruginosa Not Detected     Stenotrophomonas maltophilia Not Detected     Candida albicans Not Detected     Candida auris Not Detected     Candida glabrata Not Detected     Candida krusei Not Detected     Candida parapsilosis Not Detected     Candida tropicalis Not Detected     Cryptococcus neoformans/gattii Not Detected     CTX-M (ESBL ) Test Not Applicable     IMP (Carbapenem resistant) Test Not Applicable     KPC resistance gene (Carbapenem resistant) Test Not Applicable     mcr-1  Test Not Applicable     mec A/C  Test Not Applicable     mec A/C and MREJ (MRSA) gene Not Detected     NDM (Carbapenem resistant) Test Not Applicable     OXA-48-like (Carbapenem resistant) Test Not Applicable     van A/B (VRE gene) Test Not Applicable     VIM (Carbapenem resistant) Test Not Applicable    Narrative:      Aerobic and anaerobic

## 2024-06-26 NOTE — ASSESSMENT & PLAN NOTE
"Polysubstance abuse   PTSD and depression   Tobacco abuse   -longstanding history of polysubstance abuse, IV drug abuse, PTSD, anxiety, depression  -per Care everywhere chart review multiple antidepressants and psychiatric meds of varying doses however patient reports has not been taking   -Psych consulted for assistance - she does endorse some interest in rehab as "she is tired of being sick" however she is worried about her adult daughter who is also un-housed and would like to stay with her, she denies drug abuse in daughter and that she has "been out on the street with me to try and help me"  -opioid withdrawal supportive care as indicated - protocol ordered  -clonidine 0.1 mg po q4 hours PRN opiate withdrawal anxiety ( hold if SBP<90, DBP<50 or HR <60)  -dicylomine 10 mg q6 hours PRN muscle cramps   -loperamide 2 mg q 1 hour PRN diarrhea (max= 16 mg/24 hours)   -methocarbamol 500 mg po q 6 hours PRN muscle spasm and body aches   -No Suboxone for now given need for pain medication - changed to morphine as she is having side effects from Dilaudid.  -nicotine patch ordered  -fall and delirium precautions  -monitor  "

## 2024-06-26 NOTE — PROGRESS NOTES
Therapy with vancomycin complete and/or consult discontinued by provider.  Pharmacy will sign off, please re-consult as needed.    Thank you for the consult,    Yamilex León  06/26/2024

## 2024-06-26 NOTE — ASSESSMENT & PLAN NOTE
"-at admission CT head and CT C-spine noted no acute cervical fracture.  Prevertebral soft tissue thickening.  Recommended MRI of the cervical spine to evaluate for prevertebral edema.  Mild to moderate spondylitic changes.  -MRI done:  "No marrow replacement or marrow edema seen.  There is no evidence of neoplasm.  The cord is normal in course, caliber, and signal.  There is a thin layer of prevertebral edema in front of upper cervical levels.  Between C3 and C6 there is mild-moderate DJD.  There is minimal DJD elsewhere.  Post contrast images demonstrate diffuse enhancement of the prevertebral soft tissues but no evidence of an abscess collection."  -ID consulted, Indium scan ordered.  -TTE done - found vegetation on the tricuspid valve.  "

## 2024-06-26 NOTE — ASSESSMENT & PLAN NOTE
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  Chronic left arm wound  -admitted due to severe sepsis/septic shock with hypotension requiring vasopressor support and complicated by DONNA in setting of known recent bacteremia without complete treatment resulting in seeding, septic emboli on CXR, tricuspid valve endocarditis, likely cervical spine discitis and acute on chronic left arm wound with recent IV injection/skin popping of heroin  -at admission remained hypotensive despite fluid resuscitation and required vasopressor support; otherwise HDS and afebrile  -admission labs:  -CBC with WBC 34, hemoglobin 8.3, hematocrit 26, platelets 119.  Chemistry was , K 3.5, chloride 97, CO2 18, BUN 50, SCr 3.1, glucose 133.  Magnesium 1.9.  ; AST, ALT, T bili unremarkable.  Lactic acid 3.37 >> 1.2.  Procalcitonin 78.33.  ESR > 120, .  PH 7.419, pCO2 32.2, PO2 34, HC03 21, be negative for.  .  Troponin 0.23.  Flu and COVID negative.  HIV negative.   -admission imaging:    -CXR with findings that may reflect multifocal infectious process.  Right shoulder x-ray without acute pathology.  Left wrist x-ray without acute pathology.  CT head and CT C-spine noted no acute cervical fracture.  Prevertebral cervical soft tissue thickening.  Recommend MRI of the cervical spine to evaluate for prevertebral edema.  Mild to moderate spondylitic changes.  -admission micro:   -blood cultures MSSA positive, also gram positive rods (likely contaminant but will follow)  -Weaned off pressors   -continue IV fluid hydration   -continue oxacillin per ID - might add a carbapenem for synergy  -Wound Care consulted   -pulmonary toileting with incentive spirometer and flutter valve as tolerated   -oxygen supplementation as needed  -continue tele monitoring   -EKG PRN concerns  -trend labs, address/replete electrolytes as indicated  -Daily blood  cultures ordered until she clears bacteremia

## 2024-06-26 NOTE — SUBJECTIVE & OBJECTIVE
Interval History: Patient more alert today - was obtunded most of the day yesterday.  Neck pain marginally better on morphine.  She is afebrile and leukocytosis starting to improve.  Blood culture could not be obtained yesterday and is being drawn from her central line.    Review of Systems   Constitutional:  Negative for chills and fever.   Respiratory:  Negative for cough and shortness of breath.    Cardiovascular:  Negative for chest pain and palpitations.   Musculoskeletal:  Positive for neck pain.        Right shoulder pain     Objective:     Vital Signs (Most Recent):  Temp: 98.7 °F (37.1 °C) (06/26/24 1500)  Pulse: 82 (06/26/24 1635)  Resp: (!) 30 (06/26/24 1635)  BP: 120/72 (06/26/24 1600)  SpO2: 95 % (06/26/24 1600) Vital Signs (24h Range):  Temp:  [98 °F (36.7 °C)-98.9 °F (37.2 °C)] 98.7 °F (37.1 °C)  Pulse:  [] 82  Resp:  [15-35] 30  SpO2:  [91 %-100 %] 95 %  BP: (106-164)/(55-80) 120/72     Weight: 68.9 kg (152 lb)  Body mass index is 29.69 kg/m².    Intake/Output Summary (Last 24 hours) at 6/26/2024 1759  Last data filed at 6/26/2024 1437  Gross per 24 hour   Intake 2551.51 ml   Output 1100 ml   Net 1451.51 ml         Physical Exam  Neck:      Comments: Neck tender to palpation over left strap muscles.  No point tenderness over cervical spine.  Patient unable to flex, extend or turn her neck to either side.  Cardiovascular:      Rate and Rhythm: Normal rate and regular rhythm.      Heart sounds: Murmur heard.      No gallop.   Pulmonary:      Effort: Pulmonary effort is normal.      Breath sounds: Normal breath sounds.   Abdominal:      General: Bowel sounds are normal.      Palpations: Abdomen is soft.   Musculoskeletal:      Cervical back: Tenderness present.   Skin:     General: Skin is warm and dry.      Comments: Chronic wound left forearm, extensor surface.  Surgical scar left wrist.  Chronic wound right forearm, scarred and dry.   Neurological:      General: No focal deficit present.       Mental Status: She is alert.   Psychiatric:         Mood and Affect: Mood normal.         Behavior: Behavior normal.             Significant Labs: All pertinent labs within the past 24 hours have been reviewed.    Significant Imaging: I have reviewed all pertinent imaging results/findings within the past 24 hours.

## 2024-06-26 NOTE — PROGRESS NOTES
[facility]  Infectious Disease  Progress Note    Patient Name: Margarita Wiseman  MRN: [id number]  Admission Date: 6/23/2024  Length of Stay: 3 days  Attending Physician: Melissa Templeton MD  Primary Care Provider: No, Primary Doctor    Isolation Status: No active isolations    Assessment/Plan:        Endocarditis of tricuspid valve    50 yo woman with history of untreated MSSA bacteremia, now readmitted with bacteremia and sepsis related to IDU  - polymicrobial? Culture grew MSA and GBS.  - lE with septic pulmonary emboli  - MSSA by BCID and culture  - GBS  - repeat blood cultures till she clears the bacteremia  - anticipating 6-8 weeks of IV abx  - if repeat cultures are still positive for MSSA, will add ertapenem for synergy    Jean Carlos Payne MD  Infectious Disease  [facility]    Subjective:     Principal Problem:Septic shock    HPI: Ms. Wiseman is a 49 year old woman with homelessness and IDU, admitted with sepsis and bacteremia. She has a PMHx significant for IVDU, PAWAN, second degree heart block who presents for evaluation of fatigue, which has persisted for the last several days. She notes an associated generalized body aches and pains. Pt with recent admission for severe sepsis with MSSA bacteremia in April 2024. [place] EMS [place]. Reports continued IV drug use. She was admitted and started on IV abx. CT c/w cervical infection?  ID is consulted for sepsis and bacteremia.    Today, the patient complaints of neck pain.    Blood cultures: MSSA (BCID) and GPR  Interval History: Events noted. TVE with likely seeding (lungs, spine). Maybe feels better.     Review of Systems   Constitutional:  Negative for chills and fever.   Musculoskeletal:  Positive for arthralgias and neck pain.   All other systems reviewed and are negative.    Objective:     Vital Signs (Most Recent):  Temp: 98 °F (36.7 °C)  (06/26/24 0302)  Pulse: 91 (06/26/24 1200)  Resp: (!) 26 (06/26/24 1215)  BP: 116/80 (06/26/24 1200)  SpO2: 100 % (06/26/24 1200) Vital Signs (24h Range):  Temp:  [98 °F (36.7 °C)-98.9 °F (37.2 °C)] 98 °F (36.7 °C)  Pulse:  [] 91  Resp:  [15-39] 26  SpO2:  [89 %-100 %] 100 %  BP: (106-164)/(55-98) 116/80     Weight: 68.9 kg (152 lb)  Body mass index is 29.69 kg/m².    Estimated Creatinine Clearance: 75.3 mL/min (based on SCr of 0.8 mg/dL).     Physical Exam  Vitals and nursing note reviewed.   Constitutional:       Appearance: Normal appearance.   HENT:      Head: Normocephalic and atraumatic.   Eyes:      Pupils: Pupils are equal, round, and reactive to light.   Musculoskeletal:         General: Tenderness present.      Comments: Posterior neck   Neurological:      General: No focal deficit present.      Mental Status: She is alert and oriented to person, place, and time.   Psychiatric:         Mood and Affect: Mood normal.         Behavior: Behavior normal.         Thought Content: Thought content normal.          Significant Labs: CBC:   Recent Labs   Lab 06/25/24  0407 06/26/24  0351   WBC 30.41* 26.44*   HGB 8.5* 8.0*   HCT 26.0* 25.4*    206     Microbiology Results (last 7 days)       Procedure Component Value Units Date/Time    Blood culture [[id number]] Collected: 06/26/24 0951    Order Status: Sent Specimen: Blood     Blood culture x two cultures. Draw prior to antibiotics. [[id number]]  (Abnormal) Collected: 06/23/24 1537    Order Status: Completed Specimen: Blood from Peripheral, Antecubital, Right Updated: 06/26/24 0950     Blood Culture, Routine Gram stain aer bottle: Gram positive cocci in clusters resembling Staph,      Gram positive rods      Positive results previously called 06/24/2024  06:13      Gram stain stephanie bottle: Gram positive cocci in clusters resembling Staph      STAPHYLOCOCCUS AUREUS  [facility]  For susceptibility see  order #[id number]        GRAM-POSITIVE SUKUMAR  Non-viable for ID      Narrative:      Aerobic and anaerobic    Blood culture x two cultures. Draw prior to antibiotics. [[id number]]  (Abnormal)  (Susceptibility) Collected: 06/23/24 1537    Order Status: Completed Specimen: Blood from Peripheral, Antecubital, Right Updated: 06/26/24 0949     Blood Culture, Routine Gram stain aer bottle: Gram positive cocci in clusters resembling Staph,      Gram positive rods      Results called to and read back by: Cande NEELY 06/24/2024  06:12      Gram stain stephanie bottle: Gram positive cocci in clusters resembling Staph      Gram positive rods      Positive results previously called 06/24/2024      STAPHYLOCOCCUS AUREUS  [facility] consult required at [facility] and [facility].        GRAM POSITIVE RODS  Non-viable for ID      Narrative:      Aerobic and anaerobic    Blood culture [[id number]]     Order Status: Canceled Specimen: Blood     Blood culture [[id number]]     Order Status: Canceled Specimen: Blood     Urine culture [[id number]] Collected: 06/23/24 1929    Order Status: Completed Specimen: Urine Updated: 06/25/24 1334     Urine Culture, Routine No significant growth    Narrative:      Specimen Source->Urine    Blood culture [[id number]]     Order Status: Canceled Specimen: Blood     Rapid Organism ID by PCR (from Blood culture) [[id number]]  (Abnormal) Collected: 06/23/24 1537    Order Status: Completed Updated: 06/24/24 0723     Enterococcus faecalis Not Detected     Enterococcus faecium Not Detected     Listeria monocytogenes Not Detected     Staphylococcus spp. See species for ID     Staphylococcus aureus Detected     Staphylococcus epidermidis Not Detected     Staphylococcus lugdunensis Not Detected     Streptococcus species Not Detected     Streptococcus agalactiae Not Detected     Streptococcus pneumoniae Not Detected     Streptococcus pyogenes Not Detected     Acinetobacter calcoaceticus/baumannii complex Not  Detected     Bacteroides fragilis Not Detected     Enterobacterales Not Detected     Enterobacter cloacae complex Not Detected     Escherichia coli Not Detected     Klebsiella aerogenes Not Detected     Klebsiella oxytoca Not Detected     Klebsiella pneumoniae group Not Detected     Proteus Not Detected     Salmonella sp Not Detected     Serratia marcescens Not Detected     Haemophilus influenzae Not Detected     Neisseria meningtidis Not Detected     Pseudomonas aeruginosa Not Detected     Stenotrophomonas maltophilia Not Detected     Candida albicans Not Detected     Candida auris Not Detected     Candida glabrata Not Detected     Candida krusei Not Detected     Candida parapsilosis Not Detected     Candida tropicalis Not Detected     Cryptococcus neoformans/gattii Not Detected     CTX-M (ESBL ) Test Not Applicable     IMP (Carbapenem resistant) Test Not Applicable     KPC resistance gene (Carbapenem resistant) Test Not Applicable     mcr-1  Test Not Applicable     mec A/C  Test Not Applicable     mec A/C and MREJ (MRSA) gene Not Detected     NDM (Carbapenem resistant) Test Not Applicable     OXA-48-like (Carbapenem resistant) Test Not Applicable     van A/B (VRE gene) Test Not Applicable     VIM (Carbapenem resistant) Test Not Applicable    Narrative:      Aerobic and anaerobic            Significant Imaging: I have reviewed all pertinent imaging results/findings within the past 24 hours.

## 2024-06-26 NOTE — ASSESSMENT & PLAN NOTE
-in setting of septic shock/hypotension and component of dehydration due to poor PO intake   -admission SCr 3.1   -baseline 0.8-1.3  -s/p IV fluid resuscitation in the ED   -continue maintenance IV fluid hydration   -avoid nephrotoxins and hypotension as able, renally dose medications  -trend labs, address/replete electrolytes as indicated  -Resolving - creatinine improved to normal.

## 2024-06-26 NOTE — ASSESSMENT & PLAN NOTE
Echo    Result Date: 6/25/2024    Left Ventricle: The left ventricle is normal in size. Ventricular mass   is normal. Normal wall thickness. There is normal systolic function.   Ejection fraction by visual approximation is 65%. Global longitudinal   strain is -19.0%. Global longitudinal strain is normal. There is normal   diastolic function. Normal left ventricular filling pressure. Tissue   Doppler velocity is normal.    Right Ventricle: Normal right ventricular cavity size. Wall thickness   is normal. Systolic function is normal.    Tricuspid Valve: There is a medium mobile echogenic pedunculated mass   present on the atrial side of the septal leaflet consistent with   vegetation. There is mild regurgitation with a centrally directed jet.    IVC/SVC: Normal venous pressure at 3 mmHg.    Pericardium: There is a trivial effusion. No indication of cardiac   tamponade.        Echo    Result Date: 4/12/2024    Left Ventricle: There is normal systolic function with a visually   estimated ejection fraction of 65 - 70%. Global longitudinal strain is   normal; -19.9%. There is normal diastolic function.    Right Ventricle: Normal right ventricular cavity size. Wall thickness   is normal. Right ventricle wall motion  is normal. Systolic function is   normal.    IVC/SVC: Normal venous pressure at 3 mmHg.    Pericardium: There is a trivial effusion. No indication of cardiac   tamponade.        Echo    Result Date: 6/30/2023  · Normal systolic function.  · The estimated ejection fraction is 65%.  · Normal left ventricular diastolic function.  · Normal right ventricular size with normal right ventricular systolic   function.  · Mild left atrial enlargement.  · Mild to moderate tricuspid regurgitation.  · Normal central venous pressure (3 mmHg).  · The estimated PA systolic pressure is 32 mmHg.

## 2024-06-26 NOTE — ASSESSMENT & PLAN NOTE
Finding of multifocal PNA on imaging likely due to septic emboli to the lungs  Continue to manage with IV antibiotics

## 2024-06-26 NOTE — ASSESSMENT & PLAN NOTE
50 yo woman with history of untreated MSSA bacteremia, now readmitted with bacteremia and sepsis related to IDU  - polymicrobial? Culture grew MSA and GBS.  - lE with septic pulmonary emboli  - MSSA by BCID and culture  - GBS  - repeat blood cultures till she clears the bacteremia  - anticipating 6-8 weeks of IV abx  - if repeat cultures are still positive for MSSA, will add ertapenem for synergy
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
## 2024-06-26 NOTE — PLAN OF CARE
Patient afebrile throughout the whole shift. VSS. MAPs >65.  Levophed drip turned off at beginning of shift. Patient complains of generalized pain. PRN Morphine 10 mg administered. IV abx continued. LR infusing at prescribed rate. Intake and output being monitored. Fall precautions maintained. Bed in lowest position, call bell within reach.     Problem: Adult Inpatient Plan of Care  Goal: Plan of Care Review  Outcome: Progressing  Flowsheets (Taken 6/26/2024 0617)  Plan of Care Reviewed With: patient     Problem: Sepsis/Septic Shock  Goal: Absence of Infection Signs and Symptoms  Outcome: Progressing  Intervention: Initiate Sepsis Management  Flowsheets (Taken 6/26/2024 0617)  Infection Prevention:   hand hygiene promoted   rest/sleep promoted   single patient room provided  Infection Management: aseptic technique maintained  Isolation Precautions:   precautions maintained   protective     Problem: Acute Kidney Injury/Impairment  Goal: Fluid and Electrolyte Balance  Outcome: Progressing  Intervention: Monitor and Manage Fluid and Electrolyte Balance  Flowsheets (Taken 6/26/2024 0617)  Fluid/Electrolyte Management: intravenous fluid replacement initiated     Problem: Infection  Goal: Absence of Infection Signs and Symptoms  Outcome: Progressing  Intervention: Prevent or Manage Infection  Flowsheets (Taken 6/26/2024 0617)  Fever Reduction/Comfort Measures:   lightweight clothing   lightweight bedding  Infection Management: aseptic technique maintained  Isolation Precautions:   precautions maintained   protective     Problem: Wound  Goal: Optimal Wound Healing  Outcome: Progressing  Intervention: Promote Wound Healing  Flowsheets (Taken 6/26/2024 0617)  Sleep/Rest Enhancement:   awakenings minimized   relaxation techniques promoted     Problem: Skin Injury Risk Increased  Goal: Skin Health and Integrity  Outcome: Progressing  Intervention: Optimize Skin Protection  Flowsheets (Taken 6/26/2024 0617)  Pressure Reduction  Techniques:   frequent weight shift encouraged   positioned off wounds   pressure points protected  Skin Protection: incontinence pads utilized  Activity Management:   Arm raise - L1   Leg kicks - L2  Head of Bed (HOB) Positioning: HOB at 30-45 degrees

## 2024-06-26 NOTE — SUBJECTIVE & OBJECTIVE
Interval History: Events noted. TVE with likely seeding (lungs, spine). Maybe feels better.     Review of Systems   Constitutional:  Negative for chills and fever.   Musculoskeletal:  Positive for arthralgias and neck pain.   All other systems reviewed and are negative.    Objective:     Vital Signs (Most Recent):  Temp: 98 °F (36.7 °C) (06/26/24 0302)  Pulse: 91 (06/26/24 1200)  Resp: (!) 26 (06/26/24 1215)  BP: 116/80 (06/26/24 1200)  SpO2: 100 % (06/26/24 1200) Vital Signs (24h Range):  Temp:  [98 °F (36.7 °C)-98.9 °F (37.2 °C)] 98 °F (36.7 °C)  Pulse:  [] 91  Resp:  [15-39] 26  SpO2:  [89 %-100 %] 100 %  BP: (106-164)/(55-98) 116/80     Weight: 68.9 kg (152 lb)  Body mass index is 29.69 kg/m².    Estimated Creatinine Clearance: 75.3 mL/min (based on SCr of 0.8 mg/dL).     Physical Exam  Vitals and nursing note reviewed.   Constitutional:       Appearance: Normal appearance.   HENT:      Head: Normocephalic and atraumatic.   Eyes:      Pupils: Pupils are equal, round, and reactive to light.   Musculoskeletal:         General: Tenderness present.      Comments: Posterior neck   Neurological:      General: No focal deficit present.      Mental Status: She is alert and oriented to person, place, and time.   Psychiatric:         Mood and Affect: Mood normal.         Behavior: Behavior normal.         Thought Content: Thought content normal.          Significant Labs: CBC:   Recent Labs   Lab 06/25/24  0407 06/26/24  0351   WBC 30.41* 26.44*   HGB 8.5* 8.0*   HCT 26.0* 25.4*    206     Microbiology Results (last 7 days)       Procedure Component Value Units Date/Time    Blood culture [[id number]] Collected: 06/26/24 0951    Order Status: Sent Specimen: Blood     Blood culture x two cultures. Draw prior to antibiotics. [[id number]]  (Abnormal) Collected: 06/23/24 1537    Order Status: Completed Specimen: Blood from Peripheral, Antecubital, Right Updated: 06/26/24 3894     Blood Culture, Routine Gram stain  aer bottle: Gram positive cocci in clusters resembling Staph,      Gram positive rods      Positive results previously called 06/24/2024  06:13      Gram stain stephanie bottle: Gram positive cocci in clusters resembling Staph      STAPHYLOCOCCUS AUREUS  ID consult required at [facility].  For susceptibility see order #[id number]        GRAM-POSITIVE SUKUMAR  Non-viable for ID      Narrative:      Aerobic and anaerobic    Blood culture x two cultures. Draw prior to antibiotics. [[id number]]  (Abnormal)  (Susceptibility) Collected: 06/23/24 1537    Order Status: Completed Specimen: Blood from Peripheral, Antecubital, Right Updated: 06/26/24 0949     Blood Culture, Routine Gram stain aer bottle: Gram positive cocci in clusters resembling Staph,      Gram positive rods      Results called to and read back by: Cande NEELY 06/24/2024  06:12      Gram stain stephanie bottle: Gram positive cocci in clusters resembling Staph      Gram positive rods      Positive results previously called 06/24/2024      STAPHYLOCOCCUS AUREUS  ID consult required at [facility].        GRAM POSITIVE RODS  Non-viable for ID      Narrative:      Aerobic and anaerobic    Blood culture [[id number]]     Order Status: Canceled Specimen: Blood     Blood culture [[id number]]     Order Status: Canceled Specimen: Blood     Urine culture [[id number]] Collected: 06/23/24 1929    Order Status: Completed Specimen: Urine Updated: 06/25/24 1334     Urine Culture, Routine No significant growth    Narrative:      Specimen Source->Urine    Blood culture [[id number]]     Order Status: Canceled Specimen: Blood     Rapid Organism ID by PCR (from Blood culture) [[id number]]  (Abnormal) Collected: 06/23/24 1537    Order Status: Completed Updated: 06/24/24 0723     Enterococcus faecalis Not Detected     Enterococcus faecium Not Detected     Listeria monocytogenes Not Detected     Staphylococcus spp. See species for ID      Staphylococcus aureus Detected     Staphylococcus epidermidis Not Detected     Staphylococcus lugdunensis Not Detected     Streptococcus species Not Detected     Streptococcus agalactiae Not Detected     Streptococcus pneumoniae Not Detected     Streptococcus pyogenes Not Detected     Acinetobacter calcoaceticus/baumannii complex Not Detected     Bacteroides fragilis Not Detected     Enterobacterales Not Detected     Enterobacter cloacae complex Not Detected     Escherichia coli Not Detected     Klebsiella aerogenes Not Detected     Klebsiella oxytoca Not Detected     Klebsiella pneumoniae group Not Detected     Proteus Not Detected     Salmonella sp Not Detected     Serratia marcescens Not Detected     Haemophilus influenzae Not Detected     Neisseria meningtidis Not Detected     Pseudomonas aeruginosa Not Detected     Stenotrophomonas maltophilia Not Detected     Candida albicans Not Detected     Candida auris Not Detected     Candida glabrata Not Detected     Candida krusei Not Detected     Candida parapsilosis Not Detected     Candida tropicalis Not Detected     Cryptococcus neoformans/gattii Not Detected     CTX-M (ESBL ) Test Not Applicable     IMP (Carbapenem resistant) Test Not Applicable     KPC resistance gene (Carbapenem resistant) Test Not Applicable     mcr-1  Test Not Applicable     mec A/C  Test Not Applicable     mec A/C and MREJ (MRSA) gene Not Detected     NDM (Carbapenem resistant) Test Not Applicable     OXA-48-like (Carbapenem resistant) Test Not Applicable     van A/B (VRE gene) Test Not Applicable     VIM (Carbapenem resistant) Test Not Applicable    Narrative:      Aerobic and anaerobic            Significant Imaging: I have reviewed all pertinent imaging results/findings within the past 24 hours.

## 2024-06-27 PROBLEM — N17.9 AKI (ACUTE KIDNEY INJURY): Status: RESOLVED | Noted: 2024-04-12 | Resolved: 2024-06-27

## 2024-06-27 LAB
ALBUMIN SERPL BCP-MCNC: 1.4 G/DL (ref 3.5–5.2)
ALP SERPL-CCNC: 62 U/L (ref 55–135)
ALT SERPL W/O P-5'-P-CCNC: 5 U/L (ref 10–44)
ANION GAP SERPL CALC-SCNC: 7 MMOL/L (ref 8–16)
ANISOCYTOSIS BLD QL SMEAR: SLIGHT
AST SERPL-CCNC: 16 U/L (ref 10–40)
BASOPHILS # BLD AUTO: ABNORMAL K/UL (ref 0–0.2)
BASOPHILS NFR BLD: 0 % (ref 0–1.9)
BASOPHILS NFR BLD: 0 % (ref 0–1.9)
BILIRUB SERPL-MCNC: 0.2 MG/DL (ref 0.1–1)
BUN SERPL-MCNC: 7 MG/DL (ref 6–20)
CALCIUM SERPL-MCNC: 7.4 MG/DL (ref 8.7–10.5)
CHLORIDE SERPL-SCNC: 108 MMOL/L (ref 95–110)
CO2 SERPL-SCNC: 21 MMOL/L (ref 23–29)
CREAT SERPL-MCNC: 0.8 MG/DL (ref 0.5–1.4)
DIFFERENTIAL METHOD BLD: ABNORMAL
DIFFERENTIAL METHOD BLD: ABNORMAL
EOSINOPHIL # BLD AUTO: ABNORMAL K/UL (ref 0–0.5)
EOSINOPHIL NFR BLD: 0 % (ref 0–8)
EOSINOPHIL NFR BLD: 0 % (ref 0–8)
ERYTHROCYTE [DISTWIDTH] IN BLOOD BY AUTOMATED COUNT: 16.5 % (ref 11.5–14.5)
ERYTHROCYTE [DISTWIDTH] IN BLOOD BY AUTOMATED COUNT: 16.9 % (ref 11.5–14.5)
EST. GFR  (NO RACE VARIABLE): >60 ML/MIN/1.73 M^2
GLUCOSE SERPL-MCNC: 123 MG/DL (ref 70–110)
HCT VFR BLD AUTO: 23.3 % (ref 37–48.5)
HCT VFR BLD AUTO: 23.4 % (ref 37–48.5)
HGB BLD-MCNC: 7.4 G/DL (ref 12–16)
HGB BLD-MCNC: 7.5 G/DL (ref 12–16)
HYPOCHROMIA BLD QL SMEAR: ABNORMAL
IMM GRANULOCYTES # BLD AUTO: ABNORMAL K/UL (ref 0–0.04)
IMM GRANULOCYTES # BLD AUTO: ABNORMAL K/UL (ref 0–0.04)
IMM GRANULOCYTES NFR BLD AUTO: ABNORMAL % (ref 0–0.5)
IMM GRANULOCYTES NFR BLD AUTO: ABNORMAL % (ref 0–0.5)
LACTATE SERPL-SCNC: 1 MMOL/L (ref 0.5–2.2)
LYMPHOCYTES # BLD AUTO: ABNORMAL K/UL (ref 1–4.8)
LYMPHOCYTES NFR BLD: 9 % (ref 18–48)
LYMPHOCYTES NFR BLD: 9 % (ref 18–48)
MAGNESIUM SERPL-MCNC: 1.3 MG/DL (ref 1.6–2.6)
MCH RBC QN AUTO: 23.6 PG (ref 27–31)
MCH RBC QN AUTO: 24.3 PG (ref 27–31)
MCHC RBC AUTO-ENTMCNC: 31.6 G/DL (ref 32–36)
MCHC RBC AUTO-ENTMCNC: 32.2 G/DL (ref 32–36)
MCV RBC AUTO: 75 FL (ref 82–98)
MCV RBC AUTO: 75 FL (ref 82–98)
METAMYELOCYTES NFR BLD MANUAL: 1 %
MONOCYTES # BLD AUTO: ABNORMAL K/UL (ref 0.3–1)
MONOCYTES NFR BLD: 2 % (ref 4–15)
MONOCYTES NFR BLD: 3 % (ref 4–15)
NEUTROPHILS NFR BLD: 86 % (ref 38–73)
NEUTROPHILS NFR BLD: 87 % (ref 38–73)
NEUTS BAND NFR BLD MANUAL: 1 %
NEUTS BAND NFR BLD MANUAL: 2 %
NRBC BLD-RTO: 0 /100 WBC
NRBC BLD-RTO: 0 /100 WBC
OVALOCYTES BLD QL SMEAR: ABNORMAL
PHOSPHATE SERPL-MCNC: 4.1 MG/DL (ref 2.7–4.5)
PLATELET # BLD AUTO: 233 K/UL (ref 150–450)
PLATELET # BLD AUTO: 265 K/UL (ref 150–450)
PLATELET BLD QL SMEAR: ABNORMAL
PLATELET BLD QL SMEAR: ABNORMAL
PMV BLD AUTO: 11 FL (ref 9.2–12.9)
PMV BLD AUTO: 11.4 FL (ref 9.2–12.9)
POIKILOCYTOSIS BLD QL SMEAR: SLIGHT
POTASSIUM SERPL-SCNC: 3.3 MMOL/L (ref 3.5–5.1)
PROT SERPL-MCNC: 5.8 G/DL (ref 6–8.4)
RBC # BLD AUTO: 3.09 M/UL (ref 4–5.4)
RBC # BLD AUTO: 3.13 M/UL (ref 4–5.4)
SCHISTOCYTES BLD QL SMEAR: PRESENT
SODIUM SERPL-SCNC: 136 MMOL/L (ref 136–145)
TOXIC GRANULES BLD QL SMEAR: PRESENT
WBC # BLD AUTO: 26 K/UL (ref 3.9–12.7)
WBC # BLD AUTO: 32.52 K/UL (ref 3.9–12.7)

## 2024-06-27 PROCEDURE — 20000000 HC ICU ROOM

## 2024-06-27 PROCEDURE — 83605 ASSAY OF LACTIC ACID: CPT | Performed by: STUDENT IN AN ORGANIZED HEALTH CARE EDUCATION/TRAINING PROGRAM

## 2024-06-27 PROCEDURE — S4991 NICOTINE PATCH NONLEGEND: HCPCS | Performed by: NURSE PRACTITIONER

## 2024-06-27 PROCEDURE — 87040 BLOOD CULTURE FOR BACTERIA: CPT | Performed by: HOSPITALIST

## 2024-06-27 PROCEDURE — 25000003 PHARM REV CODE 250: Performed by: NURSE PRACTITIONER

## 2024-06-27 PROCEDURE — 85007 BL SMEAR W/DIFF WBC COUNT: CPT | Performed by: NURSE PRACTITIONER

## 2024-06-27 PROCEDURE — 25000242 PHARM REV CODE 250 ALT 637 W/ HCPCS: Performed by: HOSPITALIST

## 2024-06-27 PROCEDURE — 85027 COMPLETE CBC AUTOMATED: CPT | Performed by: NURSE PRACTITIONER

## 2024-06-27 PROCEDURE — 99900035 HC TECH TIME PER 15 MIN (STAT)

## 2024-06-27 PROCEDURE — 84100 ASSAY OF PHOSPHORUS: CPT | Performed by: NURSE PRACTITIONER

## 2024-06-27 PROCEDURE — 25000003 PHARM REV CODE 250: Performed by: INTERNAL MEDICINE

## 2024-06-27 PROCEDURE — 63600175 PHARM REV CODE 636 W HCPCS: Performed by: INTERNAL MEDICINE

## 2024-06-27 PROCEDURE — 27000221 HC OXYGEN, UP TO 24 HOURS

## 2024-06-27 PROCEDURE — 85027 COMPLETE CBC AUTOMATED: CPT | Mod: 91 | Performed by: STUDENT IN AN ORGANIZED HEALTH CARE EDUCATION/TRAINING PROGRAM

## 2024-06-27 PROCEDURE — 83735 ASSAY OF MAGNESIUM: CPT | Performed by: NURSE PRACTITIONER

## 2024-06-27 PROCEDURE — 94640 AIRWAY INHALATION TREATMENT: CPT

## 2024-06-27 PROCEDURE — 25000003 PHARM REV CODE 250: Performed by: HOSPITALIST

## 2024-06-27 PROCEDURE — 25000003 PHARM REV CODE 250: Performed by: STUDENT IN AN ORGANIZED HEALTH CARE EDUCATION/TRAINING PROGRAM

## 2024-06-27 PROCEDURE — 36415 COLL VENOUS BLD VENIPUNCTURE: CPT | Performed by: HOSPITALIST

## 2024-06-27 PROCEDURE — 80053 COMPREHEN METABOLIC PANEL: CPT | Performed by: STUDENT IN AN ORGANIZED HEALTH CARE EDUCATION/TRAINING PROGRAM

## 2024-06-27 PROCEDURE — 85007 BL SMEAR W/DIFF WBC COUNT: CPT | Mod: 91 | Performed by: STUDENT IN AN ORGANIZED HEALTH CARE EDUCATION/TRAINING PROGRAM

## 2024-06-27 PROCEDURE — 63600175 PHARM REV CODE 636 W HCPCS: Performed by: HOSPITALIST

## 2024-06-27 PROCEDURE — 94761 N-INVAS EAR/PLS OXIMETRY MLT: CPT

## 2024-06-27 PROCEDURE — 25000242 PHARM REV CODE 250 ALT 637 W/ HCPCS: Performed by: STUDENT IN AN ORGANIZED HEALTH CARE EDUCATION/TRAINING PROGRAM

## 2024-06-27 PROCEDURE — 36415 COLL VENOUS BLD VENIPUNCTURE: CPT | Performed by: STUDENT IN AN ORGANIZED HEALTH CARE EDUCATION/TRAINING PROGRAM

## 2024-06-27 RX ORDER — ALBUTEROL SULFATE 2.5 MG/.5ML
2.5 SOLUTION RESPIRATORY (INHALATION) EVERY 4 HOURS PRN
Status: DISCONTINUED | OUTPATIENT
Start: 2024-06-27 | End: 2024-06-29

## 2024-06-27 RX ORDER — IPRATROPIUM BROMIDE AND ALBUTEROL SULFATE 2.5; .5 MG/3ML; MG/3ML
3 SOLUTION RESPIRATORY (INHALATION) EVERY 6 HOURS
Status: DISCONTINUED | OUTPATIENT
Start: 2024-06-27 | End: 2024-06-29

## 2024-06-27 RX ORDER — NOREPINEPHRINE BITARTRATE/D5W 4MG/250ML
0-3 PLASTIC BAG, INJECTION (ML) INTRAVENOUS CONTINUOUS
Status: DISCONTINUED | OUTPATIENT
Start: 2024-06-27 | End: 2024-06-28

## 2024-06-27 RX ORDER — L. ACIDOPHILUS/L.BULGARICUS 100MM CELL
1 GRANULES IN PACKET (EA) ORAL 2 TIMES DAILY
Status: DISCONTINUED | OUTPATIENT
Start: 2024-06-27 | End: 2024-07-09 | Stop reason: HOSPADM

## 2024-06-27 RX ORDER — MICONAZOLE NITRATE 2 %
POWDER (GRAM) TOPICAL 2 TIMES DAILY
Status: DISCONTINUED | OUTPATIENT
Start: 2024-06-27 | End: 2024-07-09 | Stop reason: HOSPADM

## 2024-06-27 RX ADMIN — BACLOFEN 10 MG: 5 TABLET ORAL at 12:06

## 2024-06-27 RX ADMIN — AMMONIUM LACTATE: 120 LOTION TOPICAL at 11:06

## 2024-06-27 RX ADMIN — CLONIDINE HYDROCHLORIDE 0.1 MG: 0.1 TABLET ORAL at 02:06

## 2024-06-27 RX ADMIN — ERTAPENEM 1 G: 1 INJECTION INTRAMUSCULAR; INTRAVENOUS at 02:06

## 2024-06-27 RX ADMIN — OXACILLIN 2 G: 2 INJECTION, POWDER, FOR SOLUTION INTRAMUSCULAR; INTRAVENOUS at 04:06

## 2024-06-27 RX ADMIN — NICOTINE 1 PATCH: 14 PATCH TRANSDERMAL at 11:06

## 2024-06-27 RX ADMIN — BACLOFEN 10 MG: 5 TABLET ORAL at 08:06

## 2024-06-27 RX ADMIN — MORPHINE SULFATE 10 MG: 2 INJECTION, SOLUTION INTRAMUSCULAR; INTRAVENOUS at 10:06

## 2024-06-27 RX ADMIN — OXACILLIN 2 G: 2 INJECTION, POWDER, FOR SOLUTION INTRAMUSCULAR; INTRAVENOUS at 02:06

## 2024-06-27 RX ADMIN — NOREPINEPHRINE BITARTRATE 0.02 MCG/KG/MIN: 4 INJECTION, SOLUTION INTRAVENOUS at 03:06

## 2024-06-27 RX ADMIN — ALBUTEROL SULFATE 2.5 MG: 2.5 SOLUTION RESPIRATORY (INHALATION) at 02:06

## 2024-06-27 RX ADMIN — IPRATROPIUM BROMIDE AND ALBUTEROL SULFATE 3 ML: 2.5; .5 SOLUTION RESPIRATORY (INHALATION) at 11:06

## 2024-06-27 RX ADMIN — OXACILLIN 2 G: 2 INJECTION, POWDER, FOR SOLUTION INTRAMUSCULAR; INTRAVENOUS at 08:06

## 2024-06-27 RX ADMIN — POTASSIUM BICARBONATE 50 MEQ: 978 TABLET, EFFERVESCENT ORAL at 08:06

## 2024-06-27 RX ADMIN — OXACILLIN 2 G: 2 INJECTION, POWDER, FOR SOLUTION INTRAMUSCULAR; INTRAVENOUS at 11:06

## 2024-06-27 RX ADMIN — HYDROXYZINE HYDROCHLORIDE 50 MG: 25 TABLET ORAL at 02:06

## 2024-06-27 RX ADMIN — MORPHINE SULFATE 10 MG: 2 INJECTION, SOLUTION INTRAMUSCULAR; INTRAVENOUS at 12:06

## 2024-06-27 RX ADMIN — BACLOFEN 10 MG: 5 TABLET ORAL at 09:06

## 2024-06-27 RX ADMIN — LACTOBACILLUS ACIDOPHILUS / LACTOBACILLUS BULGARICUS 1 EACH: 100 MILLION CFU STRENGTH GRANULES at 08:06

## 2024-06-27 RX ADMIN — ENOXAPARIN SODIUM 40 MG: 40 INJECTION SUBCUTANEOUS at 04:06

## 2024-06-27 RX ADMIN — MORPHINE SULFATE 10 MG: 2 INJECTION, SOLUTION INTRAMUSCULAR; INTRAVENOUS at 02:06

## 2024-06-27 RX ADMIN — IPRATROPIUM BROMIDE AND ALBUTEROL SULFATE 3 ML: 2.5; .5 SOLUTION RESPIRATORY (INHALATION) at 08:06

## 2024-06-27 RX ADMIN — MUPIROCIN: 20 OINTMENT TOPICAL at 11:06

## 2024-06-27 RX ADMIN — HYDROCODONE BITARTRATE AND ACETAMINOPHEN 1 TABLET: 5; 325 TABLET ORAL at 08:06

## 2024-06-27 RX ADMIN — LACTOBACILLUS ACIDOPHILUS / LACTOBACILLUS BULGARICUS 1 EACH: 100 MILLION CFU STRENGTH GRANULES at 11:06

## 2024-06-27 RX ADMIN — HYDROCODONE BITARTRATE AND ACETAMINOPHEN 1 TABLET: 5; 325 TABLET ORAL at 04:06

## 2024-06-27 RX ADMIN — HYDROCODONE BITARTRATE AND ACETAMINOPHEN 1 TABLET: 5; 325 TABLET ORAL at 09:06

## 2024-06-27 RX ADMIN — MICONAZOLE NITRATE: 20 POWDER TOPICAL at 09:06

## 2024-06-27 RX ADMIN — BACLOFEN 10 MG: 5 TABLET ORAL at 04:06

## 2024-06-27 NOTE — ASSESSMENT & PLAN NOTE
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  Chronic left arm wound  -admitted due to severe sepsis/septic shock with hypotension requiring vasopressor support and complicated by DONNA in setting of known recent bacteremia without complete treatment resulting in seeding, septic emboli on CXR, tricuspid valve endocarditis, likely cervical spine discitis and acute on chronic left arm wound with recent IV injection/skin popping of heroin  at admission CT head and CT C-spine noted no acute cervical fracture.  Prevertebral soft tissue thickening.  Recommended MRI of the cervical spine to evaluate for prevertebral edema.  Mild to moderate spondylitic changes.  -MRI done, showed a thin layer of prevertebral edema in front of upper cervical levels, cervical spine DJD, no abscess.   -at admission remained hypotensive despite fluid resuscitation and required vasopressor support; otherwise HDS and afebrile  -admission labs:  -CBC with WBC 34, hemoglobin 8.3, hematocrit 26, platelets 119.  Chemistry was , K 3.5, chloride 97, CO2 18, BUN 50, SCr 3.1, glucose 133.  Magnesium 1.9.  ; AST, ALT, T bili unremarkable.  Lactic acid 3.37 >> 1.2.  Procalcitonin 78.33.  ESR > 120, .  PH 7.419, pCO2 32.2, PO2 34, HC03 21, be negative for.  .  Troponin 0.23.  Flu and COVID negative.  HIV negative.   -admission imaging:    -CXR with findings that may reflect multifocal infectious process.  Right shoulder x-ray without acute pathology.  Left wrist x-ray without acute pathology.  CT head and CT C-spine noted no acute cervical fracture.  Prevertebral cervical soft tissue thickening.  Recommend MRI of the cervical spine to evaluate for prevertebral edema.  Mild to moderate spondylitic changes.  -admission micro:   -blood cultures MSSA positive, also gram positive rods (likely contaminant but will follow)  -Weaned off pressors   -continue IV  fluid hydration   -continue oxacillin per ID - might add a carbapenem for synergy if blood cultures remain positive  -Wound Care consulted   -pulmonary toileting with incentive spirometer and flutter valve as tolerated   -oxygen supplementation as needed  -continue tele monitoring   -EKG PRN concerns  -trend labs, address/replete electrolytes as indicated  -Daily blood cultures ordered until she clears bacteremia  -ID consulted, Indium scan ordered.  -TTE done - found vegetation on the tricuspid valve.

## 2024-06-27 NOTE — SUBJECTIVE & OBJECTIVE
Interval History: Her neck is marginally better, or she is getting used to the pain.  Complains of new pain/sensitivity in the tip of her left second toe.    Review of Systems   Constitutional:  Negative for chills and fever.   Respiratory:  Negative for cough and shortness of breath.    Cardiovascular:  Negative for chest pain and palpitations.   Musculoskeletal:  Positive for neck pain.        Right shoulder pain     Objective:     Vital Signs (Most Recent):  Temp: 98.7 °F (37.1 °C) (06/27/24 0302)  Pulse: 78 (06/27/24 0800)  Resp: (!) 21 (06/27/24 0800)  BP: (!) 143/79 (06/27/24 0800)  SpO2: 95 % (06/27/24 0800) Vital Signs (24h Range):  Temp:  [98.7 °F (37.1 °C)-98.9 °F (37.2 °C)] 98.7 °F (37.1 °C)  Pulse:  [] 78  Resp:  [15-40] 21  SpO2:  [91 %-100 %] 95 %  BP: (110-156)/(56-89) 143/79     Weight: 68.9 kg (152 lb)  Body mass index is 29.69 kg/m².    Intake/Output Summary (Last 24 hours) at 6/27/2024 0835  Last data filed at 6/27/2024 0611  Gross per 24 hour   Intake 4630.01 ml   Output 1800 ml   Net 2830.01 ml         Physical Exam  Neck:      Comments: Neck tender to palpation over left strap muscles.  No point tenderness over cervical spine.  Patient unable to flex, extend or turn her neck to either side.  Cardiovascular:      Rate and Rhythm: Normal rate and regular rhythm.      Heart sounds: Murmur heard.      No gallop.   Pulmonary:      Effort: Pulmonary effort is normal.      Breath sounds: Normal breath sounds.   Abdominal:      General: Bowel sounds are normal.      Palpations: Abdomen is soft.   Musculoskeletal:      Cervical back: Tenderness present.      Comments: No evidence of emboli to feet - tinea pedis noted   Skin:     General: Skin is warm and dry.      Comments: Chronic wound left forearm, extensor surface.  Surgical scar left wrist.  Chronic wound right forearm, scarred and dry.   Neurological:      General: No focal deficit present.      Mental Status: She is alert.   Psychiatric:          Mood and Affect: Mood normal.         Behavior: Behavior normal.             Significant Labs: All pertinent labs within the past 24 hours have been reviewed.    Significant Imaging: I have reviewed all pertinent imaging results/findings within the past 24 hours.
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
## 2024-06-27 NOTE — ASSESSMENT & PLAN NOTE
Echo    Result Date: 6/25/2024    Left Ventricle: The left ventricle is normal in size. Ventricular mass   is normal. Normal wall thickness. There is normal systolic function.   Ejection fraction by visual approximation is 65%. Global longitudinal   strain is -19.0%. Global longitudinal strain is normal. There is normal   diastolic function. Normal left ventricular filling pressure. Tissue   Doppler velocity is normal.    Right Ventricle: Normal right ventricular cavity size. Wall thickness   is normal. Systolic function is normal.    Tricuspid Valve: There is a medium mobile echogenic pedunculated mass   present on the atrial side of the septal leaflet consistent with   vegetation. There is mild regurgitation with a centrally directed jet.    IVC/SVC: Normal venous pressure at 3 mmHg.    Pericardium: There is a trivial effusion. No indication of cardiac   tamponade.        Echo    Result Date: 4/12/2024    Left Ventricle: There is normal systolic function with a visually   estimated ejection fraction of 65 - 70%. Global longitudinal strain is   normal; -19.9%. There is normal diastolic function.    Right Ventricle: Normal right ventricular cavity size. Wall thickness   is normal. Right ventricle wall motion  is normal. Systolic function is   normal.    IVC/SVC: Normal venous pressure at 3 mmHg.    Pericardium: There is a trivial effusion. No indication of cardiac   tamponade.        Echo    Result Date: 6/30/2023  · Normal systolic function.  · The estimated ejection fraction is 65%.  · Normal left ventricular diastolic function.  · Normal right ventricular size with normal right ventricular systolic   function.  · Mild left atrial enlargement.  · Mild to moderate tricuspid regurgitation.  · Normal central venous pressure (3 mmHg).  · The estimated PA systolic pressure is 32 mmHg.

## 2024-06-27 NOTE — PLAN OF CARE
Pt stable overnight. VSS.  Patient still c/o pain. PRN Morphine 10 mg administered. IV abx continued. LR infusing at prescribed rate. Intake and output being monitored. Fall precautions maintained. Bed in lowest position, call bell within reach.     Problem: Adult Inpatient Plan of Care  Goal: Plan of Care Review  Outcome: Progressing  Flowsheets (Taken 6/27/2024 0614)  Plan of Care Reviewed With: patient     Problem: Infection  Goal: Absence of Infection Signs and Symptoms  Outcome: Progressing  Intervention: Prevent or Manage Infection  Flowsheets (Taken 6/27/2024 0614)  Fever Reduction/Comfort Measures:   lightweight clothing   lightweight bedding  Infection Management:   aseptic technique maintained   cultures obtained and sent to lab  Isolation Precautions: precautions maintained     Problem: Wound  Goal: Optimal Wound Healing  Outcome: Progressing  Intervention: Promote Wound Healing  Flowsheets (Taken 6/27/2024 0614)  Sleep/Rest Enhancement:   awakenings minimized   regular sleep/rest pattern promoted   relaxation techniques promoted   noise level reduced     Problem: Skin Injury Risk Increased  Goal: Skin Health and Integrity  Outcome: Progressing  Intervention: Optimize Skin Protection  Flowsheets (Taken 6/27/2024 0614)  Pressure Reduction Techniques: frequent weight shift encouraged  Pressure Reduction Devices: foam padding utilized  Skin Protection: incontinence pads utilized  Activity Management: Rolling - L1  Head of Bed (HOB) Positioning: HOB at 30-45 degrees

## 2024-06-27 NOTE — ASSESSMENT & PLAN NOTE
Thrombocytopenia, chronic  -etiology likely due to combination of acute and chronic disease/malnutrition, previous anemia profile detailed below with normal iron stores and elevated TIBC  -at admission H/H 8.3/25, platelets 133  -baseline appears 8-9/25-34; 118-300  -no overt s/s of bleeding  -trend labs over course and transfuse as indicated   04/12/24    Iron 42   TIBC 231 (L)   Saturated Iron 18 (L)   Transferrin 156 (L)   Ferritin 290   Folate 7.3   Vitamin B12 492

## 2024-06-28 PROBLEM — Z51.5 ENCOUNTER FOR PALLIATIVE CARE: Status: ACTIVE | Noted: 2024-06-28

## 2024-06-28 LAB
ALBUMIN SERPL BCP-MCNC: 1.5 G/DL (ref 3.5–5.2)
ALP SERPL-CCNC: 63 U/L (ref 55–135)
ALT SERPL W/O P-5'-P-CCNC: <5 U/L (ref 10–44)
ANION GAP SERPL CALC-SCNC: 8 MMOL/L (ref 8–16)
ANISOCYTOSIS BLD QL SMEAR: SLIGHT
AST SERPL-CCNC: 14 U/L (ref 10–40)
BASOPHILS # BLD AUTO: ABNORMAL K/UL (ref 0–0.2)
BASOPHILS NFR BLD: 0 % (ref 0–1.9)
BILIRUB SERPL-MCNC: 0.2 MG/DL (ref 0.1–1)
BUN SERPL-MCNC: 7 MG/DL (ref 6–20)
CALCIUM SERPL-MCNC: 8 MG/DL (ref 8.7–10.5)
CHLORIDE SERPL-SCNC: 109 MMOL/L (ref 95–110)
CO2 SERPL-SCNC: 23 MMOL/L (ref 23–29)
CREAT SERPL-MCNC: 0.8 MG/DL (ref 0.5–1.4)
DIFFERENTIAL METHOD BLD: ABNORMAL
EOSINOPHIL # BLD AUTO: ABNORMAL K/UL (ref 0–0.5)
EOSINOPHIL NFR BLD: 2 % (ref 0–8)
ERYTHROCYTE [DISTWIDTH] IN BLOOD BY AUTOMATED COUNT: 16.7 % (ref 11.5–14.5)
EST. GFR  (NO RACE VARIABLE): >60 ML/MIN/1.73 M^2
GLUCOSE SERPL-MCNC: 110 MG/DL (ref 70–110)
HCT VFR BLD AUTO: 23.3 % (ref 37–48.5)
HGB BLD-MCNC: 7.3 G/DL (ref 12–16)
HYPOCHROMIA BLD QL SMEAR: ABNORMAL
IMM GRANULOCYTES # BLD AUTO: ABNORMAL K/UL (ref 0–0.04)
IMM GRANULOCYTES NFR BLD AUTO: ABNORMAL % (ref 0–0.5)
LYMPHOCYTES # BLD AUTO: ABNORMAL K/UL (ref 1–4.8)
LYMPHOCYTES NFR BLD: 10 % (ref 18–48)
MCH RBC QN AUTO: 23.8 PG (ref 27–31)
MCHC RBC AUTO-ENTMCNC: 31.3 G/DL (ref 32–36)
MCV RBC AUTO: 76 FL (ref 82–98)
METAMYELOCYTES NFR BLD MANUAL: 2 %
MONOCYTES # BLD AUTO: ABNORMAL K/UL (ref 0.3–1)
MONOCYTES NFR BLD: 3 % (ref 4–15)
MYELOCYTES NFR BLD MANUAL: 1 %
NEUTROPHILS NFR BLD: 72 % (ref 38–73)
NEUTS BAND NFR BLD MANUAL: 7 %
NRBC BLD-RTO: 0 /100 WBC
OHS QRS DURATION: 76 MS
OHS QTC CALCULATION: 462 MS
PLATELET # BLD AUTO: 262 K/UL (ref 150–450)
PLATELET BLD QL SMEAR: ABNORMAL
PMV BLD AUTO: 11.7 FL (ref 9.2–12.9)
POIKILOCYTOSIS BLD QL SMEAR: SLIGHT
POLYCHROMASIA BLD QL SMEAR: ABNORMAL
POTASSIUM SERPL-SCNC: 3.2 MMOL/L (ref 3.5–5.1)
PROMYELOCYTES NFR BLD MANUAL: 3 %
PROT SERPL-MCNC: 6.3 G/DL (ref 6–8.4)
RBC # BLD AUTO: 3.07 M/UL (ref 4–5.4)
SODIUM SERPL-SCNC: 140 MMOL/L (ref 136–145)
WBC # BLD AUTO: 23.55 K/UL (ref 3.9–12.7)

## 2024-06-28 PROCEDURE — 85027 COMPLETE CBC AUTOMATED: CPT | Performed by: HOSPITALIST

## 2024-06-28 PROCEDURE — 25000003 PHARM REV CODE 250: Performed by: NURSE PRACTITIONER

## 2024-06-28 PROCEDURE — 99900035 HC TECH TIME PER 15 MIN (STAT)

## 2024-06-28 PROCEDURE — 27000646 HC AEROBIKA DEVICE

## 2024-06-28 PROCEDURE — 25500020 PHARM REV CODE 255: Performed by: HOSPITALIST

## 2024-06-28 PROCEDURE — 85007 BL SMEAR W/DIFF WBC COUNT: CPT | Performed by: HOSPITALIST

## 2024-06-28 PROCEDURE — 80053 COMPREHEN METABOLIC PANEL: CPT | Performed by: HOSPITALIST

## 2024-06-28 PROCEDURE — 25000242 PHARM REV CODE 250 ALT 637 W/ HCPCS: Performed by: STUDENT IN AN ORGANIZED HEALTH CARE EDUCATION/TRAINING PROGRAM

## 2024-06-28 PROCEDURE — 63600175 PHARM REV CODE 636 W HCPCS: Performed by: INTERNAL MEDICINE

## 2024-06-28 PROCEDURE — 36415 COLL VENOUS BLD VENIPUNCTURE: CPT | Performed by: HOSPITALIST

## 2024-06-28 PROCEDURE — 87040 BLOOD CULTURE FOR BACTERIA: CPT | Performed by: HOSPITALIST

## 2024-06-28 PROCEDURE — 27000221 HC OXYGEN, UP TO 24 HOURS

## 2024-06-28 PROCEDURE — S4991 NICOTINE PATCH NONLEGEND: HCPCS | Performed by: NURSE PRACTITIONER

## 2024-06-28 PROCEDURE — 99233 SBSQ HOSP IP/OBS HIGH 50: CPT | Mod: ,,, | Performed by: INTERNAL MEDICINE

## 2024-06-28 PROCEDURE — 63600175 PHARM REV CODE 636 W HCPCS: Performed by: HOSPITALIST

## 2024-06-28 PROCEDURE — 25000003 PHARM REV CODE 250: Performed by: INTERNAL MEDICINE

## 2024-06-28 PROCEDURE — 20000000 HC ICU ROOM

## 2024-06-28 PROCEDURE — C1751 CATH, INF, PER/CENT/MIDLINE: HCPCS

## 2024-06-28 PROCEDURE — A4216 STERILE WATER/SALINE, 10 ML: HCPCS | Performed by: STUDENT IN AN ORGANIZED HEALTH CARE EDUCATION/TRAINING PROGRAM

## 2024-06-28 PROCEDURE — 94664 DEMO&/EVAL PT USE INHALER: CPT

## 2024-06-28 PROCEDURE — 36410 VNPNXR 3YR/> PHY/QHP DX/THER: CPT

## 2024-06-28 PROCEDURE — 94761 N-INVAS EAR/PLS OXIMETRY MLT: CPT

## 2024-06-28 PROCEDURE — 25000003 PHARM REV CODE 250: Performed by: HOSPITALIST

## 2024-06-28 PROCEDURE — 63600175 PHARM REV CODE 636 W HCPCS: Performed by: STUDENT IN AN ORGANIZED HEALTH CARE EDUCATION/TRAINING PROGRAM

## 2024-06-28 PROCEDURE — 94640 AIRWAY INHALATION TREATMENT: CPT

## 2024-06-28 PROCEDURE — 99223 1ST HOSP IP/OBS HIGH 75: CPT | Mod: ,,, | Performed by: FAMILY MEDICINE

## 2024-06-28 PROCEDURE — 25000003 PHARM REV CODE 250: Performed by: STUDENT IN AN ORGANIZED HEALTH CARE EDUCATION/TRAINING PROGRAM

## 2024-06-28 RX ORDER — FUROSEMIDE 10 MG/ML
40 INJECTION INTRAMUSCULAR; INTRAVENOUS ONCE
Status: COMPLETED | OUTPATIENT
Start: 2024-06-28 | End: 2024-06-28

## 2024-06-28 RX ORDER — MAGNESIUM SULFATE HEPTAHYDRATE 40 MG/ML
2 INJECTION, SOLUTION INTRAVENOUS ONCE
Status: COMPLETED | OUTPATIENT
Start: 2024-06-28 | End: 2024-06-28

## 2024-06-28 RX ORDER — SODIUM CHLORIDE 0.9 % (FLUSH) 0.9 %
10 SYRINGE (ML) INJECTION EVERY 6 HOURS
Status: DISCONTINUED | OUTPATIENT
Start: 2024-06-28 | End: 2024-07-09 | Stop reason: HOSPADM

## 2024-06-28 RX ORDER — SODIUM CHLORIDE 0.9 % (FLUSH) 0.9 %
10 SYRINGE (ML) INJECTION
Status: DISCONTINUED | OUTPATIENT
Start: 2024-06-28 | End: 2024-07-09 | Stop reason: HOSPADM

## 2024-06-28 RX ORDER — POTASSIUM CHLORIDE 14.9 MG/ML
20 INJECTION INTRAVENOUS ONCE
Status: DISCONTINUED | OUTPATIENT
Start: 2024-06-28 | End: 2024-06-28

## 2024-06-28 RX ORDER — POTASSIUM CHLORIDE 14.9 MG/ML
20 INJECTION INTRAVENOUS
Status: COMPLETED | OUTPATIENT
Start: 2024-06-28 | End: 2024-06-28

## 2024-06-28 RX ADMIN — MORPHINE SULFATE 10 MG: 2 INJECTION, SOLUTION INTRAMUSCULAR; INTRAVENOUS at 02:06

## 2024-06-28 RX ADMIN — BACLOFEN 10 MG: 5 TABLET ORAL at 05:06

## 2024-06-28 RX ADMIN — POTASSIUM CHLORIDE 20 MEQ: 14.9 INJECTION, SOLUTION INTRAVENOUS at 10:06

## 2024-06-28 RX ADMIN — BACLOFEN 10 MG: 5 TABLET ORAL at 08:06

## 2024-06-28 RX ADMIN — OXACILLIN 2 G: 2 INJECTION, POWDER, FOR SOLUTION INTRAMUSCULAR; INTRAVENOUS at 07:06

## 2024-06-28 RX ADMIN — MICONAZOLE NITRATE: 20 POWDER TOPICAL at 08:06

## 2024-06-28 RX ADMIN — HYDROCODONE BITARTRATE AND ACETAMINOPHEN 1 TABLET: 5; 325 TABLET ORAL at 01:06

## 2024-06-28 RX ADMIN — AMMONIUM LACTATE: 120 LOTION TOPICAL at 09:06

## 2024-06-28 RX ADMIN — IPRATROPIUM BROMIDE AND ALBUTEROL SULFATE 3 ML: 2.5; .5 SOLUTION RESPIRATORY (INHALATION) at 07:06

## 2024-06-28 RX ADMIN — LACTOBACILLUS ACIDOPHILUS / LACTOBACILLUS BULGARICUS 1 EACH: 100 MILLION CFU STRENGTH GRANULES at 08:06

## 2024-06-28 RX ADMIN — OXACILLIN 2 G: 2 INJECTION, POWDER, FOR SOLUTION INTRAMUSCULAR; INTRAVENOUS at 03:06

## 2024-06-28 RX ADMIN — MUPIROCIN: 20 OINTMENT TOPICAL at 08:06

## 2024-06-28 RX ADMIN — POTASSIUM CHLORIDE 20 MEQ: 14.9 INJECTION, SOLUTION INTRAVENOUS at 12:06

## 2024-06-28 RX ADMIN — DOCUSATE SODIUM AND SENNOSIDES 1 TABLET: 8.6; 5 TABLET, FILM COATED ORAL at 08:06

## 2024-06-28 RX ADMIN — ERTAPENEM 1 G: 1 INJECTION INTRAMUSCULAR; INTRAVENOUS at 02:06

## 2024-06-28 RX ADMIN — METHOCARBAMOL 500 MG: 500 TABLET ORAL at 02:06

## 2024-06-28 RX ADMIN — IBUPROFEN 600 MG: 600 TABLET, FILM COATED ORAL at 08:06

## 2024-06-28 RX ADMIN — NICOTINE 1 PATCH: 14 PATCH TRANSDERMAL at 08:06

## 2024-06-28 RX ADMIN — OXACILLIN 2 G: 2 INJECTION, POWDER, FOR SOLUTION INTRAMUSCULAR; INTRAVENOUS at 12:06

## 2024-06-28 RX ADMIN — AMMONIUM LACTATE: 120 LOTION TOPICAL at 08:06

## 2024-06-28 RX ADMIN — MORPHINE SULFATE 10 MG: 2 INJECTION, SOLUTION INTRAMUSCULAR; INTRAVENOUS at 09:06

## 2024-06-28 RX ADMIN — IOHEXOL 100 ML: 350 INJECTION, SOLUTION INTRAVENOUS at 09:06

## 2024-06-28 RX ADMIN — BACLOFEN 10 MG: 5 TABLET ORAL at 12:06

## 2024-06-28 RX ADMIN — MICONAZOLE NITRATE: 20 POWDER TOPICAL at 09:06

## 2024-06-28 RX ADMIN — MAGNESIUM SULFATE HEPTAHYDRATE 2 G: 40 INJECTION, SOLUTION INTRAVENOUS at 10:06

## 2024-06-28 RX ADMIN — FUROSEMIDE 40 MG: 10 INJECTION, SOLUTION INTRAMUSCULAR; INTRAVENOUS at 12:06

## 2024-06-28 RX ADMIN — MORPHINE SULFATE 10 MG: 2 INJECTION, SOLUTION INTRAMUSCULAR; INTRAVENOUS at 05:06

## 2024-06-28 RX ADMIN — ENOXAPARIN SODIUM 40 MG: 40 INJECTION SUBCUTANEOUS at 05:06

## 2024-06-28 RX ADMIN — Medication 10 ML: at 06:06

## 2024-06-28 RX ADMIN — MORPHINE SULFATE 10 MG: 2 INJECTION, SOLUTION INTRAMUSCULAR; INTRAVENOUS at 06:06

## 2024-06-28 RX ADMIN — HYDROCODONE BITARTRATE AND ACETAMINOPHEN 1 TABLET: 5; 325 TABLET ORAL at 08:06

## 2024-06-28 NOTE — NURSING
Consulted to place a midline.  No veins identified under ultrasound on the LUE.  Pt unable to extend her right arm for full scan of R UE.  No cephalic vein visualized on right.  Vein visualized in antecubital fossa however, unable to perform complete assessment of vein bc pt will not extend arm.  Nursing notified.

## 2024-06-28 NOTE — ASSESSMENT & PLAN NOTE
"Polysubstance abuse   PTSD and depression   Tobacco abuse   -longstanding history of polysubstance abuse, IV drug abuse, PTSD, anxiety, depression  -per Care everywhere chart review multiple antidepressants and psychiatric meds of varying doses however patient reports has not been taking   -Psych consulted for assistance - she does endorse some interest in rehab as "she is tired of being sick" however she is worried about her adult daughter who is also un-housed and would like to stay with her, she denies drug abuse in daughter and that she has "been out on the street with me to try and help me"  -opioid withdrawal supportive care as indicated - protocol ordered  -clonidine 0.1 mg po q4 hours PRN opiate withdrawal anxiety ( hold if SBP<90, DBP<50 or HR <60)  -dicylomine 10 mg q6 hours PRN muscle cramps   -loperamide 2 mg q 1 hour PRN diarrhea (max= 16 mg/24 hours)   -methocarbamol 500 mg po q 6 hours PRN muscle spasm and body aches   -No Suboxone for now given need for pain medication - changed to morphine as she is having side effects from Dilaudid.  -nicotine patch ordered  -fall and delirium precautions  -monitor  -Will consult palliative care  "
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
## 2024-06-28 NOTE — SUBJECTIVE & OBJECTIVE
No past medical history on file.    No past surgical history on file.    Review of patient's allergies indicates:  No Known Allergies    Family History    None       Tobacco Use    Smoking status: Every Day     Current packs/day: 1.00     Average packs/day: 1 pack/day for 27.5 years (27.5 ttl pk-yrs)     Types: Cigarettes     Start date: 1997    Smokeless tobacco: Never    Tobacco comments:     Smoking cessation education provided. Pt is a 1 pk/day cigarette smoker x 26 yrs. She states that she cut down to 0.5 pk/day approx. 1 yr ago. Pt declines referral to Ambulatory Smoking Cessation clinic, stating not ready to quit. Handout provided.    Substance and Sexual Activity    Alcohol use: Not Currently    Drug use: Not on file    Sexual activity: Not Currently         Review of Systems   Unable to perform ROS: Acuity of condition     Objective:     Vital Signs (Most Recent):  Temp: 98.2 °F (36.8 °C) (06/28/24 0330)  Pulse: 85 (06/28/24 0915)  Resp: 20 (06/28/24 0930)  BP: (!) 145/78 (06/28/24 0915)  SpO2: 96 % (06/28/24 0915) Vital Signs (24h Range):  Temp:  [98.2 °F (36.8 °C)-100.3 °F (37.9 °C)] 98.2 °F (36.8 °C)  Pulse:  [] 85  Resp:  [15-62] 20  SpO2:  [85 %-100 %] 96 %  BP: ()/(45-89) 145/78     Weight: 68.9 kg (152 lb)  Body mass index is 29.69 kg/m².      Intake/Output Summary (Last 24 hours) at 6/28/2024 1027  Last data filed at 6/28/2024 0547  Gross per 24 hour   Intake 606.63 ml   Output 1700 ml   Net -1093.37 ml        Physical Exam  Vitals and nursing note reviewed.   Constitutional:       General: She is not in acute distress.  HENT:      Head: Normocephalic and atraumatic.      Nose: No congestion.      Mouth/Throat:      Mouth: Mucous membranes are moist.   Eyes:      General: No scleral icterus.     Pupils: Pupils are equal, round, and reactive to light.   Neck:      Comments: RIJ central line in place, dressing clean and intact  Cardiovascular:      Rate and Rhythm: Regular rhythm.  Tachycardia present.      Pulses: Normal pulses.      Heart sounds: Murmur heard.   Pulmonary:      Effort: Pulmonary effort is normal.      Breath sounds: Rales (bilaterally) present.      Comments: On room air, decreased breath sounds at bilateral bases  Abdominal:      General: There is no distension.      Tenderness: There is no abdominal tenderness.   Musculoskeletal:      Right lower leg: Edema present.      Left lower leg: Edema present.      Comments: Significant tenderness to right shoulder. Cannot move 2/2 pain and has significant pain with passive movement   Skin:     General: Skin is warm and dry.      Comments: Wound noted to right forearm   Neurological:      General: No focal deficit present.      Mental Status: She is alert and oriented to person, place, and time.   Psychiatric:         Mood and Affect: Mood normal.         Behavior: Behavior normal.          Vents:  Oxygen Concentration (%): 100 (06/27/24 1430)    Lines/Drains/Airways       Central Venous Catheter Line  Duration             Percutaneous Central Line - Triple Lumen  06/23/24 1715 Internal Jugular Right 4 days              Drain  Duration             Female External Urinary Catheter w/ Suction 06/24/24 0453 4 days              Peripheral Intravenous Line  Duration                  Peripheral IV - Single Lumen 06/23/24 1518 20 G Anterior;Right Upper Arm 4 days                    Significant Labs:    CBC/Anemia Profile:  Recent Labs   Lab 06/27/24  0607 06/27/24  1629 06/28/24  0453   WBC 26.00* 32.52* 23.55*   HGB 7.5* 7.4* 7.3*   HCT 23.3* 23.4* 23.3*    265 262   MCV 75* 75* 76*   RDW 16.9* 16.5* 16.7*        Chemistries:  Recent Labs   Lab 06/27/24  0607 06/27/24  1629 06/28/24  0453   NA  --  136 140   K  --  3.3* 3.2*   CL  --  108 109   CO2  --  21* 23   BUN  --  7 7   CREATININE  --  0.8 0.8   CALCIUM  --  7.4* 8.0*   ALBUMIN  --  1.4* 1.5*   PROT  --  5.8* 6.3   BILITOT  --  0.2 0.2   ALKPHOS  --  62 63   ALT  --  5* <5*    AST  --  16 14   MG 1.3*  --   --    PHOS 4.1  --   --        All pertinent labs within the past 24 hours have been reviewed.    Significant Imaging:   I have reviewed all pertinent imaging results/findings within the past 24 hours.  CTA Chest Non-Coronary (PE Studies)  Narrative: EXAMINATION:  CTA CHEST NON CORONARY (PE STUDIES)    CLINICAL HISTORY:  MSSA Bacteremia, septic emboli acute chest pain;    TECHNIQUE:  Low dose axial images, sagittal and coronal reformations were obtained from the thoracic inlet to the lung bases following the IV administration of 100 mL of Omnipaque 350.  Contrast timing was optimized to evaluate the pulmonary arteries.  MIP images were performed.    COMPARISON:  CT of the head and cervical spine performed 06/23/2024.    FINDINGS:  Exam quality: Study considered diagnostic to the segmental pulmonary arterial level.    Pulmonary embolism: No acute or chronic pulmonary embolism.    Central pulmonary arteries: Normal caliber.    Aorta: Nonaneurysmal.  Left-sided arch.  Short segment common origin of brachiocephalic and left common carotid artery.    Heart: Question mild cardiac enlargement.    Coronary arteries: Least mild atherosclerotic calcifications are suggested.    Pericardium: Small volume pericardial fluid, possibly physiologic.    Support tubes and lines: Right internal jugular approach central venous catheter.    Base of neck/thyroid: Normal.    Lymph nodes: Prominent number and mildly enlarged bilateral axillary, hilar, and mediastinal nodes are appreciated, possibly reactive.  Reference 14 mm short axis subcarinal node (series 2, image 151).    Esophagus: Normal.    Pleura: Small to moderate bilateral pleural effusions.    Upper abdomen: Unremarkable    Body wall: Diffuse body wall edema.    Airways: Central airways grossly patent.    Lungs: Assessment compromised by respiratory motion.  Smooth interlobular septal thickening is seen, possibly related to pulmonary vascular  congestion/interstitial edema.  Dependent atelectasis.  Superimposed infectious or noninfectious inflammatory airspace consolidation lung bases difficult to exclude.  Numerous bilateral solid pulmonary nodules are noted, demonstrating suggested hematogenous distribution.  Some of these nodules demonstrate central cavitation (reference nodule in the left lung apex on series 3, image 100).    Bones: No focal lesion.  Impression: 1. No convincing evidence of acute pulmonary embolism to the segmental pulmonary arterial level.  2. Numerous bilateral pulmonary nodules, some of which demonstrate cavitation,.  Findings would be in keeping with clinically suspected septic emboli in light of reported patient history.  Additional nonspecific infectious versus noninfectious inflammatory processes could appear similar and are additionally considered.  Attention follow-up recommended.  3. Small volume pleural effusions, diffuse body wall edema, and findings in keeping with pulmonary vascular congestion/interstitial edema.  Correlation with patient volume status recommended.  4. Additional details, as provided in the body of report.    Electronically signed by: Blaise Liu  Date:    06/28/2024  Time:    10:15

## 2024-06-28 NOTE — ASSESSMENT & PLAN NOTE
- Patient is currently homeless and has been living in a homeless camp for some time. She plans to move to [place] following hospitalization where she will have more support/ family available.

## 2024-06-28 NOTE — CONSULTS
[facility] - Intensive Care ([place])  Pulmonology  Consult Note    Patient Name: Margarita Wiseman  MRN: [id number]  Admission Date: 6/23/2024  Hospital Length of Stay: 5 days  Code Status: Full Code  Attending Physician: Melissa Templeton MD  Primary Care Provider: Doris, Primary Doctor   Principal Problem: Septic shock    Inpatient consult to Pulmonary Critical Care  Consult performed by: Reyes Ontiveros MD  Consult ordered by: Melissa Templeton MD        Subjective:     HPI:  49 year old with a past medical history of polysubstance use and chronic Hep C who presented to [facility] ED on 6/23 with complaints of body aches, fevers, and chills. She had been admitted in 4/2024 for sepsis which she was found to have MSSA bacteremia however she left AMA prior to completion of therapy and had not received any further treatment since. On presentation she was hypotensive despite fluid resuscitation and was started on levophed. Labs were notable for a leukocytosis and DONNA. Lactic acid was 3.37 which trended down. She was admitted to the ICU for septic shock. A central line was placed in the ED. She was weaned off pressors quickly. Blood cultures returned with MSSA, and GPR. She was started on oxacillin and blood cultures remained positive. A TTE was performed which confirmed a tricuspid vegetation, preserved EF, and mild tricuspid regurgitation. The patient continued to have intermittent rigors and blood cultures remained positive. Patient had an episode of rigors, wheezing, and desaturation on 6/27 and pulmonary and critical care was consulted for further evaluation.    No past medical history on file.    No past surgical history on file.    Review of patient's allergies indicates:  No Known Allergies    Family History    None       Tobacco Use    Smoking status: Every Day     Current packs/day: 1.00     Average packs/day: 1 pack/day for 27.5 years (27.5 ttl pk-yrs)     Types: Cigarettes     Start date: 1997    Smokeless  tobacco: Never    Tobacco comments:     Smoking cessation education provided. Pt is a 1 pk/day cigarette smoker x 26 yrs. She states that she cut down to 0.5 pk/day approx. 1 yr ago. Pt declines referral to Ambulatory Smoking Cessation clinic, stating not ready to quit. Handout provided.    Substance and Sexual Activity    Alcohol use: Not Currently    Drug use: Not on file    Sexual activity: Not Currently         Review of Systems   Unable to perform ROS: Acuity of condition     Objective:     Vital Signs (Most Recent):  Temp: 98.2 °F (36.8 °C) (06/28/24 0330)  Pulse: 85 (06/28/24 0915)  Resp: 20 (06/28/24 0930)  BP: (!) 145/78 (06/28/24 0915)  SpO2: 96 % (06/28/24 0915) Vital Signs (24h Range):  Temp:  [98.2 °F (36.8 °C)-100.3 °F (37.9 °C)] 98.2 °F (36.8 °C)  Pulse:  [] 85  Resp:  [15-62] 20  SpO2:  [85 %-100 %] 96 %  BP: ()/(45-89) 145/78     Weight: 68.9 kg (152 lb)  Body mass index is 29.69 kg/m².      Intake/Output Summary (Last 24 hours) at 6/28/2024 1027  Last data filed at 6/28/2024 0547  Gross per 24 hour   Intake 606.63 ml   Output 1700 ml   Net -1093.37 ml        Physical Exam  Vitals and nursing note reviewed.   Constitutional:       General: She is not in acute distress.  HENT:      Head: Normocephalic and atraumatic.      Nose: No congestion.      Mouth/Throat:      Mouth: Mucous membranes are moist.   Eyes:      General: No scleral icterus.     Pupils: Pupils are equal, round, and reactive to light.   Neck:      Comments: RIJ central line in place, dressing clean and intact  Cardiovascular:      Rate and Rhythm: Regular rhythm. Tachycardia present.      Pulses: Normal pulses.      Heart sounds: Murmur heard.   Pulmonary:      Effort: Pulmonary effort is normal.      Breath sounds: Rales (bilaterally) present.      Comments: On room air, decreased breath sounds at bilateral bases  Abdominal:      General: There is no distension.      Tenderness: There is no abdominal tenderness.    Musculoskeletal:      Right lower leg: Edema present.      Left lower leg: Edema present.      Comments: Significant tenderness to right shoulder. Cannot move 2/2 pain and has significant pain with passive movement   Skin:     General: Skin is warm and dry.      Comments: Wound noted to right forearm   Neurological:      General: No focal deficit present.      Mental Status: She is alert and oriented to person, place, and time.   Psychiatric:         Mood and Affect: Mood normal.         Behavior: Behavior normal.          Vents:  Oxygen Concentration (%): 100 (06/27/24 1430)    Lines/Drains/Airways       Central Venous Catheter Line  Duration             Percutaneous Central Line - Triple Lumen  06/23/24 1715 Internal Jugular Right 4 days              Drain  Duration             Female External Urinary Catheter w/ Suction 06/24/24 0453 4 days              Peripheral Intravenous Line  Duration                  Peripheral IV - Single Lumen 06/23/24 1518 20 G Anterior;Right Upper Arm 4 days                    Significant Labs:    CBC/Anemia Profile:  Recent Labs   Lab 06/27/24  0607 06/27/24  1629 06/28/24  0453   WBC 26.00* 32.52* 23.55*   HGB 7.5* 7.4* 7.3*   HCT 23.3* 23.4* 23.3*    265 262   MCV 75* 75* 76*   RDW 16.9* 16.5* 16.7*        Chemistries:  Recent Labs   Lab 06/27/24  0607 06/27/24  1629 06/28/24  0453   NA  --  136 140   K  --  3.3* 3.2*   CL  --  108 109   CO2  --  21* 23   BUN  --  7 7   CREATININE  --  0.8 0.8   CALCIUM  --  7.4* 8.0*   ALBUMIN  --  1.4* 1.5*   PROT  --  5.8* 6.3   BILITOT  --  0.2 0.2   ALKPHOS  --  62 63   ALT  --  5* <5*   AST  --  16 14   MG 1.3*  --   --    PHOS 4.1  --   --        All pertinent labs within the past 24 hours have been reviewed.    Significant Imaging:   I have reviewed all pertinent imaging results/findings within the past 24 hours.  CTA Chest Non-Coronary (PE Studies)  Narrative: EXAMINATION:  CTA CHEST NON CORONARY (PE STUDIES)    CLINICAL  HISTORY:  MSSA Bacteremia, septic emboli acute chest pain;    TECHNIQUE:  Low dose axial images, sagittal and coronal reformations were obtained from the thoracic inlet to the lung bases following the IV administration of 100 mL of Omnipaque 350.  Contrast timing was optimized to evaluate the pulmonary arteries.  MIP images were performed.    COMPARISON:  CT of the head and cervical spine performed 06/23/2024.    FINDINGS:  Exam quality: Study considered diagnostic to the segmental pulmonary arterial level.    Pulmonary embolism: No acute or chronic pulmonary embolism.    Central pulmonary arteries: Normal caliber.    Aorta: Nonaneurysmal.  Left-sided arch.  Short segment common origin of brachiocephalic and left common carotid artery.    Heart: Question mild cardiac enlargement.    Coronary arteries: Least mild atherosclerotic calcifications are suggested.    Pericardium: Small volume pericardial fluid, possibly physiologic.    Support tubes and lines: Right internal jugular approach central venous catheter.    Base of neck/thyroid: Normal.    Lymph nodes: Prominent number and mildly enlarged bilateral axillary, hilar, and mediastinal nodes are appreciated, possibly reactive.  Reference 14 mm short axis subcarinal node (series 2, image 151).    Esophagus: Normal.    Pleura: Small to moderate bilateral pleural effusions.    Upper abdomen: Unremarkable    Body wall: Diffuse body wall edema.    Airways: Central airways grossly patent.    Lungs: Assessment compromised by respiratory motion.  Smooth interlobular septal thickening is seen, possibly related to pulmonary vascular congestion/interstitial edema.  Dependent atelectasis.  Superimposed infectious or noninfectious inflammatory airspace consolidation lung bases difficult to exclude.  Numerous bilateral solid pulmonary nodules are noted, demonstrating suggested hematogenous distribution.  Some of these nodules demonstrate central cavitation (reference nodule in  the left lung apex on series 3, image 100).    Bones: No focal lesion.  Impression: 1. No convincing evidence of acute pulmonary embolism to the segmental pulmonary arterial level.  2. Numerous bilateral pulmonary nodules, some of which demonstrate cavitation,.  Findings would be in keeping with clinically suspected septic emboli in light of reported patient history.  Additional nonspecific infectious versus noninfectious inflammatory processes could appear similar and are additionally considered.  Attention follow-up recommended.  3. Small volume pleural effusions, diffuse body wall edema, and findings in keeping with pulmonary vascular congestion/interstitial edema.  Correlation with patient volume status recommended.  4. Additional details, as provided in the body of report.    Electronically signed by: Blaise Liu  Date:    06/28/2024  Time:    10:15      Assessment/Plan:     ID  * Septic shock  MSSA Bacteremia  Acute Hypoxemic Respiratory Failure    Patient admitted with septic shock in setting of MSSA bacteremia from IV drug use. Has had persistently positive cultures and rigors. Episode of fever, hypotension, and wheezing with desat afternoon of 6/27 and pulmonary / critical care was consulted. Was on pressors following that event, wheeze improved immediately with bronchodilator. Patient complaining of feeling a dull intermittent chest pain over past day. Overall, episode of hypoxemia rapidly improved, possible bronchospasm, patient denies history of asthma / COPD. More concerning is continual rigors and possible difficulty clearing cultures. Carbapenem added for synergistic effect on 6/27. Suspect episode of hypotension on 6/27 likely related to dose of clonidine patient received, but concerning for ongoing bacteremia as well.    Blood cultures:  6/23: MSSA and Gram + Rods (x2)  6/26: Staph Aureus (sensitivity in progress)  6/27: NGTD    Organs involved:  - Tricuspid vegetation identified on TTE. No  significant hemodynamic effect of vegetation, mild regurgitation and trivial effusion  - Edema around cervical spine with no definite osteo / discitis on MRI. No abscess or fluid collection to drain  - Cavitary lesions in lung consistent with septic emboli  - Significant right shoulder pain, likely 2/2 prior trauma however a septic joint is possible    Plan:  - CTA of chest performed today. No pulmonary embolism. Known septic emboli. Bilateral pleural effusions that are trace and simple on bedside POCUS  - Patient is significantly positive, would give dose of lasix and hold further fluid resuscitation  - Would check MRI of shoulder, could be from trauma however would need to rule out septic joint. Consider ortho consultation for tap pending results  - Last positive blood cultures: 6/26  - Remove central line today            Thank you for your consult. I will follow-up with patient. Please contact us if you have any additional questions.     Reyes Ontiveros MD  Pulmonology  [facility] - Intensive Care ([place])

## 2024-06-28 NOTE — NURSING
"Pt c/o of 10/10 sharp pain to chest that occurs when she wakes up from sleep. Pt states "it comes and goes." Morphine 10 mg given for chest pain. EKG done and sent to [facility]. Unconfirmed EKG shows NSR with prolonged QT.   "
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
## 2024-06-28 NOTE — ASSESSMENT & PLAN NOTE
MSSA Bacteremia  Acute Hypoxemic Respiratory Failure    Patient admitted with septic shock in setting of MSSA bacteremia from IV drug use. Has had persistently positive cultures and rigors. Episode of fever, hypotension, and wheezing with desat afternoon of 6/27 and pulmonary / critical care was consulted. Was on pressors following that event, wheeze improved immediately with bronchodilator. Patient complaining of feeling a dull intermittent chest pain over past day. Overall, episode of hypoxemia rapidly improved, possible bronchospasm, patient denies history of asthma / COPD. More concerning is continual rigors and possible difficulty clearing cultures. Carbapenem added for synergistic effect on 6/27. Suspect episode of hypotension on 6/27 likely related to dose of clonidine patient received, but concerning for ongoing bacteremia as well.    Blood cultures:  6/23: MSSA and Gram + Rods (x2)  6/26: Staph Aureus (sensitivity in progress)  6/27: NGTD    Organs involved:  - Tricuspid vegetation identified on TTE. No significant hemodynamic effect of vegetation, mild regurgitation and trivial effusion  - Edema around cervical spine with no definite osteo / discitis on MRI. No abscess or fluid collection to drain  - Cavitary lesions in lung consistent with septic emboli  - Significant right shoulder pain, likely 2/2 prior trauma however a septic joint is possible    Plan:  - CTA of chest performed today. No pulmonary embolism. Known septic emboli. Bilateral pleural effusions that are trace and simple on bedside POCUS  - Patient is significantly positive, would give dose of lasix and hold further fluid resuscitation  - Would check MRI of shoulder, could be from trauma however would need to rule out septic joint. Consider ortho consultation for tap pending results  - Last positive blood cultures: 6/26  - Remove central line today

## 2024-06-28 NOTE — PROGRESS NOTES
Pt received on 1LNC;SPO2 normal. Treatment given this AM;tolerated well. PM treatment was not given due to patient not being available. Will continue to monitor.

## 2024-06-28 NOTE — PLAN OF CARE
Problem: Adult Inpatient Plan of Care  Goal: Plan of Care Review  Outcome: Progressing     Problem: Adult Inpatient Plan of Care  Goal: Patient-Specific Goal (Individualized)  Outcome: Progressing     Problem: Adult Inpatient Plan of Care  Goal: Absence of Hospital-Acquired Illness or Injury  Outcome: Progressing     Problem: Adult Inpatient Plan of Care  Goal: Optimal Comfort and Wellbeing  Outcome: Progressing     Problem: Adult Inpatient Plan of Care  Goal: Readiness for Transition of Care  Outcome: Progressing     Problem: Sepsis/Septic Shock  Goal: Optimal Coping  Outcome: Progressing     Problem: Sepsis/Septic Shock  Goal: Absence of Bleeding  Outcome: Progressing     Problem: Sepsis/Septic Shock  Goal: Blood Glucose Level Within Targeted Range  Outcome: Progressing     Problem: Sepsis/Septic Shock  Goal: Absence of Infection Signs and Symptoms  Outcome: Progressing     Problem: Sepsis/Septic Shock  Goal: Optimal Nutrition Intake  Outcome: Progressing     Problem: Acute Kidney Injury/Impairment  Goal: Fluid and Electrolyte Balance  Outcome: Progressing     Problem: Acute Kidney Injury/Impairment  Goal: Improved Oral Intake  Outcome: Progressing     Problem: Acute Kidney Injury/Impairment  Goal: Effective Renal Function  Outcome: Progressing     Problem: Pneumonia  Goal: Fluid Balance  Outcome: Progressing     Problem: Pneumonia  Goal: Resolution of Infection Signs and Symptoms  Outcome: Progressing     Problem: Pneumonia  Goal: Effective Oxygenation and Ventilation  Outcome: Progressing     Problem: Infection  Goal: Absence of Infection Signs and Symptoms  Outcome: Progressing

## 2024-06-28 NOTE — ASSESSMENT & PLAN NOTE
50 yo woman with history of untreated MSSA bacteremia, now readmitted with bacteremia and sepsis related to IDU, and diagnosed with TVE and septic embolization  - IE with septic pulmonary emboli and likely prevertebral involvement with C4-C5 osteo-diskitis  - polymicrobial? Initial cultures grew MSSA and a non-viable GPR (Bacillus? Contaminant?) but repeat culture  is only MSSA, so far (likely all MSSA)  - continue oxacillin and ertapenem  - if GPR is identified on repeat culture, would add vancomycin  - repeat blood cultures till she clears the bacteremia  - trend WBC and clinical course  - if her bacteremia does not clear, would repeat imaging any reassess need for additional source control    Reference  Samuel et al. Oxacillin plus ertapenem combination therapy leads to rapid blood culture clearance and positive outcomes among patients with persistent MSSA bacteraemia: a case series. DARRIN Antimicrob Resist. 2021 Sep; 3(3): [id number].

## 2024-06-28 NOTE — ASSESSMENT & PLAN NOTE
-longstanding history of polysubstance abuse, IV drug abuse, PTSD, anxiety, depression   - Currently not on suboxone, currently receiving pain medications for acute pain

## 2024-06-28 NOTE — HPI
49 year old with a past medical history of polysubstance use and chronic Hep C who presented to [facility] ED on 6/23 with complaints of body aches, fevers, and chills. She had been admitted in 4/2024 for sepsis which she was found to have MSSA bacteremia however she left AMA prior to completion of therapy and had not received any further treatment since. On presentation she was hypotensive despite fluid resuscitation and was started on levophed. Labs were notable for a leukocytosis and DONNA. Lactic acid was 3.37 which trended down. She was admitted to the ICU for septic shock. A central line was placed in the ED. She was weaned off pressors quickly. Blood cultures returned with MSSA, and GPR. She was started on oxacillin and blood cultures remained positive. A TTE was performed which confirmed a tricuspid vegetation, preserved EF, and mild tricuspid regurgitation. The patient continued to have intermittent rigors and blood cultures remained positive. Patient had an episode of rigors, wheezing, and desaturation on 6/27 and pulmonary and critical care was consulted for further evaluation.

## 2024-06-28 NOTE — ASSESSMENT & PLAN NOTE
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  Chronic left arm wound  -admitted due to severe sepsis/septic shock with hypotension requiring vasopressor support and complicated by DONNA in setting of known recent bacteremia without complete treatment resulting in seeding, septic emboli on CXR, tricuspid valve endocarditis, likely cervical spine discitis and acute on chronic left arm wound with recent IV injection/skin popping of heroin  at admission CT head and CT C-spine noted no acute cervical fracture.  Prevertebral soft tissue thickening.  Recommended MRI of the cervical spine to evaluate for prevertebral edema.  Mild to moderate spondylitic changes.  -MRI done, showed a thin layer of prevertebral edema in front of upper cervical levels, cervical spine DJD, no abscess.   -at admission remained hypotensive despite fluid resuscitation and required vasopressor support; otherwise HDS and afebrile  -admission labs:  -CBC with WBC 34, hemoglobin 8.3, hematocrit 26, platelets 119.  Chemistry was , K 3.5, chloride 97, CO2 18, BUN 50, SCr 3.1, glucose 133.  Magnesium 1.9.  ; AST, ALT, T bili unremarkable.  Lactic acid 3.37 >> 1.2.  Procalcitonin 78.33.  ESR > 120, .  PH 7.419, pCO2 32.2, PO2 34, HC03 21, be negative for.  .  Troponin 0.23.  Flu and COVID negative.  HIV negative.   -admission imaging:    -CXR with findings that may reflect multifocal infectious process.  Right shoulder x-ray without acute pathology.  Left wrist x-ray without acute pathology.  CT head and CT C-spine noted no acute cervical fracture.  Prevertebral cervical soft tissue thickening.  Recommend MRI of the cervical spine to evaluate for prevertebral edema.  Mild to moderate spondylitic changes.  -admission micro:   -blood cultures MSSA positive, also gram positive rods (likely contaminant but will follow)  -Weaned off pressors   -continue IV  fluid hydration   -continue oxacillin per ID - might add a carbapenem for synergy if blood cultures remain positive  -Wound Care consulted   -pulmonary toileting with incentive spirometer and flutter valve as tolerated   -oxygen supplementation as needed  -continue tele monitoring   -EKG PRN concerns  -trend labs, address/replete electrolytes as indicated  -Daily blood cultures ordered until she clears bacteremia  -ID consulted, Indium scan ordered.  -TTE done - found vegetation on the tricuspid valve.  -Indium scan surprisingly negative given known endocarditis and septic emboli to the lungs.

## 2024-06-28 NOTE — SUBJECTIVE & OBJECTIVE
Interval History: Patient somnolent, slow to respond and engage in conversation     No past medical history on file.    No past surgical history on file.    Review of patient's allergies indicates:  No Known Allergies    Medications:  Continuous Infusions:  Scheduled Meds:   albuterol-ipratropium  3 mL Nebulization Q6H    ammonium lactate   Topical (Top) BID    baclofen  10 mg Oral QID    enoxparin  40 mg Subcutaneous Daily    ertapenem (INVanz) IV (PEDS and ADULTS)  1 g Intravenous Q24H    furosemide (LASIX) injection  40 mg Intravenous Once    lactobacillus acidophilus & bulgar  1 packet Oral BID    magnesium sulfate IVPB  2 g Intravenous Once    miconazole NITRATE 2 %   Topical (Top) BID    mupirocin   Nasal BID    nicotine  1 patch Transdermal Daily    oxacillin IV (PEDS and ADULTS)  2 g Intravenous Q4H    potassium chloride in water  20 mEq Intravenous Q1H    senna-docusate 8.6-50 mg  1 tablet Oral BID     PRN Meds:  Current Facility-Administered Medications:     acetaminophen, 650 mg, Oral, Q4H PRN    albuterol sulfate, 2.5 mg, Nebulization, Q4H PRN    aluminum-magnesium hydroxide-simethicone, 30 mL, Oral, Q6H PRN    atropine, 0.5 mg, Intravenous, Q5 Min PRN    dicyclomine, 10 mg, Oral, Q6H PRN    HYDROcodone-acetaminophen, 1 tablet, Oral, Q4H PRN    hydrOXYzine HCL, 50 mg, Oral, Q6H PRN    ibuprofen, 600 mg, Oral, Q6H PRN    loperamide, 2 mg, Oral, QID PRN    methocarbamoL, 500 mg, Oral, QID PRN    morphine, 10 mg, Intravenous, Q4H PRN    nitroGLYCERIN, 0.4 mg, Sublingual, Q5 Min PRN    ondansetron, 4 mg, Intravenous, Q8H PRN    polyethylene glycol, 17 g, Oral, BID PRN    promethazine (PHENERGAN) 25 mg in 0.9% NaCl 50 mL IVPB, 25 mg, Intravenous, Q6H PRN    sodium chloride 0.9%, 10 mL, Intravenous, PRN    traZODone, 50 mg, Oral, Nightly PRN    Family History    None       Tobacco Use    Smoking status: Every Day     Current packs/day: 1.00     Average packs/day: 1 pack/day for 27.5 years (27.5 ttl pk-yrs)      "Types: Cigarettes     Start date: 1997    Smokeless tobacco: Never    Tobacco comments:     Smoking cessation education provided. Pt is a 1 pk/day cigarette smoker x 26 yrs. She states that she cut down to 0.5 pk/day approx. 1 yr ago. Pt declines referral to Ambulatory Smoking Cessation clinic, stating not ready to quit. Handout provided.    Substance and Sexual Activity    Alcohol use: Not Currently    Drug use: Not on file    Sexual activity: Not Currently       Review of Systems   Constitutional:  Positive for appetite change and fatigue.   Respiratory:  Negative for cough and shortness of breath.    Neurological:  Positive for weakness.     Objective:     Vital Signs (Most Recent):  Temp: 98.2 °F (36.8 °C) (06/28/24 0330)  Pulse: 85 (06/28/24 0915)  Resp: 20 (06/28/24 0930)  BP: (!) 145/78 (06/28/24 0915)  SpO2: 96 % (06/28/24 0915) Vital Signs (24h Range):  Temp:  [98.2 °F (36.8 °C)-100.3 °F (37.9 °C)] 98.2 °F (36.8 °C)  Pulse:  [] 85  Resp:  [15-62] 20  SpO2:  [85 %-100 %] 96 %  BP: ()/(45-89) 145/78     Weight: 68.9 kg (152 lb)  Body mass index is 29.69 kg/m².       Physical Exam  Constitutional:       Appearance: She is ill-appearing.   Cardiovascular:      Rate and Rhythm: Normal rate.   Pulmonary:      Effort: No respiratory distress.   Skin:     Comments: Wounds noted with track marks on arms    Neurological:      Mental Status: She is alert.      Comments: Alert, awake, pleasant, however slow to respond and engage in conversation              Review of Symptoms      Symptom Assessment (ESAS 0-10 Scale)  Anorexia:  0  Fatigue:  3         Living Arrangements:  Lives alone    Psychosocial/Cultural:   See Palliative Psychosocial Note: Yes  - Patient is homeless and has been living in a "homeless" camp  - Has 6 children   - Worked in the [place] many years ago   **Primary  to Follow**  Palliative Care  Consult: No        Advance Care Planning   Advance Directives: "     Decision Making:  Patient answered questions  Goals of Care: The patient endorses that what is most important right now is to focus on symptom/pain control and curative/life-prolongation (regardless of treatment burdens)    Accordingly, we have decided that the best plan to meet the patient's goals includes continuing with treatment         Significant Labs: All pertinent labs within the past 24 hours have been reviewed.  CBC:   Recent Labs   Lab 06/28/24 0453   WBC 23.55*   HGB 7.3*   HCT 23.3*   MCV 76*        BMP:  Recent Labs   Lab 06/28/24 0453         K 3.2*      CO2 23   BUN 7   CREATININE 0.8   CALCIUM 8.0*     LFT:  Lab Results   Component Value Date    AST 14 06/28/2024    ALKPHOS 63 06/28/2024    BILITOT 0.2 06/28/2024     Albumin:   Albumin   Date Value Ref Range Status   06/28/2024 1.5 (L) 3.5 - 5.2 g/dL Final     Protein:   Total Protein   Date Value Ref Range Status   06/28/2024 6.3 6.0 - 8.4 g/dL Final     Lactic acid:   Lab Results   Component Value Date    LACTATE 1.0 06/27/2024    LACTATE 0.9 06/24/2024       Significant Imaging: I have reviewed all pertinent imaging results/findings within the past 24 hours.

## 2024-06-28 NOTE — PROGRESS NOTES
[facility]  Infectious Disease  Progress Note    Patient Name: Margarita Wiseman  MRN: [id number]  Admission Date: 6/23/2024  Length of Stay: 5 days  Attending Physician: Melissa Templeton MD  Primary Care Provider: No, Primary Doctor    Isolation Status: No active isolations  Assessment/Plan:      Staphylococcus aureus bacteremia    48 yo woman with history of untreated MSSA bacteremia, now readmitted with bacteremia and sepsis related to IDU, and diagnosed with TVE and septic embolization  - IE with septic pulmonary emboli and likely prevertebral involvement with C4-C5 osteo-diskitis  - polymicrobial? Initial cultures grew MSSA and a non-viable GPR (Bacillus? Contaminant?) but repeat culture  is only MSSA, so far (likely all MSSA)  - continue oxacillin and ertapenem  - if GPR is identified on repeat culture, would add vancomycin  - repeat blood cultures till she clears the bacteremia  - trend WBC and clinical course  - if her bacteremia does not clear, would repeat imaging any reassess need for additional source control    Reference  Samuel et al. Oxacillin plus ertapenem combination therapy leads to rapid blood culture clearance and positive outcomes among patients with persistent MSSA bacteraemia: a case series. DARRIN Antimicrob Resist. 2021 Sep; 3(3).    Jean Carlos Payne MD  Infectious Disease  [facility] ([place])    Subjective:     Principal Problem:Septic shock    HPI: Ms. Wiseman is a 49 year old woman with homelessness and IDU, admitted with sepsis and bacteremia. She has a PMHx significant for IVDU, PAWAN, second degree heart block who presents for evaluation of fatigue, which has persisted for the last several days. She notes an associated generalized body aches and pains. Pt with recent admission for severe sepsis with MSSA bacteremia in April 2024. [place] EMS [place]. Reports continued IV drug use. She  was admitted and started on IV abx. CT c/w cervical infection?  ID is consulted for sepsis and bacteremia.    Today, the patient complaints of neck pain.    Blood cultures: MSSA (BCID) and GPR  Interval History: Events noted. Experienced fever and rigors yesterday. Ertapenem added for MSSA synergy. Feeling better today. Neck pain is slightly improved.     Review of Systems   Constitutional:  Positive for chills and fever.   Respiratory:  Positive for shortness of breath.    Musculoskeletal:  Positive for arthralgias and neck pain.   All other systems reviewed and are negative.      Objective:     Vital Signs (Most Recent):  Temp: 98.2 °F (36.8 °C) (06/28/24 0330)  Pulse: 90 (06/28/24 0738)  Resp: 15 (06/28/24 0738)  BP: 138/76 (06/28/24 0738)  SpO2: 97 % (06/28/24 0738) Vital Signs (24h Range):  Temp:  [98.2 °F (36.8 °C)-100.3 °F (37.9 °C)] 98.2 °F (36.8 °C)  Pulse:  [] 90  Resp:  [15-62] 15  SpO2:  [85 %-100 %] 97 %  BP: ()/(45-89) 138/76     Weight: 68.9 kg (152 lb)  Body mass index is 29.69 kg/m².    Estimated Creatinine Clearance: 75.3 mL/min (based on SCr of 0.8 mg/dL).     Physical Exam  Vitals and nursing note reviewed.   Constitutional:       Appearance: Normal appearance.   HENT:      Head: Normocephalic and atraumatic.   Eyes:      Extraocular Movements: Extraocular movements intact.      Pupils: Pupils are equal, round, and reactive to light.   Cardiovascular:      Rate and Rhythm: Tachycardia present.      Heart sounds: Murmur heard.      Comments: HSM  Abdominal:      Tenderness: There is no guarding or rebound.   Musculoskeletal:      Cervical back: Tenderness present.   Neurological:      Mental Status: She is alert and oriented to person, place, and time.   Psychiatric:         Mood and Affect: Mood normal.         Behavior: Behavior normal.         Thought Content: Thought content normal.          Significant Labs: CBC:   Recent Labs   Lab 06/27/24  0607 06/27/24  1629 06/28/24  7501    WBC 26.00* 32.52* 23.55*   HGB 7.5* 7.4* 7.3*   HCT 23.3* 23.4* 23.3*    265 262     Microbiology Results (last 7 days)       Procedure Component Value Units Date/Time    Blood culture [[id number]] Collected: 06/27/24 0851    Order Status: Completed Specimen: Blood Updated: 06/27/24 1915     Blood Culture, Routine No Growth to date    Blood culture [[id number]] Collected: 06/26/24 0951    Order Status: Completed Specimen: Blood Updated: 06/27/24 0921     Blood Culture, Routine Gram stain aer bottle: Gram positive cocci in clusters resembling Staph      Results called to and read back by: Tasha Avila RN 06/27/2024  09:21    Blood culture x two cultures. Draw prior to antibiotics. [[id number]]  (Abnormal) Collected: 06/23/24 1537    Order Status: Completed Specimen: Blood from Peripheral, Antecubital, Right Updated: 06/26/24 0950     Blood Culture, Routine Gram stain aer bottle: Gram positive cocci in clusters resembling Staph,      Gram positive rods      Positive results previously called 06/24/2024  06:13      Gram stain stephanie bottle: Gram positive cocci in clusters resembling Staph      STAPHYLOCOCCUS AUREUS  ID consult required at [facility] and [facility].  For susceptibility see order #[id number]        GRAM-POSITIVE SUKUMAR  Non-viable for ID      Narrative:      Aerobic and anaerobic    Blood culture x two cultures. Draw prior to antibiotics. [[id number]]  (Abnormal)  (Susceptibility) Collected: 06/23/24 1537    Order Status: Completed Specimen: Blood from Peripheral, Antecubital, Right Updated: 06/26/24 0949     Blood Culture, Routine Gram stain aer bottle: Gram positive cocci in clusters resembling Staph,      Gram positive rods      Results called to and read back by: Cande NEELY 06/24/2024  06:12      Gram stain stephanie bottle: Gram positive cocci in clusters resembling Staph      Gram positive rods      Positive results previously called 06/24/2024      STAPHYLOCOCCUS AUREUS  ID consult  required at [facility] and [facility] locations.        GRAM POSITIVE RODS  Non-viable for ID      Narrative:      Aerobic and anaerobic    Blood culture [[id number]]     Order Status: Canceled Specimen: Blood     Blood culture [[id number]]     Order Status: Canceled Specimen: Blood     Urine culture [[id number]] Collected: 06/23/24 1929    Order Status: Completed Specimen: Urine Updated: 06/25/24 1334     Urine Culture, Routine No significant growth    Narrative:      Specimen Source->Urine    Blood culture [[id number]]     Order Status: Canceled Specimen: Blood     Rapid Organism ID by PCR (from Blood culture) [[id number]]  (Abnormal) Collected: 06/23/24 1537    Order Status: Completed Updated: 06/24/24 0723     Enterococcus faecalis Not Detected     Enterococcus faecium Not Detected     Listeria monocytogenes Not Detected     Staphylococcus spp. See species for ID     Staphylococcus aureus Detected     Staphylococcus epidermidis Not Detected     Staphylococcus lugdunensis Not Detected     Streptococcus species Not Detected     Streptococcus agalactiae Not Detected     Streptococcus pneumoniae Not Detected     Streptococcus pyogenes Not Detected     Acinetobacter calcoaceticus/baumannii complex Not Detected     Bacteroides fragilis Not Detected     Enterobacterales Not Detected     Enterobacter cloacae complex Not Detected     Escherichia coli Not Detected     Klebsiella aerogenes Not Detected     Klebsiella oxytoca Not Detected     Klebsiella pneumoniae group Not Detected     Proteus Not Detected     Salmonella sp Not Detected     Serratia marcescens Not Detected     Haemophilus influenzae Not Detected     Neisseria meningtidis Not Detected     Pseudomonas aeruginosa Not Detected     Stenotrophomonas maltophilia Not Detected     Candida albicans Not Detected     Candida auris Not Detected     Candida glabrata Not Detected     Candida krusei Not Detected     Candida parapsilosis Not Detected     Candida  tropicalis Not Detected     Cryptococcus neoformans/gattii Not Detected     CTX-M (ESBL ) Test Not Applicable     IMP (Carbapenem resistant) Test Not Applicable     KPC resistance gene (Carbapenem resistant) Test Not Applicable     mcr-1  Test Not Applicable     mec A/C  Test Not Applicable     mec A/C and MREJ (MRSA) gene Not Detected     NDM (Carbapenem resistant) Test Not Applicable     OXA-48-like (Carbapenem resistant) Test Not Applicable     van A/B (VRE gene) Test Not Applicable     VIM (Carbapenem resistant) Test Not Applicable    Narrative:      Aerobic and anaerobic            Significant Imaging: I have reviewed all pertinent imaging results/findings within the past 24 hours.
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
## 2024-06-28 NOTE — SUBJECTIVE & OBJECTIVE
Interval History: She has been having pleuritic chest pain.  Also cannot lift her right arm.  Neck pain ongoing but slightly more tolerable than before.  Episodic rigors - noted yesterday.    Review of Systems   Constitutional:  Negative for chills and fever.   Respiratory:  Negative for cough and shortness of breath.    Cardiovascular:  Positive for chest pain. Negative for palpitations.   Musculoskeletal:  Positive for neck pain.        Right shoulder pain     Objective:     Vital Signs (Most Recent):  Temp: 98.2 °F (36.8 °C) (06/28/24 0330)  Pulse: 90 (06/28/24 0738)  Resp: 15 (06/28/24 0738)  BP: 138/76 (06/28/24 0738)  SpO2: 97 % (06/28/24 0738) Vital Signs (24h Range):  Temp:  [98.2 °F (36.8 °C)-100.3 °F (37.9 °C)] 98.2 °F (36.8 °C)  Pulse:  [] 90  Resp:  [15-62] 15  SpO2:  [85 %-100 %] 97 %  BP: ()/(45-89) 138/76     Weight: 68.9 kg (152 lb)  Body mass index is 29.69 kg/m².    Intake/Output Summary (Last 24 hours) at 6/28/2024 0824  Last data filed at 6/28/2024 0547  Gross per 24 hour   Intake 806.63 ml   Output 1700 ml   Net -893.37 ml         Physical Exam  Neck:      Comments: Decreased ROM neck  Cardiovascular:      Rate and Rhythm: Normal rate and regular rhythm.      Heart sounds: Murmur heard.      No gallop.   Pulmonary:      Effort: Pulmonary effort is normal.      Breath sounds: Normal breath sounds.   Abdominal:      General: Bowel sounds are normal.      Palpations: Abdomen is soft.   Musculoskeletal:      Cervical back: Tenderness present.      Comments: Unable to lift right arm   Skin:     General: Skin is warm and dry.      Comments: Chronic wound left forearm, extensor surface.  Surgical scar left wrist.  Chronic wound right forearm, scarred and dry.   Neurological:      General: No focal deficit present.      Mental Status: She is alert.   Psychiatric:         Mood and Affect: Mood normal.         Behavior: Behavior normal.             Significant Labs: All pertinent labs within  the past 24 hours have been reviewed.    Significant Imaging: I have reviewed all pertinent imaging results/findings within the past 24 hours.

## 2024-06-28 NOTE — SUBJECTIVE & OBJECTIVE
Interval History: Events noted. Experienced fever and rigors yesterday. Ertapenem added for MSSA synergy. Feeling better today. Neck pain is slightly improved.     Review of Systems   Constitutional:  Positive for chills and fever.   Respiratory:  Positive for shortness of breath.    Musculoskeletal:  Positive for arthralgias and neck pain.   All other systems reviewed and are negative.      Objective:     Vital Signs (Most Recent):  Temp: 98.2 °F (36.8 °C) (06/28/24 0330)  Pulse: 90 (06/28/24 0738)  Resp: 15 (06/28/24 0738)  BP: 138/76 (06/28/24 0738)  SpO2: 97 % (06/28/24 0738) Vital Signs (24h Range):  Temp:  [98.2 °F (36.8 °C)-100.3 °F (37.9 °C)] 98.2 °F (36.8 °C)  Pulse:  [] 90  Resp:  [15-62] 15  SpO2:  [85 %-100 %] 97 %  BP: ()/(45-89) 138/76     Weight: 68.9 kg (152 lb)  Body mass index is 29.69 kg/m².    Estimated Creatinine Clearance: 75.3 mL/min (based on SCr of 0.8 mg/dL).     Physical Exam  Vitals and nursing note reviewed.   Constitutional:       Appearance: Normal appearance.   HENT:      Head: Normocephalic and atraumatic.   Eyes:      Extraocular Movements: Extraocular movements intact.      Pupils: Pupils are equal, round, and reactive to light.   Cardiovascular:      Rate and Rhythm: Tachycardia present.      Heart sounds: Murmur heard.      Comments: HSM  Abdominal:      Tenderness: There is no guarding or rebound.   Musculoskeletal:      Cervical back: Tenderness present.   Neurological:      Mental Status: She is alert and oriented to person, place, and time.   Psychiatric:         Mood and Affect: Mood normal.         Behavior: Behavior normal.         Thought Content: Thought content normal.          Significant Labs: CBC:   Recent Labs   Lab 06/27/24  0607 06/27/24  1629 06/28/24  0453   WBC 26.00* 32.52* 23.55*   HGB 7.5* 7.4* 7.3*   HCT 23.3* 23.4* 23.3*    265 262     Microbiology Results (last 7 days)       Procedure Component Value Units Date/Time    Blood culture  [[id number]] Collected: 06/27/24 0851    Order Status: Completed Specimen: Blood Updated: 06/27/24 1915     Blood Culture, Routine No Growth to date    Blood culture [[id number]] Collected: 06/26/24 0951    Order Status: Completed Specimen: Blood Updated: 06/27/24 0921     Blood Culture, Routine Gram stain aer bottle: Gram positive cocci in clusters resembling Staph      Results called to and read back by: Tasha Avila RN 06/27/2024  09:21    Blood culture x two cultures. Draw prior to antibiotics. [[id number]]  (Abnormal) Collected: 06/23/24 1537    Order Status: Completed Specimen: Blood from Peripheral, Antecubital, Right Updated: 06/26/24 0950     Blood Culture, Routine Gram stain aer bottle: Gram positive cocci in clusters resembling Staph,      Gram positive rods      Positive results previously called 06/24/2024  06:13      Gram stain stephanie bottle: Gram positive cocci in clusters resembling Staph      STAPHYLOCOCCUS AUREUS  ID consult required at [facility].  For susceptibility see order #[id number]        GRAM-POSITIVE SUKUMAR  Non-viable for ID      Narrative:      Aerobic and anaerobic    Blood culture x two cultures. Draw prior to antibiotics. [[id number]]  (Abnormal)  (Susceptibility) Collected: 06/23/24 1537    Order Status: Completed Specimen: Blood from Peripheral, Antecubital, Right Updated: 06/26/24 0949     Blood Culture, Routine Gram stain aer bottle: Gram positive cocci in clusters resembling Staph,      Gram positive rods      Results called to and read back by: Cande NEELY 06/24/2024  06:12      Gram stain stephanie bottle: Gram positive cocci in clusters resembling Staph      Gram positive rods      Positive results previously called 06/24/2024      STAPHYLOCOCCUS AUREUS  ID consult required at [facility].        GRAM POSITIVE RODS  Non-viable for ID      Narrative:      Aerobic and anaerobic    Blood culture [[id number]]     Order Status:  Canceled Specimen: Blood     Blood culture [[id number]]     Order Status: Canceled Specimen: Blood     Urine culture [[id number]] Collected: 06/23/24 1929    Order Status: Completed Specimen: Urine Updated: 06/25/24 1334     Urine Culture, Routine No significant growth    Narrative:      Specimen Source->Urine    Blood culture [[id number]]     Order Status: Canceled Specimen: Blood     Rapid Organism ID by PCR (from Blood culture) [[id number]]  (Abnormal) Collected: 06/23/24 1537    Order Status: Completed Updated: 06/24/24 0723     Enterococcus faecalis Not Detected     Enterococcus faecium Not Detected     Listeria monocytogenes Not Detected     Staphylococcus spp. See species for ID     Staphylococcus aureus Detected     Staphylococcus epidermidis Not Detected     Staphylococcus lugdunensis Not Detected     Streptococcus species Not Detected     Streptococcus agalactiae Not Detected     Streptococcus pneumoniae Not Detected     Streptococcus pyogenes Not Detected     Acinetobacter calcoaceticus/baumannii complex Not Detected     Bacteroides fragilis Not Detected     Enterobacterales Not Detected     Enterobacter cloacae complex Not Detected     Escherichia coli Not Detected     Klebsiella aerogenes Not Detected     Klebsiella oxytoca Not Detected     Klebsiella pneumoniae group Not Detected     Proteus Not Detected     Salmonella sp Not Detected     Serratia marcescens Not Detected     Haemophilus influenzae Not Detected     Neisseria meningtidis Not Detected     Pseudomonas aeruginosa Not Detected     Stenotrophomonas maltophilia Not Detected     Candida albicans Not Detected     Candida auris Not Detected     Candida glabrata Not Detected     Candida krusei Not Detected     Candida parapsilosis Not Detected     Candida tropicalis Not Detected     Cryptococcus neoformans/gattii Not Detected     CTX-M (ESBL ) Test Not Applicable     IMP (Carbapenem resistant) Test Not Applicable     KPC resistance gene  (Carbapenem resistant) Test Not Applicable     mcr-1  Test Not Applicable     mec A/C  Test Not Applicable     mec A/C and MREJ (MRSA) gene Not Detected     NDM (Carbapenem resistant) Test Not Applicable     OXA-48-like (Carbapenem resistant) Test Not Applicable     van A/B (VRE gene) Test Not Applicable     VIM (Carbapenem resistant) Test Not Applicable    Narrative:      Aerobic and anaerobic            Significant Imaging: I have reviewed all pertinent imaging results/findings within the past 24 hours.   good minus
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
## 2024-06-28 NOTE — ASSESSMENT & PLAN NOTE
- Noted  - ID consulted  - TTE reviewed: found vegetation on the tricuspid valve.  - Indium scan negative   - Septic emboli to the lungs noted

## 2024-06-29 PROBLEM — A41.9 SEPTIC SHOCK: Status: RESOLVED | Noted: 2024-04-11 | Resolved: 2024-06-29

## 2024-06-29 PROBLEM — R65.21 SEPTIC SHOCK: Status: RESOLVED | Noted: 2024-04-11 | Resolved: 2024-06-29

## 2024-06-29 LAB
ALBUMIN SERPL BCP-MCNC: 1.5 G/DL (ref 3.5–5.2)
ALP SERPL-CCNC: 58 U/L (ref 55–135)
ALT SERPL W/O P-5'-P-CCNC: <5 U/L (ref 10–44)
ANION GAP SERPL CALC-SCNC: 8 MMOL/L (ref 8–16)
ANISOCYTOSIS BLD QL SMEAR: SLIGHT
AST SERPL-CCNC: 11 U/L (ref 10–40)
BACTERIA BLD CULT: ABNORMAL
BASOPHILS # BLD AUTO: ABNORMAL K/UL (ref 0–0.2)
BASOPHILS NFR BLD: 0 % (ref 0–1.9)
BILIRUB SERPL-MCNC: 0.2 MG/DL (ref 0.1–1)
BUN SERPL-MCNC: 7 MG/DL (ref 6–20)
CALCIUM SERPL-MCNC: 8.2 MG/DL (ref 8.7–10.5)
CHLORIDE SERPL-SCNC: 107 MMOL/L (ref 95–110)
CO2 SERPL-SCNC: 23 MMOL/L (ref 23–29)
CREAT SERPL-MCNC: 0.8 MG/DL (ref 0.5–1.4)
DACRYOCYTES BLD QL SMEAR: ABNORMAL
DIFFERENTIAL METHOD BLD: ABNORMAL
EOSINOPHIL # BLD AUTO: ABNORMAL K/UL (ref 0–0.5)
EOSINOPHIL NFR BLD: 1 % (ref 0–8)
ERYTHROCYTE [DISTWIDTH] IN BLOOD BY AUTOMATED COUNT: 16.8 % (ref 11.5–14.5)
EST. GFR  (NO RACE VARIABLE): >60 ML/MIN/1.73 M^2
GIANT PLATELETS BLD QL SMEAR: PRESENT
GLUCOSE SERPL-MCNC: 112 MG/DL (ref 70–110)
HCT VFR BLD AUTO: 23.8 % (ref 37–48.5)
HGB BLD-MCNC: 7.2 G/DL (ref 12–16)
IMM GRANULOCYTES # BLD AUTO: ABNORMAL K/UL (ref 0–0.04)
IMM GRANULOCYTES NFR BLD AUTO: ABNORMAL % (ref 0–0.5)
LYMPHOCYTES # BLD AUTO: ABNORMAL K/UL (ref 1–4.8)
LYMPHOCYTES NFR BLD: 19 % (ref 18–48)
MCH RBC QN AUTO: 23.7 PG (ref 27–31)
MCHC RBC AUTO-ENTMCNC: 30.3 G/DL (ref 32–36)
MCV RBC AUTO: 78 FL (ref 82–98)
METAMYELOCYTES NFR BLD MANUAL: 4 %
MONOCYTES # BLD AUTO: ABNORMAL K/UL (ref 0.3–1)
MONOCYTES NFR BLD: 5 % (ref 4–15)
NEUTROPHILS NFR BLD: 69 % (ref 38–73)
NEUTS BAND NFR BLD MANUAL: 2 %
NRBC BLD-RTO: 0 /100 WBC
PLATELET # BLD AUTO: 263 K/UL (ref 150–450)
PLATELET BLD QL SMEAR: ABNORMAL
PMV BLD AUTO: 11.3 FL (ref 9.2–12.9)
POIKILOCYTOSIS BLD QL SMEAR: SLIGHT
POTASSIUM SERPL-SCNC: 3.7 MMOL/L (ref 3.5–5.1)
PROT SERPL-MCNC: 6.4 G/DL (ref 6–8.4)
RBC # BLD AUTO: 3.04 M/UL (ref 4–5.4)
SODIUM SERPL-SCNC: 138 MMOL/L (ref 136–145)
TOXIC GRANULES BLD QL SMEAR: PRESENT
WBC # BLD AUTO: 19.84 K/UL (ref 3.9–12.7)

## 2024-06-29 PROCEDURE — 25000003 PHARM REV CODE 250: Performed by: NURSE PRACTITIONER

## 2024-06-29 PROCEDURE — 99900035 HC TECH TIME PER 15 MIN (STAT)

## 2024-06-29 PROCEDURE — A4216 STERILE WATER/SALINE, 10 ML: HCPCS | Performed by: STUDENT IN AN ORGANIZED HEALTH CARE EDUCATION/TRAINING PROGRAM

## 2024-06-29 PROCEDURE — 94799 UNLISTED PULMONARY SVC/PX: CPT | Mod: XB

## 2024-06-29 PROCEDURE — 85027 COMPLETE CBC AUTOMATED: CPT | Performed by: HOSPITALIST

## 2024-06-29 PROCEDURE — 97116 GAIT TRAINING THERAPY: CPT

## 2024-06-29 PROCEDURE — A9585 GADOBUTROL INJECTION: HCPCS | Performed by: HOSPITALIST

## 2024-06-29 PROCEDURE — 25000003 PHARM REV CODE 250: Performed by: STUDENT IN AN ORGANIZED HEALTH CARE EDUCATION/TRAINING PROGRAM

## 2024-06-29 PROCEDURE — 25000003 PHARM REV CODE 250: Performed by: INTERNAL MEDICINE

## 2024-06-29 PROCEDURE — 94640 AIRWAY INHALATION TREATMENT: CPT

## 2024-06-29 PROCEDURE — 63600175 PHARM REV CODE 636 W HCPCS: Performed by: HOSPITALIST

## 2024-06-29 PROCEDURE — S4991 NICOTINE PATCH NONLEGEND: HCPCS | Performed by: NURSE PRACTITIONER

## 2024-06-29 PROCEDURE — 85007 BL SMEAR W/DIFF WBC COUNT: CPT | Performed by: HOSPITALIST

## 2024-06-29 PROCEDURE — 25000242 PHARM REV CODE 250 ALT 637 W/ HCPCS: Performed by: HOSPITALIST

## 2024-06-29 PROCEDURE — 25500020 PHARM REV CODE 255: Performed by: HOSPITALIST

## 2024-06-29 PROCEDURE — 80053 COMPREHEN METABOLIC PANEL: CPT | Performed by: HOSPITALIST

## 2024-06-29 PROCEDURE — 97165 OT EVAL LOW COMPLEX 30 MIN: CPT

## 2024-06-29 PROCEDURE — 97161 PT EVAL LOW COMPLEX 20 MIN: CPT

## 2024-06-29 PROCEDURE — 25000242 PHARM REV CODE 250 ALT 637 W/ HCPCS: Performed by: STUDENT IN AN ORGANIZED HEALTH CARE EDUCATION/TRAINING PROGRAM

## 2024-06-29 PROCEDURE — 94761 N-INVAS EAR/PLS OXIMETRY MLT: CPT

## 2024-06-29 PROCEDURE — 27000221 HC OXYGEN, UP TO 24 HOURS

## 2024-06-29 PROCEDURE — 27000646 HC AEROBIKA DEVICE

## 2024-06-29 PROCEDURE — 36415 COLL VENOUS BLD VENIPUNCTURE: CPT | Performed by: HOSPITALIST

## 2024-06-29 PROCEDURE — 11000001 HC ACUTE MED/SURG PRIVATE ROOM

## 2024-06-29 PROCEDURE — 63600175 PHARM REV CODE 636 W HCPCS: Performed by: INTERNAL MEDICINE

## 2024-06-29 PROCEDURE — 94664 DEMO&/EVAL PT USE INHALER: CPT

## 2024-06-29 PROCEDURE — 25000003 PHARM REV CODE 250: Performed by: HOSPITALIST

## 2024-06-29 RX ORDER — IPRATROPIUM BROMIDE AND ALBUTEROL SULFATE 2.5; .5 MG/3ML; MG/3ML
3 SOLUTION RESPIRATORY (INHALATION) EVERY 4 HOURS PRN
Status: DISCONTINUED | OUTPATIENT
Start: 2024-06-29 | End: 2024-07-09 | Stop reason: HOSPADM

## 2024-06-29 RX ORDER — GADOBUTROL 604.72 MG/ML
7 INJECTION INTRAVENOUS
Status: COMPLETED | OUTPATIENT
Start: 2024-06-29 | End: 2024-06-29

## 2024-06-29 RX ADMIN — LACTOBACILLUS ACIDOPHILUS / LACTOBACILLUS BULGARICUS 1 EACH: 100 MILLION CFU STRENGTH GRANULES at 09:06

## 2024-06-29 RX ADMIN — MICONAZOLE NITRATE: 20 POWDER TOPICAL at 08:06

## 2024-06-29 RX ADMIN — NICOTINE 1 PATCH: 14 PATCH TRANSDERMAL at 09:06

## 2024-06-29 RX ADMIN — OXACILLIN 2 G: 2 INJECTION, POWDER, FOR SOLUTION INTRAMUSCULAR; INTRAVENOUS at 08:06

## 2024-06-29 RX ADMIN — OXACILLIN 2 G: 2 INJECTION, POWDER, FOR SOLUTION INTRAMUSCULAR; INTRAVENOUS at 04:06

## 2024-06-29 RX ADMIN — IPRATROPIUM BROMIDE AND ALBUTEROL SULFATE 3 ML: 2.5; .5 SOLUTION RESPIRATORY (INHALATION) at 08:06

## 2024-06-29 RX ADMIN — BACLOFEN 10 MG: 5 TABLET ORAL at 09:06

## 2024-06-29 RX ADMIN — HYDROCODONE BITARTRATE AND ACETAMINOPHEN 1 TABLET: 5; 325 TABLET ORAL at 09:06

## 2024-06-29 RX ADMIN — BACLOFEN 10 MG: 5 TABLET ORAL at 12:06

## 2024-06-29 RX ADMIN — METHOCARBAMOL 500 MG: 500 TABLET ORAL at 03:06

## 2024-06-29 RX ADMIN — IBUPROFEN 600 MG: 600 TABLET, FILM COATED ORAL at 12:06

## 2024-06-29 RX ADMIN — OXACILLIN 2 G: 2 INJECTION, POWDER, FOR SOLUTION INTRAMUSCULAR; INTRAVENOUS at 12:06

## 2024-06-29 RX ADMIN — MORPHINE SULFATE 10 MG: 2 INJECTION, SOLUTION INTRAMUSCULAR; INTRAVENOUS at 11:06

## 2024-06-29 RX ADMIN — GADOBUTROL 7 ML: 604.72 INJECTION INTRAVENOUS at 04:06

## 2024-06-29 RX ADMIN — BACLOFEN 10 MG: 5 TABLET ORAL at 05:06

## 2024-06-29 RX ADMIN — HYDROCODONE BITARTRATE AND ACETAMINOPHEN 1 TABLET: 5; 325 TABLET ORAL at 03:06

## 2024-06-29 RX ADMIN — ENOXAPARIN SODIUM 40 MG: 40 INJECTION SUBCUTANEOUS at 06:06

## 2024-06-29 RX ADMIN — BACLOFEN 10 MG: 5 TABLET ORAL at 08:06

## 2024-06-29 RX ADMIN — Medication 10 ML: at 11:06

## 2024-06-29 RX ADMIN — LOPERAMIDE HYDROCHLORIDE 2 MG: 2 CAPSULE ORAL at 09:06

## 2024-06-29 RX ADMIN — HYDROCODONE BITARTRATE AND ACETAMINOPHEN 1 TABLET: 5; 325 TABLET ORAL at 11:06

## 2024-06-29 RX ADMIN — ERTAPENEM 1 G: 1 INJECTION INTRAMUSCULAR; INTRAVENOUS at 02:06

## 2024-06-29 RX ADMIN — MORPHINE SULFATE 10 MG: 2 INJECTION, SOLUTION INTRAMUSCULAR; INTRAVENOUS at 02:06

## 2024-06-29 RX ADMIN — Medication 10 ML: at 06:06

## 2024-06-29 RX ADMIN — IPRATROPIUM BROMIDE AND ALBUTEROL SULFATE 3 ML: 2.5; .5 SOLUTION RESPIRATORY (INHALATION) at 12:06

## 2024-06-29 RX ADMIN — AMMONIUM LACTATE: 120 LOTION TOPICAL at 09:06

## 2024-06-29 RX ADMIN — MORPHINE SULFATE 10 MG: 2 INJECTION, SOLUTION INTRAMUSCULAR; INTRAVENOUS at 08:06

## 2024-06-29 RX ADMIN — METHOCARBAMOL 500 MG: 500 TABLET ORAL at 09:06

## 2024-06-29 RX ADMIN — AMMONIUM LACTATE: 120 LOTION TOPICAL at 08:06

## 2024-06-29 NOTE — ASSESSMENT & PLAN NOTE
"Polysubstance abuse   PTSD and depression   Tobacco abuse   -longstanding history of polysubstance abuse, IV drug abuse, PTSD, anxiety, depression  -per Care everywhere chart review multiple antidepressants and psychiatric meds of varying doses however patient reports has not been taking   -Psych consulted for assistance - she does endorse some interest in rehab as "she is tired of being sick" however she is worried about her adult daughter who is also un-housed and would like to stay with her, she denies drug abuse in daughter and that she has "been out on the street with me to try and help me"  -opioid withdrawal supportive care as indicated - protocol ordered  -clonidine 0.1 mg po q4 hours PRN opiate withdrawal anxiety ( hold if SBP<90, DBP<50 or HR <60)  -dicylomine 10 mg q6 hours PRN muscle cramps   -loperamide 2 mg q 1 hour PRN diarrhea (max= 16 mg/24 hours)   -methocarbamol 500 mg po q 6 hours PRN muscle spasm and body aches   -No Suboxone for now given need for pain medication - changed to morphine as she is having side effects from Dilaudid.  -nicotine patch ordered  -fall and delirium precautions  -monitor  -Palliative care following  -Will discuss discontinuation of pain medication and initiation of Suboxone with her  "

## 2024-06-29 NOTE — PT/OT/SLP EVAL
"Physical Therapy Evaluation    Patient Name:  Margarita Wiseman   MRN:  [id number]    Recommendations:     Discharge Recommendations: Low Intensity Therapy   Discharge Equipment Recommendations: shower chair   Barriers to discharge: None    Assessment:     Margarita Wiseman is a 49 y.o. female admitted with a medical diagnosis of Infective endocarditis of tricuspid valve.  She presents with the following impairments/functional limitations: weakness, impaired endurance, impaired sensation, impaired self care skills, impaired functional mobility, gait instability, impaired balance, decreased coordination, decreased upper extremity function, decreased lower extremity function, decreased safety awareness, pain, edema, impaired cardiopulmonary response to activity, impaired skin .    Pt homeless and has confusing history, however, presents with significantly decreased neck and RUE Range of Motion that inhibits gait. She was Indep PTA, however, did mention using W/C at some point (unclear whether or not this was prior to injury / reason for admit). She was able to ambulate HHA in room but stability improved with RW. 10 ft per trial with CGA. Acute PT to follow for improved mobility. LOW DC. Pt discharging with family to [place] per chart.     Rehab Prognosis: Fair; patient would benefit from acute skilled PT services to address these deficits and reach maximum level of function.    Recent Surgery: * No surgery found *      Plan:     During this hospitalization, patient to be seen 3 x/week to address the identified rehab impairments via gait training, therapeutic activities, therapeutic exercises and progress toward the following goals:    Plan of Care Expires:  07/29/24    Subjective     Chief Complaint: neck and RUE pain   Patient/Family Comments/goals: "cannot move neck or RUE hardly any without severe pain" "has been using WC for past 3 days"    Pain/Comfort:  Pain Rating 1: 8/10  Location - Side 1: Right  Location - " Orientation 1: generalized  Location 1: shoulder  Pain Addressed 1: Nurse notified, Reposition, Distraction, Pre-medicate for activity  Pain Rating Post-Intervention 1: 8/10  Pain Rating 2: 8/10  Location - Orientation 2: midline  Location 2: cervical spine  Pain Addressed 2: Reposition, Nurse notified, Pre-medicate for activity  Pain Rating Post-Intervention 2: 8/10    Patients cultural, spiritual, Advent conflicts given the current situation: no    Living Environment:  Homeless but with 23 year old daughter    Prior to admission, patients level of function was mod I vs indep.  Equipment used at home: wheelchair (for past 3 days since neck and RUE injury).  DME owned (not currently used): none.  Upon discharge, patient will have assistance from daughter and maybe family per chart -- discharging home to [place] with other family.    Objective:     Communicated with nurse prior to session.  Patient found HOB elevated with  (IV and BP disconnected by nurse)  upon PT entry to room.    General Precautions: Standard, fall  Orthopedic Precautions:N/A (pain with RUE WB)   Braces: N/A  Respiratory Status: Room air    Exams:  Cognitive Exam:  Patient is oriented to Person and Place  Skin Integrity/Edema:      -       Skin integrity: Wound proximal left wrist/forearm  RLE ROM: WFL  LLE ROM: WFL    Functional Mobility:  Patient completed Rolling/Turning to Left with  moderate assistance  Patient completed Rolling/Turning to Right with minimum assistance  Patient completed Scooting/Bridging with maximal assistance, 2 persons, and assisting with bridging hips; pt limited by neck pain  Patient completed Supine to Sit with moderate assistance  Patient completed Sit to Supine with contact guard assistance     Functional Mobility/Transfers:  Patient completed Sit <> Stand Transfer with contact guard assistance  with  rolling walker   Functional Mobility: 1st trial 10 ft CGA with RW; 2nd trial 10 ft improved to CGA with no  DME    AM-PAC 6 CLICK MOBILITY  Total Score:18       Treatment & Education:   PT educated patient on the following:   PT plan of care/role of PT  Safety with transfers, use of RW, OOB mobility  Not to get OOB without nurse present   Use of call button for needs (including to get OOB)  Discharge disposition    Pt verbalized understanding      Patient left HOB elevated with all lines intact, call button in reach, and nurse notified.    GOALS:   Multidisciplinary Problems       Physical Therapy Goals          Problem: Physical Therapy    Goal Priority Disciplines Outcome Goal Variances Interventions   Physical Therapy Goal     PT, PT/OT Progressing     Description: Patient will increase functional independence with mobility by performin. Sit<>supine with INDEP.  2. Sit<>stand using LRAD and MOD I.  3. Gait x 150 ft using LRAD and  MOD I.                              History:     No past medical history on file.    No past surgical history on file.    Time Tracking:     PT Received On: 24  PT Start Time: 1045     PT Stop Time: 1109  PT Total Time (min): 24 min     Billable Minutes: Evaluation 10 and Gait Training 8 (OT time shared)      2024

## 2024-06-29 NOTE — ASSESSMENT & PLAN NOTE
-etiology likely due to combination of acute and chronic disease/malnutrition, has had normal iron stores and low TIBC, iron saturation, transferrin  -at admission H/H 8.3/25, platelets 133.  Platelets have now normalized but Hb trended down.  -baseline appears 8-9/25-34; 118-300  -no overt s/s of bleeding  -trend labs over course and transfuse as indicated.
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
## 2024-06-29 NOTE — SUBJECTIVE & OBJECTIVE
Interval History: She is doing well, neck and right shoulder pain have been improving.  Vitals stable (saturation of 81% listed below is erroneous, she has been 100% on 2 liters NC).  MRI shoulder ordered, pending.    Review of Systems   Constitutional:  Negative for chills and fever.   Respiratory:  Negative for cough and shortness of breath.    Cardiovascular:  Negative for chest pain and palpitations.   Musculoskeletal:  Positive for neck pain.        Right shoulder pain     Objective:     Vital Signs (Most Recent):  Temp: 98.4 °F (36.9 °C) (06/29/24 0430)  Pulse: 88 (06/29/24 0600)  Resp: (!) 22 (06/29/24 0600)  BP: (!) 113/56 (06/29/24 0600)  SpO2: (!) 81 % (06/29/24 0600) Vital Signs (24h Range):  Temp:  [98.4 °F (36.9 °C)-100.1 °F (37.8 °C)] 98.4 °F (36.9 °C)  Pulse:  [] 88  Resp:  [15-31] 22  SpO2:  [81 %-100 %] 81 %  BP: ()/(52-85) 113/56     Weight: 68.9 kg (152 lb)  Body mass index is 29.69 kg/m².    Intake/Output Summary (Last 24 hours) at 6/29/2024 0722  Last data filed at 6/29/2024 0614  Gross per 24 hour   Intake 2639.23 ml   Output 4775 ml   Net -2135.77 ml         Physical Exam  Neck:      Comments: Decreased ROM neck  Cardiovascular:      Rate and Rhythm: Normal rate and regular rhythm.      Heart sounds: Murmur heard.      No gallop.   Pulmonary:      Effort: Pulmonary effort is normal.      Breath sounds: Normal breath sounds.   Abdominal:      General: Bowel sounds are normal.      Palpations: Abdomen is soft.   Musculoskeletal:      Cervical back: Tenderness present.      Comments: Improved movement right arm.   Skin:     General: Skin is warm and dry.      Comments: Chronic wound left forearm, extensor surface.  Surgical scar left wrist.  Chronic wound right forearm, scarred and dry.   Neurological:      General: No focal deficit present.      Mental Status: She is alert.   Psychiatric:         Mood and Affect: Mood normal.         Behavior: Behavior normal.             Significant  Labs: All pertinent labs within the past 24 hours have been reviewed.    Significant Imaging: I have reviewed all pertinent imaging results/findings within the past 24 hours.

## 2024-06-29 NOTE — PLAN OF CARE
Problem: Occupational Therapy  Goal: Occupational Therapy Goal  Description: Goals set on 6/29/24 with expiration date 7/29/24:  Patient will increase functional independence with ADLs by performing:    Supine <> Sit with SBA.  Feeding with SBA.  Grooming while standing at sink with SBA.  UB Dressing with SBA.  LB Dressing with SBA.  Step transfer with SBA with DME as needed.  Pt will demonstrate understanding of education provided regarding energy conservation and task modification through teach-back method.         Outcome: Progressing     OT reccs low intensity therapy: home with family, ortho OP for right upper extremity and neck deficits.     CARLO Kim  6/29/2024

## 2024-06-29 NOTE — PLAN OF CARE
Problem: Adult Inpatient Plan of Care  Goal: Plan of Care Review  Outcome: Progressing  Goal: Patient-Specific Goal (Individualized)  Outcome: Progressing  Goal: Absence of Hospital-Acquired Illness or Injury  Outcome: Progressing  Goal: Optimal Comfort and Wellbeing  Outcome: Progressing  Goal: Readiness for Transition of Care  Outcome: Progressing     Problem: Sepsis/Septic Shock  Goal: Optimal Coping  Outcome: Progressing  Goal: Absence of Bleeding  Outcome: Progressing  Goal: Blood Glucose Level Within Targeted Range  Outcome: Progressing  Goal: Absence of Infection Signs and Symptoms  Outcome: Progressing  Goal: Optimal Nutrition Intake  Outcome: Progressing     Problem: Acute Kidney Injury/Impairment  Goal: Fluid and Electrolyte Balance  Outcome: Progressing  Goal: Improved Oral Intake  Outcome: Progressing  Goal: Effective Renal Function  Outcome: Progressing     Problem: Pneumonia  Goal: Fluid Balance  Outcome: Progressing  Goal: Resolution of Infection Signs and Symptoms  Outcome: Progressing  Goal: Effective Oxygenation and Ventilation  Outcome: Progressing     Problem: Infection  Goal: Absence of Infection Signs and Symptoms  Outcome: Progressing     Problem: Wound  Goal: Optimal Coping  Outcome: Progressing  Goal: Optimal Functional Ability  Outcome: Progressing  Goal: Absence of Infection Signs and Symptoms  Outcome: Progressing  Goal: Improved Oral Intake  Outcome: Progressing  Goal: Optimal Pain Control and Function  Outcome: Progressing  Goal: Skin Health and Integrity  Outcome: Progressing  Goal: Optimal Wound Healing  Outcome: Progressing     Problem: Skin Injury Risk Increased  Goal: Skin Health and Integrity  Outcome: Progressing     Problem: Coping Ineffective  Goal: Effective Coping  Outcome: Progressing
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
## 2024-06-29 NOTE — PROGRESS NOTES
Respiratory nebulizer treatment completed. No adverse reactions noted.    Worked with pt on the incentive spirometer. Pt understands the benefits of the breathing device and is working to achieve more volume and sustain a breath hold. Deep breathing and occasional coughing was encouraged.    Worked with pt on PEP therapy. Pt understands the benefit of the flutter valve and is working successfully with the device.

## 2024-06-29 NOTE — ASSESSMENT & PLAN NOTE
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  Chronic left arm wound  -admitted due to severe sepsis/septic shock with hypotension requiring vasopressor support and complicated by DONNA in setting of known recent bacteremia without complete treatment resulting in seeding, septic emboli on CXR, tricuspid valve endocarditis, likely cervical spine discitis and acute on chronic left arm wound with recent IV injection/skin popping of heroin  at admission CT head and CT C-spine noted no acute cervical fracture.  Prevertebral soft tissue thickening.  Recommended MRI of the cervical spine to evaluate for prevertebral edema.  Mild to moderate spondylitic changes.  -MRI done, showed a thin layer of prevertebral edema in front of upper cervical levels, cervical spine DJD, no abscess.   -at admission remained hypotensive despite fluid resuscitation and required vasopressor support; otherwise HDS and afebrile  -admission labs:  -CBC with WBC 34, hemoglobin 8.3, hematocrit 26, platelets 119.  Chemistry was , K 3.5, chloride 97, CO2 18, BUN 50, SCr 3.1, glucose 133.  Magnesium 1.9.  ; AST, ALT, T bili unremarkable.  Lactic acid 3.37 >> 1.2.  Procalcitonin 78.33.  ESR > 120, .  PH 7.419, pCO2 32.2, PO2 34, HC03 21, be negative for.  .  Troponin 0.23.  Flu and COVID negative.  HIV negative.   -admission imaging:               -CXR with findings that may reflect multifocal infectious process.  Right shoulder x-ray without acute pathology.  Left wrist x-ray without acute pathology.  CT head and CT C-spine noted no acute cervical fracture.  Prevertebral cervical soft tissue thickening.  Recommend MRI of the cervical spine to evaluate for prevertebral edema.  Mild to moderate spondylitic changes.  -admission micro:   -blood cultures MSSA positive, also gram positive rods (likely contaminant but will follow)  -Weaned off pressors    -continue IV fluid hydration   -continue oxacillin per ID - might add a carbapenem for synergy if blood cultures remain positive  -Wound Care consulted   -pulmonary toileting with incentive spirometer and flutter valve as tolerated   -oxygen supplementation as needed  -continue tele monitoring   -EKG PRN concerns  -trend labs, address/replete electrolytes as indicated  -Daily blood cultures ordered until she clears bacteremia  -ID consulted, Indium scan ordered.  -TTE done - found vegetation on the tricuspid valve.  -Indium scan surprisingly negative given known endocarditis and septic emboli to the lungs.Echo showed tricuspid valve vegetation that is new since prior echos.

## 2024-06-30 PROBLEM — M00.011 STAPHYLOCOCCAL ARTHRITIS OF RIGHT SHOULDER: Status: ACTIVE | Noted: 2024-06-30

## 2024-06-30 LAB
ALBUMIN SERPL BCP-MCNC: 1.4 G/DL (ref 3.5–5.2)
ALLENS TEST: ABNORMAL
ALP SERPL-CCNC: 69 U/L (ref 55–135)
ALT SERPL W/O P-5'-P-CCNC: 6 U/L (ref 10–44)
ANION GAP SERPL CALC-SCNC: 8 MMOL/L (ref 8–16)
AST SERPL-CCNC: 16 U/L (ref 10–40)
BASOPHILS # BLD AUTO: 0.03 K/UL (ref 0–0.2)
BASOPHILS NFR BLD: 0.1 % (ref 0–1.9)
BILIRUB SERPL-MCNC: 0.1 MG/DL (ref 0.1–1)
BUN SERPL-MCNC: 7 MG/DL (ref 6–20)
CALCIUM SERPL-MCNC: 7.9 MG/DL (ref 8.7–10.5)
CHLORIDE SERPL-SCNC: 107 MMOL/L (ref 95–110)
CO2 SERPL-SCNC: 22 MMOL/L (ref 23–29)
CREAT SERPL-MCNC: 0.7 MG/DL (ref 0.5–1.4)
D DIMER PPP IA.FEU-MCNC: 6.53 MG/L FEU
DELSYS: ABNORMAL
DIFFERENTIAL METHOD BLD: ABNORMAL
EOSINOPHIL # BLD AUTO: 0.2 K/UL (ref 0–0.5)
EOSINOPHIL NFR BLD: 0.8 % (ref 0–8)
ERYTHROCYTE [DISTWIDTH] IN BLOOD BY AUTOMATED COUNT: 17.2 % (ref 11.5–14.5)
EST. GFR  (NO RACE VARIABLE): >60 ML/MIN/1.73 M^2
FIO2: 100
GLUCOSE SERPL-MCNC: 100 MG/DL (ref 70–110)
HCO3 UR-SCNC: 24 MMOL/L (ref 24–28)
HCT VFR BLD AUTO: 24.9 % (ref 37–48.5)
HCT VFR BLD CALC: 24 %PCV (ref 36–54)
HGB BLD-MCNC: 7.6 G/DL (ref 12–16)
HGB BLD-MCNC: 8 G/DL
IMM GRANULOCYTES # BLD AUTO: 0.73 K/UL (ref 0–0.04)
IMM GRANULOCYTES NFR BLD AUTO: 3.4 % (ref 0–0.5)
LYMPHOCYTES # BLD AUTO: 2.2 K/UL (ref 1–4.8)
LYMPHOCYTES NFR BLD: 10.2 % (ref 18–48)
MCH RBC QN AUTO: 23.9 PG (ref 27–31)
MCHC RBC AUTO-ENTMCNC: 30.5 G/DL (ref 32–36)
MCV RBC AUTO: 78 FL (ref 82–98)
MODE: ABNORMAL
MONOCYTES # BLD AUTO: 0.5 K/UL (ref 0.3–1)
MONOCYTES NFR BLD: 2.4 % (ref 4–15)
NEUTROPHILS # BLD AUTO: 17.8 K/UL (ref 1.8–7.7)
NEUTROPHILS NFR BLD: 83.1 % (ref 38–73)
NRBC BLD-RTO: 0 /100 WBC
PCO2 BLDA: 31.6 MMHG (ref 35–45)
PH SMN: 7.49 [PH] (ref 7.35–7.45)
PLATELET # BLD AUTO: 294 K/UL (ref 150–450)
PMV BLD AUTO: 11.3 FL (ref 9.2–12.9)
PO2 BLDA: 65 MMHG (ref 80–100)
POC BE: 1 MMOL/L
POC SATURATED O2: 94 % (ref 95–100)
POC TCO2: 25 MMOL/L (ref 23–27)
POTASSIUM SERPL-SCNC: 4 MMOL/L (ref 3.5–5.1)
PROT SERPL-MCNC: 6.3 G/DL (ref 6–8.4)
RBC # BLD AUTO: 3.18 M/UL (ref 4–5.4)
SAMPLE: ABNORMAL
SITE: ABNORMAL
SODIUM SERPL-SCNC: 137 MMOL/L (ref 136–145)
WBC # BLD AUTO: 21.42 K/UL (ref 3.9–12.7)

## 2024-06-30 PROCEDURE — 63600175 PHARM REV CODE 636 W HCPCS: Performed by: INTERNAL MEDICINE

## 2024-06-30 PROCEDURE — 94640 AIRWAY INHALATION TREATMENT: CPT

## 2024-06-30 PROCEDURE — 25000003 PHARM REV CODE 250: Performed by: INTERNAL MEDICINE

## 2024-06-30 PROCEDURE — 11000001 HC ACUTE MED/SURG PRIVATE ROOM

## 2024-06-30 PROCEDURE — 36415 COLL VENOUS BLD VENIPUNCTURE: CPT | Performed by: NURSE PRACTITIONER

## 2024-06-30 PROCEDURE — C1751 CATH, INF, PER/CENT/MIDLINE: HCPCS

## 2024-06-30 PROCEDURE — 25000003 PHARM REV CODE 250: Performed by: HOSPITALIST

## 2024-06-30 PROCEDURE — 27000221 HC OXYGEN, UP TO 24 HOURS

## 2024-06-30 PROCEDURE — S4991 NICOTINE PATCH NONLEGEND: HCPCS | Performed by: NURSE PRACTITIONER

## 2024-06-30 PROCEDURE — 99900035 HC TECH TIME PER 15 MIN (STAT)

## 2024-06-30 PROCEDURE — 36600 WITHDRAWAL OF ARTERIAL BLOOD: CPT

## 2024-06-30 PROCEDURE — 25000242 PHARM REV CODE 250 ALT 637 W/ HCPCS: Performed by: HOSPITALIST

## 2024-06-30 PROCEDURE — 94664 DEMO&/EVAL PT USE INHALER: CPT

## 2024-06-30 PROCEDURE — 02HV33Z INSERTION OF INFUSION DEVICE INTO SUPERIOR VENA CAVA, PERCUTANEOUS APPROACH: ICD-10-PCS | Performed by: HOSPITALIST

## 2024-06-30 PROCEDURE — 25000003 PHARM REV CODE 250: Performed by: NURSE PRACTITIONER

## 2024-06-30 PROCEDURE — 27000646 HC AEROBIKA DEVICE

## 2024-06-30 PROCEDURE — 85379 FIBRIN DEGRADATION QUANT: CPT | Performed by: NURSE PRACTITIONER

## 2024-06-30 PROCEDURE — A4216 STERILE WATER/SALINE, 10 ML: HCPCS | Performed by: STUDENT IN AN ORGANIZED HEALTH CARE EDUCATION/TRAINING PROGRAM

## 2024-06-30 PROCEDURE — 25000003 PHARM REV CODE 250: Performed by: STUDENT IN AN ORGANIZED HEALTH CARE EDUCATION/TRAINING PROGRAM

## 2024-06-30 PROCEDURE — 63600175 PHARM REV CODE 636 W HCPCS: Performed by: HOSPITALIST

## 2024-06-30 PROCEDURE — 85025 COMPLETE CBC W/AUTO DIFF WBC: CPT | Performed by: HOSPITALIST

## 2024-06-30 PROCEDURE — 80053 COMPREHEN METABOLIC PANEL: CPT | Performed by: HOSPITALIST

## 2024-06-30 PROCEDURE — 36569 INSJ PICC 5 YR+ W/O IMAGING: CPT

## 2024-06-30 PROCEDURE — 94761 N-INVAS EAR/PLS OXIMETRY MLT: CPT | Mod: XB

## 2024-06-30 PROCEDURE — 82803 BLOOD GASES ANY COMBINATION: CPT

## 2024-06-30 RX ORDER — LANOLIN ALCOHOL/MO/W.PET/CERES
800 CREAM (GRAM) TOPICAL
Status: DISCONTINUED | OUTPATIENT
Start: 2024-06-30 | End: 2024-07-09 | Stop reason: HOSPADM

## 2024-06-30 RX ORDER — SODIUM,POTASSIUM PHOSPHATES 280-250MG
2 POWDER IN PACKET (EA) ORAL
Status: DISCONTINUED | OUTPATIENT
Start: 2024-06-30 | End: 2024-07-09 | Stop reason: HOSPADM

## 2024-06-30 RX ORDER — MORPHINE SULFATE 10 MG/ML
10 INJECTION INTRAMUSCULAR; INTRAVENOUS; SUBCUTANEOUS EVERY 4 HOURS PRN
Status: DISCONTINUED | OUTPATIENT
Start: 2024-07-01 | End: 2024-07-01

## 2024-06-30 RX ORDER — FUROSEMIDE 10 MG/ML
40 INJECTION INTRAMUSCULAR; INTRAVENOUS ONCE
Status: COMPLETED | OUTPATIENT
Start: 2024-06-30 | End: 2024-06-30

## 2024-06-30 RX ADMIN — MICONAZOLE NITRATE: 20 POWDER TOPICAL at 09:06

## 2024-06-30 RX ADMIN — MICONAZOLE NITRATE: 20 POWDER TOPICAL at 08:06

## 2024-06-30 RX ADMIN — DOCUSATE SODIUM AND SENNOSIDES 1 TABLET: 8.6; 5 TABLET, FILM COATED ORAL at 08:06

## 2024-06-30 RX ADMIN — ENOXAPARIN SODIUM 40 MG: 40 INJECTION SUBCUTANEOUS at 04:06

## 2024-06-30 RX ADMIN — Medication 10 ML: at 06:06

## 2024-06-30 RX ADMIN — OXACILLIN 2 G: 2 INJECTION, POWDER, FOR SOLUTION INTRAMUSCULAR; INTRAVENOUS at 05:06

## 2024-06-30 RX ADMIN — MORPHINE SULFATE 10 MG: 2 INJECTION, SOLUTION INTRAMUSCULAR; INTRAVENOUS at 06:06

## 2024-06-30 RX ADMIN — LACTOBACILLUS ACIDOPHILUS / LACTOBACILLUS BULGARICUS 1 EACH: 100 MILLION CFU STRENGTH GRANULES at 09:06

## 2024-06-30 RX ADMIN — BACLOFEN 10 MG: 5 TABLET ORAL at 04:06

## 2024-06-30 RX ADMIN — MORPHINE SULFATE 10 MG: 2 INJECTION, SOLUTION INTRAMUSCULAR; INTRAVENOUS at 01:06

## 2024-06-30 RX ADMIN — MORPHINE SULFATE 10 MG: 2 INJECTION, SOLUTION INTRAMUSCULAR; INTRAVENOUS at 09:06

## 2024-06-30 RX ADMIN — FUROSEMIDE 40 MG: 10 INJECTION, SOLUTION INTRAMUSCULAR; INTRAVENOUS at 04:06

## 2024-06-30 RX ADMIN — HYDROCODONE BITARTRATE AND ACETAMINOPHEN 1 TABLET: 5; 325 TABLET ORAL at 03:06

## 2024-06-30 RX ADMIN — NICOTINE 1 PATCH: 14 PATCH TRANSDERMAL at 09:06

## 2024-06-30 RX ADMIN — METHOCARBAMOL 500 MG: 500 TABLET ORAL at 09:06

## 2024-06-30 RX ADMIN — BACLOFEN 10 MG: 5 TABLET ORAL at 01:06

## 2024-06-30 RX ADMIN — OXACILLIN 2 G: 2 INJECTION, POWDER, FOR SOLUTION INTRAMUSCULAR; INTRAVENOUS at 06:06

## 2024-06-30 RX ADMIN — Medication 10 ML: at 11:06

## 2024-06-30 RX ADMIN — IPRATROPIUM BROMIDE AND ALBUTEROL SULFATE 3 ML: 2.5; .5 SOLUTION RESPIRATORY (INHALATION) at 02:06

## 2024-06-30 RX ADMIN — OXACILLIN 2 G: 2 INJECTION, POWDER, FOR SOLUTION INTRAMUSCULAR; INTRAVENOUS at 10:06

## 2024-06-30 RX ADMIN — AMMONIUM LACTATE: 120 LOTION TOPICAL at 09:06

## 2024-06-30 RX ADMIN — OXACILLIN 2 G: 2 INJECTION, POWDER, FOR SOLUTION INTRAMUSCULAR; INTRAVENOUS at 03:06

## 2024-06-30 RX ADMIN — Medication 10 ML: at 05:06

## 2024-06-30 RX ADMIN — ACETAMINOPHEN 650 MG: 325 TABLET, FILM COATED ORAL at 04:06

## 2024-06-30 RX ADMIN — ERTAPENEM 1 G: 1 INJECTION INTRAMUSCULAR; INTRAVENOUS at 03:06

## 2024-06-30 RX ADMIN — LACTOBACILLUS ACIDOPHILUS / LACTOBACILLUS BULGARICUS 1 EACH: 100 MILLION CFU STRENGTH GRANULES at 08:06

## 2024-06-30 RX ADMIN — AMMONIUM LACTATE: 120 LOTION TOPICAL at 08:06

## 2024-06-30 RX ADMIN — BACLOFEN 10 MG: 5 TABLET ORAL at 08:06

## 2024-06-30 RX ADMIN — BACLOFEN 10 MG: 5 TABLET ORAL at 09:06

## 2024-06-30 RX ADMIN — MORPHINE SULFATE 10 MG: 2 INJECTION, SOLUTION INTRAMUSCULAR; INTRAVENOUS at 10:06

## 2024-06-30 RX ADMIN — OXACILLIN 2 G: 2 INJECTION, POWDER, FOR SOLUTION INTRAMUSCULAR; INTRAVENOUS at 02:06

## 2024-06-30 NOTE — EICU
EICU FOLLOWUP NOTE:    Called for:  Shortness of breaths    CAMERA ASSESSMENT: Two way audiovisual assessment was done: Yes    DISCUSSED with bedside nurse. Yes    ASSESSMENT AND PLAN:    Patient is complaining of shortness of breath.  Patient was on room air earlier and currently on 10 L of oxygen.  Hospitalist is at the bedside.  Hospitalist has ordered D-dimer.  I will add ABG and chest x-ray.  Patient is being given DuoNebs as well at the bedside    Thank You for allowing EICU to participate in the care of the patient. Please call as needed    Robin Rayo MD  [facility]  [phone]       Addendum:  Chest x-ray reviewed which seems congestive with probable bilateral pleural effusion.  ABG with pH of 7.48, pCO2 31.6 and PO2 65.  I will give 40 mg of IV Lasix and continue to monitor her respiratory status closely.

## 2024-06-30 NOTE — ASSESSMENT & PLAN NOTE
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  Chronic left arm wound  -admitted due to severe sepsis/septic shock with hypotension requiring vasopressor support and complicated by DONNA in setting of known recent bacteremia without complete treatment resulting in seeding, septic emboli on CXR, tricuspid valve endocarditis, likely cervical spine discitis and acute on chronic left arm wound with recent IV injection/skin popping of heroin  at admission CT head and CT C-spine noted no acute cervical fracture.  Prevertebral soft tissue thickening.  Recommended MRI of the cervical spine to evaluate for prevertebral edema.  Mild to moderate spondylitic changes.  -MRI done, showed a thin layer of prevertebral edema in front of upper cervical levels, cervical spine DJD, no abscess.   -at admission remained hypotensive despite fluid resuscitation and required vasopressor support; otherwise HDS and afebrile  -admission labs:  -CBC with WBC 34, hemoglobin 8.3, hematocrit 26, platelets 119.  Chemistry was , K 3.5, chloride 97, CO2 18, BUN 50, SCr 3.1, glucose 133.  Magnesium 1.9.  ; AST, ALT, T bili unremarkable.  Lactic acid 3.37 >> 1.2.  Procalcitonin 78.33.  ESR > 120, .  PH 7.419, pCO2 32.2, PO2 34, HC03 21, be negative for.  .  Troponin 0.23.  Flu and COVID negative.  HIV negative.   -admission imaging:               -CXR with findings that may reflect multifocal infectious process.  Right shoulder x-ray without acute pathology.  Left wrist x-ray without acute pathology.  CT head and CT C-spine noted no acute cervical fracture.  Prevertebral cervical soft tissue thickening.  Recommend MRI of the cervical spine to evaluate for prevertebral edema.  Mild to moderate spondylitic changes.  -admission micro:   -blood cultures MSSA positive, also gram positive rods (likely contaminant but will follow)  -Weaned off pressors    -continue IV fluid hydration   -continue oxacillin per ID - might add a carbapenem for synergy if blood cultures remain positive  -Wound Care consulted   -pulmonary toileting with incentive spirometer and flutter valve as tolerated   -oxygen supplementation as needed  -continue tele monitoring   -EKG PRN concerns  -trend labs, address/replete electrolytes as indicated  -Daily blood cultures ordered until she clears bacteremia  -ID consulted, Indium scan ordered.  -TTE done - found vegetation on the tricuspid valve.  -Indium scan surprisingly negative given known endocarditis and septic emboli to the lungs.Echo showed tricuspid valve vegetation that is new since prior echos.  -Verbal report of MRI being positive for septic right shoulder - reading not placed yet, if joint has significant fluid will consult orthopedic surgeon
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
## 2024-06-30 NOTE — PROCEDURES
Margarita Wiseman is a 49 y.o. female patient.    Temp: 98.5 °F (36.9 °C) (06/30/24 1102)  Pulse: 77 (06/30/24 1102)  Resp: 20 (06/30/24 1337)  BP: (!) 113/57 (06/30/24 1102)  SpO2: 96 % (06/30/24 1102)  Weight: 68.9 kg (152 lb) (06/25/24 0815)  Height: 5' (152.4 cm) (06/25/24 0815)    PICC  Performed by: Yimi Watt RN  Consent Done: Yes  Time out: Immediately prior to procedure a time out was called to verify the correct patient, procedure, equipment, support staff and site/side marked as required  Indications: med administration  Anesthesia: local infiltration  Local anesthetic: lidocaine 1% without epinephrine  Anesthetic Total (mL): 3  Preparation: skin prepped with ChloraPrep  Skin prep agent dried: skin prep agent completely dried prior to procedure  Sterile barriers: all five maximum sterile barriers used - cap, mask, sterile gown, sterile gloves, and large sterile sheet  Hand hygiene: hand hygiene performed prior to central venous catheter insertion  Location details: left brachial  Catheter type: triple lumen  Catheter size: 5 Fr  Catheter Length: 39cm    nono esophageal manometryNumber of attempts: 1  Post-procedure: blood return through all ports, chlorhexidine patch and sterile dressing applied  Estimated blood loss (mL): 0  Specimens: No  Implants: No          Name FLORINDA Geller  6/30/2024

## 2024-06-30 NOTE — EICU
Intervention Initiated From:  Bedside    Aaron intervened regarding:  Other    Nurse Notified:  Yes    Doctor Notified:  Yes    Comments: E-lert from bedside nurse. Patient c/o new onset SOB, Resp rate 44-48,  sat 88-90. Patient states she gets like this when her HGB is low. Dr. Rayo notified and has camera ed into room. Nurse in room

## 2024-06-30 NOTE — PLAN OF CARE
Problem: Adult Inpatient Plan of Care  Goal: Plan of Care Review  Outcome: Progressing  Goal: Patient-Specific Goal (Individualized)  Outcome: Progressing  Goal: Absence of Hospital-Acquired Illness or Injury  Outcome: Progressing  Goal: Optimal Comfort and Wellbeing  Outcome: Progressing  Goal: Readiness for Transition of Care  Outcome: Progressing     Problem: Sepsis/Septic Shock  Goal: Optimal Coping  Outcome: Progressing  Goal: Absence of Bleeding  Outcome: Progressing  Goal: Blood Glucose Level Within Targeted Range  Outcome: Progressing  Goal: Optimal Nutrition Intake  Outcome: Progressing     Problem: Acute Kidney Injury/Impairment  Goal: Fluid and Electrolyte Balance  Outcome: Progressing

## 2024-06-30 NOTE — ASSESSMENT & PLAN NOTE
MRI done, showed severe glenohumeral synovitis and subacromial subdeltoid bursitis, likely septic.  Will consult orthopedic surgeon for possible drainage.

## 2024-06-30 NOTE — NURSING
Patient complaining of SOB, patient placed on the monitor and checked the vital signs. Patient sats 88-89% on RA placed on NC, tachypnic, tachycardic, HTN with -200, called respiratory for nebulization. Patient denies pain. While awaiting for EICU to camera in   called moonjacksoner Enrique Hood. Ordered for stat D-dimer, meanwhile EICU provider ordered for stat chest X-ray and stat ABG.

## 2024-06-30 NOTE — SUBJECTIVE & OBJECTIVE
Interval History:  Patient still in ICU due to lack of beds.  She continues to have improvement in the pain in her right shoulder although still can barely lift her arm.  Neck pain is better but has reduced range of motion still.  Getting tired of being in ICU.    Review of Systems   Constitutional:  Negative for chills and fever.   Respiratory:  Negative for cough and shortness of breath.    Cardiovascular:  Negative for chest pain and palpitations.   Musculoskeletal:         Right shoulder pain     Objective:     Vital Signs (Most Recent):  Temp: 98.6 °F (37 °C) (06/30/24 0711)  Pulse: 80 (06/30/24 0711)  Resp: 17 (06/30/24 0711)  BP: 105/61 (06/30/24 0711)  SpO2: 96 % (06/30/24 0711) Vital Signs (24h Range):  Temp:  [98.1 °F (36.7 °C)-98.6 °F (37 °C)] 98.6 °F (37 °C)  Pulse:  [] 80  Resp:  [17-49] 17  SpO2:  [89 %-100 %] 96 %  BP: (101-187)/(55-84) 105/61     Weight: 68.9 kg (152 lb)  Body mass index is 29.69 kg/m².    Intake/Output Summary (Last 24 hours) at 6/30/2024 0826  Last data filed at 6/30/2024 0700  Gross per 24 hour   Intake 1793.22 ml   Output 2850 ml   Net -1056.78 ml         Physical Exam  Neck:      Comments: Decreased ROM neck  Cardiovascular:      Rate and Rhythm: Normal rate and regular rhythm.      Heart sounds: Murmur heard.      No gallop.   Pulmonary:      Effort: Pulmonary effort is normal.      Breath sounds: Normal breath sounds.   Abdominal:      General: Bowel sounds are normal.      Palpations: Abdomen is soft.   Musculoskeletal:      Cervical back: Tenderness present.      Comments: Improved movement right arm.   Skin:     General: Skin is warm and dry.      Comments: Chronic wound left forearm, extensor surface.  Surgical scar left wrist.  Chronic wound right forearm, scarred and dry.   Neurological:      General: No focal deficit present.      Mental Status: She is alert.   Psychiatric:         Mood and Affect: Mood normal.         Behavior: Behavior normal.              Significant Labs: All pertinent labs within the past 24 hours have been reviewed.    Significant Imaging: I have reviewed all pertinent imaging results/findings within the past 24 hours.

## 2024-07-01 LAB
ALBUMIN SERPL BCP-MCNC: 1.4 G/DL (ref 3.5–5.2)
ALP SERPL-CCNC: 52 U/L (ref 55–135)
ALT SERPL W/O P-5'-P-CCNC: 5 U/L (ref 10–44)
ANION GAP SERPL CALC-SCNC: 7 MMOL/L (ref 8–16)
AST SERPL-CCNC: 11 U/L (ref 10–40)
BASOPHILS # BLD AUTO: 0.03 K/UL (ref 0–0.2)
BASOPHILS NFR BLD: 0.2 % (ref 0–1.9)
BILIRUB SERPL-MCNC: 0.1 MG/DL (ref 0.1–1)
BUN SERPL-MCNC: 7 MG/DL (ref 6–20)
CALCIUM SERPL-MCNC: 8 MG/DL (ref 8.7–10.5)
CHLORIDE SERPL-SCNC: 103 MMOL/L (ref 95–110)
CO2 SERPL-SCNC: 26 MMOL/L (ref 23–29)
CREAT SERPL-MCNC: 0.7 MG/DL (ref 0.5–1.4)
DIFFERENTIAL METHOD BLD: ABNORMAL
EOSINOPHIL # BLD AUTO: 0.2 K/UL (ref 0–0.5)
EOSINOPHIL NFR BLD: 1 % (ref 0–8)
ERYTHROCYTE [DISTWIDTH] IN BLOOD BY AUTOMATED COUNT: 17.6 % (ref 11.5–14.5)
EST. GFR  (NO RACE VARIABLE): >60 ML/MIN/1.73 M^2
GLUCOSE SERPL-MCNC: 93 MG/DL (ref 70–110)
HCT VFR BLD AUTO: 22.6 % (ref 37–48.5)
HGB BLD-MCNC: 6.9 G/DL (ref 12–16)
IMM GRANULOCYTES # BLD AUTO: 0.22 K/UL (ref 0–0.04)
IMM GRANULOCYTES NFR BLD AUTO: 1.3 % (ref 0–0.5)
LYMPHOCYTES # BLD AUTO: 2.7 K/UL (ref 1–4.8)
LYMPHOCYTES NFR BLD: 16.3 % (ref 18–48)
MCH RBC QN AUTO: 24 PG (ref 27–31)
MCHC RBC AUTO-ENTMCNC: 30.5 G/DL (ref 32–36)
MCV RBC AUTO: 79 FL (ref 82–98)
MONOCYTES # BLD AUTO: 0.7 K/UL (ref 0.3–1)
MONOCYTES NFR BLD: 3.9 % (ref 4–15)
NEUTROPHILS # BLD AUTO: 12.9 K/UL (ref 1.8–7.7)
NEUTROPHILS NFR BLD: 77.3 % (ref 38–73)
NRBC BLD-RTO: 0 /100 WBC
PLATELET # BLD AUTO: 334 K/UL (ref 150–450)
PMV BLD AUTO: 11 FL (ref 9.2–12.9)
POTASSIUM SERPL-SCNC: 3.3 MMOL/L (ref 3.5–5.1)
PROT SERPL-MCNC: 6.6 G/DL (ref 6–8.4)
RBC # BLD AUTO: 2.87 M/UL (ref 4–5.4)
SODIUM SERPL-SCNC: 136 MMOL/L (ref 136–145)
WBC # BLD AUTO: 16.66 K/UL (ref 3.9–12.7)

## 2024-07-01 PROCEDURE — 85025 COMPLETE CBC W/AUTO DIFF WBC: CPT | Performed by: HOSPITALIST

## 2024-07-01 PROCEDURE — 94664 DEMO&/EVAL PT USE INHALER: CPT

## 2024-07-01 PROCEDURE — 87075 CULTR BACTERIA EXCEPT BLOOD: CPT | Performed by: HOSPITALIST

## 2024-07-01 PROCEDURE — 25000003 PHARM REV CODE 250: Performed by: NURSE PRACTITIONER

## 2024-07-01 PROCEDURE — 25000003 PHARM REV CODE 250: Performed by: STUDENT IN AN ORGANIZED HEALTH CARE EDUCATION/TRAINING PROGRAM

## 2024-07-01 PROCEDURE — 94761 N-INVAS EAR/PLS OXIMETRY MLT: CPT

## 2024-07-01 PROCEDURE — 11000001 HC ACUTE MED/SURG PRIVATE ROOM

## 2024-07-01 PROCEDURE — 63600175 PHARM REV CODE 636 W HCPCS: Performed by: HOSPITALIST

## 2024-07-01 PROCEDURE — 80053 COMPREHEN METABOLIC PANEL: CPT | Performed by: HOSPITALIST

## 2024-07-01 PROCEDURE — 99900035 HC TECH TIME PER 15 MIN (STAT)

## 2024-07-01 PROCEDURE — 63600175 PHARM REV CODE 636 W HCPCS: Performed by: INTERNAL MEDICINE

## 2024-07-01 PROCEDURE — S4991 NICOTINE PATCH NONLEGEND: HCPCS | Performed by: NURSE PRACTITIONER

## 2024-07-01 PROCEDURE — 87070 CULTURE OTHR SPECIMN AEROBIC: CPT | Performed by: HOSPITALIST

## 2024-07-01 PROCEDURE — A4216 STERILE WATER/SALINE, 10 ML: HCPCS | Performed by: STUDENT IN AN ORGANIZED HEALTH CARE EDUCATION/TRAINING PROGRAM

## 2024-07-01 PROCEDURE — 25000003 PHARM REV CODE 250: Performed by: INTERNAL MEDICINE

## 2024-07-01 PROCEDURE — 97116 GAIT TRAINING THERAPY: CPT | Mod: CQ

## 2024-07-01 PROCEDURE — 99233 SBSQ HOSP IP/OBS HIGH 50: CPT | Mod: ,,, | Performed by: INTERNAL MEDICINE

## 2024-07-01 PROCEDURE — 99233 SBSQ HOSP IP/OBS HIGH 50: CPT | Mod: GT,,, | Performed by: FAMILY MEDICINE

## 2024-07-01 PROCEDURE — 25000003 PHARM REV CODE 250: Performed by: HOSPITALIST

## 2024-07-01 PROCEDURE — 87205 SMEAR GRAM STAIN: CPT | Performed by: HOSPITALIST

## 2024-07-01 RX ORDER — HYDROMORPHONE HYDROCHLORIDE 2 MG/ML
2 INJECTION, SOLUTION INTRAMUSCULAR; INTRAVENOUS; SUBCUTANEOUS EVERY 4 HOURS PRN
Status: DISCONTINUED | OUTPATIENT
Start: 2024-07-01 | End: 2024-07-05

## 2024-07-01 RX ADMIN — OXACILLIN 2 G: 2 INJECTION, POWDER, FOR SOLUTION INTRAMUSCULAR; INTRAVENOUS at 02:07

## 2024-07-01 RX ADMIN — BACLOFEN 10 MG: 5 TABLET ORAL at 09:07

## 2024-07-01 RX ADMIN — Medication 10 ML: at 06:07

## 2024-07-01 RX ADMIN — MICONAZOLE NITRATE: 20 POWDER TOPICAL at 08:07

## 2024-07-01 RX ADMIN — OXACILLIN 2 G: 2 INJECTION, POWDER, FOR SOLUTION INTRAMUSCULAR; INTRAVENOUS at 10:07

## 2024-07-01 RX ADMIN — OXACILLIN 2 G: 2 INJECTION, POWDER, FOR SOLUTION INTRAMUSCULAR; INTRAVENOUS at 06:07

## 2024-07-01 RX ADMIN — MORPHINE SULFATE 10 MG: 10 INJECTION INTRAVENOUS at 05:07

## 2024-07-01 RX ADMIN — Medication 10 ML: at 12:07

## 2024-07-01 RX ADMIN — NICOTINE 1 PATCH: 14 PATCH TRANSDERMAL at 09:07

## 2024-07-01 RX ADMIN — HYDROMORPHONE HYDROCHLORIDE 2 MG: 2 INJECTION INTRAMUSCULAR; INTRAVENOUS; SUBCUTANEOUS at 06:07

## 2024-07-01 RX ADMIN — MORPHINE SULFATE 10 MG: 10 INJECTION INTRAVENOUS at 01:07

## 2024-07-01 RX ADMIN — OXACILLIN 2 G: 2 INJECTION, POWDER, FOR SOLUTION INTRAMUSCULAR; INTRAVENOUS at 01:07

## 2024-07-01 RX ADMIN — ERTAPENEM 1 G: 1 INJECTION INTRAMUSCULAR; INTRAVENOUS at 04:07

## 2024-07-01 RX ADMIN — ACETAMINOPHEN 650 MG: 325 TABLET, FILM COATED ORAL at 08:07

## 2024-07-01 RX ADMIN — HYDROCODONE BITARTRATE AND ACETAMINOPHEN 1 TABLET: 5; 325 TABLET ORAL at 09:07

## 2024-07-01 RX ADMIN — HYDROMORPHONE HYDROCHLORIDE 2 MG: 2 INJECTION INTRAMUSCULAR; INTRAVENOUS; SUBCUTANEOUS at 10:07

## 2024-07-01 RX ADMIN — AMMONIUM LACTATE: 120 LOTION TOPICAL at 08:07

## 2024-07-01 RX ADMIN — LACTOBACILLUS ACIDOPHILUS / LACTOBACILLUS BULGARICUS 1 EACH: 100 MILLION CFU STRENGTH GRANULES at 08:07

## 2024-07-01 RX ADMIN — AMMONIUM LACTATE: 120 LOTION TOPICAL at 10:07

## 2024-07-01 RX ADMIN — MORPHINE SULFATE 10 MG: 10 INJECTION INTRAVENOUS at 10:07

## 2024-07-01 RX ADMIN — ENOXAPARIN SODIUM 40 MG: 40 INJECTION SUBCUTANEOUS at 05:07

## 2024-07-01 RX ADMIN — Medication 10 ML: at 11:07

## 2024-07-01 RX ADMIN — BACLOFEN 10 MG: 5 TABLET ORAL at 05:07

## 2024-07-01 RX ADMIN — MICONAZOLE NITRATE: 20 POWDER TOPICAL at 10:07

## 2024-07-01 RX ADMIN — BACLOFEN 10 MG: 5 TABLET ORAL at 08:07

## 2024-07-01 RX ADMIN — DOCUSATE SODIUM AND SENNOSIDES 1 TABLET: 8.6; 5 TABLET, FILM COATED ORAL at 09:07

## 2024-07-01 RX ADMIN — BACLOFEN 10 MG: 5 TABLET ORAL at 12:07

## 2024-07-01 RX ADMIN — MORPHINE SULFATE 10 MG: 10 INJECTION INTRAVENOUS at 02:07

## 2024-07-01 NOTE — PLAN OF CARE
07/01/24 1210   Discharge Reassessment   Assessment Type Discharge Planning Reassessment   Did the patient's condition or plan change since previous assessment? No   Discharge Plan discussed with: Patient   Communicated ABELARDO with patient/caregiver Date not available/Unable to determine   Discharge Plan A Long-term acute care facility (LTAC)   Discharge Plan B Shelter   DME Needed Upon Discharge  none   Transition of Care Barriers Homeless;Does not adhere to care plan;Substance Abuse;Social   Why the patient remains in the hospital Requires continued medical care   Post-Acute Status   Discharge Delays None known at this time

## 2024-07-01 NOTE — PLAN OF CARE
Problem: Adult Inpatient Plan of Care  Goal: Plan of Care Review  7/1/2024 1827 by Rita Younger RN  Outcome: Progressing  7/1/2024 1718 by Rita Younger RN  Outcome: Progressing  Goal: Patient-Specific Goal (Individualized)  7/1/2024 1827 by Rita Younger RN  Outcome: Progressing  7/1/2024 1718 by Rita Younger RN  Outcome: Progressing  Goal: Absence of Hospital-Acquired Illness or Injury  7/1/2024 1827 by Rita Younger RN  Outcome: Progressing  7/1/2024 1718 by Rita Younger RN  Outcome: Progressing  Goal: Optimal Comfort and Wellbeing  7/1/2024 1827 by Rita Younger RN  Outcome: Progressing  7/1/2024 1718 by Rita Younger RN  Outcome: Progressing  Goal: Readiness for Transition of Care  7/1/2024 1827 by Rita Younger RN  Outcome: Progressing  7/1/2024 1718 by Rita Younger RN  Outcome: Progressing     Problem: Sepsis/Septic Shock  Goal: Optimal Coping  7/1/2024 1827 by Rita Younger RN  Outcome: Progressing  7/1/2024 1718 by Rita Younger RN  Outcome: Progressing  Goal: Absence of Bleeding  7/1/2024 1827 by Rita Younger RN  Outcome: Progressing  7/1/2024 1718 by Rita Younger RN  Outcome: Progressing  Goal: Blood Glucose Level Within Targeted Range  7/1/2024 1827 by Rita Younger RN  Outcome: Progressing  7/1/2024 1718 by Rita Younger RN  Outcome: Progressing  Goal: Absence of Infection Signs and Symptoms  7/1/2024 1827 by Rita Younger RN  Outcome: Progressing  7/1/2024 1718 by Rita Younger RN  Outcome: Progressing  Goal: Optimal Nutrition Intake  7/1/2024 1827 by Rita Younger RN  Outcome: Progressing  7/1/2024 1718 by Rita Younger RN  Outcome: Progressing     Problem: Acute Kidney Injury/Impairment  Goal: Fluid and Electrolyte Balance  7/1/2024 1827 by Rita Younger RN  Outcome: Progressing  7/1/2024 1718 by Rita Younger RN  Outcome: Progressing  Goal: Improved Oral Intake  7/1/2024 1827 by Rita Younger RN  Outcome:  Progressing  7/1/2024 1718 by Rita Younger RN  Outcome: Progressing  Goal: Effective Renal Function  7/1/2024 1827 by Rita Younger RN  Outcome: Progressing  7/1/2024 1718 by Rita Younger RN  Outcome: Progressing     Problem: Pneumonia  Goal: Fluid Balance  7/1/2024 1827 by Rita Younger RN  Outcome: Progressing  7/1/2024 1718 by Rita Younger RN  Outcome: Progressing  Goal: Resolution of Infection Signs and Symptoms  7/1/2024 1827 by Rita Younger RN  Outcome: Progressing  7/1/2024 1718 by Rita Younger RN  Outcome: Progressing  Goal: Effective Oxygenation and Ventilation  7/1/2024 1827 by Rita Younger RN  Outcome: Progressing  7/1/2024 1718 by Rita Younger RN  Outcome: Progressing     Problem: Infection  Goal: Absence of Infection Signs and Symptoms  7/1/2024 1827 by Rita Younger RN  Outcome: Progressing  7/1/2024 1718 by Rita Younger RN  Outcome: Progressing     Problem: Wound  Goal: Optimal Coping  7/1/2024 1827 by Rita Younger RN  Outcome: Progressing  7/1/2024 1718 by Rita Younger RN  Outcome: Progressing  Goal: Optimal Functional Ability  7/1/2024 1827 by Rita Younger RN  Outcome: Progressing  7/1/2024 1718 by Rita Younger RN  Outcome: Progressing  Goal: Absence of Infection Signs and Symptoms  7/1/2024 1827 by Rita Younger RN  Outcome: Progressing  7/1/2024 1718 by Rita Younger RN  Outcome: Progressing  Goal: Improved Oral Intake  7/1/2024 1827 by Rita Younger RN  Outcome: Progressing  7/1/2024 1718 by Rita Younger RN  Outcome: Progressing  Goal: Optimal Pain Control and Function  7/1/2024 1827 by Rita Younger RN  Outcome: Progressing  7/1/2024 1718 by Rita Younger RN  Outcome: Progressing  Goal: Skin Health and Integrity  7/1/2024 1827 by Rita Younger, RN  Outcome: Progressing  7/1/2024 1718 by Rita Younger RN  Outcome: Progressing  Goal: Optimal Wound Healing  7/1/2024 1827 by Rita Younger RN  Outcome:  Progressing  7/1/2024 1718 by Rita Younger, RN  Outcome: Progressing     Problem: Skin Injury Risk Increased  Goal: Skin Health and Integrity  7/1/2024 1827 by Rita Younger RN  Outcome: Progressing  7/1/2024 1718 by Rita Younger, RN  Outcome: Progressing     Problem: Coping Ineffective  Goal: Effective Coping  7/1/2024 1827 by Rita Younger, RN  Outcome: Progressing  7/1/2024 1718 by Rita Younger, RN  Outcome: Progressing

## 2024-07-01 NOTE — PLAN OF CARE
Recommendations  1) Commercial beverages BID - chocolate    2) Continue regular diet and encourage intake of meals    3) Monitor weights and labs    4) RD to monitor and follow up    Goals: Pt will meet >75% EEN/EPN by RD follow up  Nutrition Goal Status: new  Communication of RD Recs:  (POC)

## 2024-07-01 NOTE — SUBJECTIVE & OBJECTIVE
Interval History: Out of ICU. Neck pain very slowly improving. No fever or chills.    Review of Systems   All other systems reviewed and are negative.    Objective:     Vital Signs (Most Recent):  Temp: 98 °F (36.7 °C) (07/01/24 0714)  Pulse: 79 (07/01/24 0714)  Resp: 18 (07/01/24 0910)  BP: 113/63 (07/01/24 0714)  SpO2: 95 % (07/01/24 0802) Vital Signs (24h Range):  Temp:  [98 °F (36.7 °C)-99.7 °F (37.6 °C)] 98 °F (36.7 °C)  Pulse:  [71-97] 79  Resp:  [15-23] 18  SpO2:  [93 %-100 %] 95 %  BP: (113-144)/(57-83) 113/63     Weight: 68.9 kg (152 lb)  Body mass index is 29.69 kg/m².    Estimated Creatinine Clearance: 86.1 mL/min (based on SCr of 0.7 mg/dL).     Physical Exam  Vitals and nursing note reviewed.   Constitutional:       Appearance: Normal appearance.   HENT:      Head: Normocephalic and atraumatic.   Cardiovascular:      Heart sounds: Murmur heard.   Neurological:      Mental Status: She is alert.   Psychiatric:         Mood and Affect: Mood normal.         Thought Content: Thought content normal.         Judgment: Judgment normal.          Significant Labs: CBC:   Recent Labs   Lab 06/30/24  0322 06/30/24  0422 07/01/24  0430   WBC  --  21.42* 16.66*   HGB  --  7.6* 6.9*   HCT 24* 24.9* 22.6*   PLT  --  294 334     Microbiology Results (last 7 days)       Procedure Component Value Units Date/Time    Blood culture [[id number]] Collected: 06/28/24 1040    Order Status: Completed Specimen: Blood from Line, Jugular, Internal Right Updated: 06/30/24 2012     Blood Culture, Routine No Growth to date      No Growth to date      No Growth to date    Narrative:      OK to draw from central line if necessary    Blood culture [[id number]] Collected: 06/27/24 0851    Order Status: Completed Specimen: Blood Updated: 06/30/24 1212     Blood Culture, Routine No Growth to date      No Growth to date      No Growth to date      No Growth to date    Blood culture [[id number]]  (Abnormal)  (Susceptibility) Collected:  06/26/24 0951    Order Status: Completed Specimen: Blood Updated: 06/29/24 1011     Blood Culture, Routine Gram stain aer bottle: Gram positive cocci in clusters resembling Staph      Results called to and read back by: Tasha Avila RN 06/27/2024  09:21      STAPHYLOCOCCUS AUREUS  ID consult required at [facility].      Blood culture x two cultures. Draw prior to antibiotics. [[id number]]  (Abnormal) Collected: 06/23/24 1537    Order Status: Completed Specimen: Blood from Peripheral, Antecubital, Right Updated: 06/26/24 0950     Blood Culture, Routine Gram stain aer bottle: Gram positive cocci in clusters resembling Staph,      Gram positive rods      Positive results previously called 06/24/2024  06:13      Gram stain stephanie bottle: Gram positive cocci in clusters resembling Staph      STAPHYLOCOCCUS AUREUS  ID consult required at [facility].  For susceptibility see order #[id number]        GRAM-POSITIVE SUKUMAR  Non-viable for ID      Narrative:      Aerobic and anaerobic    Blood culture x two cultures. Draw prior to antibiotics. [[id number]]  (Abnormal)  (Susceptibility) Collected: 06/23/24 1537    Order Status: Completed Specimen: Blood from Peripheral, Antecubital, Right Updated: 06/26/24 0949     Blood Culture, Routine Gram stain aer bottle: Gram positive cocci in clusters resembling Staph,      Gram positive rods      Results called to and read back by: Cande NEELY 06/24/2024  06:12      Gram stain stephanie bottle: Gram positive cocci in clusters resembling Staph      Gram positive rods      Positive results previously called 06/24/2024      STAPHYLOCOCCUS AUREUS  ID consult required at [facility].        GRAM POSITIVE RODS  Non-viable for ID      Narrative:      Aerobic and anaerobic    Blood culture [[id number]]     Order Status: Canceled Specimen: Blood     Blood culture [[id number]]     Order Status: Canceled Specimen: Blood      Urine culture [[id number]] Collected: 06/23/24 1929    Order Status: Completed Specimen: Urine Updated: 06/25/24 1334     Urine Culture, Routine No significant growth    Narrative:      Specimen Source->Urine    Blood culture [[id number]]     Order Status: Canceled Specimen: Blood             Significant Imaging: I have reviewed all pertinent imaging results/findings within the past 24 hours.

## 2024-07-01 NOTE — PLAN OF CARE
"JARRETT received a message from nurse "Please call Destiny MOLINA/TROY  with [facility] @ [phone]."    JARRETT called Destiny, no answer, left message.   "
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
## 2024-07-01 NOTE — SUBJECTIVE & OBJECTIVE
Interval History: Her shoulder is a little better although ROM still much reduced and she has pain extending into her neck. Now able to turn her head from side to side but very slowly and painfully.    Review of Systems   Constitutional:  Negative for chills and fever.   Respiratory:  Negative for cough and shortness of breath.    Cardiovascular:  Negative for chest pain and palpitations.   Musculoskeletal:  Positive for neck pain.        Right shoulder pain     Objective:     Vital Signs (Most Recent):  Temp: 98.5 °F (36.9 °C) (07/01/24 1542)  Pulse: 81 (07/01/24 1542)  Resp: 18 (07/01/24 1542)  BP: 123/77 (07/01/24 1542)  SpO2: 96 % (07/01/24 1542) Vital Signs (24h Range):  Temp:  [98 °F (36.7 °C)-99.3 °F (37.4 °C)] 98.5 °F (36.9 °C)  Pulse:  [79-97] 81  Resp:  [18-20] 18  SpO2:  [93 %-96 %] 96 %  BP: (105-144)/(58-83) 123/77     Weight: 68.9 kg (151 lb 14.4 oz)  Body mass index is 29.67 kg/m².    Intake/Output Summary (Last 24 hours) at 7/1/2024 1716  Last data filed at 7/1/2024 1633  Gross per 24 hour   Intake --   Output 1600 ml   Net -1600 ml         Physical Exam  Neck:      Comments: Decreased ROM neck  Cardiovascular:      Rate and Rhythm: Normal rate and regular rhythm.      Heart sounds: Murmur heard.      No gallop.   Pulmonary:      Effort: Pulmonary effort is normal.      Breath sounds: Normal breath sounds.   Abdominal:      General: Bowel sounds are normal.      Palpations: Abdomen is soft.   Musculoskeletal:      Cervical back: Tenderness present.      Comments: Improved movement right arm.   Skin:     General: Skin is warm and dry.      Comments: Chronic wound left forearm, extensor surface.  Surgical scar left wrist.  Chronic wound right forearm, scarred and dry.   Neurological:      General: No focal deficit present.      Mental Status: She is alert.   Psychiatric:         Mood and Affect: Mood normal.         Behavior: Behavior normal.             Significant Labs: All pertinent labs within the  past 24 hours have been reviewed.    Significant Imaging: I have reviewed all pertinent imaging results/findings within the past 24 hours.

## 2024-07-01 NOTE — PROGRESS NOTES
[facility] - [facility] ([place])  Adult Nutrition  Progress Note    SUMMARY       Recommendations  1) Commercial beverages BID - chocolate    2) Continue regular diet and encourage intake of meals    3) Monitor weights and labs    4) RD to monitor and follow up    Goals: Pt will meet >75% EEN/EPN by RD follow up  Nutrition Goal Status: new  Communication of RD Recs:  (POC)    Assessment and Plan  Nutrition Problem  Inadequate energy - protein intake    Related to (etiology):   Social circumstances    Signs and Symptoms (as evidenced by):   Homelessness; food insecurity; IVDA   Chronic wound to L arm    Interventions  (treatment strategy):  Collaboration with other providers    Nutrition Diagnosis Status:   New      Malnutrition Assessment  Malnutrition Context: social/environmental circumstances  Malnutrition Level: mild  Skin (Micronutrient): wounds unhealed (skin popping; chronic L arm wound)       Weight Loss (Malnutrition):  (12% in 1 year)       Clavicle Bone Region (Muscle Loss): mild depletion  Clavicle and Acromion Bone Region (Muscle Loss): mild depletion                 Reason for Assessment    Reason For Assessment: length of stay  Diagnosis:  (Infective endocarditis of tricuspid valve)  Relevant Medical History:   Patient Active Problem List   Diagnosis    Opioid overdose    Chronic anemia    Arm abscess    Drug abuse, IV    Abnormal CT of the chest    Bradycardia    Second degree heart block    Iron deficiency anemia    Cholelithiasis without cholecystitis    Polysubstance abuse    Acute cystitis    Trichomoniasis    Staphylococcus aureus bacteremia    PTSD and Depression    Tobacco abuse    Chronic hepatitis C virus infection    Septic embolism    Housing instability    Infective endocarditis of tricuspid valve    Encounter for palliative care    Staphylococcal arthritis of right shoulder     Interdisciplinary Rounds: did not attend  General Information Comments: Patient receiving a regular diet, able to  "tolerate breakfast but reports "weird taste" d/t abx. No N/V or GI intolerances reported to RD. Meal consumption since admit between 25-50% intake. No recent weight loss per pt. Per chart review, 12% weight loss x 1 year ago - not clinically significant. Pt currently homeless. Discussed the addition of commercial beverages, patient amendable to try. PICC. Caleb 19 - skin intact. NFPE noted to have mild depletion to clavicle and acromion region. Pt at high risk for malnutrition given poor social circumstances. RD to monitor for malnutrition risks at follow up.  Nutrition Discharge Planning: general healthful diet post discharge    Nutrition Related Social Determinants of Health: SDOH: Food insecurity.    Nutrition Risk Screen    Nutrition Risk Screen: no indicators present    Nutrition/Diet History    Spiritual, Cultural Beliefs, [employer] Practices, Values that Affect Care: no  Food Allergies: NKFA  Factors Affecting Nutritional Intake: socio-economic, altered taste    Anthropometrics    Temp: 98 °F (36.7 °C)  Height: 5' (152.4 cm)  Height (inches): 60 in  Weight Method: Bed Scale  Weight: 68.9 kg (151 lb 14.4 oz)  Weight (lb): 151.9 lb  Ideal Body Weight (IBW), Female: 100 lb  % Ideal Body Weight, Female (lb): 151.9 %  BMI (Calculated): 29.7  BMI Grade: 25 - 29.9 - overweight  Weight Loss: unintentional  Usual Body Weight (UBW), k.5 kg (2023)  % Usual Body Weight: 87.95  % Weight Change From Usual Weight: -12.23 %       Lab/Procedures/Meds    Pertinent Labs Reviewed: reviewed  CBC:  Recent Labs   Lab 24  0430   WBC 16.66*   HGB 6.9*   HCT 22.6*        CMP:  Recent Labs   Lab 24  0430   CALCIUM 8.0*   ALBUMIN 1.4*   PROT 6.6      K 3.3*   CO2 26      BUN 7   CREATININE 0.7   ALKPHOS 52*   ALT 5*   AST 11   BILITOT 0.1     BMP:   Recent Labs   Lab 24  0607 24  1629 24  0430   GLU  --    < > 93   NA  --    < > 136   K  --    < > 3.3*   CL  --    < > 103   CO2  " --    < > 26   BUN  --    < > 7   CREATININE  --    < > 0.7   CALCIUM  --    < > 8.0*   MG 1.3*  --   --     < > = values in this interval not displayed.       Pertinent Medications Reviewed: reviewed  Scheduled Meds:   ammonium lactate   Topical (Top) BID    baclofen  10 mg Oral QID    enoxparin  40 mg Subcutaneous Daily    ertapenem (INVanz) IV (PEDS and ADULTS)  1 g Intravenous Q24H    lactobacillus acidophilus & bulgar  1 packet Oral BID    miconazole NITRATE 2 %   Topical (Top) BID    nicotine  1 patch Transdermal Daily    oxacillin IV (PEDS and ADULTS)  2 g Intravenous Q4H    senna-docusate 8.6-50 mg  1 tablet Oral BID    sodium chloride 0.9%  10 mL Intravenous Q6H     Continuous Infusions:  PRN Meds:.  Current Facility-Administered Medications:     acetaminophen, 650 mg, Oral, Q4H PRN    albuterol-ipratropium, 3 mL, Nebulization, Q4H PRN    aluminum-magnesium hydroxide-simethicone, 30 mL, Oral, Q6H PRN    dicyclomine, 10 mg, Oral, Q6H PRN    HYDROcodone-acetaminophen, 1 tablet, Oral, Q4H PRN    hydrOXYzine HCL, 50 mg, Oral, Q6H PRN    ibuprofen, 600 mg, Oral, Q6H PRN    loperamide, 2 mg, Oral, QID PRN    magnesium oxide, 800 mg, Oral, PRN    magnesium oxide, 800 mg, Oral, PRN    methocarbamoL, 500 mg, Oral, QID PRN    morphine, 10 mg, Intravenous, Q4H PRN    nitroGLYCERIN, 0.4 mg, Sublingual, Q5 Min PRN    ondansetron, 4 mg, Intravenous, Q8H PRN    polyethylene glycol, 17 g, Oral, BID PRN    potassium bicarbonate, 35 mEq, Oral, PRN    potassium bicarbonate, 50 mEq, Oral, PRN    potassium bicarbonate, 60 mEq, Oral, PRN    potassium, sodium phosphates, 2 packet, Oral, PRN    potassium, sodium phosphates, 2 packet, Oral, PRN    potassium, sodium phosphates, 2 packet, Oral, PRN    promethazine (PHENERGAN) 25 mg in 0.9% NaCl 50 mL IVPB, 25 mg, Intravenous, Q6H PRN    sodium chloride 0.9%, 10 mL, Intravenous, PRN    Flushing PICC/Midline Protocol, , , Until Discontinued **AND** sodium chloride 0.9%, 10 mL,  Intravenous, Q6H **AND** sodium chloride 0.9%, 10 mL, Intravenous, PRN    traZODone, 50 mg, Oral, Nightly PRN    Estimated/Assessed Needs    Weight Used For Calorie Calculations: 68.9 kg (151 lb 14.4 oz)  Energy Calorie Requirements (kcal): 1743  Energy Need Method: Hudson-St Jeor (x 1.4)  Protein Requirements: 55 g (0.8 g/kg)  Weight Used For Protein Calculations: 68.9 kg (151 lb 14.4 oz)  Fluid Requirements (mL): 1 mL/kcal  Estimated Fluid Requirement Method:  (or per MD)  RDA Method (mL): 1743         Nutrition Prescription Ordered    Current Diet Order: Regular    Evaluation of Received Nutrient/Fluid Intake    I/O: + 5.7 L since admit  Energy Calories Required: not meeting needs  Protein Required: not meeting needs  Fluid Required: not meeting needs  Comments: LBM: 6/29  Tolerance: tolerating  % Intake of Estimated Energy Needs: 25 - 50 %  % Meal Intake: 25 - 50 %    Nutrition Risk    Level of Risk/Frequency of Follow-up: low     Monitor and Evaluation    Food and Nutrient Intake: energy intake, food and beverage intake  Food and Nutrient Adminstration: diet order  Knowledge/Beliefs/Attitudes: beliefs and attitudes  Physical Activity and Function: nutrition-related ADLs and IADLs  Anthropometric Measurements: weight, weight change  Biochemical Data, Medical Tests and Procedures: electrolyte and renal panel, lipid profile, gastrointestinal profile, glucose/endocrine profile, inflammatory profile  Nutrition-Focused Physical Findings: overall appearance     Nutrition Follow-Up    RD Follow-up?: Yes    Geetha Farley RDN, ERA

## 2024-07-01 NOTE — SUBJECTIVE & OBJECTIVE
"Interval History: Patient lethargic, reports she is "trying to do better".    Medications:  Continuous Infusions:  Scheduled Meds:   ammonium lactate   Topical (Top) BID    baclofen  10 mg Oral QID    enoxparin  40 mg Subcutaneous Daily    ertapenem (INVanz) IV (PEDS and ADULTS)  1 g Intravenous Q24H    lactobacillus acidophilus & bulgar  1 packet Oral BID    miconazole NITRATE 2 %   Topical (Top) BID    nicotine  1 patch Transdermal Daily    oxacillin IV (PEDS and ADULTS)  2 g Intravenous Q4H    senna-docusate 8.6-50 mg  1 tablet Oral BID    sodium chloride 0.9%  10 mL Intravenous Q6H     PRN Meds:  Current Facility-Administered Medications:     acetaminophen, 650 mg, Oral, Q4H PRN    albuterol-ipratropium, 3 mL, Nebulization, Q4H PRN    aluminum-magnesium hydroxide-simethicone, 30 mL, Oral, Q6H PRN    dicyclomine, 10 mg, Oral, Q6H PRN    HYDROcodone-acetaminophen, 1 tablet, Oral, Q4H PRN    hydrOXYzine HCL, 50 mg, Oral, Q6H PRN    ibuprofen, 600 mg, Oral, Q6H PRN    loperamide, 2 mg, Oral, QID PRN    magnesium oxide, 800 mg, Oral, PRN    magnesium oxide, 800 mg, Oral, PRN    methocarbamoL, 500 mg, Oral, QID PRN    morphine, 10 mg, Intravenous, Q4H PRN    nitroGLYCERIN, 0.4 mg, Sublingual, Q5 Min PRN    ondansetron, 4 mg, Intravenous, Q8H PRN    polyethylene glycol, 17 g, Oral, BID PRN    potassium bicarbonate, 35 mEq, Oral, PRN    potassium bicarbonate, 50 mEq, Oral, PRN    potassium bicarbonate, 60 mEq, Oral, PRN    potassium, sodium phosphates, 2 packet, Oral, PRN    potassium, sodium phosphates, 2 packet, Oral, PRN    potassium, sodium phosphates, 2 packet, Oral, PRN    promethazine (PHENERGAN) 25 mg in 0.9% NaCl 50 mL IVPB, 25 mg, Intravenous, Q6H PRN    sodium chloride 0.9%, 10 mL, Intravenous, PRN    Flushing PICC/Midline Protocol, , , Until Discontinued **AND** sodium chloride 0.9%, 10 mL, Intravenous, Q6H **AND** sodium chloride 0.9%, 10 mL, Intravenous, PRN    traZODone, 50 mg, Oral, Nightly " "PRN    Objective:     Vital Signs (Most Recent):  Temp: 98 °F (36.7 °C) (07/01/24 1141)  Pulse: 82 (07/01/24 1141)  Resp: 18 (07/01/24 1141)  BP: (!) 105/58 (07/01/24 1141)  SpO2: 95 % (07/01/24 1141) Vital Signs (24h Range):  Temp:  [98 °F (36.7 °C)-99.7 °F (37.6 °C)] 98 °F (36.7 °C)  Pulse:  [71-97] 82  Resp:  [18-23] 18  SpO2:  [93 %-97 %] 95 %  BP: (105-144)/(58-83) 105/58     Weight: 68.9 kg (151 lb 14.4 oz)  Body mass index is 29.67 kg/m².       Physical Exam  Constitutional:       Appearance: She is ill-appearing.   Cardiovascular:      Rate and Rhythm: Normal rate.   Pulmonary:      Effort: No respiratory distress.   Skin:     Comments: Wounds noted with track marks on arms    Neurological:      Mental Status: She is alert.      Comments: Alert, awake, pleasant, however slow to respond and engage in conversation                 Review of Symptoms        Symptom Assessment (ESAS 0-10 Scale)  Anorexia:  0  Fatigue:  3            Living Arrangements:  Lives alone     Psychosocial/Cultural:   See Palliative Psychosocial Note: Yes  - Patient is homeless and has been living in a "homeless" camp  - Has 6 children   - Worked in the [employer] many years ago   **Primary  to Follow**  Palliative Care  Consult: No        Advance Care Planning   Advance Directives:   Goals of Care: What is most important right now is to focus on symptom/pain control, curative/life-prolongation (regardless of treatment burdens). Accordingly, we have decided that the best plan to meet the patient's goals includes continuing with treatment.         Significant Labs: All pertinent labs within the past 24 hours have been reviewed.  CBC:   Recent Labs   Lab 07/01/24  0430   WBC 16.66*   HGB 6.9*   HCT 22.6*   MCV 79*        BMP:  Recent Labs   Lab 07/01/24  0430   GLU 93      K 3.3*      CO2 26   BUN 7   CREATININE 0.7   CALCIUM 8.0*     LFT:  Lab Results   Component Value Date    AST 11 " 07/01/2024    ALKPHOS 52 (L) 07/01/2024    BILITOT 0.1 07/01/2024     Albumin:   Albumin   Date Value Ref Range Status   07/01/2024 1.4 (L) 3.5 - 5.2 g/dL Final     Protein:   Total Protein   Date Value Ref Range Status   07/01/2024 6.6 6.0 - 8.4 g/dL Final     Lactic acid:   Lab Results   Component Value Date    LACTATE 1.0 06/27/2024    LACTATE 0.9 06/24/2024       Significant Imaging: I have reviewed all pertinent imaging results/findings within the past 24 hours.

## 2024-07-01 NOTE — PROGRESS NOTES
[facility] ([place])  Infectious Disease  Progress Note    Patient Name: Margarita Wiseman  MRN: [id number]  Admission Date: 6/23/2024  Length of Stay: 8 days  Attending Physician: Melissa Templeton MD  Primary Care Provider: No, Primary Doctor    Isolation Status: No active isolations  Assessment/Plan:        Staphylococcus aureus bacteremia/TVE with septic emboli/MSSA    50 yo woman with history of untreated MSSA bacteremia, now readmitted with bacteremia and sepsis related to IDU, and diagnosed with TVE and septic embolization  - IE with septic pulmonary emboli and likely prevertebral involvement with C4-C5 osteo-diskitis, septic shoulder joint  - polymicrobial? Initial cultures grew MSSA and a non-viable GPR (Bacillus? Contaminant?) but repeat culture is only MSSA, so far (likely all MSSA)  - continue oxacillin and ertapenem, now that bacteremia is clearing  - trend WBC and clinical course, both improving  - tentatively, planning on a two week course of double coverage (oxacillin and ertapenem) followed by an additional 4-6 weeks of oxacillin    Reference  Samuel et al. Oxacillin plus ertapenem combination therapy leads to rapid blood culture clearance and positive outcomes among patients with persistent MSSA bacteraemia: a case series. DARRIN Antimicrob Resist. 2021 Sep; 3(3): [id number].      Jean Carlos Payne MD  Infectious Disease  [facility] ([place])    Subjective:     Principal Problem:Infective endocarditis of tricuspid valve    HPI: Ms. Wiseman is a 49 year old woman with homelessness and IDU, admitted with sepsis and bacteremia. She has a PMHx significant for IVDU, PAWAN, second degree heart block who presents for evaluation of fatigue, which has persisted for the last several days. She notes an associated generalized body aches and pains. Pt with recent admission for severe sepsis with MSSA bacteremia in April 2024. [place] per EMS [place]. Reports continued IV drug use. She was admitted and started on IV abx. CT c/w cervical infection?  ID is consulted for sepsis and bacteremia.    Today, the patient complaints of neck pain.    Blood cultures: MSSA (BCID) and GPR  Interval History: Out of ICU. Neck pain very slowly improving. No fever or chills.    Review of Systems   All other systems reviewed and are negative.    Objective:     Vital Signs (Most Recent):  Temp: 98 °F (36.7 °C) (07/01/24 0714)  Pulse: 79 (07/01/24 0714)  Resp: 18 (07/01/24 0910)  BP: 113/63 (07/01/24 0714)  SpO2: 95 % (07/01/24 0802) Vital Signs (24h Range):  Temp:  [98 °F (36.7 °C)-99.7 °F (37.6 °C)] 98 °F (36.7 °C)  Pulse:  [71-97] 79  Resp:  [15-23] 18  SpO2:  [93 %-100 %] 95 %  BP: (113-144)/(57-83) 113/63     Weight: 68.9 kg (152 lb)  Body mass index is 29.69 kg/m².    Estimated Creatinine Clearance: 86.1 mL/min (based on SCr of 0.7 mg/dL).     Physical Exam  Vitals and nursing note reviewed.   Constitutional:       Appearance: Normal appearance.   HENT:      Head: Normocephalic and atraumatic.   Cardiovascular:      Heart sounds: Murmur heard.   Neurological:      Mental Status: She is alert.   Psychiatric:         Mood and Affect: Mood normal.         Thought Content: Thought content normal.         Judgment: Judgment normal.          Significant Labs: CBC:   Recent Labs   Lab 06/30/24  0322 06/30/24  0422 07/01/24  0430   WBC  --  21.42* 16.66*   HGB  --  7.6* 6.9*   HCT 24* 24.9* 22.6*   PLT  --  294 334     Microbiology Results (last 7 days)       Procedure Component Value Units Date/Time    Blood culture [[id number]] Collected: 06/28/24 1040    Order Status: Completed Specimen: Blood from Line, Jugular, Internal Right Updated: 06/30/24 2012     Blood Culture, Routine No Growth to date      No Growth to date      No Growth to date    Narrative:      OK to draw from central line if necessary    Blood culture [[id number]] Collected: 06/27/24 0851    Order Status:  Completed Specimen: Blood Updated: 06/30/24 1212     Blood Culture, Routine No Growth to date      No Growth to date      No Growth to date      No Growth to date    Blood culture [[id number]]  (Abnormal)  (Susceptibility) Collected: 06/26/24 0951    Order Status: Completed Specimen: Blood Updated: 06/29/24 1011     Blood Culture, Routine Gram stain aer bottle: Gram positive cocci in clusters resembling Staph      Results called to and read back by: Tasha Avila RN 06/27/2024  09:21      STAPHYLOCOCCUS AUREUS  ID consult required at [facility].      Blood culture x two cultures. Draw prior to antibiotics. [[id number]]  (Abnormal) Collected: 06/23/24 1537    Order Status: Completed Specimen: Blood from Peripheral, Antecubital, Right Updated: 06/26/24 0950     Blood Culture, Routine Gram stain aer bottle: Gram positive cocci in clusters resembling Staph,      Gram positive rods      Positive results previously called 06/24/2024  06:13      Gram stain stephanie bottle: Gram positive cocci in clusters resembling Staph      STAPHYLOCOCCUS AUREUS  ID consult required at [facility].  For susceptibility see order #[id number]        GRAM-POSITIVE SUKUMAR  Non-viable for ID      Narrative:      Aerobic and anaerobic    Blood culture x two cultures. Draw prior to antibiotics. [[id number]]  (Abnormal)  (Susceptibility) Collected: 06/23/24 1537    Order Status: Completed Specimen: Blood from Peripheral, Antecubital, Right Updated: 06/26/24 0949     Blood Culture, Routine Gram stain aer bottle: Gram positive cocci in clusters resembling Staph,      Gram positive rods      Results called to and read back by: Cande NEELY 06/24/2024  06:12      Gram stain stephanie bottle: Gram positive cocci in clusters resembling Staph      Gram positive rods      Positive results previously called 06/24/2024      STAPHYLOCOCCUS AUREUS  ID consult required at [facility].         GRAM POSITIVE RODS  Non-viable for ID      Narrative:      Aerobic and anaerobic    Blood culture [[id number]]     Order Status: Canceled Specimen: Blood     Blood culture [[id number]]     Order Status: Canceled Specimen: Blood     Urine culture [[id number]] Collected: 06/23/24 1929    Order Status: Completed Specimen: Urine Updated: 06/25/24 1334     Urine Culture, Routine No significant growth    Narrative:      Specimen Source->Urine    Blood culture [[id number]]     Order Status: Canceled Specimen: Blood             Significant Imaging: I have reviewed all pertinent imaging results/findings within the past 24 hours.

## 2024-07-01 NOTE — ASSESSMENT & PLAN NOTE
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  Prevertebral soft tissue thickening.  Recommended MRI of the cervical spine to evaluate for prevertebral edema.  Mild to moderate spondylitic changes.  -MRI done, showed a thin layer of prevertebral edema in front of upper cervical levels, cervical spine DJD, no abscess.   -at admission remained hypotensive despite fluid resuscitation and required vasopressor support; otherwise HDS and afebrile  -admission labs:  -CBC with WBC 34, hemoglobin 8.3, hematocrit 26, platelets 119.  Chemistry was , K 3.5, chloride 97, CO2 18, BUN 50, SCr 3.1, glucose 133.  Magnesium 1.9.  ; AST, ALT, T bili unremarkable.  Lactic acid 3.37 >> 1.2.  Procalcitonin 78.33.  ESR > 120, .  PH 7.419, pCO2 32.2, PO2 34, HC03 21, be negative for.  .  Troponin 0.23.  Flu and COVID negative.  HIV negative.   -admission imaging:               -CXR with findings that may reflect multifocal infectious process.  Right shoulder x-ray without acute pathology.  Left wrist x-ray without acute pathology.  CT head and CT C-spine noted no acute cervical fracture.  Prevertebral cervical soft tissue thickening.  Recommend MRI of the cervical spine to evaluate for prevertebral edema.  Mild to moderate spondylitic changes.  -admission micro:   -blood cultures MSSA positive, also gram positive rods (likely  contaminant but will follow)  -Weaned off pressors   -continue IV fluid hydration   -continue oxacillin per ID - might add a carbapenem for synergy if blood cultures remain positive  -Wound Care consulted   -pulmonary toileting with incentive spirometer and flutter valve as tolerated   -oxygen supplementation as needed  -continue tele monitoring   -EKG PRN concerns  -trend labs, address/replete electrolytes as indicated  -Daily blood cultures ordered until she clears bacteremia  -ID consulted, Indium scan done but it was not a good study  -TTE done - found vegetation on the tricuspid valve.  -Indium scan surprisingly negative given known endocarditis and septic emboli to the lungs.Echo showed tricuspid valve vegetation that is new since prior echos.  -MRI positive for septic right shoulder - will consult orthopedic surgeon

## 2024-07-01 NOTE — PLAN OF CARE
POC reviewed with patient. DEMARCUSOx4. stable on room air. Complaints of pain treated with PRN Morphine 10mg IVP Q4H according to MAR. No other complaints verbalized during shift.  Received IV antibiotics Oxicillan Q4H according to MAR. Purewick utilized for voiding. Positions self turn Q2 independently.    Problem: Adult Inpatient Plan of Care  Goal: Plan of Care Review  Outcome: Progressing  Goal: Patient-Specific Goal (Individualized)  Outcome: Progressing  Goal: Absence of Hospital-Acquired Illness or Injury  Outcome: Progressing  Goal: Optimal Comfort and Wellbeing  Outcome: Progressing  Goal: Readiness for Transition of Care  Outcome: Progressing     Problem: Sepsis/Septic Shock  Goal: Optimal Coping  Outcome: Progressing  Goal: Absence of Bleeding  Outcome: Progressing  Goal: Blood Glucose Level Within Targeted Range  Outcome: Progressing  Goal: Absence of Infection Signs and Symptoms  Outcome: Progressing  Goal: Optimal Nutrition Intake  Outcome: Progressing     Problem: Acute Kidney Injury/Impairment  Goal: Fluid and Electrolyte Balance  Outcome: Progressing  Goal: Improved Oral Intake  Outcome: Progressing  Goal: Effective Renal Function  Outcome: Progressing     Problem: Pneumonia  Goal: Fluid Balance  Outcome: Progressing  Goal: Resolution of Infection Signs and Symptoms  Outcome: Progressing  Goal: Effective Oxygenation and Ventilation  Outcome: Progressing     Problem: Infection  Goal: Absence of Infection Signs and Symptoms  Outcome: Progressing     Problem: Wound  Goal: Optimal Coping  Outcome: Progressing  Goal: Optimal Functional Ability  Outcome: Progressing  Goal: Absence of Infection Signs and Symptoms  Outcome: Progressing  Goal: Improved Oral Intake  Outcome: Progressing  Goal: Optimal Pain Control and Function  Outcome: Progressing  Goal: Skin Health and Integrity  Outcome: Progressing  Goal: Optimal Wound Healing  Outcome: Progressing     Problem: Skin Injury Risk Increased  Goal: Skin Health and  Integrity  Outcome: Progressing     Problem: Coping Ineffective  Goal: Effective Coping  Outcome: Progressing    Low air loss mattress in use. Instructed to call for help ambulating. No injuries, falls, or trauma occurred during shift. Purposeful rounding completed. Bed low and locked with side rails up x 3 and call light within reach.

## 2024-07-01 NOTE — ASSESSMENT & PLAN NOTE
50 yo woman with history of untreated MSSA bacteremia, now readmitted with bacteremia and sepsis related to IDU, and diagnosed with TVE and septic embolization  - IE with septic pulmonary emboli and likely prevertebral involvement with C4-C5 osteo-diskitis, septic shoulder joint  - polymicrobial? Initial cultures grew MSSA and a non-viable GPR (Bacillus? Contaminant?) but repeat culture is only MSSA, so far (likely all MSSA)  - continue oxacillin and ertapenem, now that bacteremia is clearing  - trend WBC and clinical course, both improving  - tentatively, planning on a two week course of double coverage (oxacillin and ertapenem) followed by an additional 4-6 weeks of oxacillin    Reference  Samuel et al. Oxacillin plus ertapenem combination therapy leads to rapid blood culture clearance and positive outcomes among patients with persistent MSSA bacteraemia: a case series. DARRIN Antimicrob Resist. 2021 Sep; 3(3): [id number].
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
## 2024-07-01 NOTE — ASSESSMENT & PLAN NOTE
"Polysubstance abuse   PTSD and depression   Tobacco abuse   -longstanding history of polysubstance abuse, IV drug abuse, PTSD, anxiety, depression  -per Care everywhere chart review multiple antidepressants and psychiatric meds of varying doses however patient reports has not been taking   -Psych consulted for assistance - she does endorse some interest in rehab as "she is tired of being sick" however she is worried about her adult daughter who is also un-housed and would like to stay with her, she denies drug abuse in daughter and that she has "been out on the street with me to try and help me"  -opioid withdrawal supportive care as indicated - protocol ordered  -clonidine 0.1 mg po q4 hours PRN opiate withdrawal anxiety ( hold if SBP<90, DBP<50 or HR <60)  -dicylomine 10 mg q6 hours PRN muscle cramps   -loperamide 2 mg q 1 hour PRN diarrhea (max= 16 mg/24 hours)   -methocarbamol 500 mg po q 6 hours PRN muscle spasm and body aches   -No Suboxone for now given need for pain medication - changed to morphine as she is having side effects from Dilaudid.  -nicotine patch ordered  -fall and delirium precautions  -monitor  -Palliative care following  -Discussed discontinuation of pain medication and initiation of Suboxone with her - not ready for Suboxone yet as she still has severe pain to be treated.  -Change IV medication back to Dilaudid as morphine no longer helping.  Continue oral medication for breakthrough  "

## 2024-07-01 NOTE — CONSULTS
[facility] ([place])  Orthopedics  Consult Note    Patient Name: Margarita Wiseman  MRN: [id number]  Admission Date: 6/23/2024  Hospital Length of Stay: 8 days  Attending Provider: Melissa Templeton MD  Primary Care Provider: Doris Primary Doctor    Subjective:     Principal Problem:Infective endocarditis of tricuspid valve    Principal Orthopedic Problem:  right shoulder    Interval History:Ms. Wiseman visit 49-year-old woman admitted to the hospital on June 23, 2024 with diagnosis of multifocal pneumonia, bacteremia and sepsis. The patient's history was obtained from her today as well as review of past medical records.  She apparently has a history of polysubstance abuse, chronic hepatitis C, chronic anemia, chronic thrombocytopenia, second-degree heart block, bipolar disorder, PTSD, anxiety/depression and intravenous drug use.  She presented to the hospital with fatigue, body aches and apparently had a recent admission after being found homeless by EMS.  She reports that she may have had a neck infection as well.she reports to me that over the last several days he has experienced right shoulder pain with difficulty reaching and lifting.  She underwent MRI of the shoulder which was reviewed.she does not recall a specific injury to the shoulder.      Review of patient's allergies indicates:  No Known Allergies    Current Facility-Administered Medications   Medication    acetaminophen tablet 650 mg    albuterol-ipratropium 2.5 mg-0.5 mg/3 mL nebulizer solution 3 mL    aluminum-magnesium hydroxide-simethicone 200-200-20 mg/5 mL suspension 30 mL    ammonium lactate 12 % lotion    baclofen tablet 10 mg    dicyclomine capsule 10 mg    enoxaparin injection 40 mg    ertapenem (INVANZ) 1 g in 0.9% NaCl 100 mL IVPB (MB+)    HYDROcodone-acetaminophen 5-325 mg per tablet 1 tablet    HYDROmorphone (PF) injection 2 mg    hydrOXYzine HCL tablet 50 mg    ibuprofen tablet 600 mg    lactobacillus acidophilus & bulgar 100 million  cell packet 1 each    loperamide capsule 2 mg    magnesium oxide tablet 800 mg    magnesium oxide tablet 800 mg    methocarbamoL tablet 500 mg    miconazole NITRATE 2 % top powder    nicotine 14 mg/24 hr 1 patch    nitroGLYCERIN SL tablet 0.4 mg    ondansetron injection 4 mg    oxacillin 2 g in 0.9% NaCl 100 mL IVPB (MB+)    polyethylene glycol packet 17 g    potassium bicarbonate disintegrating tablet 35 mEq    potassium bicarbonate disintegrating tablet 50 mEq    potassium bicarbonate disintegrating tablet 60 mEq    potassium, sodium phosphates 280-160-250 mg packet 2 packet    potassium, sodium phosphates 280-160-250 mg packet 2 packet    potassium, sodium phosphates 280-160-250 mg packet 2 packet    promethazine (PHENERGAN) 25 mg in 0.9% NaCl 50 mL IVPB    senna-docusate 8.6-50 mg per tablet 1 tablet    sodium chloride 0.9% flush 10 mL    sodium chloride 0.9% flush 10 mL    And    sodium chloride 0.9% flush 10 mL    traZODone tablet 50 mg     Objective:     Vital Signs (Most Recent):  Temp: 98.5 °F (36.9 °C) (07/01/24 1542)  Pulse: 81 (07/01/24 1542)  Resp: 18 (07/01/24 1542)  BP: 123/77 (07/01/24 1542)  SpO2: 96 % (07/01/24 1542) Vital Signs (24h Range):  Temp:  [98 °F (36.7 °C)-99.3 °F (37.4 °C)] 98.5 °F (36.9 °C)  Pulse:  [79-97] 81  Resp:  [18-20] 18  SpO2:  [93 %-96 %] 96 %  BP: (105-144)/(58-83) 123/77     Weight: 68.9 kg (151 lb 14.4 oz)  Height: 5' (152.4 cm)  Body mass index is 29.67 kg/m².      Intake/Output Summary (Last 24 hours) at 7/1/2024 1729  Last data filed at 7/1/2024 1633  Gross per 24 hour   Intake --   Output 1600 ml   Net -1600 ml       Ortho/SPM Exam  The patient is alert and appropriate and cooperative.  His fairly supple though she notes some neck discomfort.  Right shoulder has no deformity, no significant swelling or warmth.  She does note diffuse tenderness and withdraws somewhat to examination.  She actively elevate only about 70° and has discomfort with passive elevation to 120°  although she does not have much discomfort or mid range passive motion. She does not appear to have a pain with elbow wrist range of motion where there is no swelling of the distal extremity.  She is able to flex and extend all digits. Appears intact sensibility throughout the hand. Circulation is intact.    Significant Labs: All pertinent labs within the past 24 hours have been reviewed.    Significant Imaging: MRI: I have reviewed all pertinent results/findings and my personal findings are:  right shoulder MRI demonstrates degenerative changes of the glenohumeral joint consistent with osteoarthritis as well as some subacromial and subdeltoid bursitis.  She does not have a significant effusion of the glenohumeral joint space.  The rotator cuff was largely intact with some partial tearing age-appropriate degenerative changes.    Assessment/Plan:     Active Diagnoses:    Diagnosis Date Noted POA    PRINCIPAL PROBLEM:  Infective endocarditis of tricuspid valve [I33.0] 06/25/2024 Yes    Staphylococcal arthritis of right shoulder [M00.011] 06/30/2024 Yes    Encounter for palliative care [Z51.5] 06/28/2024 Not Applicable    Septic embolism [I76] 06/23/2024 Yes    Housing instability [Z59.819] 06/23/2024 Not Applicable    Staphylococcus aureus bacteremia [R78.81, B95.61] 04/12/2024 Yes    PTSD and Depression [F43.10] 04/12/2024 Yes    Tobacco abuse [Z72.0] 04/12/2024 Yes    Chronic hepatitis C virus infection [B18.2] 04/12/2024 Yes    Chronic anemia [D64.9] 06/30/2023 Yes    Drug abuse, IV [F19.10] 06/30/2023 Yes    Polysubstance abuse [F19.10] 06/20/2019 Yes      Problems Resolved During this Admission:    Diagnosis Date Noted Date Resolved POA    DONNA (acute kidney injury) [N17.9] 04/12/2024 06/27/2024 Yes    Thrombocytopenia [D69.6] 04/12/2024 06/25/2024 Yes    Septic shock [A41.9, R65.21] 04/11/2024 06/29/2024 Yes     Right shoulder pain, bacteremia, sepsis. Right shoulder bursitis, possible septic bursitis versus  septic arthritis.  I explained to the patient her condition and options.  We discussed the potential benefits as well as the potential risks and complications of attempted aspiration of the shoulder.  She elected to proceed and using strict aseptic technique the right shoulder was cleaned with alcohol and Betadine.  18-gauge needle was used for aspiration and a small amount of bloody fluid was returned.  There was no purulence noted.  Cultures were sent for Gram stain and aerobic and anaerobic culture and fungal cultures.  Not enough fluid was obtained to send for a cell count. IV antibiotic therapy oxacillin and ertapenem per infectious disease recommendations.  Thank you for the opportunity to anticipate in the care of Ms. Wiseman.    Claude S. Williams Iv, MD  Orthopedics  [facility])

## 2024-07-01 NOTE — PT/OT/SLP PROGRESS
"Physical Therapy Treatment    Patient Name:  Margarita Wiseman   MRN:  [id number]    Recommendations:     Discharge Recommendations: Low Intensity Therapy (OPPT for RUE/neck pain)  Discharge Equipment Recommendations: shower chair  Barriers to discharge: Inaccessible home and Decreased caregiver support    Assessment:     Margarita Wiseman is a 49 y.o. female admitted with a medical diagnosis of Infective endocarditis of tricuspid valve.  She presents with the following impairments/functional limitations: weakness, impaired endurance, impaired self care skills, impaired functional mobility, gait instability, impaired balance, decreased coordination, decreased upper extremity function, decreased safety awareness, pain, decreased ROM, impaired skin, edema ;pt with fair mobility today, limited by c/o's R shldr/arm pain (MD recently perf'd aspiration to shoulder at bedside), though pt able to stand and amb short distance in room w/ HHA-MONSERRATE .    Rehab Prognosis: Good and Fair; patient would benefit from acute skilled PT services to address these deficits and reach maximum level of function.    Recent Surgery: * No surgery found *      Plan:     During this hospitalization, patient to be seen 3 x/week to address the identified rehab impairments via gait training, therapeutic activities, therapeutic exercises and progress toward the following goals:    Plan of Care Expires:  07/29/24    Subjective     Chief Complaint: pain in R shldr/arm  Patient/Family Comments/goals: pt agreeable to session w/ encouragement, "That doctor just left and now my arm is hurting bad".   Pain/Comfort:  Pain Rating 1: 9/10  Location - Side 1: Left  Location 1: shoulder  Pain Addressed 1: Reposition, Distraction, Nurse notified      Objective:     Communicated with nurse prior to session.  Patient found HOB elevated with peripheral IV, PureWick upon PT entry to room.     General Precautions: Standard, fall  Orthopedic Precautions: N/A  Braces: " N/A  Respiratory Status: Room air     Functional Mobility:  Bed Mobility:     Supine to Sit: moderate assistance and 2/2 pt unable to use RUE to assist  Transfers:     Sit to Stand:  contact guard assistance and minimum assistance with hand-held assist  Gait: amb'd 30' w/ HHA/occ Esau, IV pole in tow      AM-PAC 6 CLICK MOBILITY  Turning over in bed (including adjusting bedclothes, sheets and blankets)?: 2  Sitting down on and standing up from a chair with arms (e.g., wheelchair, bedside commode, etc.): 3  Moving from lying on back to sitting on the side of the bed?: 2  Moving to and from a bed to a chair (including a wheelchair)?: 3  Need to walk in hospital room?: 3  Climbing 3-5 steps with a railing?: 3  Basic Mobility Total Score: 16       Treatment & Education:  Pt stood and perf'd hygiene 2/2 purewick leaking, pt req'd CGA for safety.     Patient left up in chair with all lines intact, call button in reach, and nurse notified..    GOALS:   Multidisciplinary Problems       Physical Therapy Goals          Problem: Physical Therapy    Goal Priority Disciplines Outcome Goal Variances Interventions   Physical Therapy Goal     PT, PT/OT Progressing     Description: Patient will increase functional independence with mobility by performin. Sit<>supine with INDEP.  2. Sit<>stand using LRAD and MOD I.  3. Gait x 150 ft using LRAD and  MOD I.                              Time Tracking:     PT Received On: 24  PT Start Time: 1445     PT Stop Time: 1502  PT Total Time (min): 17 min     Billable Minutes: Gait Training 17    Treatment Type: Treatment  PT/PTA: PTA     Number of PTA visits since last PT visit: 2024

## 2024-07-02 LAB
ABO + RH BLD: NORMAL
ALBUMIN SERPL BCP-MCNC: 1.4 G/DL (ref 3.5–5.2)
ALP SERPL-CCNC: 56 U/L (ref 55–135)
ALT SERPL W/O P-5'-P-CCNC: <5 U/L (ref 10–44)
ANION GAP SERPL CALC-SCNC: 6 MMOL/L (ref 8–16)
AST SERPL-CCNC: 11 U/L (ref 10–40)
BACTERIA BLD CULT: NORMAL
BASOPHILS # BLD AUTO: 0.04 K/UL (ref 0–0.2)
BASOPHILS NFR BLD: 0.3 % (ref 0–1.9)
BILIRUB SERPL-MCNC: 0.1 MG/DL (ref 0.1–1)
BLD GP AB SCN CELLS X3 SERPL QL: NORMAL
BLD PROD TYP BPU: NORMAL
BLOOD UNIT EXPIRATION DATE: NORMAL
BLOOD UNIT TYPE CODE: 5100
BLOOD UNIT TYPE: NORMAL
BUN SERPL-MCNC: 8 MG/DL (ref 6–20)
CALCIUM SERPL-MCNC: 7.6 MG/DL (ref 8.7–10.5)
CHLORIDE SERPL-SCNC: 105 MMOL/L (ref 95–110)
CO2 SERPL-SCNC: 26 MMOL/L (ref 23–29)
CODING SYSTEM: NORMAL
CREAT SERPL-MCNC: 0.7 MG/DL (ref 0.5–1.4)
CROSSMATCH INTERPRETATION: NORMAL
DIFFERENTIAL METHOD BLD: ABNORMAL
DISPENSE STATUS: NORMAL
EOSINOPHIL # BLD AUTO: 0.2 K/UL (ref 0–0.5)
EOSINOPHIL NFR BLD: 1.1 % (ref 0–8)
ERYTHROCYTE [DISTWIDTH] IN BLOOD BY AUTOMATED COUNT: 17.5 % (ref 11.5–14.5)
EST. GFR  (NO RACE VARIABLE): >60 ML/MIN/1.73 M^2
GLUCOSE SERPL-MCNC: 106 MG/DL (ref 70–110)
GRAM STN SPEC: NORMAL
HCT VFR BLD AUTO: 21.9 % (ref 37–48.5)
HGB BLD-MCNC: 6.7 G/DL (ref 12–16)
IMM GRANULOCYTES # BLD AUTO: 0.13 K/UL (ref 0–0.04)
IMM GRANULOCYTES NFR BLD AUTO: 0.9 % (ref 0–0.5)
LYMPHOCYTES # BLD AUTO: 2.5 K/UL (ref 1–4.8)
LYMPHOCYTES NFR BLD: 16.3 % (ref 18–48)
MCH RBC QN AUTO: 23.8 PG (ref 27–31)
MCHC RBC AUTO-ENTMCNC: 30.6 G/DL (ref 32–36)
MCV RBC AUTO: 78 FL (ref 82–98)
MONOCYTES # BLD AUTO: 0.7 K/UL (ref 0.3–1)
MONOCYTES NFR BLD: 4.5 % (ref 4–15)
NEUTROPHILS # BLD AUTO: 11.7 K/UL (ref 1.8–7.7)
NEUTROPHILS NFR BLD: 76.9 % (ref 38–73)
NRBC BLD-RTO: 0 /100 WBC
NUM UNITS TRANS PACKED RBC: NORMAL
PLATELET # BLD AUTO: 338 K/UL (ref 150–450)
PMV BLD AUTO: 10.3 FL (ref 9.2–12.9)
POTASSIUM SERPL-SCNC: 3.4 MMOL/L (ref 3.5–5.1)
PROT SERPL-MCNC: 6.6 G/DL (ref 6–8.4)
RBC # BLD AUTO: 2.81 M/UL (ref 4–5.4)
SODIUM SERPL-SCNC: 137 MMOL/L (ref 136–145)
SPECIMEN OUTDATE: NORMAL
WBC # BLD AUTO: 15.17 K/UL (ref 3.9–12.7)

## 2024-07-02 PROCEDURE — P9016 RBC LEUKOCYTES REDUCED: HCPCS | Performed by: STUDENT IN AN ORGANIZED HEALTH CARE EDUCATION/TRAINING PROGRAM

## 2024-07-02 PROCEDURE — 63600175 PHARM REV CODE 636 W HCPCS: Performed by: INTERNAL MEDICINE

## 2024-07-02 PROCEDURE — 80053 COMPREHEN METABOLIC PANEL: CPT | Performed by: HOSPITALIST

## 2024-07-02 PROCEDURE — 25000003 PHARM REV CODE 250: Performed by: STUDENT IN AN ORGANIZED HEALTH CARE EDUCATION/TRAINING PROGRAM

## 2024-07-02 PROCEDURE — 25000003 PHARM REV CODE 250: Performed by: INTERNAL MEDICINE

## 2024-07-02 PROCEDURE — 97530 THERAPEUTIC ACTIVITIES: CPT | Mod: CQ

## 2024-07-02 PROCEDURE — 94761 N-INVAS EAR/PLS OXIMETRY MLT: CPT

## 2024-07-02 PROCEDURE — S4991 NICOTINE PATCH NONLEGEND: HCPCS | Performed by: NURSE PRACTITIONER

## 2024-07-02 PROCEDURE — 36430 TRANSFUSION BLD/BLD COMPNT: CPT

## 2024-07-02 PROCEDURE — 63600175 PHARM REV CODE 636 W HCPCS: Performed by: HOSPITALIST

## 2024-07-02 PROCEDURE — A4216 STERILE WATER/SALINE, 10 ML: HCPCS | Performed by: STUDENT IN AN ORGANIZED HEALTH CARE EDUCATION/TRAINING PROGRAM

## 2024-07-02 PROCEDURE — 86901 BLOOD TYPING SEROLOGIC RH(D): CPT | Performed by: STUDENT IN AN ORGANIZED HEALTH CARE EDUCATION/TRAINING PROGRAM

## 2024-07-02 PROCEDURE — 99900035 HC TECH TIME PER 15 MIN (STAT)

## 2024-07-02 PROCEDURE — 94799 UNLISTED PULMONARY SVC/PX: CPT

## 2024-07-02 PROCEDURE — 11000001 HC ACUTE MED/SURG PRIVATE ROOM

## 2024-07-02 PROCEDURE — 94664 DEMO&/EVAL PT USE INHALER: CPT

## 2024-07-02 PROCEDURE — 85025 COMPLETE CBC W/AUTO DIFF WBC: CPT | Performed by: HOSPITALIST

## 2024-07-02 PROCEDURE — 87040 BLOOD CULTURE FOR BACTERIA: CPT | Mod: 59 | Performed by: STUDENT IN AN ORGANIZED HEALTH CARE EDUCATION/TRAINING PROGRAM

## 2024-07-02 PROCEDURE — 25000003 PHARM REV CODE 250: Performed by: HOSPITALIST

## 2024-07-02 PROCEDURE — 36415 COLL VENOUS BLD VENIPUNCTURE: CPT | Performed by: STUDENT IN AN ORGANIZED HEALTH CARE EDUCATION/TRAINING PROGRAM

## 2024-07-02 PROCEDURE — 97116 GAIT TRAINING THERAPY: CPT | Mod: CQ

## 2024-07-02 PROCEDURE — 86850 RBC ANTIBODY SCREEN: CPT | Performed by: STUDENT IN AN ORGANIZED HEALTH CARE EDUCATION/TRAINING PROGRAM

## 2024-07-02 PROCEDURE — 86920 COMPATIBILITY TEST SPIN: CPT | Performed by: STUDENT IN AN ORGANIZED HEALTH CARE EDUCATION/TRAINING PROGRAM

## 2024-07-02 PROCEDURE — 25000003 PHARM REV CODE 250: Performed by: NURSE PRACTITIONER

## 2024-07-02 RX ORDER — HYDROCODONE BITARTRATE AND ACETAMINOPHEN 500; 5 MG/1; MG/1
TABLET ORAL
Status: DISCONTINUED | OUTPATIENT
Start: 2024-07-02 | End: 2024-07-08

## 2024-07-02 RX ORDER — LIDOCAINE 50 MG/G
1 PATCH TOPICAL
Status: DISCONTINUED | OUTPATIENT
Start: 2024-07-02 | End: 2024-07-09 | Stop reason: HOSPADM

## 2024-07-02 RX ADMIN — ACETAMINOPHEN 650 MG: 325 TABLET, FILM COATED ORAL at 04:07

## 2024-07-02 RX ADMIN — LACTOBACILLUS ACIDOPHILUS / LACTOBACILLUS BULGARICUS 1 EACH: 100 MILLION CFU STRENGTH GRANULES at 09:07

## 2024-07-02 RX ADMIN — OXACILLIN 2 G: 2 INJECTION, POWDER, FOR SOLUTION INTRAMUSCULAR; INTRAVENOUS at 06:07

## 2024-07-02 RX ADMIN — OXACILLIN 2 G: 2 INJECTION, POWDER, FOR SOLUTION INTRAMUSCULAR; INTRAVENOUS at 11:07

## 2024-07-02 RX ADMIN — BACLOFEN 10 MG: 5 TABLET ORAL at 09:07

## 2024-07-02 RX ADMIN — AMMONIUM LACTATE: 120 LOTION TOPICAL at 11:07

## 2024-07-02 RX ADMIN — HYDROMORPHONE HYDROCHLORIDE 2 MG: 2 INJECTION INTRAMUSCULAR; INTRAVENOUS; SUBCUTANEOUS at 03:07

## 2024-07-02 RX ADMIN — HYDROMORPHONE HYDROCHLORIDE 2 MG: 2 INJECTION INTRAMUSCULAR; INTRAVENOUS; SUBCUTANEOUS at 11:07

## 2024-07-02 RX ADMIN — BACLOFEN 10 MG: 5 TABLET ORAL at 05:07

## 2024-07-02 RX ADMIN — MICONAZOLE NITRATE: 20 POWDER TOPICAL at 09:07

## 2024-07-02 RX ADMIN — DOCUSATE SODIUM AND SENNOSIDES 1 TABLET: 8.6; 5 TABLET, FILM COATED ORAL at 09:07

## 2024-07-02 RX ADMIN — ERTAPENEM 1 G: 1 INJECTION INTRAMUSCULAR; INTRAVENOUS at 03:07

## 2024-07-02 RX ADMIN — LACTOBACILLUS ACIDOPHILUS / LACTOBACILLUS BULGARICUS 1 EACH: 100 MILLION CFU STRENGTH GRANULES at 07:07

## 2024-07-02 RX ADMIN — AMMONIUM LACTATE: 120 LOTION TOPICAL at 09:07

## 2024-07-02 RX ADMIN — Medication 10 ML: at 11:07

## 2024-07-02 RX ADMIN — HYDROMORPHONE HYDROCHLORIDE 2 MG: 2 INJECTION INTRAMUSCULAR; INTRAVENOUS; SUBCUTANEOUS at 07:07

## 2024-07-02 RX ADMIN — BACLOFEN 10 MG: 5 TABLET ORAL at 02:07

## 2024-07-02 RX ADMIN — OXACILLIN 2 G: 2 INJECTION, POWDER, FOR SOLUTION INTRAMUSCULAR; INTRAVENOUS at 03:07

## 2024-07-02 RX ADMIN — HYDROMORPHONE HYDROCHLORIDE 2 MG: 2 INJECTION INTRAMUSCULAR; INTRAVENOUS; SUBCUTANEOUS at 08:07

## 2024-07-02 RX ADMIN — OXACILLIN 2 G: 2 INJECTION, POWDER, FOR SOLUTION INTRAMUSCULAR; INTRAVENOUS at 07:07

## 2024-07-02 RX ADMIN — ENOXAPARIN SODIUM 40 MG: 40 INJECTION SUBCUTANEOUS at 05:07

## 2024-07-02 RX ADMIN — MICONAZOLE NITRATE: 20 POWDER TOPICAL at 11:07

## 2024-07-02 RX ADMIN — Medication 10 ML: at 06:07

## 2024-07-02 RX ADMIN — OXACILLIN 2 G: 2 INJECTION, POWDER, FOR SOLUTION INTRAMUSCULAR; INTRAVENOUS at 02:07

## 2024-07-02 RX ADMIN — HYDROMORPHONE HYDROCHLORIDE 2 MG: 2 INJECTION INTRAMUSCULAR; INTRAVENOUS; SUBCUTANEOUS at 04:07

## 2024-07-02 RX ADMIN — LIDOCAINE 1 PATCH: 50 PATCH CUTANEOUS at 08:07

## 2024-07-02 RX ADMIN — BACLOFEN 10 MG: 5 TABLET ORAL at 07:07

## 2024-07-02 RX ADMIN — NICOTINE 1 PATCH: 14 PATCH TRANSDERMAL at 07:07

## 2024-07-02 NOTE — ASSESSMENT & PLAN NOTE
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  Prevertebral soft tissue thickening.  Recommended MRI of the cervical spine to evaluate for prevertebral edema.  Mild to moderate spondylitic changes.  -MRI done, showed a thin layer of prevertebral edema in front of upper cervical levels, cervical spine DJD, no abscess.   -at admission remained hypotensive despite fluid resuscitation and required vasopressor support; otherwise HDS and afebrile  -admission labs:  -CBC with WBC 34, hemoglobin 8.3, hematocrit 26, platelets 119.  Chemistry was , K 3.5, chloride 97, CO2 18, BUN 50, SCr 3.1, glucose 133.  Magnesium 1.9.  ; AST, ALT, T bili unremarkable.  Lactic acid 3.37 >> 1.2.  Procalcitonin 78.33.  ESR > 120, .  PH 7.419, pCO2 32.2, PO2 34, HC03 21, be negative for.  .  Troponin 0.23.  Flu and COVID negative.  HIV negative.   -admission imaging:               -CXR with findings that may reflect multifocal infectious process.  Right shoulder x-ray without acute pathology.  Left wrist x-ray without acute pathology.  CT head and CT C-spine noted no acute cervical fracture.  Prevertebral cervical soft tissue thickening.  Recommend MRI of the cervical spine to evaluate for prevertebral edema.  Mild to moderate spondylitic changes.  -admission micro:   -blood cultures MSSA positive, also gram positive rods (likely  contaminant but will follow)  -Weaned off pressors   -continue IV fluid hydration   -continue oxacillin per ID - might add a carbapenem for synergy if blood cultures remain positive  -Wound Care consulted   -pulmonary toileting with incentive spirometer and flutter valve as tolerated   -oxygen supplementation as needed  -continue tele monitoring   -EKG PRN concerns  -trend labs, address/replete electrolytes as indicated  -Daily blood cultures ordered until she clears bacteremia  -ID consulted, Indium scan done but it was not a good study  -TTE done - found vegetation on the tricuspid valve.  -Indium scan surprisingly negative given known endocarditis and septic emboli to the lungs.Echo showed tricuspid valve vegetation that is new since prior echos.  -MRI positive for septic right shoulder - will consult orthopedic surgeon - aspirated 7/1 - cultures in process

## 2024-07-02 NOTE — ASSESSMENT & PLAN NOTE
MRI done, showed severe glenohumeral synovitis and subacromial subdeltoid bursitis, likely septic.  Will consult orthopedic surgeon for possible drainage - aspirated 7/1 - cultures in process

## 2024-07-02 NOTE — ASSESSMENT & PLAN NOTE
-etiology likely due to combination of acute and chronic disease/malnutrition, has had normal iron stores and low TIBC, iron saturation, transferrin  -at admission H/H 8.3/25, platelets 133.  Platelets have now normalized but Hb trended down.  -baseline appears 8-9/25-34; 118-300  -no overt s/s of bleeding  -trend labs over course and transfuse as indicated.  - holding iron in setting of acute infection  - 7/2 Hb 6.7 - 1 unit transfused

## 2024-07-02 NOTE — SUBJECTIVE & OBJECTIVE
Interval History: Reports continued pain in right shoulder and neck. But neck pain and ROM has improved.     Review of Systems   Constitutional:  Negative for chills and fever.   Respiratory:  Negative for cough and shortness of breath.    Cardiovascular:  Negative for chest pain and palpitations.   Musculoskeletal:  Positive for neck pain.        Right shoulder pain     Objective:     Vital Signs (Most Recent):  Temp: 99.9 °F (37.7 °C) (07/02/24 1652)  Pulse: 83 (07/02/24 1652)  Resp: 20 (07/02/24 1652)  BP: (!) 112/59 (07/02/24 1652)  SpO2: 95 % (07/02/24 1652) Vital Signs (24h Range):  Temp:  [98.9 °F (37.2 °C)-101.9 °F (38.8 °C)] 99.9 °F (37.7 °C)  Pulse:  [82-92] 83  Resp:  [16-21] 20  SpO2:  [92 %-97 %] 95 %  BP: (101-126)/(56-82) 112/59     Weight: 68.9 kg (151 lb 14.4 oz)  Body mass index is 29.67 kg/m².    Intake/Output Summary (Last 24 hours) at 7/2/2024 1809  Last data filed at 7/2/2024 1754  Gross per 24 hour   Intake 600 ml   Output 1750 ml   Net -1150 ml         Physical Exam  Constitutional:       General: She is not in acute distress.  Pulmonary:      Effort: No respiratory distress.   Abdominal:      General: There is no distension.      Tenderness: There is no abdominal tenderness.   Musculoskeletal:      Right lower leg: No edema.      Left lower leg: No edema.      Comments: Right ant shoulder pain with limited ROM  Limited ROM in neck but reports has improved from before    Neurological:      General: No focal deficit present.      Mental Status: She is oriented to person, place, and time.             Significant Labs: All pertinent labs within the past 24 hours have been reviewed.    Significant Imaging: I have reviewed all pertinent imaging results/findings within the past 24 hours.

## 2024-07-02 NOTE — PT/OT/SLP PROGRESS
"Physical Therapy Treatment    Patient Name:  Margarita Wiseman   MRN:  [id number]    Recommendations:     Discharge Recommendations: Low Intensity Therapy (OPPT for RUE and neck pain)  Discharge Equipment Recommendations: shower chair  Barriers to discharge: Inaccessible home and Decreased caregiver support    Assessment:     Margarita Wiseman is a 49 y.o. female admitted with a medical diagnosis of Infective endocarditis of tricuspid valve.  She presents with the following impairments/functional limitations: weakness, impaired endurance, impaired self care skills, impaired functional mobility, gait instability, impaired balance, decreased coordination, decreased upper extremity function, decreased lower extremity function, decreased safety awareness, pain, decreased ROM, impaired skin, edema ;pt with fair/good mobility today, needing a lot of assistance to get up from lying down in bed, though only req'd CGA to stand, amb'd short distance in room w/ HHA/CGA.    Rehab Prognosis: Good and Fair; patient would benefit from acute skilled PT services to address these deficits and reach maximum level of function.    Recent Surgery: * No surgery found *      Plan:     During this hospitalization, patient to be seen 3 x/week to address the identified rehab impairments via gait training, therapeutic activities, therapeutic exercises and progress toward the following goals:    Plan of Care Expires:  07/29/24    Subjective     Chief Complaint: pain in R shldr and cervical region  Patient/Family Comments/goals: pt agreeable to session, reported feeling "heavy" when walking in room, no c/o's dizziness.  Pain/Comfort:  Pain Rating 1:  (did not rate)  Location - Side 1: Right  Location - Orientation 1: generalized  Location 1: shoulder (and cervical region)  Pain Addressed 1: Reposition, Pre-medicate for activity, Distraction, Nurse notified (nurse reports she gets pain meds every 4 hours)      Objective:     Communicated with nurse " prior to session. Nsg. OK'd session, despite low H&H.  Patient found HOB elevated with peripheral IV, PureWick upon PT entry to room. Bed sheets soiled w/ urine.     General Precautions: Standard, fall  Orthopedic Precautions: N/A  Braces: N/A  Respiratory Status: Room air     Functional Mobility:  Bed Mobility:     Supine to Sit: moderate assistance  Sit to Supine: minimum assistance  Transfers:     Sit to Stand:  contact guard assistance with hand-held assist  Gait: amb'd ~30' w/ HHA/CGA for balance, may try a cane next session      AM-PAC 6 CLICK MOBILITY  Turning over in bed (including adjusting bedclothes, sheets and blankets)?: 2  Sitting down on and standing up from a chair with arms (e.g., wheelchair, bedside commode, etc.): 3  Moving from lying on back to sitting on the side of the bed?: 2  Moving to and from a bed to a chair (including a wheelchair)?: 3  Need to walk in hospital room?: 3  Climbing 3-5 steps with a railing?: 3  Basic Mobility Total Score: 16       Treatment & Education:  Pt soiled w/ urine, despite purewick on. Assisted pt w/ changing gown, req'd mod assistance.  Nsg. Assisted w/ changing linen while pt up walking.   Pt perf'd seated LAQ's and hip flexion ex's x 10 ea. , LUE shldr AROM flexion and RUE shldr AAROM flexion x 5 ea.   Pt ret'd to bed 2/2 needing blood transfusion for low H & H.     Patient left HOB elevated with all lines intact, call button in reach, bed alarm on, and nurse present..    GOALS:   Multidisciplinary Problems       Physical Therapy Goals          Problem: Physical Therapy    Goal Priority Disciplines Outcome Goal Variances Interventions   Physical Therapy Goal     PT, PT/OT Progressing     Description: Patient will increase functional independence with mobility by performin. Sit<>supine with INDEP.  2. Sit<>stand using LRAD and MOD I.  3. Gait x 150 ft using LRAD and  MOD I.                              Time Tracking:     PT Received On: 24  PT Start  Time: 1549     PT Stop Time: 1612  PT Total Time (min): 23 min     Billable Minutes: Gait Training 13 and Therapeutic Activity 10    Treatment Type: Treatment  PT/PTA: PTA     Number of PTA visits since last PT visit: 2     07/02/2024

## 2024-07-02 NOTE — PT/OT/SLP PROGRESS
Occupational Therapy      Patient Name:  Margarita Wiseman   MRN:  [id number]    Patient not seen today secondary to Patient fatigue, Other (Comment). Pt declining therapy this date 2/2 fatigue, requesting to rest. Pt also with low H&H, scheduled to receive blood later today. Will follow-up next available date/time.    7/2/2024
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
## 2024-07-03 LAB
ALBUMIN SERPL BCP-MCNC: 1.4 G/DL (ref 3.5–5.2)
ALP SERPL-CCNC: 62 U/L (ref 55–135)
ALT SERPL W/O P-5'-P-CCNC: 5 U/L (ref 10–44)
ANION GAP SERPL CALC-SCNC: 3 MMOL/L (ref 8–16)
AST SERPL-CCNC: 12 U/L (ref 10–40)
BACTERIA BLD CULT: NORMAL
BASOPHILS # BLD AUTO: 0.04 K/UL (ref 0–0.2)
BASOPHILS NFR BLD: 0.3 % (ref 0–1.9)
BILIRUB SERPL-MCNC: 0.1 MG/DL (ref 0.1–1)
BUN SERPL-MCNC: 7 MG/DL (ref 6–20)
CALCIUM SERPL-MCNC: 8 MG/DL (ref 8.7–10.5)
CHLORIDE SERPL-SCNC: 105 MMOL/L (ref 95–110)
CO2 SERPL-SCNC: 28 MMOL/L (ref 23–29)
CREAT SERPL-MCNC: 0.7 MG/DL (ref 0.5–1.4)
DIFFERENTIAL METHOD BLD: ABNORMAL
EOSINOPHIL # BLD AUTO: 0.2 K/UL (ref 0–0.5)
EOSINOPHIL NFR BLD: 1.1 % (ref 0–8)
ERYTHROCYTE [DISTWIDTH] IN BLOOD BY AUTOMATED COUNT: 17.7 % (ref 11.5–14.5)
EST. GFR  (NO RACE VARIABLE): >60 ML/MIN/1.73 M^2
GLUCOSE SERPL-MCNC: 108 MG/DL (ref 70–110)
HCT VFR BLD AUTO: 25.2 % (ref 37–48.5)
HGB BLD-MCNC: 7.8 G/DL (ref 12–16)
IMM GRANULOCYTES # BLD AUTO: 0.11 K/UL (ref 0–0.04)
IMM GRANULOCYTES NFR BLD AUTO: 0.8 % (ref 0–0.5)
LYMPHOCYTES # BLD AUTO: 2.7 K/UL (ref 1–4.8)
LYMPHOCYTES NFR BLD: 18.3 % (ref 18–48)
MCH RBC QN AUTO: 24.5 PG (ref 27–31)
MCHC RBC AUTO-ENTMCNC: 31 G/DL (ref 32–36)
MCV RBC AUTO: 79 FL (ref 82–98)
MONOCYTES # BLD AUTO: 0.6 K/UL (ref 0.3–1)
MONOCYTES NFR BLD: 3.8 % (ref 4–15)
NEUTROPHILS # BLD AUTO: 11.1 K/UL (ref 1.8–7.7)
NEUTROPHILS NFR BLD: 75.7 % (ref 38–73)
NRBC BLD-RTO: 0 /100 WBC
PLATELET # BLD AUTO: 376 K/UL (ref 150–450)
PMV BLD AUTO: 11.3 FL (ref 9.2–12.9)
POTASSIUM SERPL-SCNC: 3.9 MMOL/L (ref 3.5–5.1)
PROT SERPL-MCNC: 7 G/DL (ref 6–8.4)
RBC # BLD AUTO: 3.18 M/UL (ref 4–5.4)
SODIUM SERPL-SCNC: 136 MMOL/L (ref 136–145)
WBC # BLD AUTO: 14.6 K/UL (ref 3.9–12.7)

## 2024-07-03 PROCEDURE — 94761 N-INVAS EAR/PLS OXIMETRY MLT: CPT

## 2024-07-03 PROCEDURE — A4216 STERILE WATER/SALINE, 10 ML: HCPCS | Performed by: STUDENT IN AN ORGANIZED HEALTH CARE EDUCATION/TRAINING PROGRAM

## 2024-07-03 PROCEDURE — S4991 NICOTINE PATCH NONLEGEND: HCPCS | Performed by: NURSE PRACTITIONER

## 2024-07-03 PROCEDURE — 63600175 PHARM REV CODE 636 W HCPCS: Performed by: HOSPITALIST

## 2024-07-03 PROCEDURE — 25000003 PHARM REV CODE 250: Performed by: STUDENT IN AN ORGANIZED HEALTH CARE EDUCATION/TRAINING PROGRAM

## 2024-07-03 PROCEDURE — 94664 DEMO&/EVAL PT USE INHALER: CPT

## 2024-07-03 PROCEDURE — 99900035 HC TECH TIME PER 15 MIN (STAT)

## 2024-07-03 PROCEDURE — 25000003 PHARM REV CODE 250: Performed by: HOSPITALIST

## 2024-07-03 PROCEDURE — 11000001 HC ACUTE MED/SURG PRIVATE ROOM

## 2024-07-03 PROCEDURE — 63600175 PHARM REV CODE 636 W HCPCS: Performed by: INTERNAL MEDICINE

## 2024-07-03 PROCEDURE — 94799 UNLISTED PULMONARY SVC/PX: CPT

## 2024-07-03 PROCEDURE — 80053 COMPREHEN METABOLIC PANEL: CPT | Performed by: HOSPITALIST

## 2024-07-03 PROCEDURE — 25000003 PHARM REV CODE 250: Performed by: NURSE PRACTITIONER

## 2024-07-03 PROCEDURE — 25000003 PHARM REV CODE 250: Performed by: INTERNAL MEDICINE

## 2024-07-03 PROCEDURE — 85025 COMPLETE CBC W/AUTO DIFF WBC: CPT | Performed by: HOSPITALIST

## 2024-07-03 PROCEDURE — 99233 SBSQ HOSP IP/OBS HIGH 50: CPT | Mod: GT,,, | Performed by: FAMILY MEDICINE

## 2024-07-03 RX ADMIN — OXACILLIN 2 G: 2 INJECTION, POWDER, FOR SOLUTION INTRAMUSCULAR; INTRAVENOUS at 01:07

## 2024-07-03 RX ADMIN — OXACILLIN 2 G: 2 INJECTION, POWDER, FOR SOLUTION INTRAMUSCULAR; INTRAVENOUS at 11:07

## 2024-07-03 RX ADMIN — IBUPROFEN 600 MG: 600 TABLET, FILM COATED ORAL at 10:07

## 2024-07-03 RX ADMIN — TRAZODONE HYDROCHLORIDE 50 MG: 50 TABLET ORAL at 09:07

## 2024-07-03 RX ADMIN — NICOTINE 1 PATCH: 14 PATCH TRANSDERMAL at 09:07

## 2024-07-03 RX ADMIN — HYDROCODONE BITARTRATE AND ACETAMINOPHEN 1 TABLET: 5; 325 TABLET ORAL at 10:07

## 2024-07-03 RX ADMIN — LACTOBACILLUS ACIDOPHILUS / LACTOBACILLUS BULGARICUS 1 EACH: 100 MILLION CFU STRENGTH GRANULES at 09:07

## 2024-07-03 RX ADMIN — HYDROCODONE BITARTRATE AND ACETAMINOPHEN 1 TABLET: 5; 325 TABLET ORAL at 09:07

## 2024-07-03 RX ADMIN — BACLOFEN 10 MG: 5 TABLET ORAL at 01:07

## 2024-07-03 RX ADMIN — OXACILLIN 2 G: 2 INJECTION, POWDER, FOR SOLUTION INTRAMUSCULAR; INTRAVENOUS at 06:07

## 2024-07-03 RX ADMIN — Medication 10 ML: at 01:07

## 2024-07-03 RX ADMIN — HYDROMORPHONE HYDROCHLORIDE 2 MG: 2 INJECTION INTRAMUSCULAR; INTRAVENOUS; SUBCUTANEOUS at 06:07

## 2024-07-03 RX ADMIN — HYDROXYZINE HYDROCHLORIDE 50 MG: 25 TABLET ORAL at 07:07

## 2024-07-03 RX ADMIN — BACLOFEN 10 MG: 5 TABLET ORAL at 06:07

## 2024-07-03 RX ADMIN — HYDROMORPHONE HYDROCHLORIDE 2 MG: 2 INJECTION INTRAMUSCULAR; INTRAVENOUS; SUBCUTANEOUS at 11:07

## 2024-07-03 RX ADMIN — HYDROMORPHONE HYDROCHLORIDE 2 MG: 2 INJECTION INTRAMUSCULAR; INTRAVENOUS; SUBCUTANEOUS at 03:07

## 2024-07-03 RX ADMIN — HYDROMORPHONE HYDROCHLORIDE 2 MG: 2 INJECTION INTRAMUSCULAR; INTRAVENOUS; SUBCUTANEOUS at 07:07

## 2024-07-03 RX ADMIN — DOCUSATE SODIUM AND SENNOSIDES 1 TABLET: 8.6; 5 TABLET, FILM COATED ORAL at 09:07

## 2024-07-03 RX ADMIN — BACLOFEN 10 MG: 5 TABLET ORAL at 09:07

## 2024-07-03 RX ADMIN — Medication 10 ML: at 06:07

## 2024-07-03 RX ADMIN — MICONAZOLE NITRATE: 20 POWDER TOPICAL at 10:07

## 2024-07-03 RX ADMIN — OXACILLIN 2 G: 2 INJECTION, POWDER, FOR SOLUTION INTRAMUSCULAR; INTRAVENOUS at 10:07

## 2024-07-03 RX ADMIN — LIDOCAINE 1 PATCH: 50 PATCH CUTANEOUS at 08:07

## 2024-07-03 RX ADMIN — ERTAPENEM 1 G: 1 INJECTION INTRAMUSCULAR; INTRAVENOUS at 03:07

## 2024-07-03 RX ADMIN — Medication 10 ML: at 09:07

## 2024-07-03 RX ADMIN — ENOXAPARIN SODIUM 40 MG: 40 INJECTION SUBCUTANEOUS at 06:07

## 2024-07-03 RX ADMIN — AMMONIUM LACTATE: 120 LOTION TOPICAL at 10:07

## 2024-07-03 RX ADMIN — AMMONIUM LACTATE: 120 LOTION TOPICAL at 09:07

## 2024-07-03 RX ADMIN — HYDROMORPHONE HYDROCHLORIDE 2 MG: 2 INJECTION INTRAMUSCULAR; INTRAVENOUS; SUBCUTANEOUS at 02:07

## 2024-07-03 RX ADMIN — HYDROCODONE BITARTRATE AND ACETAMINOPHEN 1 TABLET: 5; 325 TABLET ORAL at 01:07

## 2024-07-03 RX ADMIN — MICONAZOLE NITRATE: 20 POWDER TOPICAL at 09:07

## 2024-07-03 RX ADMIN — OXACILLIN 2 G: 2 INJECTION, POWDER, FOR SOLUTION INTRAMUSCULAR; INTRAVENOUS at 02:07

## 2024-07-03 RX ADMIN — Medication 10 ML: at 10:07
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
## 2024-07-03 NOTE — PROGRESS NOTES
room air. SPO2 95%  Incentive Spirometer done. Patient tolerated well. Flutter valve done. Tolerated well.

## 2024-07-03 NOTE — ASSESSMENT & PLAN NOTE
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  Prevertebral soft tissue thickening.  Recommended MRI of the cervical spine to evaluate for prevertebral edema.  Mild to moderate spondylitic changes.  -MRI done, showed a thin layer of prevertebral edema in front of upper cervical levels, cervical spine DJD, no abscess.   -at admission remained hypotensive despite fluid resuscitation and required vasopressor support; otherwise HDS and afebrile  -admission labs:  -CBC with WBC 34, hemoglobin 8.3, hematocrit 26, platelets 119.  Chemistry was , K 3.5, chloride 97, CO2 18, BUN 50, SCr 3.1, glucose 133.  Magnesium 1.9.  ; AST, ALT, T bili unremarkable.  Lactic acid 3.37 >> 1.2.  Procalcitonin 78.33.  ESR > 120, .  PH 7.419, pCO2 32.2, PO2 34, HC03 21, be negative for.  .  Troponin 0.23.  Flu and COVID negative.  HIV negative.   -admission imaging:               -CXR with findings that may reflect multifocal infectious process.  Right shoulder x-ray without acute pathology.  Left wrist x-ray without acute pathology.  CT head and CT C-spine noted no acute cervical fracture.  Prevertebral cervical soft tissue thickening.  Recommend MRI of the cervical spine to evaluate for prevertebral edema.  Mild to moderate spondylitic changes.  -admission micro:   -blood cultures MSSA positive, also gram positive rods (likely  contaminant but will follow)  -Weaned off pressors   -continue IV fluid hydration   -continue oxacillin per ID - might add a carbapenem for synergy if blood cultures remain positive  -Wound Care consulted   -pulmonary toileting with incentive spirometer and flutter valve as tolerated   -oxygen supplementation as needed  -continue tele monitoring   -EKG PRN concerns  -trend labs, address/replete electrolytes as indicated  -Daily blood cultures ordered until she clears bacteremia  -ID consulted  -TTE done - found vegetation on the tricuspid valve.  -Indium scan surprisingly negative given known endocarditis and septic emboli to the lungs.-MRI positive for septic right shoulder - will consult orthopedic surgeon - aspirated 7/1 - cultures in process.    - 7/2 repeated blood cx for fevers - NGTD

## 2024-07-03 NOTE — SUBJECTIVE & OBJECTIVE
Interval History: Patient much more awake and alert today, daughter at the bedside.     Medications:  Continuous Infusions:  Scheduled Meds:   ammonium lactate   Topical (Top) BID    baclofen  10 mg Oral QID    enoxparin  40 mg Subcutaneous Daily    ertapenem (INVanz) IV (PEDS and ADULTS)  1 g Intravenous Q24H    lactobacillus acidophilus & bulgar  1 packet Oral BID    LIDOcaine  1 patch Transdermal Q24H    miconazole NITRATE 2 %   Topical (Top) BID    nicotine  1 patch Transdermal Daily    oxacillin IV (PEDS and ADULTS)  2 g Intravenous Q4H    senna-docusate 8.6-50 mg  1 tablet Oral BID    sodium chloride 0.9%  10 mL Intravenous Q6H     PRN Meds:  Current Facility-Administered Medications:     0.9%  NaCl infusion (for blood administration), , Intravenous, Q24H PRN    acetaminophen, 650 mg, Oral, Q4H PRN    albuterol-ipratropium, 3 mL, Nebulization, Q4H PRN    aluminum-magnesium hydroxide-simethicone, 30 mL, Oral, Q6H PRN    dicyclomine, 10 mg, Oral, Q6H PRN    HYDROcodone-acetaminophen, 1 tablet, Oral, Q4H PRN    HYDROmorphone, 2 mg, Intravenous, Q4H PRN    hydrOXYzine HCL, 50 mg, Oral, Q6H PRN    ibuprofen, 600 mg, Oral, Q6H PRN    loperamide, 2 mg, Oral, QID PRN    magnesium oxide, 800 mg, Oral, PRN    magnesium oxide, 800 mg, Oral, PRN    methocarbamoL, 500 mg, Oral, QID PRN    nitroGLYCERIN, 0.4 mg, Sublingual, Q5 Min PRN    ondansetron, 4 mg, Intravenous, Q8H PRN    polyethylene glycol, 17 g, Oral, BID PRN    potassium bicarbonate, 35 mEq, Oral, PRN    potassium bicarbonate, 50 mEq, Oral, PRN    potassium bicarbonate, 60 mEq, Oral, PRN    potassium, sodium phosphates, 2 packet, Oral, PRN    potassium, sodium phosphates, 2 packet, Oral, PRN    potassium, sodium phosphates, 2 packet, Oral, PRN    promethazine (PHENERGAN) 25 mg in 0.9% NaCl 50 mL IVPB, 25 mg, Intravenous, Q6H PRN    sodium chloride 0.9%, 10 mL, Intravenous, PRN    Flushing PICC/Midline Protocol, , , Until Discontinued **AND** sodium chloride  "0.9%, 10 mL, Intravenous, Q6H **AND** sodium chloride 0.9%, 10 mL, Intravenous, PRN    traZODone, 50 mg, Oral, Nightly PRN    Objective:     Vital Signs (Most Recent):  Temp: 98.8 °F (37.1 °C) (07/03/24 1126)  Pulse: 71 (07/03/24 1126)  Resp: 15 (07/03/24 1131)  BP: (!) 107/59 (07/03/24 1126)  SpO2: 100 % (07/03/24 1126) Vital Signs (24h Range):  Temp:  [98.1 °F (36.7 °C)-100.9 °F (38.3 °C)] 98.8 °F (37.1 °C)  Pulse:  [71-89] 71  Resp:  [15-20] 15  SpO2:  [94 %-100 %] 100 %  BP: (107-119)/(59-82) 107/59     Weight: 68.9 kg (151 lb 14.4 oz)  Body mass index is 29.67 kg/m².       Physical Exam  Constitutional:       Appearance: She is ill-appearing.   Cardiovascular:      Rate and Rhythm: Normal rate.   Pulmonary:      Effort: No respiratory distress.   Skin:     Comments: Wounds noted with track marks on arms    Neurological:      Mental Status: She is alert.      Comments: Alert, awake, pleasant, however slow to respond and engage in conversation        Review of Symptoms        Symptom Assessment (ESAS 0-10 Scale)  Anorexia:  0  Fatigue:  3            Living Arrangements:  Lives alone     Psychosocial/Cultural:   See Palliative Psychosocial Note: Yes  - Patient is homeless and has been living in a "homeless" camp  - Has 6 children   - [place]ed in the [place] years ago   **Primary  to Follow**  Palliative Care  Consult: No       Advance Care Planning   Advance Directives:   Goals of Care: What is most important right now is to focus on symptom/pain control, curative/life-prolongation (regardless of treatment burdens). Accordingly, we have decided that the best plan to meet the patient's goals includes continuing with treatment.         Significant Labs: All pertinent labs within the past 24 hours have been reviewed.  CBC:   Recent Labs   Lab 07/03/24  0509   WBC 14.60*   HGB 7.8*   HCT 25.2*   MCV 79*        BMP:  Recent Labs   Lab 07/03/24  0509         K 3.9 "      CO2 28   BUN 7   CREATININE 0.7   CALCIUM 8.0*     LFT:  Lab Results   Component Value Date    AST 12 07/03/2024    ALKPHOS 62 07/03/2024    BILITOT 0.1 07/03/2024     Albumin:   Albumin   Date Value Ref Range Status   07/03/2024 1.4 (L) 3.5 - 5.2 g/dL Final     Protein:   Total Protein   Date Value Ref Range Status   07/03/2024 7.0 6.0 - 8.4 g/dL Final     Lactic acid:   Lab Results   Component Value Date    LACTATE 1.0 06/27/2024    LACTATE 0.9 06/24/2024       Significant Imaging: I have reviewed all pertinent imaging results/findings within the past 24 hours.

## 2024-07-03 NOTE — PLAN OF CARE
Uneventful shift. POC reviewed. Purposeful rounding completed. VSS on room air. Assessment per flowsheets. Complaints of pain treated with PRN pain medication according to MAR. Received iv abx according to MAR. No injuries, falls, or trauma occurred during shift. Purposeful rounding completed. Family remains at bedside. Bed alarm set. Bed low and locked with side rails up x 3 and call light within reach. Safety maintained throughout shift.       Problem: Adult Inpatient Plan of Care  Goal: Plan of Care Review  Outcome: Progressing  Goal: Patient-Specific Goal (Individualized)  Outcome: Progressing  Goal: Absence of Hospital-Acquired Illness or Injury  Outcome: Progressing  Goal: Optimal Comfort and Wellbeing  Outcome: Progressing  Goal: Readiness for Transition of Care  Outcome: Progressing     Problem: Sepsis/Septic Shock  Goal: Absence of Infection Signs and Symptoms  Outcome: Progressing     Problem: Acute Kidney Injury/Impairment  Goal: Improved Oral Intake  Outcome: Progressing     Problem: Infection  Goal: Absence of Infection Signs and Symptoms  Outcome: Progressing     Problem: Wound  Goal: Absence of Infection Signs and Symptoms  Outcome: Progressing

## 2024-07-03 NOTE — SUBJECTIVE & OBJECTIVE
Interval History: Reports some improvement in pain. Monitoring rpt cultures and temperature.     Review of Systems   Constitutional:  Negative for chills and fever.   Respiratory:  Negative for cough and shortness of breath.    Cardiovascular:  Negative for chest pain and palpitations.   Musculoskeletal:  Positive for neck pain.        Right shoulder pain     Objective:     Vital Signs (Most Recent):  Temp: 98.8 °F (37.1 °C) (07/03/24 1126)  Pulse: 71 (07/03/24 1126)  Resp: 20 (07/03/24 1542)  BP: (!) 107/59 (07/03/24 1126)  SpO2: 100 % (07/03/24 1240) Vital Signs (24h Range):  Temp:  [98.1 °F (36.7 °C)-100 °F (37.8 °C)] 98.8 °F (37.1 °C)  Pulse:  [71-85] 71  Resp:  [15-20] 20  SpO2:  [95 %-100 %] 100 %  BP: (107-118)/(59-72) 107/59     Weight: 68.9 kg (151 lb 14.4 oz)  Body mass index is 29.67 kg/m².    Intake/Output Summary (Last 24 hours) at 7/3/2024 1630  Last data filed at 7/3/2024 1546  Gross per 24 hour   Intake 748.75 ml   Output 2400 ml   Net -1651.25 ml         Physical Exam  Constitutional:       General: She is not in acute distress.  Pulmonary:      Effort: No respiratory distress.   Abdominal:      General: There is no distension.      Tenderness: There is no abdominal tenderness.   Musculoskeletal:      Right lower leg: No edema.      Left lower leg: No edema.      Comments: Right ant shoulder pain with limited ROM  Limited ROM in neck but reports has improved from before    Neurological:      General: No focal deficit present.      Mental Status: She is oriented to person, place, and time.             Significant Labs: All pertinent labs within the past 24 hours have been reviewed.    Significant Imaging: I have reviewed all pertinent imaging results/findings within the past 24 hours.

## 2024-07-04 LAB
ANION GAP SERPL CALC-SCNC: 5 MMOL/L (ref 8–16)
BASOPHILS # BLD AUTO: 0.04 K/UL (ref 0–0.2)
BASOPHILS NFR BLD: 0.3 % (ref 0–1.9)
BUN SERPL-MCNC: 8 MG/DL (ref 6–20)
CALCIUM SERPL-MCNC: 7.9 MG/DL (ref 8.7–10.5)
CHLORIDE SERPL-SCNC: 106 MMOL/L (ref 95–110)
CO2 SERPL-SCNC: 27 MMOL/L (ref 23–29)
CREAT SERPL-MCNC: 0.7 MG/DL (ref 0.5–1.4)
DIFFERENTIAL METHOD BLD: ABNORMAL
EOSINOPHIL # BLD AUTO: 0.1 K/UL (ref 0–0.5)
EOSINOPHIL NFR BLD: 1 % (ref 0–8)
ERYTHROCYTE [DISTWIDTH] IN BLOOD BY AUTOMATED COUNT: 18 % (ref 11.5–14.5)
EST. GFR  (NO RACE VARIABLE): >60 ML/MIN/1.73 M^2
GLUCOSE SERPL-MCNC: 99 MG/DL (ref 70–110)
HCT VFR BLD AUTO: 24.6 % (ref 37–48.5)
HGB BLD-MCNC: 7.5 G/DL (ref 12–16)
IMM GRANULOCYTES # BLD AUTO: 0.05 K/UL (ref 0–0.04)
IMM GRANULOCYTES NFR BLD AUTO: 0.4 % (ref 0–0.5)
LYMPHOCYTES # BLD AUTO: 2.6 K/UL (ref 1–4.8)
LYMPHOCYTES NFR BLD: 22.4 % (ref 18–48)
MCH RBC QN AUTO: 24.6 PG (ref 27–31)
MCHC RBC AUTO-ENTMCNC: 30.5 G/DL (ref 32–36)
MCV RBC AUTO: 81 FL (ref 82–98)
MONOCYTES # BLD AUTO: 0.4 K/UL (ref 0.3–1)
MONOCYTES NFR BLD: 3.5 % (ref 4–15)
NEUTROPHILS # BLD AUTO: 8.4 K/UL (ref 1.8–7.7)
NEUTROPHILS NFR BLD: 72.4 % (ref 38–73)
NRBC BLD-RTO: 0 /100 WBC
PLATELET # BLD AUTO: 368 K/UL (ref 150–450)
PMV BLD AUTO: 10.6 FL (ref 9.2–12.9)
POTASSIUM SERPL-SCNC: 3.7 MMOL/L (ref 3.5–5.1)
RBC # BLD AUTO: 3.05 M/UL (ref 4–5.4)
SODIUM SERPL-SCNC: 138 MMOL/L (ref 136–145)
WBC # BLD AUTO: 11.57 K/UL (ref 3.9–12.7)

## 2024-07-04 PROCEDURE — 11000001 HC ACUTE MED/SURG PRIVATE ROOM

## 2024-07-04 PROCEDURE — 99900035 HC TECH TIME PER 15 MIN (STAT)

## 2024-07-04 PROCEDURE — S4991 NICOTINE PATCH NONLEGEND: HCPCS | Performed by: HOSPITALIST

## 2024-07-04 PROCEDURE — 94664 DEMO&/EVAL PT USE INHALER: CPT

## 2024-07-04 PROCEDURE — 25000003 PHARM REV CODE 250: Performed by: HOSPITALIST

## 2024-07-04 PROCEDURE — A4216 STERILE WATER/SALINE, 10 ML: HCPCS | Performed by: STUDENT IN AN ORGANIZED HEALTH CARE EDUCATION/TRAINING PROGRAM

## 2024-07-04 PROCEDURE — 85025 COMPLETE CBC W/AUTO DIFF WBC: CPT | Performed by: HOSPITALIST

## 2024-07-04 PROCEDURE — 80048 BASIC METABOLIC PNL TOTAL CA: CPT | Performed by: STUDENT IN AN ORGANIZED HEALTH CARE EDUCATION/TRAINING PROGRAM

## 2024-07-04 PROCEDURE — 63600175 PHARM REV CODE 636 W HCPCS: Performed by: INTERNAL MEDICINE

## 2024-07-04 PROCEDURE — 94761 N-INVAS EAR/PLS OXIMETRY MLT: CPT

## 2024-07-04 PROCEDURE — 25000003 PHARM REV CODE 250: Performed by: STUDENT IN AN ORGANIZED HEALTH CARE EDUCATION/TRAINING PROGRAM

## 2024-07-04 PROCEDURE — 25000003 PHARM REV CODE 250: Performed by: INTERNAL MEDICINE

## 2024-07-04 PROCEDURE — 63600175 PHARM REV CODE 636 W HCPCS: Performed by: HOSPITALIST

## 2024-07-04 PROCEDURE — 97116 GAIT TRAINING THERAPY: CPT | Mod: CQ

## 2024-07-04 PROCEDURE — 97530 THERAPEUTIC ACTIVITIES: CPT | Mod: CQ

## 2024-07-04 RX ADMIN — OXACILLIN 2 G: 2 INJECTION, POWDER, FOR SOLUTION INTRAMUSCULAR; INTRAVENOUS at 03:07

## 2024-07-04 RX ADMIN — OXACILLIN 2 G: 2 INJECTION, POWDER, FOR SOLUTION INTRAMUSCULAR; INTRAVENOUS at 10:07

## 2024-07-04 RX ADMIN — OXACILLIN 2 G: 2 INJECTION, POWDER, FOR SOLUTION INTRAMUSCULAR; INTRAVENOUS at 06:07

## 2024-07-04 RX ADMIN — Medication 10 ML: at 11:07

## 2024-07-04 RX ADMIN — OXACILLIN 2 G: 2 INJECTION, POWDER, FOR SOLUTION INTRAMUSCULAR; INTRAVENOUS at 11:07

## 2024-07-04 RX ADMIN — AMMONIUM LACTATE: 120 LOTION TOPICAL at 09:07

## 2024-07-04 RX ADMIN — DOCUSATE SODIUM AND SENNOSIDES 1 TABLET: 8.6; 5 TABLET, FILM COATED ORAL at 09:07

## 2024-07-04 RX ADMIN — LIDOCAINE 1 PATCH: 50 PATCH CUTANEOUS at 06:07

## 2024-07-04 RX ADMIN — HYDROMORPHONE HYDROCHLORIDE 2 MG: 2 INJECTION INTRAMUSCULAR; INTRAVENOUS; SUBCUTANEOUS at 04:07

## 2024-07-04 RX ADMIN — DOCUSATE SODIUM AND SENNOSIDES 1 TABLET: 8.6; 5 TABLET, FILM COATED ORAL at 08:07

## 2024-07-04 RX ADMIN — LACTOBACILLUS ACIDOPHILUS / LACTOBACILLUS BULGARICUS 1 EACH: 100 MILLION CFU STRENGTH GRANULES at 08:07

## 2024-07-04 RX ADMIN — LACTOBACILLUS ACIDOPHILUS / LACTOBACILLUS BULGARICUS 1 EACH: 100 MILLION CFU STRENGTH GRANULES at 09:07

## 2024-07-04 RX ADMIN — MICONAZOLE NITRATE: 20 POWDER TOPICAL at 09:07

## 2024-07-04 RX ADMIN — ENOXAPARIN SODIUM 40 MG: 40 INJECTION SUBCUTANEOUS at 06:07

## 2024-07-04 RX ADMIN — HYDROMORPHONE HYDROCHLORIDE 2 MG: 2 INJECTION INTRAMUSCULAR; INTRAVENOUS; SUBCUTANEOUS at 07:07

## 2024-07-04 RX ADMIN — BACLOFEN 10 MG: 5 TABLET ORAL at 12:07

## 2024-07-04 RX ADMIN — Medication 10 ML: at 06:07

## 2024-07-04 RX ADMIN — BACLOFEN 10 MG: 5 TABLET ORAL at 08:07

## 2024-07-04 RX ADMIN — OXACILLIN 2 G: 2 INJECTION, POWDER, FOR SOLUTION INTRAMUSCULAR; INTRAVENOUS at 02:07

## 2024-07-04 RX ADMIN — BACLOFEN 10 MG: 5 TABLET ORAL at 06:07

## 2024-07-04 RX ADMIN — HYDROMORPHONE HYDROCHLORIDE 2 MG: 2 INJECTION INTRAMUSCULAR; INTRAVENOUS; SUBCUTANEOUS at 02:07

## 2024-07-04 RX ADMIN — ERTAPENEM 1 G: 1 INJECTION INTRAMUSCULAR; INTRAVENOUS at 03:07

## 2024-07-04 RX ADMIN — NICOTINE 1 PATCH: 14 PATCH TRANSDERMAL at 09:07

## 2024-07-04 RX ADMIN — BACLOFEN 10 MG: 5 TABLET ORAL at 09:07

## 2024-07-04 RX ADMIN — Medication 10 ML: at 04:07

## 2024-07-04 RX ADMIN — BACLOFEN 10 MG: 5 TABLET ORAL at 02:07

## 2024-07-04 RX ADMIN — HYDROMORPHONE HYDROCHLORIDE 2 MG: 2 INJECTION INTRAMUSCULAR; INTRAVENOUS; SUBCUTANEOUS at 09:07
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
## 2024-07-04 NOTE — PT/OT/SLP PROGRESS
Physical Therapy Treatment    Patient Name:  Margarita Wiseman   MRN:  [id number]    Recommendations:     Discharge Recommendations: Low Intensity Therapy (OPPT for R shldr and neck pain)  Discharge Equipment Recommendations: shower chair  Barriers to discharge: Inaccessible home and Decreased caregiver support    Assessment:     Margarita Wisemna is a 49 y.o. female admitted with a medical diagnosis of Infective endocarditis of tricuspid valve.  She presents with the following impairments/functional limitations: weakness, impaired endurance, impaired self care skills, impaired functional mobility, gait instability, impaired balance, decreased coordination, decreased upper extremity function, decreased lower extremity function, decreased safety awareness, pain, abnormal tone, decreased ROM, impaired skin, edema ;pt with fair/good mobility today, found lying in bed in urine/feces, purewick on, pt states she called nursing to help her to bathroom?. Pt did amb further in hallway today, though still needing CGA/Esau for balance.    Rehab Prognosis: Good and Fair; patient would benefit from acute skilled PT services to address these deficits and reach maximum level of function.    Recent Surgery: * No surgery found *      Plan:     During this hospitalization, patient to be seen 3 x/week to address the identified rehab impairments via gait training, therapeutic activities, therapeutic exercises and progress toward the following goals:    Plan of Care Expires:  07/29/24    Subjective     Chief Complaint: pain  Patient/Family Comments/goals: pt agreeable to session, appears daughter slept on sofa last night, though not here today, lots of belongings around and room smelled like smoke.   Pain/Comfort:  Pain Rating 1:  (did not rate)  Location - Side 1: Right  Location 1: shoulder (and neck pain)  Pain Addressed 1: Reposition, Distraction  Pain Rating Post-Intervention 1:  (pain w/ mvmt OOB, appears to have no pain at  rest)      Objective:     Communicated with nurse prior to session.  Patient found supine with peripheral IV, PureWick upon PT entry to room.     General Precautions: Standard, fall  Orthopedic Precautions: N/A  Braces: N/A  Respiratory Status: Room air     Functional Mobility:  Bed Mobility:     Supine to Sit: moderate assistance  Transfers:     Sit to Stand:  minimum assistance with hand-held assist  Gait: amb'd ~100' w/ HHA/Esau , dec step length initially, though able to correct w/ cueing. LOB w/ turning around in hallway, req'd Esau.       AM-PAC 6 CLICK MOBILITY  Turning over in bed (including adjusting bedclothes, sheets and blankets)?: 2  Sitting down on and standing up from a chair with arms (e.g., wheelchair, bedside commode, etc.): 3  Moving from lying on back to sitting on the side of the bed?: 2  Moving to and from a bed to a chair (including a wheelchair)?: 3  Need to walk in hospital room?: 3  Climbing 3-5 steps with a railing?: 3  Basic Mobility Total Score: 16       Treatment & Education:  Pt perf'd commode t/f in bathroom w/ min.A and use of railiing on wall, pt had large BM, able to perform hygiene w/ SBA.   Perf'd seated hip flex, LAQ's , AP's, x 10 ea.     Patient left up in chair with all lines intact, call button in reach, and nurse and PCT notified..    GOALS:   Multidisciplinary Problems       Physical Therapy Goals          Problem: Physical Therapy    Goal Priority Disciplines Outcome Goal Variances Interventions   Physical Therapy Goal     PT, PT/OT Progressing     Description: Patient will increase functional independence with mobility by performin. Sit<>supine with INDEP.  2. Sit<>stand using LRAD and MOD I.  3. Gait x 150 ft using LRAD and  MOD I.                              Time Tracking:     PT Received On: 24  PT Start Time: 1626     PT Stop Time: 1650  PT Total Time (min): 24 min     Billable Minutes: Gait Training 12 and Therapeutic Activity 12    Treatment Type:  Treatment  PT/PTA: PTA     Number of PTA visits since last PT visit: 3     07/04/2024

## 2024-07-04 NOTE — ASSESSMENT & PLAN NOTE
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  Prevertebral soft tissue thickening.  Recommended MRI of the cervical spine to evaluate for prevertebral edema.  Mild to moderate spondylitic changes.  -MRI done, showed a thin layer of prevertebral edema in front of upper cervical levels, cervical spine DJD, no abscess.   -at admission remained hypotensive despite fluid resuscitation and required vasopressor support; otherwise HDS and afebrile  -admission labs:  -CBC with WBC 34, hemoglobin 8.3, hematocrit 26, platelets 119.  Chemistry was , K 3.5, chloride 97, CO2 18, BUN 50, SCr 3.1, glucose 133.  Magnesium 1.9.  ; AST, ALT, T bili unremarkable.  Lactic acid 3.37 >> 1.2.  Procalcitonin 78.33.  ESR > 120, .  PH 7.419, pCO2 32.2, PO2 34, HC03 21, be negative for.  .  Troponin 0.23.  Flu and COVID negative.  HIV negative.   -admission imaging:               -CXR with findings that may reflect multifocal infectious process.  Right shoulder x-ray without acute pathology.  Left wrist x-ray without acute pathology.  CT head and CT C-spine noted no acute cervical fracture.  Prevertebral cervical soft tissue thickening.  Recommend MRI of the cervical spine to evaluate for prevertebral edema.  Mild to moderate spondylitic changes.  -admission micro:   -blood cultures MSSA positive, also gram positive rods (likely  contaminant but will follow)  -Weaned off pressors   -continue IV fluid hydration   -continue oxacillin per ID - might add a carbapenem for synergy if blood cultures remain positive  -Wound Care consulted   -pulmonary toileting with incentive spirometer and flutter valve as tolerated   -oxygen supplementation as needed  -continue tele monitoring   -EKG PRN concerns  -trend labs, address/replete electrolytes as indicated  -Daily blood cultures ordered until she clears bacteremia  -ID consulted  -TTE done - found vegetation on the tricuspid valve.  -Indium scan surprisingly negative given known endocarditis and septic emboli to the lungs.-MRI positive for septic right shoulder - will consult orthopedic surgeon - aspirated 7/1 - aerobic cx neg, anaerobic cultures in process.    - 7/2 repeated blood cx for fevers - NGTD

## 2024-07-04 NOTE — PLAN OF CARE
Problem: Adult Inpatient Plan of Care  Goal: Plan of Care Review  Outcome: Progressing  Goal: Patient-Specific Goal (Individualized)  Outcome: Progressing  Goal: Absence of Hospital-Acquired Illness or Injury  Outcome: Progressing  Goal: Optimal Comfort and Wellbeing  Outcome: Progressing  Goal: Readiness for Transition of Care  Outcome: Progressing     Problem: Sepsis/Septic Shock  Goal: Optimal Coping  Outcome: Progressing  Goal: Absence of Bleeding  Outcome: Progressing  Goal: Blood Glucose Level Within Targeted Range  Outcome: Progressing  Goal: Absence of Infection Signs and Symptoms  Outcome: Progressing  Goal: Optimal Nutrition Intake  Outcome: Progressing     Problem: Acute Kidney Injury/Impairment  Goal: Fluid and Electrolyte Balance  Outcome: Progressing  Goal: Improved Oral Intake  Outcome: Progressing  Goal: Effective Renal Function  Outcome: Progressing     Problem: Pneumonia  Goal: Fluid Balance  Outcome: Progressing  Goal: Resolution of Infection Signs and Symptoms  Outcome: Progressing  Goal: Effective Oxygenation and Ventilation  Outcome: Progressing     Problem: Infection  Goal: Absence of Infection Signs and Symptoms  Outcome: Progressing     Problem: Wound  Goal: Optimal Coping  Outcome: Progressing  Goal: Optimal Functional Ability  Outcome: Progressing  Goal: Absence of Infection Signs and Symptoms  Outcome: Progressing  Goal: Improved Oral Intake  Outcome: Progressing  Goal: Optimal Pain Control and Function  Outcome: Progressing  Goal: Skin Health and Integrity  Outcome: Progressing  Goal: Optimal Wound Healing  Outcome: Progressing     Problem: Skin Injury Risk Increased  Goal: Skin Health and Integrity  Outcome: Progressing     Problem: Coping Ineffective  Goal: Effective Coping  Outcome: Progressing     Problem: Fall Injury Risk  Goal: Absence of Fall and Fall-Related Injury  Outcome: Progressing

## 2024-07-04 NOTE — SUBJECTIVE & OBJECTIVE
Interval History: Has been afebrile. Looking into placement options. Delayed for 4th of july    Review of Systems   Constitutional:  Negative for chills and fever.   Respiratory:  Negative for cough and shortness of breath.    Cardiovascular:  Negative for chest pain and palpitations.   Musculoskeletal:  Positive for neck pain.        Right shoulder pain     Objective:     Vital Signs (Most Recent):  Temp: 98.4 °F (36.9 °C) (07/04/24 1128)  Pulse: 76 (07/04/24 1128)  Resp: 18 (07/04/24 1421)  BP: (!) 116/59 (07/04/24 1128)  SpO2: 97 % (07/04/24 1128) Vital Signs (24h Range):  Temp:  [97.7 °F (36.5 °C)-98.9 °F (37.2 °C)] 98.4 °F (36.9 °C)  Pulse:  [66-84] 76  Resp:  [16-20] 18  SpO2:  [94 %-97 %] 97 %  BP: (103-129)/(57-87) 116/59     Weight: 68.9 kg (151 lb 14.4 oz)  Body mass index is 29.67 kg/m².    Intake/Output Summary (Last 24 hours) at 7/4/2024 1538  Last data filed at 7/4/2024 0800  Gross per 24 hour   Intake 500 ml   Output 1450 ml   Net -950 ml         Physical Exam  Constitutional:       General: She is not in acute distress.  Pulmonary:      Effort: No respiratory distress.   Abdominal:      General: There is no distension.      Tenderness: There is no abdominal tenderness.   Musculoskeletal:      Right lower leg: No edema.      Left lower leg: No edema.      Comments: Right ant shoulder pain with limited ROM  Limited ROM in neck but reports has improved from before    Neurological:      General: No focal deficit present.      Mental Status: She is oriented to person, place, and time.             Significant Labs: All pertinent labs within the past 24 hours have been reviewed.    Significant Imaging: I have reviewed all pertinent imaging results/findings within the past 24 hours.

## 2024-07-05 LAB
ANION GAP SERPL CALC-SCNC: 7 MMOL/L (ref 8–16)
BACTERIA SPEC AEROBE CULT: NO GROWTH
BASOPHILS # BLD AUTO: 0.03 K/UL (ref 0–0.2)
BASOPHILS NFR BLD: 0.2 % (ref 0–1.9)
BUN SERPL-MCNC: 9 MG/DL (ref 6–20)
CALCIUM SERPL-MCNC: 8.1 MG/DL (ref 8.7–10.5)
CHLORIDE SERPL-SCNC: 105 MMOL/L (ref 95–110)
CO2 SERPL-SCNC: 26 MMOL/L (ref 23–29)
CREAT SERPL-MCNC: 0.8 MG/DL (ref 0.5–1.4)
DIFFERENTIAL METHOD BLD: ABNORMAL
EOSINOPHIL # BLD AUTO: 0 K/UL (ref 0–0.5)
EOSINOPHIL NFR BLD: 0.3 % (ref 0–8)
ERYTHROCYTE [DISTWIDTH] IN BLOOD BY AUTOMATED COUNT: 18.4 % (ref 11.5–14.5)
EST. GFR  (NO RACE VARIABLE): >60 ML/MIN/1.73 M^2
GLUCOSE SERPL-MCNC: 125 MG/DL (ref 70–110)
HCT VFR BLD AUTO: 24.2 % (ref 37–48.5)
HGB BLD-MCNC: 7.5 G/DL (ref 12–16)
IMM GRANULOCYTES # BLD AUTO: 0.05 K/UL (ref 0–0.04)
IMM GRANULOCYTES NFR BLD AUTO: 0.4 % (ref 0–0.5)
LYMPHOCYTES # BLD AUTO: 2.1 K/UL (ref 1–4.8)
LYMPHOCYTES NFR BLD: 16.5 % (ref 18–48)
MCH RBC QN AUTO: 24.8 PG (ref 27–31)
MCHC RBC AUTO-ENTMCNC: 31 G/DL (ref 32–36)
MCV RBC AUTO: 80 FL (ref 82–98)
MONOCYTES # BLD AUTO: 0.5 K/UL (ref 0.3–1)
MONOCYTES NFR BLD: 4 % (ref 4–15)
NEUTROPHILS # BLD AUTO: 9.8 K/UL (ref 1.8–7.7)
NEUTROPHILS NFR BLD: 78.6 % (ref 38–73)
NRBC BLD-RTO: 0 /100 WBC
PLATELET # BLD AUTO: 384 K/UL (ref 150–450)
PMV BLD AUTO: 10.4 FL (ref 9.2–12.9)
POTASSIUM SERPL-SCNC: 3.9 MMOL/L (ref 3.5–5.1)
RBC # BLD AUTO: 3.03 M/UL (ref 4–5.4)
SODIUM SERPL-SCNC: 138 MMOL/L (ref 136–145)
WBC # BLD AUTO: 12.52 K/UL (ref 3.9–12.7)

## 2024-07-05 PROCEDURE — 63600175 PHARM REV CODE 636 W HCPCS: Performed by: STUDENT IN AN ORGANIZED HEALTH CARE EDUCATION/TRAINING PROGRAM

## 2024-07-05 PROCEDURE — 94664 DEMO&/EVAL PT USE INHALER: CPT

## 2024-07-05 PROCEDURE — 63600175 PHARM REV CODE 636 W HCPCS: Performed by: INTERNAL MEDICINE

## 2024-07-05 PROCEDURE — 25000003 PHARM REV CODE 250: Performed by: STUDENT IN AN ORGANIZED HEALTH CARE EDUCATION/TRAINING PROGRAM

## 2024-07-05 PROCEDURE — 99233 SBSQ HOSP IP/OBS HIGH 50: CPT | Mod: ,,, | Performed by: INTERNAL MEDICINE

## 2024-07-05 PROCEDURE — 25000003 PHARM REV CODE 250: Performed by: HOSPITALIST

## 2024-07-05 PROCEDURE — 85025 COMPLETE CBC W/AUTO DIFF WBC: CPT | Performed by: HOSPITALIST

## 2024-07-05 PROCEDURE — 94761 N-INVAS EAR/PLS OXIMETRY MLT: CPT

## 2024-07-05 PROCEDURE — S4991 NICOTINE PATCH NONLEGEND: HCPCS | Performed by: HOSPITALIST

## 2024-07-05 PROCEDURE — 25000003 PHARM REV CODE 250: Performed by: INTERNAL MEDICINE

## 2024-07-05 PROCEDURE — 94799 UNLISTED PULMONARY SVC/PX: CPT

## 2024-07-05 PROCEDURE — 27000646 HC AEROBIKA DEVICE

## 2024-07-05 PROCEDURE — 63600175 PHARM REV CODE 636 W HCPCS: Performed by: HOSPITALIST

## 2024-07-05 PROCEDURE — 80048 BASIC METABOLIC PNL TOTAL CA: CPT | Performed by: STUDENT IN AN ORGANIZED HEALTH CARE EDUCATION/TRAINING PROGRAM

## 2024-07-05 PROCEDURE — A4216 STERILE WATER/SALINE, 10 ML: HCPCS | Performed by: STUDENT IN AN ORGANIZED HEALTH CARE EDUCATION/TRAINING PROGRAM

## 2024-07-05 PROCEDURE — 11000001 HC ACUTE MED/SURG PRIVATE ROOM

## 2024-07-05 PROCEDURE — 99900035 HC TECH TIME PER 15 MIN (STAT)

## 2024-07-05 RX ORDER — HYDROCODONE BITARTRATE AND ACETAMINOPHEN 5; 325 MG/1; MG/1
1 TABLET ORAL ONCE
Status: COMPLETED | OUTPATIENT
Start: 2024-07-05 | End: 2024-07-05

## 2024-07-05 RX ORDER — HYDROMORPHONE HYDROCHLORIDE 2 MG/ML
2 INJECTION, SOLUTION INTRAMUSCULAR; INTRAVENOUS; SUBCUTANEOUS EVERY 8 HOURS PRN
Status: DISCONTINUED | OUTPATIENT
Start: 2024-07-05 | End: 2024-07-06

## 2024-07-05 RX ORDER — HYDROCODONE BITARTRATE AND ACETAMINOPHEN 5; 325 MG/1; MG/1
2 TABLET ORAL EVERY 4 HOURS PRN
Status: DISCONTINUED | OUTPATIENT
Start: 2024-07-05 | End: 2024-07-06

## 2024-07-05 RX ADMIN — OXACILLIN 2 G: 2 INJECTION, POWDER, FOR SOLUTION INTRAMUSCULAR; INTRAVENOUS at 05:07

## 2024-07-05 RX ADMIN — MICONAZOLE NITRATE: 20 POWDER TOPICAL at 09:07

## 2024-07-05 RX ADMIN — HYDROMORPHONE HYDROCHLORIDE 2 MG: 2 INJECTION INTRAMUSCULAR; INTRAVENOUS; SUBCUTANEOUS at 09:07

## 2024-07-05 RX ADMIN — LACTOBACILLUS ACIDOPHILUS / LACTOBACILLUS BULGARICUS 1 EACH: 100 MILLION CFU STRENGTH GRANULES at 09:07

## 2024-07-05 RX ADMIN — OXACILLIN 2 G: 2 INJECTION, POWDER, FOR SOLUTION INTRAMUSCULAR; INTRAVENOUS at 02:07

## 2024-07-05 RX ADMIN — NICOTINE 1 PATCH: 14 PATCH TRANSDERMAL at 09:07

## 2024-07-05 RX ADMIN — Medication 10 ML: at 02:07

## 2024-07-05 RX ADMIN — Medication 10 ML: at 06:07

## 2024-07-05 RX ADMIN — LIDOCAINE 1 PATCH: 50 PATCH CUTANEOUS at 05:07

## 2024-07-05 RX ADMIN — AMMONIUM LACTATE: 120 LOTION TOPICAL at 09:07

## 2024-07-05 RX ADMIN — HYDROCODONE BITARTRATE AND ACETAMINOPHEN 1 TABLET: 5; 325 TABLET ORAL at 04:07

## 2024-07-05 RX ADMIN — BACLOFEN 10 MG: 5 TABLET ORAL at 09:07

## 2024-07-05 RX ADMIN — HYDROCODONE BITARTRATE AND ACETAMINOPHEN 1 TABLET: 5; 325 TABLET ORAL at 06:07

## 2024-07-05 RX ADMIN — Medication 10 ML: at 05:07

## 2024-07-05 RX ADMIN — OXACILLIN 2 G: 2 INJECTION, POWDER, FOR SOLUTION INTRAMUSCULAR; INTRAVENOUS at 09:07

## 2024-07-05 RX ADMIN — ENOXAPARIN SODIUM 40 MG: 40 INJECTION SUBCUTANEOUS at 05:07

## 2024-07-05 RX ADMIN — HYDROMORPHONE HYDROCHLORIDE 2 MG: 2 INJECTION INTRAMUSCULAR; INTRAVENOUS; SUBCUTANEOUS at 03:07

## 2024-07-05 RX ADMIN — BACLOFEN 10 MG: 5 TABLET ORAL at 12:07

## 2024-07-05 RX ADMIN — HYDROMORPHONE HYDROCHLORIDE 2 MG: 2 INJECTION INTRAMUSCULAR; INTRAVENOUS; SUBCUTANEOUS at 12:07

## 2024-07-05 RX ADMIN — HYDROMORPHONE HYDROCHLORIDE 2 MG: 2 INJECTION INTRAMUSCULAR; INTRAVENOUS; SUBCUTANEOUS at 01:07

## 2024-07-05 RX ADMIN — Medication 10 ML: at 12:07

## 2024-07-05 RX ADMIN — BACLOFEN 10 MG: 5 TABLET ORAL at 04:07

## 2024-07-05 RX ADMIN — DOCUSATE SODIUM AND SENNOSIDES 1 TABLET: 8.6; 5 TABLET, FILM COATED ORAL at 09:07

## 2024-07-05 RX ADMIN — ERTAPENEM 1 G: 1 INJECTION INTRAMUSCULAR; INTRAVENOUS at 03:07

## 2024-07-05 RX ADMIN — OXACILLIN 2 G: 2 INJECTION, POWDER, FOR SOLUTION INTRAMUSCULAR; INTRAVENOUS at 03:07
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
## 2024-07-05 NOTE — SUBJECTIVE & OBJECTIVE
Interval History: Doing okay. Shoulder is feeling better. Low-grade temps, on and off. Blood cultures sent. Denies symptoms.    Review of Systems   All other systems reviewed and are negative.    Objective:     Vital Signs (Most Recent):  Temp: 98.3 °F (36.8 °C) (07/05/24 0724)  Pulse: 85 (07/05/24 0724)  Resp: 18 (07/05/24 0913)  BP: (!) 142/81 (07/05/24 0724)  SpO2: 96 % (07/05/24 0724) Vital Signs (24h Range):  Temp:  [98 °F (36.7 °C)-99.8 °F (37.7 °C)] 98.3 °F (36.8 °C)  Pulse:  [66-88] 85  Resp:  [16-18] 18  SpO2:  [95 %-99 %] 96 %  BP: (110-142)/(59-81) 142/81     Weight: 68.9 kg (151 lb 14.4 oz)  Body mass index is 29.67 kg/m².    Estimated Creatinine Clearance: 75.3 mL/min (based on SCr of 0.8 mg/dL).     Physical Exam  Vitals and nursing note reviewed.   Constitutional:       Appearance: Normal appearance.   HENT:      Head: Normocephalic.   Cardiovascular:      Rate and Rhythm: Normal rate.      Heart sounds: Murmur heard.   Skin:     Findings: No erythema.   Neurological:      General: No focal deficit present.      Mental Status: She is alert and oriented to person, place, and time.   Psychiatric:         Mood and Affect: Mood normal.         Behavior: Behavior normal.         Thought Content: Thought content normal.          Significant Labs: BMP:   Recent Labs   Lab 07/05/24  0506   *      K 3.9      CO2 26   BUN 9   CREATININE 0.8   CALCIUM 8.1*     CBC:   Recent Labs   Lab 07/04/24  0515 07/05/24  0506   WBC 11.57 12.52   HGB 7.5* 7.5*   HCT 24.6* 24.2*    384     Microbiology Results (last 7 days)       Procedure Component Value Units Date/Time    Aerobic culture [[id number]] Collected: 07/01/24 1435    Order Status: Completed Specimen: Shoulder, Right Updated: 07/05/24 1053     Aerobic Bacterial Culture No growth    Blood culture [[id number]] Collected: 07/02/24 0825    Order Status: Completed Specimen: Blood from Peripheral, Hand, Right Updated: 07/04/24 1212     Blood  Culture, Routine No Growth to date      No Growth to date      No Growth to date    Narrative:      Collection has been rescheduled by [facility] at 07/02/2024 07:49 Reason:   Per pt want phleb to come back after pn med is given nurse Rita aware  Collection has been rescheduled by [facility] at 07/02/2024 08:22 Reason:   Done  Collection has been rescheduled by [facility] at 07/02/2024 07:49 Reason:   Per pt want phleb to come back after pn med is given nurse Rita aware  Collection has been rescheduled by [facility] at 07/02/2024 08:22 Reason:   Done    Blood culture [[id number]] Collected: 07/02/24 0800    Order Status: Completed Specimen: Blood from Peripheral, Hand, Right Updated: 07/04/24 1212     Blood Culture, Routine No Growth to date      No Growth to date      No Growth to date    Narrative:      Collection has been rescheduled by [facility] at 07/02/2024 07:49 Reason:   Per pt want phleb to come back after pn med is given nurse Rita aware  Collection has been rescheduled by [facility] at 07/02/2024 08:22 Reason:   Done  Collection has been rescheduled by [facility] at 07/02/2024 07:49 Reason:   Per pt want phleb to come back after pn med is given nurse Rita aware  Collection has been rescheduled by [facility] at 07/02/2024 08:22 Reason:   Done    Culture, Anaerobe [[id number]] Collected: 07/01/24 1435    Order Status: Completed Specimen: Body Fluid from Shoulder, Right Updated: 07/04/24 0905     Anaerobic Culture Culture in progress    Blood culture [[id number]] Collected: 06/28/24 1040    Order Status: Completed Specimen: Blood from Line, Jugular, Internal Right Updated: 07/03/24 2012     Blood Culture, Routine No growth after 5 days.    Narrative:      OK to draw from central line if necessary    Blood culture [[id number]] Collected: 06/27/24 0851    Order Status: Completed Specimen: Blood Updated: 07/02/24 1212     Blood Culture, Routine No growth after 5 days.    Gram stain [[id number]] Collected: 07/01/24 1435    Order Status: Completed  Specimen: Body Fluid from Shoulder, Right Updated: 07/02/24 0002     Gram Stain Result No WBC's, epithelial cells or organisms seen    Blood culture [[id number]]  (Abnormal)  (Susceptibility) Collected: 06/26/24 0951    Order Status: Completed Specimen: Blood Updated: 06/29/24 1011     Blood Culture, Routine Gram stain aer bottle: Gram positive cocci in clusters resembling Staph      Results called to and read back by: Tasha Avila RN 06/27/2024  09:21      STAPHYLOCOCCUS AUREUS  ID consult required at [facility],[place] and [facility] locations.              Significant Imaging: I have reviewed all pertinent imaging results/findings within the past 24 hours.

## 2024-07-05 NOTE — ASSESSMENT & PLAN NOTE
MRI done, showed severe glenohumeral synovitis and subacromial subdeltoid bursitis, likely septic.  Will consult orthopedic surgeon for possible drainage - aspirated 7/1 - cultures in process - NGTD

## 2024-07-05 NOTE — PLAN OF CARE
VSS on RA and afebrile this shift.  Patient has complain of severe pain on right shoulder and neck. Pain management done as per MAR.  Judith continued. Fall precautions maintained and call light in reach.  POC updated questions answered and comments acknowledged.      Problem: Adult Inpatient Plan of Care  Goal: Plan of Care Review  Outcome: Progressing  Goal: Patient-Specific Goal (Individualized)  Outcome: Progressing  Goal: Absence of Hospital-Acquired Illness or Injury  Outcome: Progressing  Goal: Optimal Comfort and Wellbeing  Outcome: Progressing  Goal: Readiness for Transition of Care  Outcome: Progressing     Problem: Sepsis/Septic Shock  Goal: Optimal Coping  Outcome: Progressing  Goal: Absence of Bleeding  Outcome: Progressing  Goal: Blood Glucose Level Within Targeted Range  Outcome: Progressing  Goal: Absence of Infection Signs and Symptoms  Outcome: Progressing  Goal: Optimal Nutrition Intake  Outcome: Progressing     Problem: Acute Kidney Injury/Impairment  Goal: Fluid and Electrolyte Balance  Outcome: Progressing  Goal: Improved Oral Intake  Outcome: Progressing  Goal: Effective Renal Function  Outcome: Progressing     Problem: Pneumonia  Goal: Fluid Balance  Outcome: Progressing  Goal: Resolution of Infection Signs and Symptoms  Outcome: Progressing  Goal: Effective Oxygenation and Ventilation  Outcome: Progressing     Problem: Infection  Goal: Absence of Infection Signs and Symptoms  Outcome: Progressing     Problem: Wound  Goal: Optimal Coping  Outcome: Progressing  Goal: Optimal Functional Ability  Outcome: Progressing  Goal: Absence of Infection Signs and Symptoms  Outcome: Progressing  Goal: Improved Oral Intake  Outcome: Progressing  Goal: Optimal Pain Control and Function  Outcome: Progressing  Goal: Skin Health and Integrity  Outcome: Progressing  Goal: Optimal Wound Healing  Outcome: Progressing     Problem: Skin Injury Risk Increased  Goal: Skin Health and Integrity  Outcome:  Progressing     Problem: Coping Ineffective  Goal: Effective Coping  Outcome: Progressing     Problem: Fall Injury Risk  Goal: Absence of Fall and Fall-Related Injury  Outcome: Progressing

## 2024-07-05 NOTE — SUBJECTIVE & OBJECTIVE
Interval History: Weaning IV opioids. Discharge planning     Review of Systems   Constitutional:  Negative for chills and fever.   Respiratory:  Negative for cough and shortness of breath.    Cardiovascular:  Negative for chest pain and palpitations.   Musculoskeletal:  Positive for neck pain.        Right shoulder pain     Objective:     Vital Signs (Most Recent):  Temp: 98.2 °F (36.8 °C) (07/05/24 1537)  Pulse: 71 (07/05/24 1537)  Resp: 18 (07/05/24 1537)  BP: 121/68 (07/05/24 1537)  SpO2: 96 % (07/05/24 1537) Vital Signs (24h Range):  Temp:  [98.2 °F (36.8 °C)-99.8 °F (37.7 °C)] 98.2 °F (36.8 °C)  Pulse:  [67-88] 71  Resp:  [17-18] 18  SpO2:  [95 %-99 %] 96 %  BP: (110-142)/(59-81) 121/68     Weight: 68.9 kg (151 lb 14.4 oz)  Body mass index is 29.67 kg/m².    Intake/Output Summary (Last 24 hours) at 7/5/2024 1619  Last data filed at 7/5/2024 1553  Gross per 24 hour   Intake 1902.81 ml   Output 2250 ml   Net -347.19 ml         Physical Exam  Constitutional:       General: She is not in acute distress.  Pulmonary:      Effort: No respiratory distress.   Abdominal:      General: There is no distension.      Tenderness: There is no abdominal tenderness.   Musculoskeletal:      Right lower leg: No edema.      Left lower leg: No edema.      Comments: Right ant shoulder pain with limited ROM  Limited ROM in neck but reports has improved from before    Neurological:      General: No focal deficit present.      Mental Status: She is oriented to person, place, and time.             Significant Labs: All pertinent labs within the past 24 hours have been reviewed.    Significant Imaging: I have reviewed all pertinent imaging results/findings within the past 24 hours.

## 2024-07-05 NOTE — ASSESSMENT & PLAN NOTE
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  Prevertebral soft tissue thickening.  Recommended MRI of the cervical spine to evaluate for prevertebral edema.  Mild to moderate spondylitic changes.  -MRI done, showed a thin layer of prevertebral edema in front of upper cervical levels, cervical spine DJD, no abscess.   -at admission remained hypotensive despite fluid resuscitation and required vasopressor support; otherwise HDS and afebrile  -admission labs:  -CBC with WBC 34, hemoglobin 8.3, hematocrit 26, platelets 119.  Chemistry was , K 3.5, chloride 97, CO2 18, BUN 50, SCr 3.1, glucose 133.  Magnesium 1.9.  ; AST, ALT, T bili unremarkable.  Lactic acid 3.37 >> 1.2.  Procalcitonin 78.33.  ESR > 120, .  PH 7.419, pCO2 32.2, PO2 34, HC03 21, be negative for.  .  Troponin 0.23.  Flu and COVID negative.  HIV negative.   -admission imaging:               -CXR with findings that may reflect multifocal infectious process.  Right shoulder x-ray without acute pathology.  Left wrist x-ray without acute pathology.  CT head and CT C-spine noted no acute cervical fracture.  Prevertebral cervical soft tissue thickening.  Recommend MRI of the cervical spine to evaluate for prevertebral edema.  Mild to moderate spondylitic changes.  -admission micro:   -blood cultures MSSA positive, also gram positive rods (likely  contaminant but will follow)  -Weaned off pressors   -continue IV fluid hydration   -continue oxacillin per ID - might add a carbapenem for synergy if blood cultures remain positive  -Wound Care consulted   -pulmonary toileting with incentive spirometer and flutter valve as tolerated   -oxygen supplementation as needed  -continue tele monitoring   -EKG PRN concerns  -trend labs, address/replete electrolytes as indicated  -Daily blood cultures ordered until she clears bacteremia  -ID consulted  -TTE done - found vegetation on the tricuspid valve.  -Indium scan surprisingly negative given known endocarditis and septic emboli to the lungs.-MRI positive for septic right shoulder - will consult orthopedic surgeon - aspirated 7/1 - aerobic cx neg, anaerobic cultures in process.    - 7/2 repeated blood cx for fevers - NGTD   - abx plan per ID - tentatively, planning on a two week course of double coverage (oxacillin and ertapenem) followed by an additional 4-6 weeks of oxacillin

## 2024-07-05 NOTE — PLAN OF CARE
SW sent an email to [employer] to determine if a switch in coverage has been updated. SW awaiting reply.

## 2024-07-05 NOTE — ASSESSMENT & PLAN NOTE
48 yo woman with history of untreated MSSA bacteremia, now readmitted with bacteremia and sepsis related to IDU, and diagnosed with TVE and septic embolization  - IE with septic pulmonary emboli and likely prevertebral involvement with C4-C5 osteo-diskitis, septic shoulder joint  - Initial cultures grew MSSA and a non-viable GPR (Contaminant?) but repeat culture is only MSSA  - clearing on oxacillin and ertapenem  - tentatively, planning on a two week course of double coverage (oxacillin and ertapenem) followed by an additional 4-6 weeks of oxacillin    EOC oxacillin 08/07/24  EOC Invanz 07/10/24    Reference  Samuel et al. Oxacillin plus ertapenem combination therapy leads to rapid blood culture clearance and positive outcomes among patients with persistent MSSA bacteraemia: a case series. DARRIN Antimicrob Resist. 2021 Sep; 3(3): [id number].

## 2024-07-05 NOTE — PROGRESS NOTES
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 Oxacillin plus ertapenem combination therapy leads to rapid blood culture clearance and positive outcomes among patients with persistent MSSA bacteraemia: a case series. DARRIN Antimicrob Resist. 2021 Sep; 3(3): [id number].      Jean Carlos Payne MD  Infectious Disease  [facility] ([place])    Subjective:     Principal Problem:Infective endocarditis of tricuspid valve    HPI: Ms. Wiseman is a 49 year old woman with homelessness and IDU, admitted with sepsis and bacteremia. She has a PMHx significant for IVDU, PAWAN, second degree heart block who presents for evaluation of fatigue, which has persisted for the last several days. She notes an associated generalized body aches and pains. Pt with recent admission for severe sepsis with MSSA bacteremia in April 2024. [place] EMS [place]. Reports continued IV drug use. She was admitted and started on IV abx. CT c/w cervical infection?  ID is  consulted for sepsis and bacteremia.    Today, the patient complaints of neck pain.    Blood cultures: MSSA (BCID) and GPR  Interval History: Doing okay. Shoulder is feeling better. Low-grade temps, on and off. Blood cultures sent. Denies symptoms.    Review of Systems   All other systems reviewed and are negative.    Objective:     Vital Signs (Most Recent):  Temp: 98.3 °F (36.8 °C) (07/05/24 0724)  Pulse: 85 (07/05/24 0724)  Resp: 18 (07/05/24 0913)  BP: (!) 142/81 (07/05/24 0724)  SpO2: 96 % (07/05/24 0724) Vital Signs (24h Range):  Temp:  [98 °F (36.7 °C)-99.8 °F (37.7 °C)] 98.3 °F (36.8 °C)  Pulse:  [66-88] 85  Resp:  [16-18] 18  SpO2:  [95 %-99 %] 96 %  BP: (110-142)/(59-81) 142/81     Weight: 68.9 kg (151 lb 14.4 oz)  Body mass index is 29.67 kg/m².    Estimated Creatinine Clearance: 75.3 mL/min (based on SCr of 0.8 mg/dL).     Physical Exam  Vitals and nursing note reviewed.   Constitutional:       Appearance: Normal appearance.   HENT:      Head: Normocephalic.   Cardiovascular:      Rate and Rhythm: Normal rate.      Heart sounds: Murmur heard.   Skin:     Findings: No erythema.   Neurological:      General: No focal deficit present.      Mental Status: She is alert and oriented to person, place, and time.   Psychiatric:         Mood and Affect: Mood normal.         Behavior: Behavior normal.         Thought Content: Thought content normal.          Significant Labs: BMP:   Recent Labs   Lab 07/05/24  0506   *      K 3.9      CO2 26   BUN 9   CREATININE 0.8   CALCIUM 8.1*     CBC:   Recent Labs   Lab 07/04/24  0515 07/05/24  0506   WBC 11.57 12.52   HGB 7.5* 7.5*   HCT 24.6* 24.2*    384     Microbiology Results (last 7 days)       Procedure Component Value Units Date/Time    Aerobic culture [[id number]] Collected: 07/01/24 1435    Order Status: Completed Specimen: Shoulder, Right Updated: 07/05/24 1053     Aerobic Bacterial Culture No growth    Blood culture [[id number]]  Collected: 07/02/24 0825    Order Status: Completed Specimen: Blood from Peripheral, Hand, Right Updated: 07/04/24 1212     Blood Culture, Routine No Growth to date      No Growth to date      No Growth to date    Narrative:      Collection has been rescheduled by [facility] at 07/02/2024 07:49 Reason:   Per pt want phleb to come back after pn med is given nurse Rita aware  Collection has been rescheduled by [facility] at 07/02/2024 08:22 Reason:   Done  Collection has been rescheduled by [facility] at 07/02/2024 07:49 Reason:   Per pt want phleb to come back after pn med is given nurse Rita aware  Collection has been rescheduled by [facility] at 07/02/2024 08:22 Reason:   Done    Blood culture [[id number]] Collected: 07/02/24 0800    Order Status: Completed Specimen: Blood from Peripheral, Hand, Right Updated: 07/04/24 1212     Blood Culture, Routine No Growth to date      No Growth to date      No Growth to date    Narrative:      Collection has been rescheduled by [facility] at 07/02/2024 07:49 Reason:   Per pt want phleb to come back after pn med is given nurse Rita aware  Collection has been rescheduled by [facility] at 07/02/2024 08:22 Reason:   Done  Collection has been rescheduled by [facility] at 07/02/2024 07:49 Reason:   Per pt want phleb to come back after pn med is given nurse Rita aware  Collection has been rescheduled by [facility] at 07/02/2024 08:22 Reason:   Done    Culture, Anaerobe [[id number]] Collected: 07/01/24 1435    Order Status: Completed Specimen: Body Fluid from Shoulder, Right Updated: 07/04/24 0905     Anaerobic Culture Culture in progress    Blood culture [[id number]] Collected: 06/28/24 1040    Order Status: Completed Specimen: Blood from Line, Jugular, Internal Right Updated: 07/03/24 2012     Blood Culture, Routine No growth after 5 days.    Narrative:      OK to draw from central line if necessary    Blood culture [[id number]] Collected: 06/27/24 0851    Order Status: Completed Specimen: Blood Updated: 07/02/24 1212      Blood Culture, Routine No growth after 5 days.    Gram stain [[id number]] Collected: 07/01/24 1435    Order Status: Completed Specimen: Body Fluid from Shoulder, Right Updated: 07/02/24 0002     Gram Stain Result No WBC's, epithelial cells or organisms seen    Blood culture [[id number]]  (Abnormal)  (Susceptibility) Collected: 06/26/24 0951    Order Status: Completed Specimen: Blood Updated: 06/29/24 1011     Blood Culture, Routine Gram stain aer bottle: Gram positive cocci in clusters resembling Staph      Results called to and read back by: Tasha Avila RN 06/27/2024  09:21      STAPHYLOCOCCUS AUREUS  ID consult required at [facility].[place] and [place] locations.              Significant Imaging: I have reviewed all pertinent imaging results/findings within the past 24 hours.

## 2024-07-05 NOTE — PLAN OF CARE
Lexie ([facility]) reports patient doesn't have LTAC benefits - spoke with patient who's unsure why insurance changed from [employer] - provided # [phone] for patient to contact insurance & request change back to previous plan - will continue to follow

## 2024-07-06 LAB
ABO + RH BLD: NORMAL
BASOPHILS # BLD AUTO: 0.03 K/UL (ref 0–0.2)
BASOPHILS NFR BLD: 0.3 % (ref 0–1.9)
BLD GP AB SCN CELLS X3 SERPL QL: NORMAL
DIFFERENTIAL METHOD BLD: ABNORMAL
EOSINOPHIL # BLD AUTO: 0.1 K/UL (ref 0–0.5)
EOSINOPHIL NFR BLD: 0.9 % (ref 0–8)
ERYTHROCYTE [DISTWIDTH] IN BLOOD BY AUTOMATED COUNT: 18.4 % (ref 11.5–14.5)
HCT VFR BLD AUTO: 22.5 % (ref 37–48.5)
HGB BLD-MCNC: 7 G/DL (ref 12–16)
IMM GRANULOCYTES # BLD AUTO: 0.05 K/UL (ref 0–0.04)
IMM GRANULOCYTES NFR BLD AUTO: 0.6 % (ref 0–0.5)
LYMPHOCYTES # BLD AUTO: 2.1 K/UL (ref 1–4.8)
LYMPHOCYTES NFR BLD: 23.5 % (ref 18–48)
MCH RBC QN AUTO: 25.2 PG (ref 27–31)
MCHC RBC AUTO-ENTMCNC: 31.1 G/DL (ref 32–36)
MCV RBC AUTO: 81 FL (ref 82–98)
MONOCYTES # BLD AUTO: 0.4 K/UL (ref 0.3–1)
MONOCYTES NFR BLD: 4.8 % (ref 4–15)
NEUTROPHILS # BLD AUTO: 6.2 K/UL (ref 1.8–7.7)
NEUTROPHILS NFR BLD: 69.9 % (ref 38–73)
NRBC BLD-RTO: 0 /100 WBC
PLATELET # BLD AUTO: 400 K/UL (ref 150–450)
PMV BLD AUTO: 10.7 FL (ref 9.2–12.9)
RBC # BLD AUTO: 2.78 M/UL (ref 4–5.4)
SPECIMEN OUTDATE: NORMAL
WBC # BLD AUTO: 8.84 K/UL (ref 3.9–12.7)

## 2024-07-06 PROCEDURE — 25000003 PHARM REV CODE 250: Performed by: HOSPITALIST

## 2024-07-06 PROCEDURE — 11000001 HC ACUTE MED/SURG PRIVATE ROOM

## 2024-07-06 PROCEDURE — A4216 STERILE WATER/SALINE, 10 ML: HCPCS | Performed by: STUDENT IN AN ORGANIZED HEALTH CARE EDUCATION/TRAINING PROGRAM

## 2024-07-06 PROCEDURE — 63600175 PHARM REV CODE 636 W HCPCS: Performed by: INTERNAL MEDICINE

## 2024-07-06 PROCEDURE — 94761 N-INVAS EAR/PLS OXIMETRY MLT: CPT

## 2024-07-06 PROCEDURE — 25000003 PHARM REV CODE 250: Performed by: STUDENT IN AN ORGANIZED HEALTH CARE EDUCATION/TRAINING PROGRAM

## 2024-07-06 PROCEDURE — 86901 BLOOD TYPING SEROLOGIC RH(D): CPT | Performed by: PHYSICIAN ASSISTANT

## 2024-07-06 PROCEDURE — 99900035 HC TECH TIME PER 15 MIN (STAT)

## 2024-07-06 PROCEDURE — 63600175 PHARM REV CODE 636 W HCPCS: Performed by: STUDENT IN AN ORGANIZED HEALTH CARE EDUCATION/TRAINING PROGRAM

## 2024-07-06 PROCEDURE — 63600175 PHARM REV CODE 636 W HCPCS: Performed by: HOSPITALIST

## 2024-07-06 PROCEDURE — 25000003 PHARM REV CODE 250: Performed by: INTERNAL MEDICINE

## 2024-07-06 PROCEDURE — 86850 RBC ANTIBODY SCREEN: CPT | Performed by: PHYSICIAN ASSISTANT

## 2024-07-06 PROCEDURE — S4991 NICOTINE PATCH NONLEGEND: HCPCS | Performed by: HOSPITALIST

## 2024-07-06 PROCEDURE — 85025 COMPLETE CBC W/AUTO DIFF WBC: CPT | Performed by: HOSPITALIST

## 2024-07-06 RX ORDER — HYDROMORPHONE HYDROCHLORIDE 2 MG/ML
2 INJECTION, SOLUTION INTRAMUSCULAR; INTRAVENOUS; SUBCUTANEOUS EVERY 8 HOURS PRN
Status: DISCONTINUED | OUTPATIENT
Start: 2024-07-06 | End: 2024-07-07

## 2024-07-06 RX ORDER — HYDROCODONE BITARTRATE AND ACETAMINOPHEN 5; 325 MG/1; MG/1
2 TABLET ORAL EVERY 4 HOURS PRN
Status: DISCONTINUED | OUTPATIENT
Start: 2024-07-06 | End: 2024-07-09 | Stop reason: HOSPADM

## 2024-07-06 RX ADMIN — DOCUSATE SODIUM AND SENNOSIDES 1 TABLET: 8.6; 5 TABLET, FILM COATED ORAL at 08:07

## 2024-07-06 RX ADMIN — BACLOFEN 10 MG: 5 TABLET ORAL at 09:07

## 2024-07-06 RX ADMIN — LACTOBACILLUS ACIDOPHILUS / LACTOBACILLUS BULGARICUS 1 EACH: 100 MILLION CFU STRENGTH GRANULES at 09:07

## 2024-07-06 RX ADMIN — HYDROCODONE BITARTRATE AND ACETAMINOPHEN 2 TABLET: 5; 325 TABLET ORAL at 09:07

## 2024-07-06 RX ADMIN — Medication 10 ML: at 05:07

## 2024-07-06 RX ADMIN — BACLOFEN 10 MG: 5 TABLET ORAL at 01:07

## 2024-07-06 RX ADMIN — Medication 10 ML: at 06:07

## 2024-07-06 RX ADMIN — ERTAPENEM 1 G: 1 INJECTION INTRAMUSCULAR; INTRAVENOUS at 03:07

## 2024-07-06 RX ADMIN — HYDROMORPHONE HYDROCHLORIDE 2 MG: 2 INJECTION INTRAMUSCULAR; INTRAVENOUS; SUBCUTANEOUS at 01:07

## 2024-07-06 RX ADMIN — Medication 10 ML: at 01:07

## 2024-07-06 RX ADMIN — HYDROCODONE BITARTRATE AND ACETAMINOPHEN 2 TABLET: 5; 325 TABLET ORAL at 05:07

## 2024-07-06 RX ADMIN — BACLOFEN 10 MG: 5 TABLET ORAL at 05:07

## 2024-07-06 RX ADMIN — OXACILLIN 2 G: 2 INJECTION, POWDER, FOR SOLUTION INTRAMUSCULAR; INTRAVENOUS at 05:07

## 2024-07-06 RX ADMIN — OXACILLIN 2 G: 2 INJECTION, POWDER, FOR SOLUTION INTRAMUSCULAR; INTRAVENOUS at 10:07

## 2024-07-06 RX ADMIN — MICONAZOLE NITRATE: 20 POWDER TOPICAL at 09:07

## 2024-07-06 RX ADMIN — HYDROMORPHONE HYDROCHLORIDE 2 MG: 2 INJECTION INTRAMUSCULAR; INTRAVENOUS; SUBCUTANEOUS at 10:07

## 2024-07-06 RX ADMIN — HYDROMORPHONE HYDROCHLORIDE 2 MG: 2 INJECTION INTRAMUSCULAR; INTRAVENOUS; SUBCUTANEOUS at 05:07

## 2024-07-06 RX ADMIN — ENOXAPARIN SODIUM 40 MG: 40 INJECTION SUBCUTANEOUS at 05:07

## 2024-07-06 RX ADMIN — OXACILLIN 2 G: 2 INJECTION, POWDER, FOR SOLUTION INTRAMUSCULAR; INTRAVENOUS at 01:07

## 2024-07-06 RX ADMIN — LIDOCAINE 1 PATCH: 50 PATCH CUTANEOUS at 06:07

## 2024-07-06 RX ADMIN — NICOTINE 1 PATCH: 14 PATCH TRANSDERMAL at 09:07

## 2024-07-06 RX ADMIN — BACLOFEN 10 MG: 5 TABLET ORAL at 08:07

## 2024-07-06 RX ADMIN — AMMONIUM LACTATE: 120 LOTION TOPICAL at 09:07

## 2024-07-06 RX ADMIN — AMMONIUM LACTATE: 120 LOTION TOPICAL at 08:07

## 2024-07-06 RX ADMIN — DOCUSATE SODIUM AND SENNOSIDES 1 TABLET: 8.6; 5 TABLET, FILM COATED ORAL at 09:07

## 2024-07-06 RX ADMIN — Medication 10 ML: at 12:07

## 2024-07-06 RX ADMIN — OXACILLIN 2 G: 2 INJECTION, POWDER, FOR SOLUTION INTRAMUSCULAR; INTRAVENOUS at 09:07

## 2024-07-06 RX ADMIN — OXACILLIN 2 G: 2 INJECTION, POWDER, FOR SOLUTION INTRAMUSCULAR; INTRAVENOUS at 02:07

## 2024-07-06 RX ADMIN — MICONAZOLE NITRATE: 20 POWDER TOPICAL at 08:07

## 2024-07-06 NOTE — SUBJECTIVE & OBJECTIVE
Interval History: Hb 7 today. No overt signs of bleeding. Will monitor and transfuse based on tomorrows labs. Discharge planning     Review of Systems   Constitutional:  Negative for chills and fever.   Respiratory:  Negative for cough and shortness of breath.    Cardiovascular:  Negative for chest pain and palpitations.   Musculoskeletal:  Positive for neck pain.        Right shoulder pain     Objective:     Vital Signs (Most Recent):  Temp: 98.4 °F (36.9 °C) (07/06/24 0833)  Pulse: 71 (07/06/24 0833)  Resp: 18 (07/06/24 0954)  BP: 139/74 (07/06/24 0833)  SpO2: 97 % (07/06/24 0833) Vital Signs (24h Range):  Temp:  [98.2 °F (36.8 °C)-98.5 °F (36.9 °C)] 98.4 °F (36.9 °C)  Pulse:  [67-75] 71  Resp:  [16-18] 18  SpO2:  [96 %-98 %] 97 %  BP: (113-139)/(68-77) 139/74     Weight: 68.9 kg (151 lb 14.4 oz)  Body mass index is 29.67 kg/m².    Intake/Output Summary (Last 24 hours) at 7/6/2024 1030  Last data filed at 7/6/2024 0454  Gross per 24 hour   Intake 1742.81 ml   Output 2250 ml   Net -507.19 ml         Physical Exam  Constitutional:       General: She is not in acute distress.  Pulmonary:      Effort: No respiratory distress.   Abdominal:      General: There is no distension.      Tenderness: There is no abdominal tenderness.   Musculoskeletal:      Right lower leg: No edema.      Left lower leg: No edema.      Comments: Right ant shoulder pain with limited ROM  Limited ROM in neck but reports has improved from before    Neurological:      General: No focal deficit present.      Mental Status: She is oriented to person, place, and time.             Significant Labs: All pertinent labs within the past 24 hours have been reviewed.    Significant Imaging: I have reviewed all pertinent imaging results/findings within the past 24 hours.

## 2024-07-06 NOTE — ASSESSMENT & PLAN NOTE
-etiology likely due to combination of acute and chronic disease/malnutrition, has had normal iron stores and low TIBC, iron saturation, transferrin  -at admission H/H 8.3/25, platelets 133.  Platelets have now normalized but Hb trended down.  -baseline appears 8-9/25-34; 118-300  -no overt s/s of bleeding  -trend labs over course and transfuse as indicated.  - holding iron in setting of acute infection  - 7/2 Hb 6.7 - 1 unit transfused   - 7/6 - Hb 7 today. No overt signs of bleeding. Will monitor and transfuse based on tomorrows labs.

## 2024-07-06 NOTE — PLAN OF CARE
Uneventful shift. POC reviewed with patient. AAOx4. stable on room air. VSS. Physical assessment per flowsheets.  Complaints of pain treated with PRN pain medication according to MAR. Received iv abx according to MAR. No other complaints verbalized during shift.  No injuries, falls, or trauma occurred during shift. Purposeful rounding completed. Bed low and locked with side rails up x 3 and call light within reach. Safety maintained throughout shift.     Problem: Adult Inpatient Plan of Care  Goal: Plan of Care Review  Outcome: Progressing  Goal: Patient-Specific Goal (Individualized)  Outcome: Progressing  Goal: Absence of Hospital-Acquired Illness or Injury  Outcome: Progressing  Goal: Optimal Comfort and Wellbeing  Outcome: Progressing  Goal: Readiness for Transition of Care  Outcome: Progressing     Problem: Sepsis/Septic Shock  Goal: Optimal Coping  Outcome: Progressing  Goal: Absence of Bleeding  Outcome: Progressing  Goal: Blood Glucose Level Within Targeted Range  Outcome: Progressing  Goal: Absence of Infection Signs and Symptoms  Outcome: Progressing  Goal: Optimal Nutrition Intake  Outcome: Progressing     Problem: Sepsis/Septic Shock  Goal: Absence of Infection Signs and Symptoms  Outcome: Progressing
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
## 2024-07-07 LAB
BACTERIA BLD CULT: NORMAL
BACTERIA BLD CULT: NORMAL
BASOPHILS # BLD AUTO: 0.05 K/UL (ref 0–0.2)
BASOPHILS NFR BLD: 0.6 % (ref 0–1.9)
DIFFERENTIAL METHOD BLD: ABNORMAL
EOSINOPHIL # BLD AUTO: 0.1 K/UL (ref 0–0.5)
EOSINOPHIL NFR BLD: 1.3 % (ref 0–8)
ERYTHROCYTE [DISTWIDTH] IN BLOOD BY AUTOMATED COUNT: 18.7 % (ref 11.5–14.5)
HCT VFR BLD AUTO: 25 % (ref 37–48.5)
HGB BLD-MCNC: 7.5 G/DL (ref 12–16)
IMM GRANULOCYTES # BLD AUTO: 0.03 K/UL (ref 0–0.04)
IMM GRANULOCYTES NFR BLD AUTO: 0.4 % (ref 0–0.5)
LYMPHOCYTES # BLD AUTO: 2.3 K/UL (ref 1–4.8)
LYMPHOCYTES NFR BLD: 29.1 % (ref 18–48)
MCH RBC QN AUTO: 24.4 PG (ref 27–31)
MCHC RBC AUTO-ENTMCNC: 30 G/DL (ref 32–36)
MCV RBC AUTO: 81 FL (ref 82–98)
MONOCYTES # BLD AUTO: 0.4 K/UL (ref 0.3–1)
MONOCYTES NFR BLD: 4.9 % (ref 4–15)
NEUTROPHILS # BLD AUTO: 4.9 K/UL (ref 1.8–7.7)
NEUTROPHILS NFR BLD: 63.7 % (ref 38–73)
NRBC BLD-RTO: 0 /100 WBC
PLATELET # BLD AUTO: 403 K/UL (ref 150–450)
PMV BLD AUTO: 9.9 FL (ref 9.2–12.9)
RBC # BLD AUTO: 3.07 M/UL (ref 4–5.4)
WBC # BLD AUTO: 7.73 K/UL (ref 3.9–12.7)

## 2024-07-07 PROCEDURE — 25000003 PHARM REV CODE 250: Performed by: HOSPITALIST

## 2024-07-07 PROCEDURE — 63600175 PHARM REV CODE 636 W HCPCS: Performed by: STUDENT IN AN ORGANIZED HEALTH CARE EDUCATION/TRAINING PROGRAM

## 2024-07-07 PROCEDURE — 63600175 PHARM REV CODE 636 W HCPCS: Performed by: HOSPITALIST

## 2024-07-07 PROCEDURE — 11000001 HC ACUTE MED/SURG PRIVATE ROOM

## 2024-07-07 PROCEDURE — 85025 COMPLETE CBC W/AUTO DIFF WBC: CPT | Performed by: HOSPITALIST

## 2024-07-07 PROCEDURE — 63600175 PHARM REV CODE 636 W HCPCS: Performed by: INTERNAL MEDICINE

## 2024-07-07 PROCEDURE — S4991 NICOTINE PATCH NONLEGEND: HCPCS | Performed by: HOSPITALIST

## 2024-07-07 PROCEDURE — 25000003 PHARM REV CODE 250: Performed by: STUDENT IN AN ORGANIZED HEALTH CARE EDUCATION/TRAINING PROGRAM

## 2024-07-07 PROCEDURE — 25000003 PHARM REV CODE 250: Performed by: INTERNAL MEDICINE

## 2024-07-07 PROCEDURE — 94761 N-INVAS EAR/PLS OXIMETRY MLT: CPT

## 2024-07-07 PROCEDURE — 99900035 HC TECH TIME PER 15 MIN (STAT)

## 2024-07-07 PROCEDURE — 94799 UNLISTED PULMONARY SVC/PX: CPT

## 2024-07-07 PROCEDURE — A4216 STERILE WATER/SALINE, 10 ML: HCPCS | Performed by: STUDENT IN AN ORGANIZED HEALTH CARE EDUCATION/TRAINING PROGRAM

## 2024-07-07 RX ORDER — HYDROMORPHONE HYDROCHLORIDE 2 MG/ML
2 INJECTION, SOLUTION INTRAMUSCULAR; INTRAVENOUS; SUBCUTANEOUS EVERY 12 HOURS PRN
Status: DISCONTINUED | OUTPATIENT
Start: 2024-07-07 | End: 2024-07-08

## 2024-07-07 RX ADMIN — HYDROCODONE BITARTRATE AND ACETAMINOPHEN 2 TABLET: 5; 325 TABLET ORAL at 06:07

## 2024-07-07 RX ADMIN — OXACILLIN 2 G: 2 INJECTION, POWDER, FOR SOLUTION INTRAMUSCULAR; INTRAVENOUS at 10:07

## 2024-07-07 RX ADMIN — OXACILLIN 2 G: 2 INJECTION, POWDER, FOR SOLUTION INTRAMUSCULAR; INTRAVENOUS at 06:07

## 2024-07-07 RX ADMIN — DOCUSATE SODIUM AND SENNOSIDES 1 TABLET: 8.6; 5 TABLET, FILM COATED ORAL at 08:07

## 2024-07-07 RX ADMIN — MICONAZOLE NITRATE: 20 POWDER TOPICAL at 08:07

## 2024-07-07 RX ADMIN — OXACILLIN 2 G: 2 INJECTION, POWDER, FOR SOLUTION INTRAMUSCULAR; INTRAVENOUS at 11:07

## 2024-07-07 RX ADMIN — HYDROCODONE BITARTRATE AND ACETAMINOPHEN 2 TABLET: 5; 325 TABLET ORAL at 01:07

## 2024-07-07 RX ADMIN — AMMONIUM LACTATE: 120 LOTION TOPICAL at 08:07

## 2024-07-07 RX ADMIN — BACLOFEN 10 MG: 5 TABLET ORAL at 01:07

## 2024-07-07 RX ADMIN — ERTAPENEM 1 G: 1 INJECTION INTRAMUSCULAR; INTRAVENOUS at 03:07

## 2024-07-07 RX ADMIN — OXACILLIN 2 G: 2 INJECTION, POWDER, FOR SOLUTION INTRAMUSCULAR; INTRAVENOUS at 01:07

## 2024-07-07 RX ADMIN — BACLOFEN 10 MG: 5 TABLET ORAL at 08:07

## 2024-07-07 RX ADMIN — HYDROCODONE BITARTRATE AND ACETAMINOPHEN 2 TABLET: 5; 325 TABLET ORAL at 03:07

## 2024-07-07 RX ADMIN — LACTOBACILLUS ACIDOPHILUS / LACTOBACILLUS BULGARICUS 1 EACH: 100 MILLION CFU STRENGTH GRANULES at 08:07

## 2024-07-07 RX ADMIN — Medication 10 ML: at 01:07

## 2024-07-07 RX ADMIN — LIDOCAINE 1 PATCH: 50 PATCH CUTANEOUS at 06:07

## 2024-07-07 RX ADMIN — NICOTINE 1 PATCH: 14 PATCH TRANSDERMAL at 10:07

## 2024-07-07 RX ADMIN — OXACILLIN 2 G: 2 INJECTION, POWDER, FOR SOLUTION INTRAMUSCULAR; INTRAVENOUS at 02:07

## 2024-07-07 RX ADMIN — BACLOFEN 10 MG: 5 TABLET ORAL at 05:07

## 2024-07-07 RX ADMIN — ENOXAPARIN SODIUM 40 MG: 40 INJECTION SUBCUTANEOUS at 05:07

## 2024-07-07 RX ADMIN — HYDROMORPHONE HYDROCHLORIDE 2 MG: 2 INJECTION INTRAMUSCULAR; INTRAVENOUS; SUBCUTANEOUS at 08:07

## 2024-07-07 RX ADMIN — Medication 10 ML: at 11:07

## 2024-07-07 RX ADMIN — Medication 10 ML: at 07:07

## 2024-07-07 NOTE — PLAN OF CARE
Pt AAOx4.  Purewick in place and draining. PICC line in place, blood return noted, dated. Antibiotics given Q 4 hrs. Vital signs WNL.  Pain meds given per MAR. Bed in lowest position, wheels locked, call light within reach.     Problem: Adult Inpatient Plan of Care  Goal: Plan of Care Review  Outcome: Progressing  Goal: Patient-Specific Goal (Individualized)  Outcome: Progressing  Goal: Absence of Hospital-Acquired Illness or Injury  Outcome: Progressing  Goal: Optimal Comfort and Wellbeing  Outcome: Progressing  Goal: Readiness for Transition of Care  Outcome: Progressing     Problem: Sepsis/Septic Shock  Goal: Optimal Coping  Outcome: Progressing  Goal: Absence of Bleeding  Outcome: Progressing  Goal: Blood Glucose Level Within Targeted Range  Outcome: Progressing  Goal: Absence of Infection Signs and Symptoms  Outcome: Progressing  Goal: Optimal Nutrition Intake  Outcome: Progressing     Problem: Acute Kidney Injury/Impairment  Goal: Fluid and Electrolyte Balance  Outcome: Progressing  Goal: Improved Oral Intake  Outcome: Progressing  Goal: Effective Renal Function  Outcome: Progressing     Problem: Pneumonia  Goal: Fluid Balance  Outcome: Progressing  Goal: Resolution of Infection Signs and Symptoms  Outcome: Progressing  Goal: Effective Oxygenation and Ventilation  Outcome: Progressing     Problem: Infection  Goal: Absence of Infection Signs and Symptoms  Outcome: Progressing     Problem: Wound  Goal: Optimal Coping  Outcome: Progressing  Goal: Optimal Functional Ability  Outcome: Progressing  Goal: Absence of Infection Signs and Symptoms  Outcome: Progressing  Goal: Improved Oral Intake  Outcome: Progressing  Goal: Optimal Pain Control and Function  Outcome: Progressing  Goal: Skin Health and Integrity  Outcome: Progressing  Goal: Optimal Wound Healing  Outcome: Progressing     Problem: Skin Injury Risk Increased  Goal: Skin Health and Integrity  Outcome: Progressing     Problem: Coping Ineffective  Goal:  Effective Coping  Outcome: Progressing     Problem: Fall Injury Risk  Goal: Absence of Fall and Fall-Related Injury  Outcome: Progressing
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
## 2024-07-07 NOTE — SUBJECTIVE & OBJECTIVE
Interval History: Hb 7.5 today. No overt signs of bleeding. Weaning IV opioids. Discharge planning     Review of Systems   Constitutional:  Negative for chills and fever.   Respiratory:  Negative for cough and shortness of breath.    Cardiovascular:  Negative for chest pain and palpitations.   Musculoskeletal:  Positive for neck pain.        Right shoulder pain     Objective:     Vital Signs (Most Recent):  Temp: 97 °F (36.1 °C) (07/07/24 0842)  Pulse: 70 (07/07/24 0842)  Resp: 18 (07/07/24 0843)  BP: 125/69 (07/07/24 0842)  SpO2: 98 % (07/07/24 0842) Vital Signs (24h Range):  Temp:  [97 °F (36.1 °C)-98.4 °F (36.9 °C)] 97 °F (36.1 °C)  Pulse:  [65-87] 70  Resp:  [14-20] 18  SpO2:  [93 %-98 %] 98 %  BP: (113-132)/(56-69) 125/69     Weight: 68.9 kg (151 lb 14.4 oz)  Body mass index is 29.67 kg/m².    Intake/Output Summary (Last 24 hours) at 7/7/2024 1125  Last data filed at 7/7/2024 0746  Gross per 24 hour   Intake 240 ml   Output 1100 ml   Net -860 ml         Physical Exam  Constitutional:       General: She is not in acute distress.  Pulmonary:      Effort: No respiratory distress.   Abdominal:      General: There is no distension.      Tenderness: There is no abdominal tenderness.   Musculoskeletal:      Right lower leg: No edema.      Left lower leg: No edema.      Comments: Right ant shoulder pain with limited ROM  Limited ROM in neck but reports has improved from before    Neurological:      General: No focal deficit present.      Mental Status: She is oriented to person, place, and time.             Significant Labs: All pertinent labs within the past 24 hours have been reviewed.    Significant Imaging: I have reviewed all pertinent imaging results/findings within the past 24 hours.

## 2024-07-07 NOTE — PLAN OF CARE
VSS on RA and afebrile this shift.  Pain management done as per MAR.    Judith continued. Fall precautions maintained and call light in reach.  POC updated questions answered and comments acknowledged.      Problem: Adult Inpatient Plan of Care  Goal: Plan of Care Review  Outcome: Progressing  Goal: Patient-Specific Goal (Individualized)  Outcome: Progressing  Goal: Absence of Hospital-Acquired Illness or Injury  Outcome: Progressing  Goal: Optimal Comfort and Wellbeing  Outcome: Progressing  Goal: Readiness for Transition of Care  Outcome: Progressing     Problem: Sepsis/Septic Shock  Goal: Optimal Coping  Outcome: Progressing  Goal: Absence of Bleeding  Outcome: Progressing  Goal: Blood Glucose Level Within Targeted Range  Outcome: Progressing  Goal: Absence of Infection Signs and Symptoms  Outcome: Progressing  Goal: Optimal Nutrition Intake  Outcome: Progressing     Problem: Acute Kidney Injury/Impairment  Goal: Fluid and Electrolyte Balance  Outcome: Progressing  Goal: Improved Oral Intake  Outcome: Progressing  Goal: Effective Renal Function  Outcome: Progressing     Problem: Pneumonia  Goal: Fluid Balance  Outcome: Progressing  Goal: Resolution of Infection Signs and Symptoms  Outcome: Progressing  Goal: Effective Oxygenation and Ventilation  Outcome: Progressing     Problem: Infection  Goal: Absence of Infection Signs and Symptoms  Outcome: Progressing     Problem: Wound  Goal: Optimal Coping  Outcome: Progressing  Goal: Optimal Functional Ability  Outcome: Progressing  Goal: Absence of Infection Signs and Symptoms  Outcome: Progressing  Goal: Improved Oral Intake  Outcome: Progressing  Goal: Optimal Pain Control and Function  Outcome: Progressing  Goal: Skin Health and Integrity  Outcome: Progressing  Goal: Optimal Wound Healing  Outcome: Progressing     Problem: Skin Injury Risk Increased  Goal: Skin Health and Integrity  Outcome: Progressing     Problem: Coping Ineffective  Goal: Effective  Coping  Outcome: Progressing     Problem: Fall Injury Risk  Goal: Absence of Fall and Fall-Related Injury  Outcome: Progressing

## 2024-07-08 VITALS
BODY MASS INDEX: 29.82 KG/M2 | OXYGEN SATURATION: 98 % | DIASTOLIC BLOOD PRESSURE: 75 MMHG | WEIGHT: 151.88 LBS | TEMPERATURE: 99 F | HEIGHT: 60 IN | RESPIRATION RATE: 16 BRPM | SYSTOLIC BLOOD PRESSURE: 136 MMHG | HEART RATE: 61 BPM

## 2024-07-08 LAB
BASOPHILS # BLD AUTO: 0.03 K/UL (ref 0–0.2)
BASOPHILS NFR BLD: 0.5 % (ref 0–1.9)
DIFFERENTIAL METHOD BLD: ABNORMAL
EOSINOPHIL # BLD AUTO: 0.1 K/UL (ref 0–0.5)
EOSINOPHIL NFR BLD: 1.3 % (ref 0–8)
ERYTHROCYTE [DISTWIDTH] IN BLOOD BY AUTOMATED COUNT: 18.7 % (ref 11.5–14.5)
HCT VFR BLD AUTO: 25.1 % (ref 37–48.5)
HGB BLD-MCNC: 7.6 G/DL (ref 12–16)
IMM GRANULOCYTES # BLD AUTO: 0.02 K/UL (ref 0–0.04)
IMM GRANULOCYTES NFR BLD AUTO: 0.3 % (ref 0–0.5)
LYMPHOCYTES # BLD AUTO: 2 K/UL (ref 1–4.8)
LYMPHOCYTES NFR BLD: 31.2 % (ref 18–48)
MCH RBC QN AUTO: 24.8 PG (ref 27–31)
MCHC RBC AUTO-ENTMCNC: 30.3 G/DL (ref 32–36)
MCV RBC AUTO: 82 FL (ref 82–98)
MONOCYTES # BLD AUTO: 0.3 K/UL (ref 0.3–1)
MONOCYTES NFR BLD: 4.8 % (ref 4–15)
NEUTROPHILS # BLD AUTO: 3.9 K/UL (ref 1.8–7.7)
NEUTROPHILS NFR BLD: 61.9 % (ref 38–73)
NRBC BLD-RTO: 0 /100 WBC
PLATELET # BLD AUTO: 417 K/UL (ref 150–450)
PMV BLD AUTO: 10 FL (ref 9.2–12.9)
RBC # BLD AUTO: 3.06 M/UL (ref 4–5.4)
WBC # BLD AUTO: 6.25 K/UL (ref 3.9–12.7)

## 2024-07-08 PROCEDURE — 63600175 PHARM REV CODE 636 W HCPCS: Performed by: HOSPITALIST

## 2024-07-08 PROCEDURE — 94799 UNLISTED PULMONARY SVC/PX: CPT

## 2024-07-08 PROCEDURE — 63600175 PHARM REV CODE 636 W HCPCS: Performed by: INTERNAL MEDICINE

## 2024-07-08 PROCEDURE — 25000003 PHARM REV CODE 250: Performed by: HOSPITALIST

## 2024-07-08 PROCEDURE — 25000003 PHARM REV CODE 250: Performed by: STUDENT IN AN ORGANIZED HEALTH CARE EDUCATION/TRAINING PROGRAM

## 2024-07-08 PROCEDURE — S4991 NICOTINE PATCH NONLEGEND: HCPCS | Performed by: HOSPITALIST

## 2024-07-08 PROCEDURE — 25000003 PHARM REV CODE 250: Performed by: INTERNAL MEDICINE

## 2024-07-08 PROCEDURE — 94664 DEMO&/EVAL PT USE INHALER: CPT

## 2024-07-08 PROCEDURE — 97535 SELF CARE MNGMENT TRAINING: CPT

## 2024-07-08 PROCEDURE — 94761 N-INVAS EAR/PLS OXIMETRY MLT: CPT

## 2024-07-08 PROCEDURE — A4216 STERILE WATER/SALINE, 10 ML: HCPCS | Performed by: STUDENT IN AN ORGANIZED HEALTH CARE EDUCATION/TRAINING PROGRAM

## 2024-07-08 PROCEDURE — 85025 COMPLETE CBC W/AUTO DIFF WBC: CPT | Performed by: HOSPITALIST

## 2024-07-08 PROCEDURE — 97530 THERAPEUTIC ACTIVITIES: CPT | Mod: CQ

## 2024-07-08 PROCEDURE — 99900035 HC TECH TIME PER 15 MIN (STAT)

## 2024-07-08 PROCEDURE — 99233 SBSQ HOSP IP/OBS HIGH 50: CPT | Mod: ,,, | Performed by: INTERNAL MEDICINE

## 2024-07-08 PROCEDURE — 11000001 HC ACUTE MED/SURG PRIVATE ROOM

## 2024-07-08 PROCEDURE — 63600175 PHARM REV CODE 636 W HCPCS: Mod: JZ,JG | Performed by: STUDENT IN AN ORGANIZED HEALTH CARE EDUCATION/TRAINING PROGRAM

## 2024-07-08 PROCEDURE — 97116 GAIT TRAINING THERAPY: CPT | Mod: CQ

## 2024-07-08 RX ORDER — HEPARIN SODIUM,PORCINE/PF 1 UNIT/ML
10 SYRINGE (ML) INTRAVENOUS ONCE
Status: DISCONTINUED | OUTPATIENT
Start: 2024-07-08 | End: 2024-07-08

## 2024-07-08 RX ADMIN — OXACILLIN 2 G: 2 INJECTION, POWDER, FOR SOLUTION INTRAMUSCULAR; INTRAVENOUS at 01:07

## 2024-07-08 RX ADMIN — CEFAZOLIN 6 G: 2 INJECTION, POWDER, FOR SOLUTION INTRAMUSCULAR; INTRAVENOUS at 09:07

## 2024-07-08 RX ADMIN — HYDROCODONE BITARTRATE AND ACETAMINOPHEN 2 TABLET: 5; 325 TABLET ORAL at 03:07

## 2024-07-08 RX ADMIN — AMMONIUM LACTATE: 120 LOTION TOPICAL at 09:07

## 2024-07-08 RX ADMIN — ENOXAPARIN SODIUM 40 MG: 40 INJECTION SUBCUTANEOUS at 04:07

## 2024-07-08 RX ADMIN — MICONAZOLE NITRATE: 20 POWDER TOPICAL at 09:07

## 2024-07-08 RX ADMIN — HYDROMORPHONE HYDROCHLORIDE 2 MG: 2 INJECTION INTRAMUSCULAR; INTRAVENOUS; SUBCUTANEOUS at 09:07

## 2024-07-08 RX ADMIN — Medication 10 ML: at 06:07

## 2024-07-08 RX ADMIN — NICOTINE 1 PATCH: 14 PATCH TRANSDERMAL at 09:07

## 2024-07-08 RX ADMIN — LACTOBACILLUS ACIDOPHILUS / LACTOBACILLUS BULGARICUS 1 EACH: 100 MILLION CFU STRENGTH GRANULES at 09:07

## 2024-07-08 RX ADMIN — ERTAPENEM 1 G: 1 INJECTION INTRAMUSCULAR; INTRAVENOUS at 03:07

## 2024-07-08 RX ADMIN — LIDOCAINE 1 PATCH: 50 PATCH CUTANEOUS at 06:07

## 2024-07-08 RX ADMIN — BACLOFEN 10 MG: 5 TABLET ORAL at 09:07

## 2024-07-08 RX ADMIN — HYDROCODONE BITARTRATE AND ACETAMINOPHEN 2 TABLET: 5; 325 TABLET ORAL at 09:07

## 2024-07-08 RX ADMIN — OXACILLIN 2 G: 2 INJECTION, POWDER, FOR SOLUTION INTRAMUSCULAR; INTRAVENOUS at 02:07

## 2024-07-08 RX ADMIN — OXACILLIN 2 G: 2 INJECTION, POWDER, FOR SOLUTION INTRAMUSCULAR; INTRAVENOUS at 06:07

## 2024-07-08 RX ADMIN — BACLOFEN 10 MG: 5 TABLET ORAL at 04:07

## 2024-07-08 RX ADMIN — BACLOFEN 10 MG: 5 TABLET ORAL at 01:07

## 2024-07-08 RX ADMIN — HYDROMORPHONE HYDROCHLORIDE 2 MG: 2 INJECTION INTRAMUSCULAR; INTRAVENOUS; SUBCUTANEOUS at 02:07

## 2024-07-08 RX ADMIN — ALTEPLASE 2 MG: 2.2 INJECTION, POWDER, LYOPHILIZED, FOR SOLUTION INTRAVENOUS at 04:07

## 2024-07-08 NOTE — PROGRESS NOTES
[facility]  Adult Nutrition  Progress Note    SUMMARY       Recommendations  1) Continue regular diet   2) Encourage intake of meals + oral nutrition supplement   3) Monitor weights and labs    4) RD to monitor and follow up    Goals: Pt will meet >75% EEN/EPN by RD follow up  Nutrition Goal Status: goal met  Communication of RD Recs:  (POC)    Assessment and Plan  Nutrition Problem  Inadequate energy - protein intake    Related to (etiology):   Social circumstances    Signs and Symptoms (as evidenced by):   Homelessness; food insecurity; IVDA   Chronic wound to L arm    Interventions  (treatment strategy):  Collaboration with other providers    Nutrition Diagnosis Status:   Improving      Malnutrition Assessment  Malnutrition Context: social/environmental circumstances  Skin (Micronutrient): wounds unhealed (skin popping; chronic L arm wound)       Weight Loss (Malnutrition):  (12% in 1 year)       Clavicle Bone Region (Muscle Loss): mild depletion  Clavicle and Acromion Bone Region (Muscle Loss): mild depletion                 Reason for Assessment    Reason For Assessment: RD follow up  Diagnosis:  (Infective endocarditis of tricuspid valve)  Relevant Medical History:   Patient Active Problem List   Diagnosis    Opioid overdose    Chronic anemia    Arm abscess    Drug abuse, IV    Abnormal CT of the chest    Bradycardia    Second degree heart block    Iron deficiency anemia    Cholelithiasis without cholecystitis    Polysubstance abuse    Acute cystitis    Trichomoniasis    Staphylococcus aureus bacteremia    PTSD and Depression    Tobacco abuse    Chronic hepatitis C virus infection    Septic embolism    Housing instability    Infective endocarditis of tricuspid valve    Encounter for palliative care    Staphylococcal arthritis of right shoulder     Interdisciplinary Rounds: did not attend  General Information Comments:   07/01: Patient receiving a regular diet, able to tolerate breakfast but  "reports "weird taste" d/t abx. No N/V or GI intolerances reported to RD. Meal consumption since admit between 25-50% intake. No recent weight loss per pt. Per chart review, 12% weight loss x 1 year ago - not clinically significant. Pt currently homeless. Discussed the addition of commercial beverages, patient amendable to try. PICC. Caleb 19 - skin intact. NFPE noted to have mild depletion to clavicle and acromion region. Pt at high risk for malnutrition given poor social circumstances. RD to monitor for malnutrition risks at follow up.    : Patient sleeping, no intake at of breakfast at time of visit. Pt woke to tell RD she was planning on eating breakfast but wanted to continue to sleep. Reports not sleeping well last night. Receiving a regular diet, ONS added BID to support nutrient intake. Pt noted to have improved intake since admit, tolerating ~75% meal consumption. PICC. Caleb 19 - L arm. NFPE pt nourished at this time with good PO intake and no weight loss. Pt to discharge to LTAC, waiting on approval.       Nutrition Discharge Planning: general healthful diet post discharge    Nutrition Related Social Determinants of Health: SDOH: Food insecurity.    Nutrition Risk Screen    Nutrition Risk Screen: no indicators present    Nutrition/Diet History    Spiritual, Cultural Beliefs, Congregation Practices, Values that Affect Care: no  Food Allergies: NKFA  Factors Affecting Nutritional Intake: socio-economic    Anthropometrics    Temp: 98.7 °F (37.1 °C)  Height: 5' (152.4 cm)  Height (inches): 60 in  Weight Method: Bed Scale  Weight: 68.9 kg (151 lb 14.4 oz)  Weight (lb): 151.9 lb  Ideal Body Weight (IBW), Female: 100 lb  % Ideal Body Weight, Female (lb): 151.9 %  BMI (Calculated): 29.7  BMI Grade: 25 - 29.9 - overweight  Weight Loss: unintentional  Usual Body Weight (UBW), k.5 kg (2023)  % Usual Body Weight: 87.95  % Weight Change From Usual Weight: -12.23 %       Lab/Procedures/Meds    Pertinent Labs " "Reviewed: reviewed  CBC:  Recent Labs   Lab 07/08/24  0500   WBC 6.25   HGB 7.6*   HCT 25.1*        CMP:  No results for input(s): "GLUCOSE", "CALCIUM", "ALBUMIN", "PROT", "NA", "K", "CO2", "CL", "BUN", "CREATININE", "ALKPHOS", "ALT", "AST", "BILITOT" in the last 24 hours.    BMP:   Recent Labs   Lab 07/05/24  0506   *      K 3.9      CO2 26   BUN 9   CREATININE 0.8   CALCIUM 8.1*       Pertinent Medications Reviewed: reviewed  Scheduled Meds:   ammonium lactate   Topical (Top) BID    baclofen  10 mg Oral QID    enoxparin  40 mg Subcutaneous Daily    ertapenem (INVanz) IV (PEDS and ADULTS)  1 g Intravenous Q24H    lactobacillus acidophilus & bulgar  1 packet Oral BID    LIDOcaine  1 patch Transdermal Q24H    miconazole NITRATE 2 %   Topical (Top) BID    nicotine  1 patch Transdermal Daily    oxacillin IV (PEDS and ADULTS)  2 g Intravenous Q4H    senna-docusate 8.6-50 mg  1 tablet Oral BID    sodium chloride 0.9%  10 mL Intravenous Q6H     Continuous Infusions:  PRN Meds:.  Current Facility-Administered Medications:     0.9%  NaCl infusion (for blood administration), , Intravenous, Q24H PRN    acetaminophen, 650 mg, Oral, Q4H PRN    albuterol-ipratropium, 3 mL, Nebulization, Q4H PRN    aluminum-magnesium hydroxide-simethicone, 30 mL, Oral, Q6H PRN    dicyclomine, 10 mg, Oral, Q6H PRN    HYDROcodone-acetaminophen, 2 tablet, Oral, Q4H PRN    hydrOXYzine HCL, 50 mg, Oral, Q6H PRN    ibuprofen, 600 mg, Oral, Q6H PRN    loperamide, 2 mg, Oral, QID PRN    magnesium oxide, 800 mg, Oral, PRN    magnesium oxide, 800 mg, Oral, PRN    methocarbamoL, 500 mg, Oral, QID PRN    nitroGLYCERIN, 0.4 mg, Sublingual, Q5 Min PRN    ondansetron, 4 mg, Intravenous, Q8H PRN    polyethylene glycol, 17 g, Oral, BID PRN    potassium bicarbonate, 35 mEq, Oral, PRN    potassium bicarbonate, 50 mEq, Oral, PRN    potassium bicarbonate, 60 mEq, Oral, PRN    potassium, sodium phosphates, 2 packet, Oral, PRN    potassium, " sodium phosphates, 2 packet, Oral, PRN    potassium, sodium phosphates, 2 packet, Oral, PRN    promethazine (PHENERGAN) 25 mg in 0.9% NaCl 50 mL IVPB, 25 mg, Intravenous, Q6H PRN    sodium chloride 0.9%, 10 mL, Intravenous, PRN    Flushing PICC/Midline Protocol, , , Until Discontinued **AND** sodium chloride 0.9%, 10 mL, Intravenous, Q6H **AND** sodium chloride 0.9%, 10 mL, Intravenous, PRN    traZODone, 50 mg, Oral, Nightly PRN    Estimated/Assessed Needs    Weight Used For Calorie Calculations: 68.9 kg (151 lb 14.4 oz)  Energy Calorie Requirements (kcal): 1743  Energy Need Method: [place]-St Jeor (x 1.4)  Protein Requirements: 55 g (0.8 g/kg)  Weight Used For Protein Calculations: 68.9 kg (151 lb 14.4 oz)  Fluid Requirements (mL): 1 mL/kcal  Estimated Fluid Requirement Method:  (or per MD)  RDA Method (mL): 1743         Nutrition Prescription Ordered    Current Diet Order: Regular    Evaluation of Received Nutrient/Fluid Intake    I/O: - 4.9 L since 6/24  Energy Calories Required: meeting needs  Protein Required: meeting needs  Fluid Required: meeting needs  Comments: LBM: 7/6  Tolerance: tolerating  % Intake of Estimated Energy Needs: 50 - 75 %  % Meal Intake: 50 - 75 %    Nutrition Risk    Level of Risk/Frequency of Follow-up: low     Monitor and Evaluation    Food and Nutrient Intake: energy intake, food and beverage intake  Food and Nutrient Adminstration: diet order  Knowledge/Beliefs/Attitudes: beliefs and attitudes  Physical Activity and Function: nutrition-related ADLs and IADLs  Anthropometric Measurements: weight, weight change  Biochemical Data, Medical Tests and Procedures: electrolyte and renal panel, lipid profile, gastrointestinal profile, glucose/endocrine profile, inflammatory profile  Nutrition-Focused Physical Findings: overall appearance     Nutrition Follow-Up    RD Follow-up?: Yes    Geetha Farley RDN, [facility]

## 2024-07-08 NOTE — PT/OT/SLP PROGRESS
Physical Therapy Treatment    Patient Name:  Margarita Wiseman   MRN:  [id number]    Recommendations:     Discharge Recommendations: Low Intensity Therapy (OPPT for R shldr/neck pain)  Discharge Equipment Recommendations:  (TBD, may need a cane vs RW, will trial each)  Barriers to discharge: Inaccessible home and Decreased caregiver support    Assessment:     Margarita Wiseman is a 49 y.o. female admitted with a medical diagnosis of Infective endocarditis of tricuspid valve.  She presents with the following impairments/functional limitations: weakness, impaired endurance, impaired self care skills, impaired functional mobility, gait instability, impaired balance, decreased coordination, decreased upper extremity function, decreased lower extremity function, decreased safety awareness, pain, decreased ROM, edema ;pt with fair/good mobility today, able to get up from the bed without assistance, though still req's CGA/occ Esau for safety w/ amb. W/ st cane today, will trial a RW next visit. (Pt's R shldr pain a 6/10, though able to use st cane in RUE)    Rehab Prognosis: Good and Fair; patient would benefit from acute skilled PT services to address these deficits and reach maximum level of function.    Recent Surgery: * No surgery found *      Plan:     During this hospitalization, patient to be seen 3 x/week to address the identified rehab impairments via gait training, therapeutic activities, therapeutic exercises and progress toward the following goals:    Plan of Care Expires:  07/29/24    Subjective     Chief Complaint: didn't sleep well last night  Patient/Family Comments/goals: pt sleeping upon arrival to room, though agreeable to session/OOB.  Pain/Comfort:  Pain Rating 1: 6/10  Location - Side 1: Right  Location - Orientation 1: generalized  Location 1: shoulder (and neck pain)  Pain Addressed 1: Reposition, Distraction  Pain Rating Post-Intervention 1: 6/10      Objective:     Communicated with nurse prior to  "session.  Patient found HOB elevated with peripheral IV, PureWick, bed alarm upon PT entry to room.     General Precautions: Standard, fall  Orthopedic Precautions: N/A  Braces: N/A  Respiratory Status: Room air     Functional Mobility:  Bed Mobility:     Supine to Sit: stand by assistance  Transfers:     Sit to Stand:  stand by assistance with no AD  Gait: amb'd ~150' w/ st cane and CGA/occ Esau      AM-PAC 6 CLICK MOBILITY  Turning over in bed (including adjusting bedclothes, sheets and blankets)?: 3  Sitting down on and standing up from a chair with arms (e.g., wheelchair, bedside commode, etc.): 3  Moving from lying on back to sitting on the side of the bed?: 3  Moving to and from a bed to a chair (including a wheelchair)?: 3  Need to walk in hospital room?: 3  Climbing 3-5 steps with a railing?: 3  Basic Mobility Total Score: 18       Treatment & Education:  Pt perf'd seated LE ex's of LAQ"s and AP's x 10 ea.  Perf'd commode t/f's in bathroom to regular ht toilet , using rail on wall, w/ CGA. Pt perf'd hygiene after having BM w/ Sup only.     Patient left up in chair with all lines intact, call button in reach, nurse notified, and breakfast tray heated up and  present..    GOALS:   Multidisciplinary Problems       Physical Therapy Goals          Problem: Physical Therapy    Goal Priority Disciplines Outcome Goal Variances Interventions   Physical Therapy Goal     PT, PT/OT Progressing     Description: Patient will increase functional independence with mobility by performin. Sit<>supine with INDEP.  2. Sit<>stand using LRAD and MOD I.  3. Gait x 150 ft using LRAD and  MOD I.                              Time Tracking:     PT Received On: 24  PT Start Time: 1130     PT Stop Time: 1155  PT Total Time (min): 25 min     Billable Minutes: Gait Training 15 and Therapeutic Activity 10    Treatment Type: Treatment  PT/PTA: PTA     Number of PTA visits since last PT visit: 4     2024  "

## 2024-07-08 NOTE — SUBJECTIVE & OBJECTIVE
Interval History: No adverse events.    Review of Systems   All other systems reviewed and are negative.    Objective:     Vital Signs (Most Recent):  Temp: 98.7 °F (37.1 °C) (07/08/24 1527)  Pulse: 74 (07/08/24 1527)  Resp: 12 (07/08/24 1545)  BP: (!) 126/56 (07/08/24 1527)  SpO2: 97 % (07/08/24 1527) Vital Signs (24h Range):  Temp:  [98.1 °F (36.7 °C)-98.7 °F (37.1 °C)] 98.7 °F (37.1 °C)  Pulse:  [63-83] 74  Resp:  [12-18] 12  SpO2:  [95 %-98 %] 97 %  BP: (115-132)/(56-67) 126/56     Weight: 68.9 kg (151 lb 14.4 oz)  Body mass index is 29.67 kg/m².    Estimated Creatinine Clearance: 75.3 mL/min (based on SCr of 0.8 mg/dL).     Physical Exam  Vitals and nursing note reviewed.   Constitutional:       Appearance: Normal appearance.   HENT:      Head: Normocephalic.   Cardiovascular:      Rate and Rhythm: Normal rate.      Heart sounds: Murmur heard.   Skin:     Findings: No erythema.   Neurological:      General: No focal deficit present.      Mental Status: She is alert and oriented to person, place, and time.   Psychiatric:         Mood and Affect: Mood normal.         Behavior: Behavior normal.         Thought Content: Thought content normal.          Significant Labs:   Microbiology Results (last 7 days)       Procedure Component Value Units Date/Time    Culture, Anaerobe [[id number]] Collected: 07/01/24 1435    Order Status: Completed Specimen: Body Fluid from Shoulder, Right Updated: 07/08/24 0908     Anaerobic Culture Culture in progress    Blood culture [[id number]] Collected: 07/02/24 0825    Order Status: Completed Specimen: Blood from Peripheral, Hand, Right Updated: 07/07/24 1212     Blood Culture, Routine No growth after 5 days.    Narrative:      Collection has been rescheduled by [facility] at 07/02/2024 07:49 Reason:   Per pt want phleb to come back after pn med is given nurse Rita francis  Collection has been rescheduled by [email or web address] at 07/02/2024 08:22 Reason:   Done  Collection has been rescheduled by [email or web address] at  07/02/2024 07:49 Reason:   Per pt want phleb to come back after pn med is given nurse Rita aware  Collection has been rescheduled by [facility] at 07/02/2024 08:22 Reason:   Done    Blood culture [[id number]] Collected: 07/02/24 0800    Order Status: Completed Specimen: Blood from Peripheral, Hand, Right Updated: 07/07/24 1212     Blood Culture, Routine No growth after 5 days.    Narrative:      Collection has been rescheduled by [facility] at 07/02/2024 07:49 Reason:   Per pt want phleb to come back after pn med is given nurse Rita aware  Collection has been rescheduled by [facility] at 07/02/2024 08:22 Reason:   Done  Collection has been rescheduled by [facility] at 07/02/2024 07:49 Reason:   Per pt want phleb to come back after pn med is given nurse Rita aware  Collection has been rescheduled by [facility] at 07/02/2024 08:22 Reason:   Done    Aerobic culture [[id number]] Collected: 07/01/24 1435    Order Status: Completed Specimen: Shoulder, Right Updated: 07/05/24 1053     Aerobic Bacterial Culture No growth    Blood culture [[id number]] Collected: 06/28/24 1040    Order Status: Completed Specimen: Blood from Line, Jugular, Internal Right Updated: 07/03/24 2012     Blood Culture, Routine No growth after 5 days.    Narrative:      OK to draw from central line if necessary    Blood culture [[id number]] Collected: 06/27/24 0851    Order Status: Completed Specimen: Blood Updated: 07/02/24 1212     Blood Culture, Routine No growth after 5 days.    Gram stain [[id number]] Collected: 07/01/24 1435    Order Status: Completed Specimen: Body Fluid from Shoulder, Right Updated: 07/02/24 0002     Gram Stain Result No WBC's, epithelial cells or organisms seen            Significant Imaging: I have reviewed all pertinent imaging results/findings within the past 24 hours.

## 2024-07-08 NOTE — PROGRESS NOTES
Worked with pt on the incentive spirometer. Pt understands the benefits of the breathing device and is working to achieve more volume and sustain a breath hold. Deep breathing and occasional coughing was encouraged.Worked with pt on PEP therapy. Pt understands the benefit of the flutter valve and is working successfully with the device.

## 2024-07-08 NOTE — PLAN OF CARE
Recommendations  1) Continue regular diet   2) Encourage intake of meals + oral nutrition supplement   3) Monitor weights and labs    4) RD to monitor and follow up    Goals: Pt will meet >75% EEN/EPN by RD follow up  Nutrition Goal Status: goal met  Communication of RD Recs:  (POC)

## 2024-07-08 NOTE — PT/OT/SLP PROGRESS
Occupational Therapy   Treatment    Name: Margarita Wiseman  MRN: [id number]  Admitting Diagnosis:  Infective endocarditis of tricuspid valve       Recommendations:     Discharge Recommendations: Low Intensity Therapy  Discharge Equipment Recommendations:  to be determined by next level of care  Barriers to discharge:  None    Assessment:     Margarita Wiseman is a 49 y.o. female with a medical diagnosis of Infective endocarditis of tricuspid valve.  She presents with the following performance deficits affecting function are weakness, impaired endurance, impaired self care skills, impaired functional mobility, gait instability, impaired balance, decreased coordination, decreased upper extremity function, decreased safety awareness, decreased lower extremity function, decreased ROM, impaired cardiopulmonary response to activity, edema, impaired fine motor, abnormal tone.     Rehab Prognosis:  Good; patient would benefit from acute skilled OT services to address these deficits and reach maximum level of function.       Plan:     Patient to be seen 4 x/week to address the above listed problems via self-care/home management, therapeutic activities, therapeutic exercises  Plan of Care Expires: 07/29/24  Plan of Care Reviewed with: patient    Subjective     Chief Complaint: Being sleepy.  Pt stating she didn't sleep last night due to neck pain.  Education on use of call button to notify nursing staff of wants/needs.  Patient/Family Comments/goals: Pt stating she has 5 weeks of IV antibiotics to do.  Pain/Comfort:  Pain Rating 1:  (Pt not rating pain but stating she has neck pain.)    Objective:     Communicated with: nurse   prior to session.  Patient found up in chair with PICC line, PureWick upon OT entry to room.    General Precautions: Standard, fall    Orthopedic Precautions:N/A  Braces: N/A  Respiratory Status: Room air     Occupational Performance:     Bed Mobility:    NT     Functional Mobility/Transfers:  Sit to  stand from recliner: SBA   Standing balance for LB sponge bathing and dressing: SBA  Functional Mobility:  Education on using call button to have nursing staff provide assist/supervision for toilet or BSC transfers and relying less on female external catheter.  Pt stating she voids two times every 10 minutes and she cannot wait for staff to come to her room.     Activities of Daily Living:  Sponge bathing: Set up/SBA  UB Dressing: Min A due to IV line  LB Dressing: Set up/SBA for donning underwear    [facility] 6 Click ADL: 19    Treatment & Education:  Role of OT, POC, increasing awake time during day to assist with sleeping at night, standing balance, ADL training, calling for assist to get up to toilet or BSC and relying less on external female catheter     Patient left up in chair with all lines intact, call button in reach, and nurse notified. New female external catheter and incontinence chair pad after sponge bath.    GOALS:   Multidisciplinary Problems       Occupational Therapy Goals          Problem: Occupational Therapy    Goal Priority Disciplines Outcome Interventions   Occupational Therapy Goal     OT, PT/OT Progressing    Description: Goals set on 6/29/24 with expiration date 7/29/24:  Goals updated on 7/8/2024   Patient will increase functional independence with ADLs by performing:    Supine <> Sit with SBA.  Feeding with SBA.  Grooming while standing at sink with SBA.  UB Dressing with SBA.  Donning socks/shoes at SBA.    Step transfer with SBA with DME as needed.  Pt will demonstrate understanding of education provided regarding energy conservation and task modification through teach-back method.     Donned underwear at SBA on 7/8/2024                            Time Tracking:     OT Date of Treatment: 07/08/24  OT Start Time: 1445  OT Stop Time: 1509  OT Total Time (min): 24 min    Billable Minutes:Self Care/Home Management 24    OT/BC: OT          7/8/2024

## 2024-07-08 NOTE — PLAN OF CARE
Patient has been accepted to [facility]--waiting on auth to be approved.    07/08/24 1201   Discharge Reassessment   Assessment Type Discharge Planning Reassessment   Did the patient's condition or plan change since previous assessment? No   Discharge Plan discussed with: Patient   Communicated ABELARDO with patient/caregiver Date not available/Unable to determine   Discharge Plan A Long-term acute care facility (LTAC)   Discharge Plan B Skilled Nursing Facility   DME Needed Upon Discharge  none   Transition of Care Barriers Homeless;Substance Abuse;Does not adhere to care plan   Why the patient remains in the hospital Requires continued medical care   Post-Acute Status   Post-Acute Authorization Placement   Post-Acute Placement Status Pending payor review/awaiting authorization (if required)   Discharge Delays (!) Post-Acute Set-up     [facility]  Discharge Reassessment    Primary Care Provider: [facility] Primary Doctor    Expected Discharge Date: 7/9/2024    Reassessment (most recent)       Discharge Reassessment - 07/08/24 1201          Discharge Reassessment    Assessment Type Discharge Planning Reassessment     Did the patient's condition or plan change since previous assessment? No     Discharge Plan discussed with: Patient     Communicated ABELARDO with patient/caregiver Date not available/Unable to determine     Discharge Plan A Long-term acute care facility (LTAC)     Discharge Plan B Skilled Nursing Facility     DME Needed Upon Discharge  none     Transition of Care Barriers Homeless;Substance Abuse;Does not adhere to care plan     Why the patient remains in the hospital Requires continued medical care        Post-Acute Status    Post-Acute Authorization Placement     Post-Acute Placement Status Pending payor review/awaiting authorization (if required)     Discharge Delays Post-Acute Set-up (P)

## 2024-07-08 NOTE — PLAN OF CARE
Problem: Occupational Therapy  Goal: Occupational Therapy Goal  Description: Goals set on 6/29/24 with expiration date 7/29/24:  Goals updated on 7/8/2024   Patient will increase functional independence with ADLs by performing:    Supine <> Sit with SBA.  Feeding with SBA.  Grooming while standing at sink with SBA.  UB Dressing with SBA.  Donning socks/shoes at SBA.    Step transfer with SBA with DME as needed.  Pt will demonstrate understanding of education provided regarding energy conservation and task modification through teach-back method.     Donned underwear at SBA on 7/8/2024       Outcome: Progressing

## 2024-07-08 NOTE — SUBJECTIVE & OBJECTIVE
Interval History: Weaned off IV opioids. Discharge planning     Review of Systems   Constitutional:  Negative for chills and fever.   Respiratory:  Negative for cough and shortness of breath.    Cardiovascular:  Negative for chest pain and palpitations.   Musculoskeletal:  Positive for neck pain.        Right shoulder pain     Objective:     Vital Signs (Most Recent):  Temp: 98.7 °F (37.1 °C) (07/08/24 1527)  Pulse: 74 (07/08/24 1527)  Resp: 12 (07/08/24 1545)  BP: (!) 126/56 (07/08/24 1527)  SpO2: 97 % (07/08/24 1527) Vital Signs (24h Range):  Temp:  [98.1 °F (36.7 °C)-98.7 °F (37.1 °C)] 98.7 °F (37.1 °C)  Pulse:  [63-83] 74  Resp:  [12-18] 12  SpO2:  [95 %-98 %] 97 %  BP: (115-132)/(56-67) 126/56     Weight: 68.9 kg (151 lb 14.4 oz)  Body mass index is 29.67 kg/m².    Intake/Output Summary (Last 24 hours) at 7/8/2024 1847  Last data filed at 7/8/2024 1556  Gross per 24 hour   Intake --   Output 2900 ml   Net -2900 ml         Physical Exam  Constitutional:       General: She is not in acute distress.  Pulmonary:      Effort: No respiratory distress.   Abdominal:      General: There is no distension.      Tenderness: There is no abdominal tenderness.   Musculoskeletal:      Right lower leg: No edema.      Left lower leg: No edema.      Comments: Right ant shoulder pain with limited ROM  Limited ROM in neck but reports has improved from before    Neurological:      General: No focal deficit present.      Mental Status: She is oriented to person, place, and time.             Significant Labs: All pertinent labs within the past 24 hours have been reviewed.    Significant Imaging: I have reviewed all pertinent imaging results/findings within the past 24 hours.

## 2024-07-08 NOTE — NURSING
Notified Picc nurse nicki that unable to flush or pull back blood from distal port of picc line. Distal port marked and tapped off, unable to use at this time.

## 2024-07-08 NOTE — NURSING
No signs of infection, such as redness, exudate, or warmth to the touch assessed at insertion site of PICC.      After reconstituting per manufacturers protocol.  1 ml of Cathflo was instilled into each of the occluded lumens using a 10-mL syringe.  After 30 minutes of dwell time, catheter function was restored and 5 mL of blood was aspirated from lumen to remove Cathflo and residual clot. The aspirate was discarded and each lumen was flushed with 10 ml of 0.9% Sodium Chloride.

## 2024-07-08 NOTE — ASSESSMENT & PLAN NOTE
"Polysubstance abuse   PTSD and depression   Tobacco abuse   -longstanding history of polysubstance abuse, IV drug abuse, PTSD, anxiety, depression  -per Care everywhere chart review multiple antidepressants and psychiatric meds of varying doses however patient reports has not been taking   -Psych consulted for assistance - she does endorse some interest in rehab as "she is tired of being sick" however she is worried about her adult daughter who is also un-housed and would like to stay with her, she denies drug abuse in daughter and that she has "been out on the street with me to try and help me"  -opioid withdrawal supportive care as indicated - protocol ordered  -clonidine 0.1 mg po q4 hours PRN opiate withdrawal anxiety ( hold if SBP<90, DBP<50 or HR <60)  -dicylomine 10 mg q6 hours PRN muscle cramps   -loperamide 2 mg q 1 hour PRN diarrhea (max= 16 mg/24 hours)   -methocarbamol 500 mg po q 6 hours PRN muscle spasm and body aches   -No Suboxone for now given need for pain medication - changed to morphine as she is having side effects from Dilaudid.  -nicotine patch ordered  -fall and delirium precautions  -monitor  -Palliative care following  -Discussed discontinuation of pain medication and initiation of Suboxone with her - not ready for Suboxone yet as she still has severe pain to be treated.  -Change IV medication back to Dilaudid as morphine no longer helping.  Continue oral medication for breakthrough  - 7/8 - weaned off IV pain meds   "
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
## 2024-07-09 NOTE — ASSESSMENT & PLAN NOTE
50 yo woman with history of untreated MSSA bacteremia, now readmitted with bacteremia and sepsis related to IDU, and diagnosed with TVE and septic embolization.  Patient is on day #10 of oxacillin and ertapenem combination started for persistent MSSA bacteremia.  She has now cleared her blood cultures.  Oxacillin and ertapenem discontinued.  IV cefazolin started.  This will be easier to continue as outpatient.    Plan  Cefazolin 6 grams IV q 24 hours as continuous infusion.  Anticipate 6 weeks duration.

## 2024-07-09 NOTE — ASSESSMENT & PLAN NOTE
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  Prevertebral soft tissue thickening.  Recommended MRI of the cervical spine to evaluate for prevertebral edema.  Mild to moderate spondylitic changes.  -MRI done, showed a thin layer of prevertebral edema in front of upper cervical levels, cervical spine DJD, no abscess.   -at admission remained hypotensive despite fluid resuscitation and required vasopressor support; otherwise HDS and afebrile  -admission labs:  -CBC with WBC 34, hemoglobin 8.3, hematocrit 26, platelets 119.  Chemistry was , K 3.5, chloride 97, CO2 18, BUN 50, SCr 3.1, glucose 133.  Magnesium 1.9.  ; AST, ALT, T bili unremarkable.  Lactic acid 3.37 >> 1.2.  Procalcitonin 78.33.  ESR > 120, .  PH 7.419, pCO2 32.2, PO2 34, HC03 21, be negative for.  .  Troponin 0.23.  Flu and COVID negative.  HIV negative.   -admission imaging:               -CXR with findings that may reflect multifocal infectious process.  Right shoulder x-ray without acute pathology.  Left wrist x-ray without acute pathology.  CT head and CT C-spine noted no acute cervical fracture.  Prevertebral cervical soft tissue thickening.  Recommend MRI of the cervical spine to evaluate for prevertebral edema.  Mild to moderate spondylitic changes.  -admission micro:   -blood cultures MSSA positive, also gram positive rods (likely  contaminant but will follow)  -Weaned off pressors   -continue IV fluid hydration   -continue oxacillin per ID - might add a carbapenem for synergy if blood cultures remain positive  -Wound Care consulted   -pulmonary toileting with incentive spirometer and flutter valve as tolerated   -oxygen supplementation as needed  -continue tele monitoring   -EKG PRN concerns  -trend labs, address/replete electrolytes as indicated  -Daily blood cultures ordered until she clears bacteremia  -ID consulted  -TTE done - found vegetation on the tricuspid valve.  -Indium scan surprisingly negative given known endocarditis and septic emboli to the lungs.-MRI positive for septic right shoulder - will consult orthopedic surgeon - aspirated 7/1 - aerobic cx neg, anaerobic cultures in process.    - 7/2 repeated blood cx for fevers - NGTD   - abx plan per ID - tentatively, planning on a two week course of double coverage (oxacillin and ertapenem) followed by an additional 4-6 weeks of oxacillin   - Seen by ID 7/8 and abx changed to cefazolin anticipating 6 weeks.

## 2024-07-09 NOTE — PROGRESS NOTES
[facility])  Infectious Disease  Progress Note    Patient Name: Margarita Wiseman  MRN: [id number]  Admission Date: 6/23/2024  Length of Stay: 15 days  Attending Physician: Bonny Bustos MD  Primary Care Provider: Doris, Primary Doctor    Isolation Status: No active isolations  Assessment/Plan:      ID  Staphylococcus aureus bacteremia  48 yo woman with history of untreated MSSA bacteremia, now readmitted with bacteremia and sepsis related to IDU, and diagnosed with TVE and septic embolization.  Patient is on day #10 of oxacillin and ertapenem combination started for persistent MSSA bacteremia.  She has now cleared her blood cultures.  Oxacillin and ertapenem discontinued.  IV cefazolin started.  This will be easier to continue as outpatient.    Plan  Cefazolin 6 grams IV q 24 hours as continuous infusion.  Anticipate 6 weeks duration.        Anticipated Disposition: TBD    Thank you for your consult. I will follow-up with patient. Please contact us if you have any additional questions.    Blake Cavazos MD  Infectious Disease  [facility] ([place])    Subjective:     Principal Problem:Infective endocarditis of tricuspid valve    HPI: Ms. Wiseman is a 49 year old woman with homelessness and IDU, admitted with sepsis and bacteremia. She has a PMHx significant for IVDU, PAWAN, second degree heart block who presents for evaluation of fatigue, which has persisted for the last several days. She notes an associated generalized body aches and pains. Pt with recent admission for severe sepsis with MSSA bacteremia in April 2024. Pt [place] per EMS [place]. Reports continued IV drug use. She was admitted and started on IV abx. CT c/w cervical infection?  ID is consulted for sepsis and bacteremia.    Today, the patient complaints of neck pain.    Blood cultures: MSSA (BCID) and GPR  Interval History: No adverse events.    Review of Systems   All other systems  reviewed and are negative.    Objective:     Vital Signs (Most Recent):  Temp: 98.7 °F (37.1 °C) (07/08/24 1527)  Pulse: 74 (07/08/24 1527)  Resp: 12 (07/08/24 1545)  BP: (!) 126/56 (07/08/24 1527)  SpO2: 97 % (07/08/24 1527) Vital Signs (24h Range):  Temp:  [98.1 °F (36.7 °C)-98.7 °F (37.1 °C)] 98.7 °F (37.1 °C)  Pulse:  [63-83] 74  Resp:  [12-18] 12  SpO2:  [95 %-98 %] 97 %  BP: (115-132)/(56-67) 126/56     Weight: 68.9 kg (151 lb 14.4 oz)  Body mass index is 29.67 kg/m².    Estimated Creatinine Clearance: 75.3 mL/min (based on SCr of 0.8 mg/dL).     Physical Exam  Vitals and nursing note reviewed.   Constitutional:       Appearance: Normal appearance.   HENT:      Head: Normocephalic.   Cardiovascular:      Rate and Rhythm: Normal rate.      Heart sounds: Murmur heard.   Skin:     Findings: No erythema.   Neurological:      General: No focal deficit present.      Mental Status: She is alert and oriented to person, place, and time.   Psychiatric:         Mood and Affect: Mood normal.         Behavior: Behavior normal.         Thought Content: Thought content normal.          Significant Labs:   Microbiology Results (last 7 days)       Procedure Component Value Units Date/Time    Culture, Anaerobe [[id number]] Collected: 07/01/24 1435    Order Status: Completed Specimen: Body Fluid from Shoulder, Right Updated: 07/08/24 0908     Anaerobic Culture Culture in progress    Blood culture [[id number]] Collected: 07/02/24 0825    Order Status: Completed Specimen: Blood from Peripheral, Hand, Right Updated: 07/07/24 1212     Blood Culture, Routine No growth after 5 days.    Narrative:      Collection has been rescheduled by [facility] at 07/02/2024 07:49 Reason:   Per pt want phleb to come back after pn med is given nurse Rita francis  Collection has been rescheduled by [facility] at 07/02/2024 08:22 Reason:   Done  Collection has been rescheduled by [facility] at 07/02/2024 07:49 Reason:   Per pt want phleb to come back after pn med is  given nurse Rita aware  Collection has been rescheduled by [facility] at 07/02/2024 08:22 Reason:   Done    Blood culture [[id number]] Collected: 07/02/24 0800    Order Status: Completed Specimen: Blood from Peripheral, Hand, Right Updated: 07/07/24 1212     Blood Culture, Routine No growth after 5 days.    Narrative:      Collection has been rescheduled by [facility] at 07/02/2024 07:49 Reason:   Per pt want phleb to come back after pn med is given nurse Rita aware  Collection has been rescheduled by [facility] at 07/02/2024 08:22 Reason:   Done  Collection has been rescheduled by [facility] at 07/02/2024 07:49 Reason:   Per pt want phleb to come back after pn med is given nurse Rita aware  Collection has been rescheduled by [facility] at 07/02/2024 08:22 Reason:   Done    Aerobic culture [[id number]] Collected: 07/01/24 1435    Order Status: Completed Specimen: Shoulder, Right Updated: 07/05/24 1053     Aerobic Bacterial Culture No growth    Blood culture [[id number]] Collected: 06/28/24 1040    Order Status: Completed Specimen: Blood from Line, Jugular, Internal Right Updated: 07/03/24 2012     Blood Culture, Routine No growth after 5 days.    Narrative:      OK to draw from central line if necessary    Blood culture [[id number]] Collected: 06/27/24 0851    Order Status: Completed Specimen: Blood Updated: 07/02/24 1212     Blood Culture, Routine No growth after 5 days.    Gram stain [[id number]] Collected: 07/01/24 1435    Order Status: Completed Specimen: Body Fluid from Shoulder, Right Updated: 07/02/24 0002     Gram Stain Result No WBC's, epithelial cells or organisms seen            Significant Imaging: I have reviewed all pertinent imaging results/findings within the past 24 hours.

## 2024-07-09 NOTE — NURSING
Pt wants to leave AMA. Educated pt on importance of current health status. Adamant on being discharged right now. Refused scheduled vitals and medications to help with anxiety. States it's a family emergency concerning her daughter. Provider notified. AMA signed.     Pt left with all personal belongings. All IV access removed.

## 2024-07-10 LAB — BACTERIA SPEC ANAEROBE CULT: NORMAL

## 2024-07-16 ENCOUNTER — HOSPITAL ENCOUNTER (INPATIENT)
Facility: OTHER | Age: 49
LOS: 3 days | Discharge: LEFT BEFORE DISCHARGE | DRG: 871 | End: 2024-07-19
Attending: EMERGENCY MEDICINE | Admitting: STUDENT IN AN ORGANIZED HEALTH CARE EDUCATION/TRAINING PROGRAM
Payer: MEDICAID

## 2024-07-16 DIAGNOSIS — F19.10 SUBSTANCE ABUSE: ICD-10-CM

## 2024-07-16 DIAGNOSIS — I38 ENDOCARDITIS, UNSPECIFIED CHRONICITY, UNSPECIFIED ENDOCARDITIS TYPE: ICD-10-CM

## 2024-07-16 DIAGNOSIS — R78.81 BACTEREMIA: Primary | ICD-10-CM

## 2024-07-16 DIAGNOSIS — R68.89 NOT FEELING GREAT: ICD-10-CM

## 2024-07-16 DIAGNOSIS — B95.61 STAPHYLOCOCCUS AUREUS BACTEREMIA: ICD-10-CM

## 2024-07-16 DIAGNOSIS — R78.81 STAPHYLOCOCCUS AUREUS BACTEREMIA: ICD-10-CM

## 2024-07-16 DIAGNOSIS — R06.02 SHORTNESS OF BREATH: ICD-10-CM

## 2024-07-16 LAB
ALBUMIN SERPL BCP-MCNC: 2.6 G/DL (ref 3.5–5.2)
ALP SERPL-CCNC: 86 U/L (ref 55–135)
ALT SERPL W/O P-5'-P-CCNC: 26 U/L (ref 10–44)
ANION GAP SERPL CALC-SCNC: 11 MMOL/L (ref 8–16)
AST SERPL-CCNC: 39 U/L (ref 10–40)
BASOPHILS # BLD AUTO: 0.03 K/UL (ref 0–0.2)
BASOPHILS NFR BLD: 0.4 % (ref 0–1.9)
BILIRUB SERPL-MCNC: 0.1 MG/DL (ref 0.1–1)
BNP SERPL-MCNC: 43 PG/ML (ref 0–99)
BUN SERPL-MCNC: 13 MG/DL (ref 6–20)
CALCIUM SERPL-MCNC: 9 MG/DL (ref 8.7–10.5)
CHLORIDE SERPL-SCNC: 106 MMOL/L (ref 95–110)
CO2 SERPL-SCNC: 20 MMOL/L (ref 23–29)
CREAT SERPL-MCNC: 0.9 MG/DL (ref 0.5–1.4)
DIFFERENTIAL METHOD BLD: ABNORMAL
EOSINOPHIL # BLD AUTO: 0.3 K/UL (ref 0–0.5)
EOSINOPHIL NFR BLD: 3.7 % (ref 0–8)
ERYTHROCYTE [DISTWIDTH] IN BLOOD BY AUTOMATED COUNT: 17.5 % (ref 11.5–14.5)
EST. GFR  (NO RACE VARIABLE): >60 ML/MIN/1.73 M^2
EXTRA BLUE TOP RAINBOW: NORMAL
GLUCOSE SERPL-MCNC: 81 MG/DL (ref 70–110)
HCO3 UR-SCNC: 26.6 MMOL/L (ref 24–28)
HCT VFR BLD AUTO: 28.2 % (ref 37–48.5)
HCT VFR BLD CALC: 22 %PCV (ref 36–54)
HGB BLD-MCNC: 8 G/DL
HGB BLD-MCNC: 8.5 G/DL (ref 12–16)
IMM GRANULOCYTES # BLD AUTO: 0.02 K/UL (ref 0–0.04)
IMM GRANULOCYTES NFR BLD AUTO: 0.3 % (ref 0–0.5)
INFLUENZA A, MOLECULAR: NEGATIVE
INFLUENZA B, MOLECULAR: NEGATIVE
LACTATE SERPL-SCNC: 1.1 MMOL/L (ref 0.5–2.2)
LIPASE SERPL-CCNC: 10 U/L (ref 4–60)
LYMPHOCYTES # BLD AUTO: 3.2 K/UL (ref 1–4.8)
LYMPHOCYTES NFR BLD: 40.8 % (ref 18–48)
MAGNESIUM SERPL-MCNC: 1.6 MG/DL (ref 1.6–2.6)
MAGNESIUM SERPL-MCNC: 1.6 MG/DL (ref 1.6–2.6)
MCH RBC QN AUTO: 24.6 PG (ref 27–31)
MCHC RBC AUTO-ENTMCNC: 30.1 G/DL (ref 32–36)
MCV RBC AUTO: 82 FL (ref 82–98)
MONOCYTES # BLD AUTO: 0.7 K/UL (ref 0.3–1)
MONOCYTES NFR BLD: 8.4 % (ref 4–15)
NEUTROPHILS # BLD AUTO: 3.7 K/UL (ref 1.8–7.7)
NEUTROPHILS NFR BLD: 46.4 % (ref 38–73)
NRBC BLD-RTO: 0 /100 WBC
PCO2 BLDA: 47.3 MMHG (ref 35–45)
PH SMN: 7.36 [PH] (ref 7.35–7.45)
PHOSPHATE SERPL-MCNC: 3.8 MG/DL (ref 2.7–4.5)
PLATELET # BLD AUTO: 453 K/UL (ref 150–450)
PMV BLD AUTO: 11 FL (ref 9.2–12.9)
PO2 BLDA: 50 MMHG (ref 40–60)
POC BE: 1 MMOL/L
POC IONIZED CALCIUM: 1.17 MMOL/L (ref 1.06–1.42)
POC SATURATED O2: 83 % (ref 95–100)
POC TCO2: 28 MMOL/L (ref 24–29)
POTASSIUM BLD-SCNC: 3.6 MMOL/L (ref 3.5–5.1)
POTASSIUM SERPL-SCNC: 3.7 MMOL/L (ref 3.5–5.1)
PROT SERPL-MCNC: 9.5 G/DL (ref 6–8.4)
RBC # BLD AUTO: 3.45 M/UL (ref 4–5.4)
SAMPLE: ABNORMAL
SODIUM BLD-SCNC: 142 MMOL/L (ref 136–145)
SODIUM SERPL-SCNC: 137 MMOL/L (ref 136–145)
SPECIMEN SOURCE: NORMAL
TROPONIN I SERPL DL<=0.01 NG/ML-MCNC: <0.006 NG/ML (ref 0–0.03)
WBC # BLD AUTO: 7.9 K/UL (ref 3.9–12.7)

## 2024-07-16 PROCEDURE — 82803 BLOOD GASES ANY COMBINATION: CPT

## 2024-07-16 PROCEDURE — 80053 COMPREHEN METABOLIC PANEL: CPT | Performed by: EMERGENCY MEDICINE

## 2024-07-16 PROCEDURE — 99900035 HC TECH TIME PER 15 MIN (STAT)

## 2024-07-16 PROCEDURE — 87502 INFLUENZA DNA AMP PROBE: CPT | Performed by: EMERGENCY MEDICINE

## 2024-07-16 PROCEDURE — 25000003 PHARM REV CODE 250: Performed by: EMERGENCY MEDICINE

## 2024-07-16 PROCEDURE — 84100 ASSAY OF PHOSPHORUS: CPT | Performed by: EMERGENCY MEDICINE

## 2024-07-16 PROCEDURE — 83735 ASSAY OF MAGNESIUM: CPT | Performed by: EMERGENCY MEDICINE

## 2024-07-16 PROCEDURE — 96367 TX/PROPH/DG ADDL SEQ IV INF: CPT

## 2024-07-16 PROCEDURE — 12000002 HC ACUTE/MED SURGE SEMI-PRIVATE ROOM

## 2024-07-16 PROCEDURE — 93005 ELECTROCARDIOGRAM TRACING: CPT

## 2024-07-16 PROCEDURE — 96366 THER/PROPH/DIAG IV INF ADDON: CPT

## 2024-07-16 PROCEDURE — 99285 EMERGENCY DEPT VISIT HI MDM: CPT | Mod: 25

## 2024-07-16 PROCEDURE — 63600175 PHARM REV CODE 636 W HCPCS: Performed by: EMERGENCY MEDICINE

## 2024-07-16 PROCEDURE — 84484 ASSAY OF TROPONIN QUANT: CPT | Performed by: EMERGENCY MEDICINE

## 2024-07-16 PROCEDURE — 85025 COMPLETE CBC W/AUTO DIFF WBC: CPT | Performed by: EMERGENCY MEDICINE

## 2024-07-16 PROCEDURE — 87040 BLOOD CULTURE FOR BACTERIA: CPT | Mod: 59 | Performed by: EMERGENCY MEDICINE

## 2024-07-16 PROCEDURE — 83690 ASSAY OF LIPASE: CPT | Performed by: EMERGENCY MEDICINE

## 2024-07-16 PROCEDURE — 83605 ASSAY OF LACTIC ACID: CPT | Performed by: EMERGENCY MEDICINE

## 2024-07-16 PROCEDURE — 93010 ELECTROCARDIOGRAM REPORT: CPT | Mod: ,,, | Performed by: INTERNAL MEDICINE

## 2024-07-16 PROCEDURE — 96365 THER/PROPH/DIAG IV INF INIT: CPT

## 2024-07-16 PROCEDURE — 83880 ASSAY OF NATRIURETIC PEPTIDE: CPT | Performed by: EMERGENCY MEDICINE

## 2024-07-16 RX ORDER — PIPERACILLIN SODIUM, TAZOBACTAM SODIUM 4; .5 G/20ML; G/20ML
INJECTION, POWDER, LYOPHILIZED, FOR SOLUTION INTRAVENOUS
Status: DISPENSED
Start: 2024-07-16 | End: 2024-07-17

## 2024-07-16 RX ADMIN — SODIUM CHLORIDE 1000 ML: 9 INJECTION, SOLUTION INTRAVENOUS at 09:07

## 2024-07-16 RX ADMIN — PIPERACILLIN SODIUM AND TAZOBACTAM SODIUM 4.5 G: 4; .5 INJECTION, POWDER, LYOPHILIZED, FOR SOLUTION INTRAVENOUS at 10:07

## 2024-07-16 RX ADMIN — VANCOMYCIN HYDROCHLORIDE 1750 MG: 500 INJECTION, POWDER, LYOPHILIZED, FOR SOLUTION INTRAVENOUS at 10:07

## 2024-07-16 NOTE — Clinical Note
Diagnosis: Shortness of breath [786.05.ICD-9-CM]   Future Attending Provider: STARR KAUFMAN [[id number]]
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
## 2024-07-17 PROBLEM — F19.10 POLYSUBSTANCE ABUSE: Status: RESOLVED | Noted: 2019-06-20 | Resolved: 2024-07-17

## 2024-07-17 LAB
AMPHET+METHAMPHET UR QL: NEGATIVE
ANION GAP SERPL CALC-SCNC: 7 MMOL/L (ref 8–16)
BACTERIA #/AREA URNS HPF: ABNORMAL /HPF
BASOPHILS # BLD AUTO: 0.04 K/UL (ref 0–0.2)
BASOPHILS NFR BLD: 0.7 % (ref 0–1.9)
BENZODIAZ UR QL SCN>200 NG/ML: NEGATIVE
BILIRUB UR QL STRIP: NEGATIVE
BUN SERPL-MCNC: 9 MG/DL (ref 6–20)
BZE UR QL SCN: ABNORMAL
CALCIUM SERPL-MCNC: 8.1 MG/DL (ref 8.7–10.5)
CANNABINOIDS UR QL SCN: NEGATIVE
CHLORIDE SERPL-SCNC: 110 MMOL/L (ref 95–110)
CLARITY UR: ABNORMAL
CO2 SERPL-SCNC: 21 MMOL/L (ref 23–29)
COLOR UR: YELLOW
CREAT SERPL-MCNC: 0.8 MG/DL (ref 0.5–1.4)
CREAT UR-MCNC: 96.8 MG/DL (ref 15–325)
DIFFERENTIAL METHOD BLD: ABNORMAL
EOSINOPHIL # BLD AUTO: 0.2 K/UL (ref 0–0.5)
EOSINOPHIL NFR BLD: 3.2 % (ref 0–8)
ERYTHROCYTE [DISTWIDTH] IN BLOOD BY AUTOMATED COUNT: 17.2 % (ref 11.5–14.5)
EST. GFR  (NO RACE VARIABLE): >60 ML/MIN/1.73 M^2
GLUCOSE SERPL-MCNC: 84 MG/DL (ref 70–110)
GLUCOSE UR QL STRIP: NEGATIVE
HCT VFR BLD AUTO: 26.1 % (ref 37–48.5)
HGB BLD-MCNC: 7.8 G/DL (ref 12–16)
HGB UR QL STRIP: ABNORMAL
HYALINE CASTS #/AREA URNS LPF: 6 /LPF
IMM GRANULOCYTES # BLD AUTO: 0.03 K/UL (ref 0–0.04)
IMM GRANULOCYTES NFR BLD AUTO: 0.5 % (ref 0–0.5)
KETONES UR QL STRIP: NEGATIVE
LEUKOCYTE ESTERASE UR QL STRIP: ABNORMAL
LYMPHOCYTES # BLD AUTO: 2 K/UL (ref 1–4.8)
LYMPHOCYTES NFR BLD: 35.8 % (ref 18–48)
MAGNESIUM SERPL-MCNC: 1.5 MG/DL (ref 1.6–2.6)
MCH RBC QN AUTO: 24.5 PG (ref 27–31)
MCHC RBC AUTO-ENTMCNC: 29.9 G/DL (ref 32–36)
MCV RBC AUTO: 82 FL (ref 82–98)
METHADONE UR QL SCN>300 NG/ML: NEGATIVE
MICROSCOPIC COMMENT: ABNORMAL
MONOCYTES # BLD AUTO: 0.4 K/UL (ref 0.3–1)
MONOCYTES NFR BLD: 6.6 % (ref 4–15)
NEUTROPHILS # BLD AUTO: 3 K/UL (ref 1.8–7.7)
NEUTROPHILS NFR BLD: 53.2 % (ref 38–73)
NITRITE UR QL STRIP: NEGATIVE
NRBC BLD-RTO: 0 /100 WBC
OHS QRS DURATION: 72 MS
OHS QTC CALCULATION: 431 MS
OPIATES UR QL SCN: NEGATIVE
PCP UR QL SCN>25 NG/ML: NEGATIVE
PH UR STRIP: 6 [PH] (ref 5–8)
PLATELET # BLD AUTO: 396 K/UL (ref 150–450)
PMV BLD AUTO: 10.2 FL (ref 9.2–12.9)
POTASSIUM SERPL-SCNC: 3.3 MMOL/L (ref 3.5–5.1)
PROT UR QL STRIP: ABNORMAL
RBC # BLD AUTO: 3.19 M/UL (ref 4–5.4)
RBC #/AREA URNS HPF: 2 /HPF (ref 0–4)
SODIUM SERPL-SCNC: 138 MMOL/L (ref 136–145)
SP GR UR STRIP: 1.02 (ref 1–1.03)
SQUAMOUS #/AREA URNS HPF: 4 /HPF
TOXICOLOGY INFORMATION: ABNORMAL
URN SPEC COLLECT METH UR: ABNORMAL
UROBILINOGEN UR STRIP-ACNC: NEGATIVE EU/DL
WBC # BLD AUTO: 5.58 K/UL (ref 3.9–12.7)
WBC #/AREA URNS HPF: 24 /HPF (ref 0–5)

## 2024-07-17 PROCEDURE — 25000003 PHARM REV CODE 250: Performed by: STUDENT IN AN ORGANIZED HEALTH CARE EDUCATION/TRAINING PROGRAM

## 2024-07-17 PROCEDURE — 36410 VNPNXR 3YR/> PHY/QHP DX/THER: CPT

## 2024-07-17 PROCEDURE — 63600175 PHARM REV CODE 636 W HCPCS: Performed by: STUDENT IN AN ORGANIZED HEALTH CARE EDUCATION/TRAINING PROGRAM

## 2024-07-17 PROCEDURE — 63600175 PHARM REV CODE 636 W HCPCS: Performed by: PHYSICIAN ASSISTANT

## 2024-07-17 PROCEDURE — 21400001 HC TELEMETRY ROOM

## 2024-07-17 PROCEDURE — 63600175 PHARM REV CODE 636 W HCPCS: Performed by: EMERGENCY MEDICINE

## 2024-07-17 PROCEDURE — A4216 STERILE WATER/SALINE, 10 ML: HCPCS | Performed by: PHYSICIAN ASSISTANT

## 2024-07-17 PROCEDURE — 83735 ASSAY OF MAGNESIUM: CPT | Performed by: PHYSICIAN ASSISTANT

## 2024-07-17 PROCEDURE — 80048 BASIC METABOLIC PNL TOTAL CA: CPT | Performed by: PHYSICIAN ASSISTANT

## 2024-07-17 PROCEDURE — 87088 URINE BACTERIA CULTURE: CPT | Performed by: EMERGENCY MEDICINE

## 2024-07-17 PROCEDURE — 85025 COMPLETE CBC W/AUTO DIFF WBC: CPT | Performed by: PHYSICIAN ASSISTANT

## 2024-07-17 PROCEDURE — 96372 THER/PROPH/DIAG INJ SC/IM: CPT | Performed by: PHYSICIAN ASSISTANT

## 2024-07-17 PROCEDURE — 25000003 PHARM REV CODE 250: Performed by: PHYSICIAN ASSISTANT

## 2024-07-17 PROCEDURE — 76937 US GUIDE VASCULAR ACCESS: CPT

## 2024-07-17 PROCEDURE — C1751 CATH, INF, PER/CENT/MIDLINE: HCPCS

## 2024-07-17 PROCEDURE — 80307 DRUG TEST PRSMV CHEM ANLYZR: CPT | Performed by: EMERGENCY MEDICINE

## 2024-07-17 PROCEDURE — 25000003 PHARM REV CODE 250: Performed by: EMERGENCY MEDICINE

## 2024-07-17 PROCEDURE — A4216 STERILE WATER/SALINE, 10 ML: HCPCS | Performed by: STUDENT IN AN ORGANIZED HEALTH CARE EDUCATION/TRAINING PROGRAM

## 2024-07-17 PROCEDURE — 87186 SC STD MICRODIL/AGAR DIL: CPT | Performed by: EMERGENCY MEDICINE

## 2024-07-17 PROCEDURE — G0378 HOSPITAL OBSERVATION PER HR: HCPCS

## 2024-07-17 PROCEDURE — 81000 URINALYSIS NONAUTO W/SCOPE: CPT | Mod: 59 | Performed by: EMERGENCY MEDICINE

## 2024-07-17 PROCEDURE — 87086 URINE CULTURE/COLONY COUNT: CPT | Performed by: EMERGENCY MEDICINE

## 2024-07-17 RX ORDER — MAGNESIUM SULFATE HEPTAHYDRATE 40 MG/ML
2 INJECTION, SOLUTION INTRAVENOUS ONCE
Status: COMPLETED | OUTPATIENT
Start: 2024-07-17 | End: 2024-07-17

## 2024-07-17 RX ORDER — METHOCARBAMOL 500 MG/1
500 TABLET, FILM COATED ORAL EVERY 6 HOURS PRN
Status: DISCONTINUED | OUTPATIENT
Start: 2024-07-17 | End: 2024-07-19 | Stop reason: HOSPADM

## 2024-07-17 RX ORDER — POTASSIUM CHLORIDE 20 MEQ/1
20 TABLET, EXTENDED RELEASE ORAL ONCE
Status: COMPLETED | OUTPATIENT
Start: 2024-07-17 | End: 2024-07-17

## 2024-07-17 RX ORDER — GLUCAGON 1 MG
1 KIT INJECTION
Status: DISCONTINUED | OUTPATIENT
Start: 2024-07-17 | End: 2024-07-19 | Stop reason: HOSPADM

## 2024-07-17 RX ORDER — HYDROXYZINE HYDROCHLORIDE 25 MG/1
50 TABLET, FILM COATED ORAL EVERY 6 HOURS PRN
Status: DISCONTINUED | OUTPATIENT
Start: 2024-07-17 | End: 2024-07-19 | Stop reason: HOSPADM

## 2024-07-17 RX ORDER — BACLOFEN 5 MG/1
5 TABLET ORAL EVERY 8 HOURS PRN
Status: DISCONTINUED | OUTPATIENT
Start: 2024-07-17 | End: 2024-07-17

## 2024-07-17 RX ORDER — DICYCLOMINE HYDROCHLORIDE 10 MG/1
10 CAPSULE ORAL EVERY 6 HOURS PRN
Status: DISCONTINUED | OUTPATIENT
Start: 2024-07-17 | End: 2024-07-19

## 2024-07-17 RX ORDER — NALOXONE HCL 0.4 MG/ML
0.02 VIAL (ML) INJECTION
Status: DISCONTINUED | OUTPATIENT
Start: 2024-07-17 | End: 2024-07-19 | Stop reason: HOSPADM

## 2024-07-17 RX ORDER — ACETAMINOPHEN 325 MG/1
650 TABLET ORAL EVERY 6 HOURS PRN
Status: DISCONTINUED | OUTPATIENT
Start: 2024-07-17 | End: 2024-07-19 | Stop reason: HOSPADM

## 2024-07-17 RX ORDER — SODIUM CHLORIDE 0.9 % (FLUSH) 0.9 %
10 SYRINGE (ML) INJECTION EVERY 6 HOURS
Status: DISCONTINUED | OUTPATIENT
Start: 2024-07-17 | End: 2024-07-19 | Stop reason: HOSPADM

## 2024-07-17 RX ORDER — AMOXICILLIN 250 MG
1 CAPSULE ORAL 2 TIMES DAILY PRN
Status: DISCONTINUED | OUTPATIENT
Start: 2024-07-17 | End: 2024-07-19 | Stop reason: HOSPADM

## 2024-07-17 RX ORDER — IBUPROFEN 200 MG
24 TABLET ORAL
Status: DISCONTINUED | OUTPATIENT
Start: 2024-07-17 | End: 2024-07-19 | Stop reason: HOSPADM

## 2024-07-17 RX ORDER — SODIUM CHLORIDE 0.9 % (FLUSH) 0.9 %
10 SYRINGE (ML) INJECTION EVERY 8 HOURS
Status: DISCONTINUED | OUTPATIENT
Start: 2024-07-17 | End: 2024-07-19 | Stop reason: HOSPADM

## 2024-07-17 RX ORDER — KETOROLAC TROMETHAMINE 30 MG/ML
15 INJECTION, SOLUTION INTRAMUSCULAR; INTRAVENOUS EVERY 6 HOURS PRN
Status: DISCONTINUED | OUTPATIENT
Start: 2024-07-17 | End: 2024-07-19 | Stop reason: HOSPADM

## 2024-07-17 RX ORDER — TALC
6 POWDER (GRAM) TOPICAL NIGHTLY PRN
Status: DISCONTINUED | OUTPATIENT
Start: 2024-07-17 | End: 2024-07-19 | Stop reason: HOSPADM

## 2024-07-17 RX ORDER — HEPARIN SODIUM 5000 [USP'U]/ML
5000 INJECTION, SOLUTION INTRAVENOUS; SUBCUTANEOUS EVERY 8 HOURS
Status: DISCONTINUED | OUTPATIENT
Start: 2024-07-17 | End: 2024-07-19 | Stop reason: HOSPADM

## 2024-07-17 RX ORDER — IBUPROFEN 200 MG
16 TABLET ORAL
Status: DISCONTINUED | OUTPATIENT
Start: 2024-07-17 | End: 2024-07-19 | Stop reason: HOSPADM

## 2024-07-17 RX ORDER — CLONIDINE HYDROCHLORIDE 0.1 MG/1
0.1 TABLET ORAL EVERY 4 HOURS PRN
Status: DISCONTINUED | OUTPATIENT
Start: 2024-07-17 | End: 2024-07-19 | Stop reason: HOSPADM

## 2024-07-17 RX ORDER — SODIUM CHLORIDE 0.9 % (FLUSH) 0.9 %
10 SYRINGE (ML) INJECTION
Status: DISCONTINUED | OUTPATIENT
Start: 2024-07-17 | End: 2024-07-19 | Stop reason: HOSPADM

## 2024-07-17 RX ORDER — LOPERAMIDE HYDROCHLORIDE 2 MG/1
4 CAPSULE ORAL ONCE
Status: DISCONTINUED | OUTPATIENT
Start: 2024-07-17 | End: 2024-07-19 | Stop reason: HOSPADM

## 2024-07-17 RX ADMIN — Medication 10 ML: at 07:07

## 2024-07-17 RX ADMIN — CEFAZOLIN 6000 MG: 3 INJECTION, POWDER, FOR SOLUTION INTRAMUSCULAR; INTRAVENOUS at 11:07

## 2024-07-17 RX ADMIN — HEPARIN SODIUM 5000 UNITS: 5000 INJECTION INTRAVENOUS; SUBCUTANEOUS at 02:07

## 2024-07-17 RX ADMIN — POTASSIUM CHLORIDE 20 MEQ: 1500 TABLET, EXTENDED RELEASE ORAL at 05:07

## 2024-07-17 RX ADMIN — HEPARIN SODIUM 5000 UNITS: 5000 INJECTION INTRAVENOUS; SUBCUTANEOUS at 10:07

## 2024-07-17 RX ADMIN — HEPARIN SODIUM 5000 UNITS: 5000 INJECTION INTRAVENOUS; SUBCUTANEOUS at 05:07

## 2024-07-17 RX ADMIN — SODIUM CHLORIDE, PRESERVATIVE FREE 10 ML: 5 INJECTION INTRAVENOUS at 05:07

## 2024-07-17 RX ADMIN — VANCOMYCIN HYDROCHLORIDE 2000 MG: 500 INJECTION, POWDER, LYOPHILIZED, FOR SOLUTION INTRAVENOUS at 02:07

## 2024-07-17 RX ADMIN — PIPERACILLIN SODIUM AND TAZOBACTAM SODIUM 4.5 G: 4; .5 INJECTION, POWDER, LYOPHILIZED, FOR SOLUTION INTRAVENOUS at 04:07

## 2024-07-17 RX ADMIN — SODIUM CHLORIDE, PRESERVATIVE FREE 10 ML: 5 INJECTION INTRAVENOUS at 11:07

## 2024-07-17 RX ADMIN — Medication 10 ML: at 10:07

## 2024-07-17 RX ADMIN — MAGNESIUM SULFATE HEPTAHYDRATE 2 G: 40 INJECTION, SOLUTION INTRAVENOUS at 05:07

## 2024-07-17 NOTE — HPI
Ms. Margarita Wiseman is a 49 y.o. female, with PMH of IVDA, polysubstance abuse (cocaine and heroin), endocarditis with tricuspid vegetation, septic pulmonary emboli, chronic hep C, chronic anemia, chronic thrombocytopenia, 2nd degree heart block, chronic L arm wound (currently skin popping drugs, present for several years), bipolar disorder, PTSD, anxiety/depression, homelessness, and medication noncompliance who presents to [facility] ED on 7/17/24 due to generalized body pain. She was admitted 4/11 - 13/24 for bacteremia 2/2 strep & staph and treated with IV antibiotics until she left AMA. She was brought in by EMS on 6/23 due to hypotension, body aches, fever, and chills, (and was admitted until she left AMA on 7/9). During that admission she has severe sepsis requiring pressor support in the ICU at one point, and was treated for endocarditis, septic pulmonary emboli, MSSA with ertapenem followed by oxacillin. She was also treated for synovitis of the glenohumeral joint, subacromial subdeltoid bursitis and had a joint aspiration for septic joint. After being seen by ID her antibiotic regimen was changed to cefazolin. She was to have access placed for an additional 6 weeks of cefazolin coverage to continue after discharge. She again left AMA prior to her hospital based treatment being completed. In the ED tonight labs showed no leukocytosis or left shift. She is anemia with H&H of 8.5/28.2 on CBC. A metabolic panel showed no acute abnormalities. A drug screen was positive for cocaine. A UA showed a nitrite negative UTI with 24 WBC/hpf, many bacteria, 2+ leuk esterase. She was treated in the ED with vancomycin and zosyn. She was placed on observation.

## 2024-07-17 NOTE — FIRST PROVIDER EVALUATION
Emergency Department TeleTriage Encounter Note      CHIEF COMPLAINT    Chief Complaint   Patient presents with    bodyaches     Pt was hospitalized earlier this month for 2 weeks for sepsis per pt. Left ama, was supposed to receive more abx prior to discharge. Reports generalized body pain. Also reports BL lower leg swelling. Hx of IV drug use/heroin. Last used 5 hours ago per pt however pt very drowsy and nodding off in triage. Bleeding wound to site of injection.        VITAL SIGNS   Initial Vitals [07/16/24 2037]   BP Pulse Resp Temp SpO2   128/67 78 14 98.1 °F (36.7 °C) 99 %      MAP       --            ALLERGIES    Review of patient's allergies indicates:  No Known Allergies    PROVIDER TRIAGE NOTE  This is a teletriage evaluation of a 49 y.o. female presenting to the ED complaining of pain - she is nodding off and not responding appropriately to questions - will alert onsite team.     Initial orders will be placed and care will be transferred to an alternate provider when patient is roomed for a full evaluation. Any additional orders and the final disposition will be determined by that provider.       ORDERS  Labs Reviewed - No data to display    ED Orders (720h ago, onward)      None              Virtual Visit Note: The provider triage portion of this emergency department evaluation and documentation was performed via TalkTo, a HIPAA-compliant telemedicine application, in concert with a tele-presenter in the room. A face to face patient evaluation with one of my colleagues will occur once the patient is placed in an emergency department room.      DISCLAIMER: This note was prepared with [employer] voice recognition transcription software. Garbled syntax, mangled pronouns, and other bizarre constructions may be attributed to that software system.

## 2024-07-17 NOTE — SUBJECTIVE & OBJECTIVE
Interval History: Pt reports feeling malaise. Still has some neck pain.     Review of Systems   Constitutional:  Positive for chills, fatigue and fever.   Respiratory:  Negative for cough and shortness of breath.    Cardiovascular:  Negative for chest pain and leg swelling.   Gastrointestinal:  Negative for abdominal pain.   Musculoskeletal:  Positive for neck pain.   Psychiatric/Behavioral:  Negative for agitation and confusion.      Objective:     Vital Signs (Most Recent):  Temp: 98.6 °F (37 °C) (07/17/24 1459)  Pulse: 79 (07/17/24 1459)  Resp: 18 (07/17/24 1459)  BP: 96/60 (07/17/24 1459)  SpO2: 97 % (07/17/24 1459) Vital Signs (24h Range):  Temp:  [98.1 °F (36.7 °C)-98.6 °F (37 °C)] 98.6 °F (37 °C)  Pulse:  [63-79] 79  Resp:  [12-20] 18  SpO2:  [97 %-100 %] 97 %  BP: ()/(60-75) 96/60     Weight: 72.6 kg (160 lb 0.9 oz)  Body mass index is 31.26 kg/m².    Intake/Output Summary (Last 24 hours) at 7/17/2024 1540  Last data filed at 7/17/2024 0606  Gross per 24 hour   Intake 1699 ml   Output --   Net 1699 ml         Physical Exam  Constitutional:       General: She is not in acute distress.  Pulmonary:      Effort: No respiratory distress.      Breath sounds: No wheezing.   Abdominal:      General: There is no distension.      Tenderness: There is no abdominal tenderness.   Musculoskeletal:      Right lower leg: No edema.      Left lower leg: No edema.      Comments: Limited ROM in neck - similar to prior    Neurological:      General: No focal deficit present.      Mental Status: She is oriented to person, place, and time.             Significant Labs: All pertinent labs within the past 24 hours have been reviewed.    Significant Imaging: I have reviewed all pertinent imaging results/findings within the past 24 hours.

## 2024-07-17 NOTE — NURSING
Pt admitted from ED to room 317 via wheelchair, VSS, AAOx4, assessment performed. Medication administered per MAR. Placed cardiac monitoring per orders.       Nurses Note -- 4 Eyes      7/17/2024   6:00 PM      Skin assessed during: Admit      [] No Altered Skin Integrity Present    []Prevention Measures Documented      [x] Yes- Altered Skin Integrity Present or Discovered   [x] LDA Added if Not in Epic (Describe Wound)   [] New Altered Skin Integrity was Present on Admit and Documented in LDA   [x] Wound Image Taken    Wound Care Consulted? Yes    Attending Nurse:  Kamille Wilson RN/Staff Member:  Lauren

## 2024-07-17 NOTE — ASSESSMENT & PLAN NOTE
- Ms. Margarita Wiseman returns after leaving [place] a second time as she is feeling generally unwell   - she had been being treated for Bacteremia 2/2 Endocarditis w/ tricuspid vegetation and Septic Emboli  - s/p vancomycin & zosyn in ED   - ID had changed her antibiotics to 6g cefezolin q24 hours during her last admission, will continue   - monitor

## 2024-07-17 NOTE — ED PROVIDER NOTES
Encounter Date: 7/16/2024       History     Chief Complaint   Patient presents with    bodyaches     Pt was hospitalized earlier this month for 2 weeks for sepsis per pt. Left ama, was supposed to receive more abx prior to discharge. Reports generalized body pain. Also reports BL lower leg swelling. Hx of IV drug use/heroin. Last used 5 hours ago per pt however pt very drowsy and nodding off in triage. Bleeding wound to site of injection.      This is a very pleasant 49-year-old female presenting for evaluation of feeling badly.  Notes that she has a blood infection, had previously been hospitalized but left prior to completing her treatment.  She left roughly 1 week ago in his been doing drugs since leaving the hospital but notes that she has had fever and body aches throughout all parts of her body since that time.  Has not taken any medications since leaving the hospital.  Nothing seems improve these symptoms and they seem to be worsening with time      Review of patient's allergies indicates:  No Known Allergies  No past medical history on file.  No past surgical history on file.  No family history on file.  Social History     Tobacco Use    Smoking status: Every Day     Current packs/day: 1.00     Average packs/day: 1 pack/day for 27.5 years (27.5 ttl pk-yrs)     Types: Cigarettes     Start date: 1997    Smokeless tobacco: Never    Tobacco comments:     Smoking cessation education provided. Pt is a 1 pk/day cigarette smoker x 26 yrs. She states that she cut down to 0.5 pk/day approx. 1 yr ago. Pt declines referral to Ambulatory Smoking Cessation clinic, stating not ready to quit. Handout provided.    Substance Use Topics    Alcohol use: Not Currently     Review of Systems  Constitutional-positive fever  HEENT-no congestion  Eyes-no redness  Respiratory-no shortness of breath  Cardio-no chest pain  GI-no abdominal pain  Endocrine-no cold intolerance  -no difficulty urinating  MSK-positive body aches  Skin-no  rashes  Allergy-no environmental allergy  Neurologic-positive headache  Hematology-no swollen nodes  Behavioral-no confusion  Physical Exam     Initial Vitals [07/16/24 2037]   BP Pulse Resp Temp SpO2   128/67 78 14 98.1 °F (36.7 °C) 99 %      MAP       --         Physical Exam  Constitutional:  Somnolent chronically ill-appearing 49-year-old female in mild distress  Eyes: Conjunctivae normal.  ENT       Head: Normocephalic, atraumatic.       Nose: Normal external appearance        Mouth/Throat: no strigulous respirations   Hematological/Lymphatic/Immunilogical: no visible lymphadenopathy   Cardiovascular: Normal rate,   Respiratory: Normal respiratory effort.   Gastrointestinal: non distended   Musculoskeletal: Normal range of motion in all extremities. No obvious deformities or swelling.  Neurologic: Alert, oriented. Normal speech and language. No gross focal neurologic deficits are appreciated.  Skin: Skin is warm, dry. No rash noted.  Psychiatric: Mood and affect are normal.   ED Course   Procedures  Labs Reviewed   CBC W/ AUTO DIFFERENTIAL - Abnormal; Notable for the following components:       Result Value    RBC 3.45 (*)     Hemoglobin 8.5 (*)     Hematocrit 28.2 (*)     MCH 24.6 (*)     MCHC 30.1 (*)     RDW 17.5 (*)     Platelets 453 (*)     All other components within normal limits   COMPREHENSIVE METABOLIC PANEL - Abnormal; Notable for the following components:    CO2 20 (*)     Total Protein 9.5 (*)     Albumin 2.6 (*)     All other components within normal limits   DRUG SCREEN PANEL, URINE EMERGENCY - Abnormal; Notable for the following components:    Cocaine (Metab.) Presumptive Positive (*)     All other components within normal limits    Narrative:     Specimen Source->Urine   URINALYSIS, REFLEX TO URINE CULTURE - Abnormal; Notable for the following components:    Appearance, UA Hazy (*)     Protein, UA 1+ (*)     Occult Blood UA 2+ (*)     Leukocytes, UA 2+ (*)     All other components within normal  limits    Narrative:     Specimen Source->Urine   URINALYSIS MICROSCOPIC - Abnormal; Notable for the following components:    WBC, UA 24 (*)     Bacteria Many (*)     Hyaline Casts, UA 6 (*)     All other components within normal limits    Narrative:     Specimen Source->Urine   ISTAT PROCEDURE - Abnormal; Notable for the following components:    POC PCO2 47.3 (*)     POC Hematocrit 22 (*)     All other components within normal limits   INFLUENZA A & B BY MOLECULAR   CULTURE, BLOOD   CULTURE, BLOOD   CULTURE, URINE   LIPASE   MAGNESIUM   TROPONIN I   B-TYPE NATRIURETIC PEPTIDE   MAGNESIUM   PHOSPHORUS   BLUE-TOP TUBE   LACTIC ACID, PLASMA   BASIC METABOLIC PANEL   MAGNESIUM   CBC W/ AUTO DIFFERENTIAL        ECG Results              EKG 12-lead (Preliminary result)  Result time 07/16/24 22:06:00      Wet Read by Luis Fernando Saavedra MD (07/16/24 22:06:00, [facility] Emergency Dept, Emergency Medicine)    My EKG interpretation, sinus rhythm, 66 beats per minute, normal axis, no ST segment changes, when compared to previous EKG 06/28/2024 QTC is decreased from 462 down to 431                                  Imaging Results              X-Ray Chest AP Portable (Final result)  Result time 07/16/24 21:53:40      Final result by Emmett Mott MD (07/16/24 21:53:40)                   Impression:      No acute findings in the chest.      Electronically signed by: Emmett Mott MD  Date:    07/16/2024  Time:    21:53               Narrative:    EXAMINATION:  XR CHEST AP PORTABLE    CLINICAL HISTORY:  Shortness of breath    TECHNIQUE:  Single frontal view of the chest was performed.    COMPARISON:  06/30/2024.    FINDINGS:  No consolidation, pleural effusion or pneumothorax.    Cardiomediastinal silhouette is unremarkable.                                       Medications   piperacillin-tazobactam (ZOSYN) 4.5 g in D5W 100 mL IVPB (MB+) (0 g Intravenous Stopped 7/16/24 9009)   sodium chloride 0.9% flush 10 mL (has no  administration in time range)   melatonin tablet 6 mg (has no administration in time range)   senna-docusate 8.6-50 mg per tablet 1 tablet (has no administration in time range)   acetaminophen tablet 650 mg (has no administration in time range)   naloxone 0.4 mg/mL injection 0.02 mg (has no administration in time range)   glucose chewable tablet 16 g (has no administration in time range)   glucose chewable tablet 24 g (has no administration in time range)   glucagon (human recombinant) injection 1 mg (has no administration in time range)   heparin (porcine) injection 5,000 Units (has no administration in time range)   dextrose 10% bolus 125 mL 125 mL (has no administration in time range)   dextrose 10% bolus 250 mL 250 mL (has no administration in time range)   vancomycin 1.75 g in 5 % dextrose 500 mL IVPB (0 mg Intravenous Stopped 7/17/24 0052)   sodium chloride 0.9% bolus 1,000 mL 1,000 mL (0 mLs Intravenous Stopped 7/17/24 0000)     Medical Decision Making  Differential diagnosis-bacteremia, sepsis, body aches, overdose, withdrawal    Had previously grown out staph in the blood as well as strep.  Was receiving IV antibiotics then culture negative however left to completion of treatment.  Will obtain cultures, count, chemistry, CRP, chest x-ray.  Will initiate treatment with IV antibiotics.    Discussed with HARDY West, will plan for admission at this time, will like relieve require PICC placement for further treatment.  Counseled regarding drug abuse.    Problems Addressed:  Endocarditis, unspecified chronicity, unspecified endocarditis type: chronic illness or injury  Not feeling great: acute illness or injury  Shortness of breath: acute illness or injury  Substance abuse: chronic illness or injury with exacerbation, progression, or side effects of treatment    Amount and/or Complexity of Data Reviewed  Independent Historian: friend     Details: Friend at the bedside notes that she has been using drugs  continuously since leaving the hospital  External Data Reviewed: labs, radiology, ECG and notes.     Details: History of infective endocarditis seen with septic emboli on previous [facility]  Labs: ordered. Decision-making details documented in ED Course.  Radiology: ordered and independent interpretation performed. Decision-making details documented in ED Course.  ECG/medicine tests: ordered and independent interpretation performed. Decision-making details documented in ED Course.    Risk  OTC drugs.  Prescription drug management.  Parenteral controlled substances.  Drug therapy requiring intensive monitoring for toxicity.  Decision regarding hospitalization.  Diagnosis or treatment significantly limited by social determinants of health.  Risk Details: Substance abuse complicates this patient's care has a social determinants of health                                      Clinical Impression:  Final diagnoses:  [R06.02] Shortness of breath  [R78.81] Bacteremia (Primary)  [F19.10] Substance abuse  [R68.89] Not feeling great  [I38] Endocarditis, unspecified chronicity, unspecified endocarditis type          ED Disposition Condition    Observation Stable                Luis Fernando Saavedra MD  07/17/24 0401

## 2024-07-17 NOTE — H&P
[facility] Medicine  History & Physical    Patient Name: Margarita Wiseman  MRN: [id number]  Patient Class: OP- Observation  Admission Date: 7/16/2024  Attending Physician: Bonny Bustos MD   Primary Care Provider: Doris Primary Doctor         Patient information was obtained from patient, past medical records, and ER records.     Subjective:     Principal Problem:<principal problem not specified>    Chief Complaint:   Chief Complaint   Patient presents with    bodyaches     Pt was hospitalized earlier this month for 2 weeks for sepsis per pt. Left ama, was supposed to receive more abx prior to discharge. Reports generalized body pain. Also reports BL lower leg swelling. Hx of IV drug use/heroin. Last used 5 hours ago per pt however pt very drowsy and nodding off in triage. Bleeding wound to site of injection.         HPI: Ms. Margarita Wiseman is a 49 y.o. female, with PMH of IVDA, polysubstance abuse (cocaine and heroin), endocarditis with tricuspid vegetation, septic pulmonary emboli, chronic hep C, chronic anemia, chronic thrombocytopenia, 2nd degree heart block, chronic L arm wound (currently skin popping drugs, present for several years), bipolar disorder, PTSD, anxiety/depression, homelessness, and medication noncompliance who presents to [facility] ED on 7/17/24 due to generalized body pain. She was admitted 4/11 - 13/24 for bacteremia 2/2 strep & staph and treated with IV antibiotics until she left AMA. She was brought in by EMS on 6/23 due to hypotension, body aches, fever, and chills, (and was admitted until she left AMA on 7/9). During that admission she has severe sepsis requiring pressor support in the ICU at one point, and was treated for endocarditis, septic pulmonary emboli, MSSA with ertapenem followed by oxacillin. She was also treated for synovitis of the glenohumeral joint, subacromial subdeltoid bursitis and had a joint aspiration for septic joint. After being seen by ID her  antibiotic regimen was changed to cefazolin. She was to have access placed for an additional 6 weeks of cefazolin coverage to continue after discharge. She again left AMA prior to her hospital based treatment being completed. In the ED tonight labs showed no leukocytosis or left shift. She is anemia with H&H of 8.5/28.2 on CBC. A metabolic panel showed no acute abnormalities. A drug screen was positive for cocaine. A UA showed a nitrite negative UTI with 24 WBC/hpf, many bacteria, 2+ leuk esterase. She was treated in the ED with vancomycin and zosyn. She was placed on observation.       History reviewed. No pertinent past medical history.    History reviewed. No pertinent surgical history.    Review of patient's allergies indicates:  No Known Allergies    No current facility-administered medications on file prior to encounter.     Current Outpatient Medications on File Prior to Encounter   Medication Sig    busPIRone (BUSPAR) 5 MG Tab Take 5 mg by mouth 2 (two) times daily.    divalproex ER (DEPAKOTE ER) 250 MG 24 hr tablet Take 250 mg by mouth once daily.    ferrous sulfate (FEOSOL) 325 mg (65 mg iron) Tab tablet Take 1 tablet (325 mg total) by mouth 2 (two) times daily.    hydrOXYzine (ATARAX) 50 MG tablet Take 1 tablet (50 mg total) by mouth nightly as needed for Itching (insomnia).    ibuprofen (ADVIL,MOTRIN) 800 MG tablet Take 1 tablet (800 mg total) by mouth every 6 (six) hours as needed for Pain.    naloxone (NARCAN) 4 mg/actuation Spry 4mg by nasal route as needed for opioid overdose; may repeat every 2-3 minutes in alternating nostrils until medical help arrives. Call 911    OLANZapine (ZYPREXA) 5 MG tablet Take 5 mg by mouth every evening.    ondansetron (ZOFRAN-ODT) 8 MG TbDL Take 1 tablet (8 mg total) by mouth every 6 (six) hours as needed (nausea).    sertraline (ZOLOFT) 25 MG tablet Take 25 mg by mouth once daily.     Family History    None       Tobacco Use    Smoking status: Every Day     Current  packs/day: 1.00     Average packs/day: 1 pack/day for 27.5 years (27.5 ttl pk-yrs)     Types: Cigarettes     Start date: 1997    Smokeless tobacco: Never    Tobacco comments:     Smoking cessation education provided. Pt is a 1 pk/day cigarette smoker x 26 yrs. She states that she cut down to 0.5 pk/day approx. 1 yr ago. Pt declines referral to Ambulatory Smoking Cessation clinic, stating not ready to quit. Handout provided.    Substance and Sexual Activity    Alcohol use: Not Currently    Drug use: Yes     Types: IV    Sexual activity: Not Currently     Review of Systems   Reason unable to perform ROS: Patient does not participate in answering questions. She is somnolent and awaeks momentarily only.     Objective:     Vital Signs (Most Recent):  Temp: 98.6 °F (07/17/24 0436)  Pulse: 77 (07/17/24 0433)  Resp: 19 (07/17/24 0433)  BP: 113/68 (07/17/24 0057)  SpO2: 98 % (07/17/24 0433)      Weight: 71.2 kg (156 lb 15.5 oz)  Body mass index is 30.66 kg/m².     Physical Exam  Vitals and nursing note reviewed.   Constitutional:       General: She is not in acute distress.     Appearance: She is well-developed and normal weight. She is ill-appearing (chronically). She is not toxic-appearing or diaphoretic.      Comments: Somewhat unkempt. Chronically ill appearing.    HENT:      Head: Normocephalic and atraumatic.   Eyes:      General: No scleral icterus.        Right eye: No discharge.         Left eye: No discharge.      Conjunctiva/sclera: Conjunctivae normal.   Neck:      Trachea: No tracheal deviation.   Cardiovascular:      Rate and Rhythm: Normal rate and regular rhythm.      Heart sounds: Normal heart sounds. No murmur heard.     No gallop.   Pulmonary:      Effort: Pulmonary effort is normal. No respiratory distress.      Breath sounds: Normal breath sounds. No stridor. No wheezing or rales.   Abdominal:      General: Bowel sounds are normal. There is no distension.      Palpations: Abdomen is soft. There is no  mass.      Tenderness: There is no abdominal tenderness. There is no guarding.   Musculoskeletal:         General: No deformity. Normal range of motion.      Cervical back: Normal range of motion and neck supple.   Skin:     General: Skin is warm and dry.      Coloration: Skin is not pale.      Findings: No erythema or rash.   Neurological:      General: No focal deficit present.      Mental Status: She is alert.      Motor: No abnormal muscle tone.      Comments: Somnolent, does not answer questions or partake meaningfully in exam.    Psychiatric:      Comments: Can not assess 2/2 somnolence.          Assessment/Plan:     Staphylococcus aureus bacteremia  - Ms. Margarita Wiseman returns after leaving AMA a second time as she is feeling generally unwell   - she had been being treated for Bacteremia 2/2 Endocarditis w/ tricuspid vegetation and Septic Emboli  - s/p vancomycin & zosyn in ED   - ID had changed her antibiotics to 6g cefezolin q24 hours during her last admission, will continue   - monitor       Infective endocarditis of tricuspid valve  Echocardiogram with evidence of tricuspid regurgitation that is mild . The patient's most recent echocardiogram result is listed below.   - remainder of treatment plan as above     Echo    Result Date: 6/25/2024    Left Ventricle: The left ventricle is normal in size. Ventricular mass   is normal. Normal wall thickness. There is normal systolic function.   Ejection fraction by visual approximation is 65%. Global longitudinal   strain is -19.0%. Global longitudinal strain is normal. There is normal   diastolic function. Normal left ventricular filling pressure. Tissue   Doppler velocity is normal.    Right Ventricle: Normal right ventricular cavity size. Wall thickness   is normal. Systolic function is normal.    Tricuspid Valve: There is a medium mobile echogenic pedunculated mass   present on the atrial side of the septal leaflet consistent with   vegetation. There is mild  regurgitation with a centrally directed jet.    IVC/SVC: Normal venous pressure at 3 mmHg.    Pericardium: There is a trivial effusion. No indication of cardiac   tamponade.        Echo    Result Date: 4/12/2024    Left Ventricle: There is normal systolic function with a visually   estimated ejection fraction of 65 - 70%. Global longitudinal strain is   normal; -19.9%. There is normal diastolic function.    Right Ventricle: Normal right ventricular cavity size. Wall thickness   is normal. Right ventricle wall motion  is normal. Systolic function is   normal.    IVC/SVC: Normal venous pressure at 3 mmHg.    Pericardium: There is a trivial effusion. No indication of cardiac   tamponade.          Septic embolism  - IV antibiotics as above       Drug abuse, IV  - history noted   - PRN medications for withdrawal symptoms       Polysubstance abuse  - history noted       Chronic anemia  Patient's anemia is currently stable. Has not received any PRBCs to date.   Current CBC reviewed-   Lab Results   Component Value Date    HGB 8.5 (L) 07/16/2024    HCT 22 (L) 07/16/2024     Monitor serial CBC and transfuse if patient becomes hemodynamically unstable, symptomatic or H/H drops below 7/21.    Chronic hepatitis C virus infection  - first positive 6/20/19  - not yet treated   - LFTs WNL  - refer to hepatology at discharge     Housing instability  - consult case management/social work      PTSD and Depression  - continue home meds        VTE Risk Mitigation (From admission, onward)           Ordered     heparin (porcine) injection 5,000 Units  Every 8 hours         07/17/24 0047     IP VTE HIGH RISK PATIENT  Once         07/17/24 0047     Place sequential compression device  Until discontinued         07/17/24 0047                         On 07/17/2024, patient should be placed in hospital observation services under the care of Dr. Bonny Bustos MD.        Wandy West PA-C  Department of Hospital Medicine  [facility] -  Emergency Dept

## 2024-07-17 NOTE — PROGRESS NOTES
[facility] Medicine  Progress Note    Patient Name: Margarita Wiseman  MRN: [id number]  Patient Class: OP- Observation   Admission Date: 7/16/2024  Length of Stay: 0 days  Attending Physician: Bonny Bustos MD  Primary Care Provider: Doris, Primary Doctor        Subjective:     Principal Problem:Staphylococcus aureus bacteremia        HPI:  Ms. Margarita Wiseman is a 49 y.o. female, with PMH of IVDA, polysubstance abuse (cocaine and heroin), endocarditis with tricuspid vegetation, septic pulmonary emboli, chronic hep C, chronic anemia, chronic thrombocytopenia, 2nd degree heart block, chronic L arm wound (currently skin popping drugs, present for several years), bipolar disorder, PTSD, anxiety/depression, homelessness, and medication noncompliance who presents to [facility] ED on 7/17/24 due to generalized body pain. She was admitted 4/11 - 13/24 for bacteremia 2/2 strep & staph and treated with IV antibiotics until she left AMA. She was brought in by EMS on 6/23 due to hypotension, body aches, fever, and chills, (and was admitted until she left AMA on 7/9). During that admission she has severe sepsis requiring pressor support in the ICU at one point, and was treated for endocarditis, septic pulmonary emboli, MSSA with ertapenem followed by oxacillin. She was also treated for synovitis of the glenohumeral joint, subacromial subdeltoid bursitis and had a joint aspiration for septic joint. After being seen by ID her antibiotic regimen was changed to cefazolin. She was to have access placed for an additional 6 weeks of cefazolin coverage to continue after discharge. She again left AMA prior to her hospital based treatment being completed. In the ED tonight labs showed no leukocytosis or left shift. She is anemia with H&H of 8.5/28.2 on CBC. A metabolic panel showed no acute abnormalities. A drug screen was positive for cocaine. A UA showed a nitrite negative UTI with 24 WBC/hpf, many bacteria, 2+ leuk  esterase. She was treated in the ED with vancomycin and zosyn. She was placed on observation.         Overview/Hospital Course:  Pt reports she did indulge in cocaine as well as heroin. Meds for opiate withdrawal ordered. Restarted IV cefazolin from last admission along with vancomycin in light of drug use. Blood cx in process.     Interval History: Pt reports feeling malaise. Still has some neck pain.     Review of Systems   Constitutional:  Positive for chills, fatigue and fever.   Respiratory:  Negative for cough and shortness of breath.    Cardiovascular:  Negative for chest pain and leg swelling.   Gastrointestinal:  Negative for abdominal pain.   Musculoskeletal:  Positive for neck pain.   Psychiatric/Behavioral:  Negative for agitation and confusion.      Objective:     Vital Signs (Most Recent):  Temp: 98.6 °F (37 °C) (07/17/24 1459)  Pulse: 79 (07/17/24 1459)  Resp: 18 (07/17/24 1459)  BP: 96/60 (07/17/24 1459)  SpO2: 97 % (07/17/24 1459) Vital Signs (24h Range):  Temp:  [98.1 °F (36.7 °C)-98.6 °F (37 °C)] 98.6 °F (37 °C)  Pulse:  [63-79] 79  Resp:  [12-20] 18  SpO2:  [97 %-100 %] 97 %  BP: ()/(60-75) 96/60     Weight: 72.6 kg (160 lb 0.9 oz)  Body mass index is 31.26 kg/m².    Intake/Output Summary (Last 24 hours) at 7/17/2024 1540  Last data filed at 7/17/2024 0606  Gross per 24 hour   Intake 1699 ml   Output --   Net 1699 ml         Physical Exam  Constitutional:       General: She is not in acute distress.  Pulmonary:      Effort: No respiratory distress.      Breath sounds: No wheezing.   Abdominal:      General: There is no distension.      Tenderness: There is no abdominal tenderness.   Musculoskeletal:      Right lower leg: No edema.      Left lower leg: No edema.      Comments: Limited ROM in neck - similar to prior    Neurological:      General: No focal deficit present.      Mental Status: She is oriented to person, place, and time.             Significant Labs: All pertinent labs within  the past 24 hours have been reviewed.    Significant Imaging: I have reviewed all pertinent imaging results/findings within the past 24 hours.    Assessment/Plan:      * Staphylococcus aureus bacteremia  Ms. Margarita Wiseman returns after leaving AMA a second time as she is feeling generally unwell   She had been being treated for Bacteremia 2/2 Endocarditis w/ tricuspid vegetation and Septic Emboli to lungs and cervical neck infection  s/p vancomycin & zosyn in ED     Plan -   - ID had changed her antibiotics to 6g cefezolin q24 hours during her last admission, will continue   - will keep vanc on for now in light of drug use till cultures return       Septic embolism  - IV antibiotics as above       Infective endocarditis of tricuspid valve  Echocardiogram with evidence of tricuspid regurgitation that is mild . The patient's most recent echocardiogram result is listed below.   - remainder of treatment plan as above     Echo    Result Date: 6/25/2024    Left Ventricle: The left ventricle is normal in size. Ventricular mass   is normal. Normal wall thickness. There is normal systolic function.   Ejection fraction by visual approximation is 65%. Global longitudinal   strain is -19.0%. Global longitudinal strain is normal. There is normal   diastolic function. Normal left ventricular filling pressure. Tissue   Doppler velocity is normal.    Right Ventricle: Normal right ventricular cavity size. Wall thickness   is normal. Systolic function is normal.    Tricuspid Valve: There is a medium mobile echogenic pedunculated mass   present on the atrial side of the septal leaflet consistent with   vegetation. There is mild regurgitation with a centrally directed jet.    IVC/SVC: Normal venous pressure at 3 mmHg.    Pericardium: There is a trivial effusion. No indication of cardiac   tamponade.        Echo    Result Date: 4/12/2024    Left Ventricle: There is normal systolic function with a visually   estimated ejection  fraction of 65 - 70%. Global longitudinal strain is   normal; -19.9%. There is normal diastolic function.    Right Ventricle: Normal right ventricular cavity size. Wall thickness   is normal. Right ventricle wall motion  is normal. Systolic function is   normal.    IVC/SVC: Normal venous pressure at 3 mmHg.    Pericardium: There is a trivial effusion. No indication of cardiac   tamponade.          Housing instability  - consult case management/social work      Chronic hepatitis C virus infection  - first positive 6/20/19  - not yet treated   - LFTs WNL  - refer to hepatology at discharge     PTSD and Depression  - per psych note last admission - 'Hold off on initiation of all other scheduled psychotropic medications at this time due to patient's hyponatremia. She may benefit from an SSRI however would recommend a continued wash out period from recent and chronic substance use to better assess her psychiatric baseline. This is another reason for pursuing inpatient substance use treatment as patient would be in a controlled setting to facilitate a more thorough and accurate diagnosis. '      Drug abuse, IV  Utox only pos for cocaine but pt endorses cocaine and heroin use     Plan -   - PRN medications for withdrawal symptoms   - COWS q4      Chronic anemia  Patient's anemia is currently stable. Has not received any PRBCs to date.   Current CBC reviewed-   Lab Results   Component Value Date    HGB 7.8 (L) 07/17/2024    HCT 26.1 (L) 07/17/2024     Monitor serial CBC and transfuse if patient becomes hemodynamically unstable, symptomatic or H/H drops below 7/21.      VTE Risk Mitigation (From admission, onward)           Ordered     heparin (porcine) injection 5,000 Units  Every 8 hours         07/17/24 0047     IP VTE HIGH RISK PATIENT  Once         07/17/24 0047     Place sequential compression device  Until discontinued         07/17/24 0047                    Discharge Planning   ABELARDO:      Code Status: Full Code   Is  the patient medically ready for discharge?:     Reason for patient still in hospital (select all that apply): Treatment                     Bonny Maradiaga MD  Department of Hospital Medicine   [facility] - Emergency Dept

## 2024-07-17 NOTE — ASSESSMENT & PLAN NOTE
Utox only pos for cocaine but pt endorses cocaine and heroin use     Plan -   - PRN medications for withdrawal symptoms   - COWS q4

## 2024-07-17 NOTE — ASSESSMENT & PLAN NOTE
Echocardiogram with evidence of tricuspid regurgitation that is mild . The patient's most recent echocardiogram result is listed below.   - remainder of treatment plan as above     Echo    Result Date: 6/25/2024    Left Ventricle: The left ventricle is normal in size. Ventricular mass   is normal. Normal wall thickness. There is normal systolic function.   Ejection fraction by visual approximation is 65%. Global longitudinal   strain is -19.0%. Global longitudinal strain is normal. There is normal   diastolic function. Normal left ventricular filling pressure. Tissue   Doppler velocity is normal.    Right Ventricle: Normal right ventricular cavity size. Wall thickness   is normal. Systolic function is normal.    Tricuspid Valve: There is a medium mobile echogenic pedunculated mass   present on the atrial side of the septal leaflet consistent with   vegetation. There is mild regurgitation with a centrally directed jet.    IVC/SVC: Normal venous pressure at 3 mmHg.    Pericardium: There is a trivial effusion. No indication of cardiac   tamponade.        Echo    Result Date: 4/12/2024    Left Ventricle: There is normal systolic function with a visually   estimated ejection fraction of 65 - 70%. Global longitudinal strain is   normal; -19.9%. There is normal diastolic function.    Right Ventricle: Normal right ventricular cavity size. Wall thickness   is normal. Right ventricle wall motion  is normal. Systolic function is   normal.    IVC/SVC: Normal venous pressure at 3 mmHg.    Pericardium: There is a trivial effusion. No indication of cardiac   tamponade.         [id number]

## 2024-07-17 NOTE — HOSPITAL COURSE
Pt reports she did indulge in cocaine as well as heroin. Meds for opiate withdrawal ordered. No meds needed to be given so far other than muscle relaxants for neck pain. Restarted IV cefazolin from last admission along with vancomycin in light of drug use. Blood cx in process - no growth to date. Will consult ID - rec rpt CT cervical neck to eval discitis. Anticipate prolonged course of cefazolin. Will d/c vanc.     Update - pt requesting to leave AMA. Had extensive discussions with patients about continuing care. Discussed risks including but not limited to drug abuse, sepsis and death. Pt expressed understanding. Opts to leave AMA.

## 2024-07-17 NOTE — CONSULTS
4F x 12 cm SL PowerMidline placed to left brachial vein using real time ultrasound.  Good blood return.  Flushes with ease.   CHG patch in place.  Sterile dressing applied. Patient tolerated well.    LOT # [id number]

## 2024-07-17 NOTE — ASSESSMENT & PLAN NOTE
Echocardiogram with evidence of tricuspid regurgitation that is mild . The patient's most recent echocardiogram result is listed below.   - remainder of treatment plan as above     Echo    Result Date: 6/25/2024    Left Ventricle: The left ventricle is normal in size. Ventricular mass   is normal. Normal wall thickness. There is normal systolic function.   Ejection fraction by visual approximation is 65%. Global longitudinal   strain is -19.0%. Global longitudinal strain is normal. There is normal   diastolic function. Normal left ventricular filling pressure. Tissue   Doppler velocity is normal.    Right Ventricle: Normal right ventricular cavity size. Wall thickness   is normal. Systolic function is normal.    Tricuspid Valve: There is a medium mobile echogenic pedunculated mass   present on the atrial side of the septal leaflet consistent with   vegetation. There is mild regurgitation with a centrally directed jet.    IVC/SVC: Normal venous pressure at 3 mmHg.    Pericardium: There is a trivial effusion. No indication of cardiac   tamponade.        Echo    Result Date: 4/12/2024    Left Ventricle: There is normal systolic function with a visually   estimated ejection fraction of 65 - 70%. Global longitudinal strain is   normal; -19.9%. There is normal diastolic function.    Right Ventricle: Normal right ventricular cavity size. Wall thickness   is normal. Right ventricle wall motion  is normal. Systolic function is   normal.    IVC/SVC: Normal venous pressure at 3 mmHg.    Pericardium: There is a trivial effusion. No indication of cardiac   tamponade.

## 2024-07-17 NOTE — ASSESSMENT & PLAN NOTE
Patient's anemia is currently stable. Has not received any PRBCs to date.   Current CBC reviewed-   Lab Results   Component Value Date    HGB 8.5 (L) 07/16/2024    HCT 22 (L) 07/16/2024     Monitor serial CBC and transfuse if patient becomes hemodynamically unstable, symptomatic or H/H drops below 7/21.

## 2024-07-17 NOTE — PLAN OF CARE
Assessment completed with patient via phone - currently homeless - living on the streets - noncompliant with care - discussed prior admission in which patient left AMA just hours before insurance approving LTAC level of care - patient agrees to remain hospitalized until dispo determined & secured     [facility] - Med Surg ([place])  Initial Discharge Assessment       Primary Care Provider: Doris, Primary Doctor    Admission Diagnosis: Shortness of breath [R06.02]  Bacteremia [R78.81]  Substance abuse [F19.10]  Not feeling great [R68.89]  Endocarditis, unspecified chronicity, unspecified endocarditis type [I38]    Admission Date: 7/16/2024  Expected Discharge Date:     Transition of Care Barriers: Does not adhere to care plan, Homeless, Substance Abuse    Payor: MEDICAID / Plan: [facility] COMMUNITY PLAN [facility] ([place] MEDICAID) / Product Type: Managed Medicaid /     Extended Emergency Contact Information  Primary Emergency Contact: Eryn Wiseman  Mobile Phone: [phone]  Relation: Daughter  Secondary Emergency Contact: Fernando Laurent  Address: [address] [place] [id number]  Mobile Phone: [phone]  Relation: Friend  Mother: Ashley Wiseman  Mobile Phone: [phone]    Discharge Plan A: Long-term acute care facility (LTAC)  Discharge Plan B: Home with family      [id number] - [facility] [place], [place] - [address] AT [facility] [address]  [address]  [place] [address]  Phone: [phone] Fax: [phone]      Initial Assessment (most recent)       Adult Discharge Assessment - 07/17/24 1726          Discharge Assessment    Assessment Type Discharge Planning Assessment     Confirmed/corrected address, phone number and insurance Yes     Confirmed Demographics --   updated phone #    Source of Information patient     Does patient/caregiver understand observation status Yes     People in Home --   homeless    Do you expect to return to your current living situation? No      Prior to hospitilization cognitive status: Alert/Oriented     Current cognitive status: Alert/Oriented     Walking or Climbing Stairs Difficulty no     Dressing/Bathing Difficulty no     Equipment Currently Used at Home none     Readmission within 30 days? Yes     Patient currently being followed by outpatient case management? No     Do you currently have service(s) that help you manage your care at home? No     Do you take prescription medications? No     Do you have prescription coverage? Yes     Are you on dialysis? No     Discharge Plan A Long-term acute care facility (LTAC)     Discharge Plan B Home with family     DME Needed Upon Discharge  none     Discharge Plan discussed with: Patient     Transition of Care Barriers Does not adhere to care plan;Homeless;Substance Abuse

## 2024-07-17 NOTE — ASSESSMENT & PLAN NOTE
- per psych note last admission - 'Hold off on initiation of all other scheduled psychotropic medications at this time due to patient's hyponatremia. She may benefit from an SSRI however would recommend a continued wash out period from recent and chronic substance use to better assess her psychiatric baseline. This is another reason for pursuing inpatient substance use treatment as patient would be in a controlled setting to facilitate a more thorough and accurate diagnosis. '

## 2024-07-17 NOTE — PROGRESS NOTES
Pharmacokinetic Initial Assessment: IV Vancomycin    Assessment/Plan:    Initiate intravenous vancomycin with loading dose of 1750 mg once followed by a maintenance dose of vancomycin 2000 mg IV every 24 hours  Desired empiric serum trough concentration is 10 to 20 mcg/mL  Draw vancomycin trough level 60 min prior to third dose on 7/18/2024 at approximately 14:00  Pharmacy will continue to follow and monitor vancomycin.      Please contact pharmacy at extension [phone] with any questions regarding this assessment.     Thank you for the consult,   Yamilex León       Patient brief summary:  Margarita Wiseman is a 49 y.o. female initiated on antimicrobial therapy with IV Vancomycin for treatment of suspected bacteremia    Drug Allergies:   Review of patient's allergies indicates:  No Known Allergies    Actual Body Weight:   72.6 kg    Renal Function:   Estimated Creatinine Clearance: 68.7 mL/min (based on SCr of 0.9 mg/dL).,     Dialysis Method (if applicable):  N/A    CBC (last 72 hours):  Recent Labs   Lab Result Units 07/16/24 2142   WBC K/uL 7.90   Hemoglobin g/dL 8.5*   Hematocrit % 28.2*   Platelets K/uL 453*   Gran % % 46.4   Lymph % % 40.8   Mono % % 8.4   Eosinophil % % 3.7   Basophil % % 0.4   Differential Method  Automated       Metabolic Panel (last 72 hours):  Recent Labs   Lab Result Units 07/16/24 2142 07/17/24  0059   Sodium mmol/L 137  --    Potassium mmol/L 3.7  --    Chloride mmol/L 106  --    CO2 mmol/L 20*  --    Glucose mg/dL 81  --    Glucose, UA   --  Negative   BUN mg/dL 13  --    Creatinine mg/dL 0.9  --    Creatinine, Urine mg/dL  --  96.8   Albumin g/dL 2.6*  --    Total Bilirubin mg/dL 0.1  --    Alkaline Phosphatase U/L 86  --    AST U/L 39  --    ALT U/L 26  --    Magnesium mg/dL 1.6  1.6  --    Phosphorus mg/dL 3.8  --        Microbiologic Results:  Microbiology Results (last 7 days)       Procedure Component Value Units Date/Time    Blood culture #2 **CANNOT BE ORDERED STAT**  [[id number]] Collected: 07/16/24 2157    Order Status: Sent Specimen: Blood from Peripheral, Antecubital, Right Updated: 07/17/24 0247    Blood culture #1 **CANNOT BE ORDERED STAT** [[id number]] Collected: 07/16/24 2157    Order Status: Sent Specimen: Blood from Peripheral, Hand, Right Updated: 07/17/24 0247    Urine culture [[id number]] Collected: 07/17/24 0059    Order Status: No result Specimen: Urine Updated: 07/17/24 0115    Influenza A & B by Molecular [[id number]] Collected: 07/16/24 2212    Order Status: Completed Specimen: Nasopharyngeal Swab Updated: 07/16/24 2236     Influenza A, Molecular Negative     Influenza B, Molecular Negative     Flu A & B Source Nasal Swab    Blood culture x two cultures. Draw prior to antibiotics. [[id number]]     Order Status: Canceled Specimen: Blood     Blood culture x two cultures. Draw prior to antibiotics. [[id number]]     Order Status: Canceled Specimen: Blood

## 2024-07-17 NOTE — ASSESSMENT & PLAN NOTE
Ms. Margarita Wiseman returns after leaving [facility] a second time as she is feeling generally unwell   She had been being treated for Bacteremia 2/2 Endocarditis w/ tricuspid vegetation and Septic Emboli to lungs and cervical neck infection  s/p vancomycin & zosyn in ED     Plan -   - ID had changed her antibiotics to 6g cefezolin q24 hours during her last admission, will continue   - will keep vanc on for now in light of drug use till cultures return

## 2024-07-17 NOTE — ASSESSMENT & PLAN NOTE
Patient's anemia is currently stable. Has not received any PRBCs to date.   Current CBC reviewed-   Lab Results   Component Value Date    HGB 7.8 (L) 07/17/2024    HCT 26.1 (L) 07/17/2024     Monitor serial CBC and transfuse if patient becomes hemodynamically unstable, symptomatic or H/H drops below 7/21.

## 2024-07-18 LAB
ALBUMIN SERPL BCP-MCNC: 2 G/DL (ref 3.5–5.2)
ALP SERPL-CCNC: 82 U/L (ref 55–135)
ALT SERPL W/O P-5'-P-CCNC: 15 U/L (ref 10–44)
ANION GAP SERPL CALC-SCNC: 8 MMOL/L (ref 8–16)
AST SERPL-CCNC: 18 U/L (ref 10–40)
BILIRUB SERPL-MCNC: 0.1 MG/DL (ref 0.1–1)
BUN SERPL-MCNC: 11 MG/DL (ref 6–20)
CALCIUM SERPL-MCNC: 8 MG/DL (ref 8.7–10.5)
CHLORIDE SERPL-SCNC: 108 MMOL/L (ref 95–110)
CO2 SERPL-SCNC: 23 MMOL/L (ref 23–29)
CREAT SERPL-MCNC: 0.9 MG/DL (ref 0.5–1.4)
ERYTHROCYTE [DISTWIDTH] IN BLOOD BY AUTOMATED COUNT: 17.2 % (ref 11.5–14.5)
EST. GFR  (NO RACE VARIABLE): >60 ML/MIN/1.73 M^2
GLUCOSE SERPL-MCNC: 111 MG/DL (ref 70–110)
HCT VFR BLD AUTO: 28.6 % (ref 37–48.5)
HGB BLD-MCNC: 8.9 G/DL (ref 12–16)
MAGNESIUM SERPL-MCNC: 1.9 MG/DL (ref 1.6–2.6)
MCH RBC QN AUTO: 24.3 PG (ref 27–31)
MCHC RBC AUTO-ENTMCNC: 31.1 G/DL (ref 32–36)
MCV RBC AUTO: 78 FL (ref 82–98)
PHOSPHATE SERPL-MCNC: 3.5 MG/DL (ref 2.7–4.5)
PLATELET # BLD AUTO: 312 K/UL (ref 150–450)
PMV BLD AUTO: 11.3 FL (ref 9.2–12.9)
POTASSIUM SERPL-SCNC: 3.7 MMOL/L (ref 3.5–5.1)
PROT SERPL-MCNC: 7.6 G/DL (ref 6–8.4)
RBC # BLD AUTO: 3.66 M/UL (ref 4–5.4)
SODIUM SERPL-SCNC: 139 MMOL/L (ref 136–145)
VANCOMYCIN TROUGH SERPL-MCNC: 9.7 UG/ML (ref 10–22)
WBC # BLD AUTO: 6.89 K/UL (ref 3.9–12.7)

## 2024-07-18 PROCEDURE — G0378 HOSPITAL OBSERVATION PER HR: HCPCS

## 2024-07-18 PROCEDURE — 63600175 PHARM REV CODE 636 W HCPCS: Performed by: STUDENT IN AN ORGANIZED HEALTH CARE EDUCATION/TRAINING PROGRAM

## 2024-07-18 PROCEDURE — 84100 ASSAY OF PHOSPHORUS: CPT | Performed by: STUDENT IN AN ORGANIZED HEALTH CARE EDUCATION/TRAINING PROGRAM

## 2024-07-18 PROCEDURE — 80202 ASSAY OF VANCOMYCIN: CPT | Performed by: STUDENT IN AN ORGANIZED HEALTH CARE EDUCATION/TRAINING PROGRAM

## 2024-07-18 PROCEDURE — 80053 COMPREHEN METABOLIC PANEL: CPT | Performed by: STUDENT IN AN ORGANIZED HEALTH CARE EDUCATION/TRAINING PROGRAM

## 2024-07-18 PROCEDURE — 96372 THER/PROPH/DIAG INJ SC/IM: CPT | Performed by: PHYSICIAN ASSISTANT

## 2024-07-18 PROCEDURE — 83735 ASSAY OF MAGNESIUM: CPT | Performed by: STUDENT IN AN ORGANIZED HEALTH CARE EDUCATION/TRAINING PROGRAM

## 2024-07-18 PROCEDURE — 63600175 PHARM REV CODE 636 W HCPCS: Performed by: PHYSICIAN ASSISTANT

## 2024-07-18 PROCEDURE — 25000003 PHARM REV CODE 250: Performed by: STUDENT IN AN ORGANIZED HEALTH CARE EDUCATION/TRAINING PROGRAM

## 2024-07-18 PROCEDURE — 36415 COLL VENOUS BLD VENIPUNCTURE: CPT | Performed by: STUDENT IN AN ORGANIZED HEALTH CARE EDUCATION/TRAINING PROGRAM

## 2024-07-18 PROCEDURE — 25000003 PHARM REV CODE 250: Performed by: PHYSICIAN ASSISTANT

## 2024-07-18 PROCEDURE — C1751 CATH, INF, PER/CENT/MIDLINE: HCPCS

## 2024-07-18 PROCEDURE — 36410 VNPNXR 3YR/> PHY/QHP DX/THER: CPT

## 2024-07-18 PROCEDURE — 85027 COMPLETE CBC AUTOMATED: CPT | Performed by: STUDENT IN AN ORGANIZED HEALTH CARE EDUCATION/TRAINING PROGRAM

## 2024-07-18 PROCEDURE — 21400001 HC TELEMETRY ROOM

## 2024-07-18 PROCEDURE — A4216 STERILE WATER/SALINE, 10 ML: HCPCS | Performed by: STUDENT IN AN ORGANIZED HEALTH CARE EDUCATION/TRAINING PROGRAM

## 2024-07-18 PROCEDURE — A4216 STERILE WATER/SALINE, 10 ML: HCPCS | Performed by: PHYSICIAN ASSISTANT

## 2024-07-18 RX ADMIN — Medication 10 ML: at 02:07

## 2024-07-18 RX ADMIN — SODIUM CHLORIDE, PRESERVATIVE FREE 10 ML: 5 INJECTION INTRAVENOUS at 11:07

## 2024-07-18 RX ADMIN — SODIUM CHLORIDE, PRESERVATIVE FREE 10 ML: 5 INJECTION INTRAVENOUS at 09:07

## 2024-07-18 RX ADMIN — VANCOMYCIN HYDROCHLORIDE 2000 MG: 500 INJECTION, POWDER, LYOPHILIZED, FOR SOLUTION INTRAVENOUS at 07:07

## 2024-07-18 RX ADMIN — SODIUM CHLORIDE, PRESERVATIVE FREE 10 ML: 5 INJECTION INTRAVENOUS at 12:07

## 2024-07-18 RX ADMIN — HEPARIN SODIUM 5000 UNITS: 5000 INJECTION INTRAVENOUS; SUBCUTANEOUS at 09:07

## 2024-07-18 RX ADMIN — CEFAZOLIN 6000 MG: 3 INJECTION, POWDER, FOR SOLUTION INTRAMUSCULAR; INTRAVENOUS at 11:07

## 2024-07-18 RX ADMIN — Medication 10 ML: at 09:07

## 2024-07-18 RX ADMIN — SODIUM CHLORIDE, PRESERVATIVE FREE 10 ML: 5 INJECTION INTRAVENOUS at 05:07

## 2024-07-18 RX ADMIN — HEPARIN SODIUM 5000 UNITS: 5000 INJECTION INTRAVENOUS; SUBCUTANEOUS at 05:07

## 2024-07-18 RX ADMIN — KETOROLAC TROMETHAMINE 15 MG: 30 INJECTION, SOLUTION INTRAMUSCULAR; INTRAVENOUS at 07:07

## 2024-07-18 RX ADMIN — METHOCARBAMOL 500 MG: 500 TABLET ORAL at 12:07

## 2024-07-18 RX ADMIN — HEPARIN SODIUM 5000 UNITS: 5000 INJECTION INTRAVENOUS; SUBCUTANEOUS at 02:07

## 2024-07-18 NOTE — PROGRESS NOTES
[facility] - Med [facility]urg ([place])  Wound Care    Patient Name:  Margarita Wiseman   MRN:  [id number]  Date: 7/18/2024  Diagnosis: Staphylococcus aureus bacteremia    History:     History reviewed. No pertinent past medical history.    Social History     Socioeconomic History    Marital status: Unknown   Tobacco Use    Smoking status: Every Day     Current packs/day: 1.00     Average packs/day: 1 pack/day for 27.5 years (27.5 ttl pk-yrs)     Types: Cigarettes     Start date: 1997    Smokeless tobacco: Never    Tobacco comments:     Smoking cessation education provided. Pt is a 1 pk/day cigarette smoker x 26 yrs. She states that she cut down to 0.5 pk/day approx. 1 yr ago. Pt declines referral to Ambulatory Smoking Cessation clinic, stating not ready to quit. Handout provided.    Substance and Sexual Activity    Alcohol use: Not Currently    Drug use: Yes     Types: IV    Sexual activity: Not Currently     Social Determinants of Health     Financial Resource Strain: High Risk (6/25/2024)    Overall Financial Resource Strain (CARDIA)     Difficulty of Paying Living Expenses: Very hard   Food Insecurity: Food Insecurity Present (6/25/2024)    Hunger Vital Sign     Worried About Running Out of Food in the Last Year: Often true     Ran Out of Food in the Last Year: Often true   Transportation Needs: Unmet Transportation Needs (6/25/2024)    TRANSPORTATION NEEDS     Transportation : Yes, it has kept me from medical appointments or from getting my medications.   Physical Activity: Inactive (6/25/2024)    Exercise Vital Sign     Days of Exercise per Week: 0 days     Minutes of Exercise per Session: 0 min   Stress: Stress Concern Present (6/25/2024)    [employer] - Occupational Stress Questionnaire     Feeling of Stress : To some extent   Housing Stability: High Risk (6/25/2024)    Housing Stability Vital Sign     Unable to Pay for Housing in the Last Year: Yes     Homeless in the Last Year: Yes        Precautions:     Allergies as of 07/16/2024    (No Known Allergies)       [facility] Assessment Details/Treatment     Wound care consult received for assessment of left forearm. Patient is a 49 year old female known to wound care team from previous admissions. Past medical history of IVDA, polysubstance abuse (cocaine and heroin), endocarditis with tricuspid vegetation, septic pulmonary emboli, chronic hep C, chronic anemia, chronic thrombocytopenia, 2nd degree heart block, chronic L arm wound (currently skin popping drugs, present for several years), bipolar disorder, PTSD, anxiety/depression, homelessness, and medication noncompliance who presents to [facility] ED on 7/17/24 due to generalized body pain. A metabolic panel showed no acute abnormalities. A drug screen was positive for cocaine. A UA showed a nitrite negative UTI with 24 WBC/hpf, many bacteria, 2+ leuk esterase. She was treated in the ED with vancomycin and zosyn. She was placed on observation.     Upon assessment to left arm noted healing full thickness wound. Etiology is likely due to skin popping. Cleansed wound with Vashe and applied triad paste to wound bed and covered with foam dressing. Patient tolerated dressing change well.     Recommend every 3 day dressing change: cleanse with wound cleanser. Pat dry and apply thin layer of triad paste to wound. Cover with foam dressing.     Nursing and MD team notified. Orders placed. Nursing to maintain pressure injury prevention interventions. Wound care will follow.    07/18/24 0815        Wound 07/16/24 Ulceration Left anterior;lower Arm #1   Date First Assessed: 07/16/24   Present on Original Admission: Yes  Primary Wound Type: Ulceration  Side: Left  Orientation: anterior;lower  Location: Arm  Wound Number: #1   Wound Image    Dressing Appearance Dry;Intact;Moist drainage   Drainage Amount Scant   Drainage Characteristics/Odor Serous;No odor   Appearance Pink;Red;Moist;Granulating   Tissue loss description  Full thickness   Periwound Area Dry;Scar tissue   Wound Edges Open   Wound Length (cm) 2.7 cm   Wound Width (cm) 1 cm   Wound Depth (cm) 0.2 cm   Wound Volume (cm^3) 0.54 cm^3   Wound Surface Area (cm^2) 2.7 cm^2   Care Cleansed with:;Antimicrobial agent;Applied:  (triad paste)   Dressing Applied;Silicone;Foam             07/18/2024

## 2024-07-18 NOTE — PLAN OF CARE
Problem: Adult Inpatient Plan of Care  Goal: Plan of Care Review  Outcome: Progressing  Patient had no adverse events during shift. Patient free of falls. Call light in reach. Side Rails x2. Bed alarm set. Patient repositions independently. IVF/ABX administered as ordered. VSS. Heart monitor in place. Dressing to L forearm Clean, dry, and intact. Chart reviewed.       Notified Attending, Dr. Soriano that patients scheduled Vanc is incompatible with Continuous Cefazolin, unable to y-site. Informed that multiple IV attempts were performed by 2 staff nurses and 1 ICU nurse with no success. Orders placed for 2nd Midline. House sup notified and PICC/Midline team consulted. Pharmacy made aware of why Vanc has not been administered on schedule time. Will have pharmacy re-time Vanc after first dose is given.

## 2024-07-18 NOTE — NURSING
Lab called and CBC tube was clotted. Phlebotomist having difficulty collecting labs as pt has poor venous access. New order for CBC placed for recollect.

## 2024-07-18 NOTE — ASSESSMENT & PLAN NOTE
Ms. Margarita Wiseman returns after leaving [place] a second time as she is feeling generally unwell   She had been being treated for Bacteremia 2/2 Endocarditis w/ tricuspid vegetation and Septic Emboli to lungs and cervical neck infection  s/p vancomycin & zosyn in ED     Plan -   - ID had changed her antibiotics to 6g cefezolin q24 hours during her last admission, will continue   - will keep vanc on for now in light of drug use till cultures return   - Consult ID for abx plan

## 2024-07-18 NOTE — PROGRESS NOTES
[facility] Medicine  Progress Note    Patient Name: Margarita Wiseman  MRN: [id number]  Patient Class: OP- Observation   Admission Date: 7/16/2024  Length of Stay: 0 days  Attending Physician: Bonny Bustos MD  Primary Care Provider: Doris, Primary Doctor        Subjective:     Principal Problem:Staphylococcus aureus bacteremia        HPI:  Ms. Margarita Wiseman is a 49 y.o. female, with PMH of IVDA, polysubstance abuse (cocaine and heroin), endocarditis with tricuspid vegetation, septic pulmonary emboli, chronic hep C, chronic anemia, chronic thrombocytopenia, 2nd degree heart block, chronic L arm wound (currently skin popping drugs, present for several years), bipolar disorder, PTSD, anxiety/depression, homelessness, and medication noncompliance who presents to [facility] ED on 7/17/24 due to generalized body pain. She was admitted 4/11 - 13/24 for bacteremia 2/2 strep & staph and treated with IV antibiotics until she left AMA. She was brought in by EMS on 6/23 due to hypotension, body aches, fever, and chills, (and was admitted until she left AMA on 7/9). During that admission she has severe sepsis requiring pressor support in the ICU at one point, and was treated for endocarditis, septic pulmonary emboli, MSSA with ertapenem followed by oxacillin. She was also treated for synovitis of the glenohumeral joint, subacromial subdeltoid bursitis and had a joint aspiration for septic joint. After being seen by ID her antibiotic regimen was changed to cefazolin. She was to have access placed for an additional 6 weeks of cefazolin coverage to continue after discharge. She again left AMA prior to her hospital based treatment being completed. In the ED tonight labs showed no leukocytosis or left shift. She is anemia with H&H of 8.5/28.2 on CBC. A metabolic panel showed no acute abnormalities. A drug screen was positive for cocaine. A UA showed a nitrite negative UTI with 24 WBC/hpf, many bacteria, 2+ leuk  esterase. She was treated in the ED with vancomycin and zosyn. She was placed on observation.         Overview/Hospital Course:  Pt reports she did indulge in cocaine as well as heroin. Meds for opiate withdrawal ordered. Restarted IV cefazolin from last admission along with vancomycin in light of drug use. Blood cx in process. Will consult ID.     Interval history - Pt reports feeling malaise.     Review of Systems     Constitutional:  Positive for chills, fatigue and fever.   Respiratory:  Negative for cough and shortness of breath.    Cardiovascular:  Negative for chest pain and leg swelling.   Gastrointestinal:  Negative for abdominal pain.   Musculoskeletal:  Positive for neck pain.   Psychiatric/Behavioral:  Negative for agitation and confusion.      Objective:     Vital Signs (Most Recent):  Temp: 97.9 °F (36.6 °C) (07/18/24 0850)  Pulse: (!) 57 (07/18/24 0850)  Resp: 18 (07/18/24 0850)  BP: 124/83 (07/18/24 0850)  SpO2: 99 % (07/18/24 0850) Vital Signs (24h Range):  Temp:  [97.8 °F (36.6 °C)-99.1 °F (37.3 °C)] 97.9 °F (36.6 °C)  Pulse:  [45-79] 57  Resp:  [12-20] 18  SpO2:  [97 %-100 %] 99 %  BP: ()/(54-83) 124/83     Weight: 71.2 kg (156 lb 15.5 oz)  Body mass index is 30.66 kg/m².     Physical Exam  Constitutional:       General: She is not in acute distress.     Appearance: She is ill-appearing.   Neck:      Comments: Limited ROM in neck   Pulmonary:      Effort: No respiratory distress.      Breath sounds: No wheezing.   Abdominal:      General: There is no distension.      Palpations: Abdomen is soft.      Tenderness: There is no abdominal tenderness.   Musculoskeletal:      Right lower leg: No edema.      Left lower leg: No edema.   Neurological:      General: No focal deficit present.      Mental Status: She is oriented to person, place, and time.                      Significant Labs: All pertinent labs within the past 24 hours have been reviewed.    Significant Imaging: I have reviewed all  pertinent imaging results/findings within the past 24 hours.    Assessment/Plan:      * Staphylococcus aureus bacteremia  Ms. Margarita Wiseman returns after leaving [place] a second time as she is feeling generally unwell   She had been being treated for Bacteremia 2/2 Endocarditis w/ tricuspid vegetation and Septic Emboli to lungs and cervical neck infection  s/p vancomycin & zosyn in ED     Plan -   - ID had changed her antibiotics to 6g cefezolin q24 hours during her last admission, will continue   - will keep vanc on for now in light of drug use till cultures return   - Consult ID for abx plan       Septic embolism  - IV antibiotics as above       Infective endocarditis of tricuspid valve  Echocardiogram with evidence of tricuspid regurgitation that is mild . The patient's most recent echocardiogram result is listed below.   - remainder of treatment plan as above     Echo    Result Date: 6/25/2024    Left Ventricle: The left ventricle is normal in size. Ventricular mass   is normal. Normal wall thickness. There is normal systolic function.   Ejection fraction by visual approximation is 65%. Global longitudinal   strain is -19.0%. Global longitudinal strain is normal. There is normal   diastolic function. Normal left ventricular filling pressure. Tissue   Doppler velocity is normal.    Right Ventricle: Normal right ventricular cavity size. Wall thickness   is normal. Systolic function is normal.    Tricuspid Valve: There is a medium mobile echogenic pedunculated mass   present on the atrial side of the septal leaflet consistent with   vegetation. There is mild regurgitation with a centrally directed jet.    IVC/SVC: Normal venous pressure at 3 mmHg.    Pericardium: There is a trivial effusion. No indication of cardiac   tamponade.        Echo    Result Date: 4/12/2024    Left Ventricle: There is normal systolic function with a visually   estimated ejection fraction of 65 - 70%. Global longitudinal strain is   normal;  -19.9%. There is normal diastolic function.    Right Ventricle: Normal right ventricular cavity size. Wall thickness   is normal. Right ventricle wall motion  is normal. Systolic function is   normal.    IVC/SVC: Normal venous pressure at 3 mmHg.    Pericardium: There is a trivial effusion. No indication of cardiac   tamponade.          Housing instability  - consult case management/social work      Chronic hepatitis C virus infection  - first positive 6/20/19  - not yet treated   - LFTs WNL  - refer to hepatology at discharge     PTSD and Depression  - per psych note last admission - 'Hold off on initiation of all other scheduled psychotropic medications at this time due to patient's hyponatremia. She may benefit from an SSRI however would recommend a continued wash out period from recent and chronic substance use to better assess her psychiatric baseline. This is another reason for pursuing inpatient substance use treatment as patient would be in a controlled setting to facilitate a more thorough and accurate diagnosis. '      Drug abuse, IV  Utox only pos for cocaine but pt endorses cocaine and heroin use     Plan -   - PRN medications for withdrawal symptoms   - COWS q4      Chronic anemia  Patient's anemia is currently stable. Has not received any PRBCs to date.   Current CBC reviewed-   Lab Results   Component Value Date    HGB 7.8 (L) 07/17/2024    HCT 26.1 (L) 07/17/2024     Monitor serial CBC and transfuse if patient becomes hemodynamically unstable, symptomatic or H/H drops below 7/21.      VTE Risk Mitigation (From admission, onward)           Ordered     heparin (porcine) injection 5,000 Units  Every 8 hours         07/17/24 0047     IP VTE HIGH RISK PATIENT  Once         07/17/24 0047     Place sequential compression device  Until discontinued         07/17/24 0047                    Discharge Planning   ABELARDO:      Code Status: Full Code   Is the patient medically ready for discharge?:     Reason for  patient still in hospital (select all that apply): Treatment  Discharge Plan A: Long-term acute care facility (LTAC)                  Bonny Maradiaga MD  Department of Hospital Medicine   [facility] ([place])

## 2024-07-18 NOTE — PROGRESS NOTES
Pharmacokinetic Assessment Follow Up: IV Vancomycin    Vancomycin serum concentration assessment(s):    The trough level was drawn correctly and can be used to guide therapy at this time. The measurement is below the desired definitive target range of 10 to 20 mcg/mL.    Vancomycin Regimen Plan:    Change regimen to Vancomycin 1250 mg IV every 12 hours with next serum trough concentration measured at 1215 prior to next dose on 7/20    Drug levels (last 3 results):  Recent Labs   Lab Result Units 07/18/24  1443   Vancomycin-Trough ug/mL 9.7*       Pharmacy will continue to follow and monitor vancomycin.    Please contact pharmacy at extension [phone] for questions regarding this assessment.    Thank you for the consult,   Heather Gutierres       Patient brief summary:  Margarita Wiseman is a 49 y.o. female initiated on antimicrobial therapy with IV Vancomycin for treatment of bacteremia    The patient's current regimen is 1250 mg every 12 hours.    Drug Allergies:   Review of patient's allergies indicates:  No Known Allergies    Actual Body Weight:   71.2 kg    Renal Function:   Estimated Creatinine Clearance: 68.1 mL/min (based on SCr of 0.9 mg/dL).,     Dialysis Method (if applicable):  N/A    CBC (last 72 hours):  Recent Labs   Lab Result Units 07/16/24 2142 07/17/24  0856 07/18/24  0716   WBC K/uL 7.90 5.58 6.89   Hemoglobin g/dL 8.5* 7.8* 8.9*   Hematocrit % 28.2* 26.1* 28.6*   Platelets K/uL 453* 396 312   Gran % % 46.4 53.2  --    Lymph % % 40.8 35.8  --    Mono % % 8.4 6.6  --    Eosinophil % % 3.7 3.2  --    Basophil % % 0.4 0.7  --    Differential Method  Automated Automated  --        Metabolic Panel (last 72 hours):  Recent Labs   Lab Result Units 07/16/24 2142 07/17/24  0059 07/17/24  0856 07/18/24  0443   Sodium mmol/L 137  --  138 139   Potassium mmol/L 3.7  --  3.3* 3.7   Chloride mmol/L 106  --  110 108   CO2 mmol/L 20*  --  21* 23   Glucose mg/dL 81  --  84 111*   Glucose, UA   --  Negative  --    --    BUN mg/dL 13  --  9 11   Creatinine mg/dL 0.9  --  0.8 0.9   Creatinine, Urine mg/dL  --  96.8  --   --    Albumin g/dL 2.6*  --   --  2.0*   Total Bilirubin mg/dL 0.1  --   --  0.1   Alkaline Phosphatase U/L 86  --   --  82   AST U/L 39  --   --  18   ALT U/L 26  --   --  15   Magnesium mg/dL 1.6  1.6  --  1.5* 1.9   Phosphorus mg/dL 3.8  --   --  3.5       Vancomycin Administrations:  vancomycin given in the last 96 hours                     vancomycin 2 g in dextrose 5 % 500 mL IVPB (mg) 2,000 mg New Bag 07/17/24 1414    vancomycin 1.75 g in 5 % dextrose 500 mL IVPB (mg) 1,750 mg New Bag 07/16/24 2250                    Microbiologic Results:  Microbiology Results (last 7 days)       Procedure Component Value Units Date/Time    Urine culture [[id number]]  (Abnormal) Collected: 07/17/24 0059    Order Status: Completed Specimen: Urine Updated: 07/18/24 1647     Urine Culture, Routine GRAM NEGATIVE SUKUMAR  10,000 - 49,999 cfu/ml  Identification and susceptibility pending      Narrative:      Specimen Source->Urine    Blood culture #2 **CANNOT BE ORDERED STAT** [[id number]] Collected: 07/16/24 2157    Order Status: Completed Specimen: Blood from Peripheral, Antecubital, Right Updated: 07/18/24 0613     Blood Culture, Routine No Growth to date      No Growth to date    Blood culture #1 **CANNOT BE ORDERED STAT** [[id number]] Collected: 07/16/24 2157    Order Status: Completed Specimen: Blood from Peripheral, Hand, Right Updated: 07/18/24 0613     Blood Culture, Routine No Growth to date      No Growth to date    Influenza A & B by Molecular [[id number]] Collected: 07/16/24 2212    Order Status: Completed Specimen: Nasopharyngeal Swab Updated: 07/16/24 2236     Influenza A, Molecular Negative     Influenza B, Molecular Negative     Flu A & B Source Nasal Swab    Blood culture x two cultures. Draw prior to antibiotics. [[id number]]     Order Status: Canceled Specimen: Blood     Blood culture x two cultures. Draw  prior to antibiotics. [[id number]]     Order Status: Canceled Specimen: Blood

## 2024-07-18 NOTE — PLAN OF CARE
Pt remained free of falls and injuries throughout shift. Pt lethargic and sleeping most of shift after eating dinner. Pt arouses to voice and oriented X4, will answer questions then fall back asleep. Purposeful hourly rounding performed. Pt swallows meds whole. IV cefazolin infusing continuously per MAR. Managed c/o muscles spasms with PRN Robaxin. Patient ambulates with X1 assist but weak and lethargic, pure wick placed to suction. VSS on room air. Pt sleeping in bed, even rise and fall of chest. Bed low and locked, bed alarm on, call light in reach. Side rails up x3. Will continue to monitor.

## 2024-07-18 NOTE — SUBJECTIVE & OBJECTIVE
Interval history - Pt reports feeling malaise.     Review of Systems   Reason unable to perform ROS: Patient does not participate in answering questions. She is somnolent and awaeks momentarily only.   Constitutional:  Positive for chills, fatigue and fever.   Respiratory:  Negative for cough and shortness of breath.    Cardiovascular:  Negative for chest pain and leg swelling.   Gastrointestinal:  Negative for abdominal pain.   Musculoskeletal:  Positive for neck pain.   Psychiatric/Behavioral:  Negative for agitation and confusion.      Objective:     Vital Signs (Most Recent):  Temp: 97.9 °F (36.6 °C) (07/18/24 0850)  Pulse: (!) 57 (07/18/24 0850)  Resp: 18 (07/18/24 0850)  BP: 124/83 (07/18/24 0850)  SpO2: 99 % (07/18/24 0850) Vital Signs (24h Range):  Temp:  [97.8 °F (36.6 °C)-99.1 °F (37.3 °C)] 97.9 °F (36.6 °C)  Pulse:  [45-79] 57  Resp:  [12-20] 18  SpO2:  [97 %-100 %] 99 %  BP: ()/(54-83) 124/83     Weight: 71.2 kg (156 lb 15.5 oz)  Body mass index is 30.66 kg/m².     Physical Exam  Constitutional:       General: She is not in acute distress.     Appearance: She is ill-appearing.   Neck:      Comments: Limited ROM in neck   Pulmonary:      Effort: No respiratory distress.      Breath sounds: No wheezing.   Abdominal:      General: There is no distension.      Palpations: Abdomen is soft.      Tenderness: There is no abdominal tenderness.   Musculoskeletal:      Right lower leg: No edema.      Left lower leg: No edema.   Neurological:      General: No focal deficit present.      Mental Status: She is oriented to person, place, and time.                      Significant Labs: All pertinent labs within the past 24 hours have been reviewed.    Significant Imaging: I have reviewed all pertinent imaging results/findings within the past 24 hours.   O-T Advancement Flap Text: The defect edges were debeveled with a #15 scalpel blade.  Given the location of the defect, shape of the defect and the proximity to free margins an O-T advancement flap was deemed most appropriate.  Using a sterile surgical marker, an appropriate advancement flap was drawn incorporating the defect and placing the expected incisions within the relaxed skin tension lines where possible.    The area thus outlined was incised deep to adipose tissue with a #15 scalpel blade.  The skin margins were undermined to an appropriate distance in all directions utilizing iris scissors.

## 2024-07-19 VITALS
BODY MASS INDEX: 30.81 KG/M2 | RESPIRATION RATE: 16 BRPM | HEIGHT: 60 IN | SYSTOLIC BLOOD PRESSURE: 142 MMHG | TEMPERATURE: 99 F | DIASTOLIC BLOOD PRESSURE: 91 MMHG | OXYGEN SATURATION: 99 % | WEIGHT: 156.94 LBS | HEART RATE: 55 BPM

## 2024-07-19 LAB — BACTERIA UR CULT: ABNORMAL

## 2024-07-19 PROCEDURE — A4216 STERILE WATER/SALINE, 10 ML: HCPCS | Performed by: PHYSICIAN ASSISTANT

## 2024-07-19 PROCEDURE — 25000003 PHARM REV CODE 250: Performed by: PHYSICIAN ASSISTANT

## 2024-07-19 PROCEDURE — 96372 THER/PROPH/DIAG INJ SC/IM: CPT | Performed by: PHYSICIAN ASSISTANT

## 2024-07-19 PROCEDURE — 94761 N-INVAS EAR/PLS OXIMETRY MLT: CPT

## 2024-07-19 PROCEDURE — 25000003 PHARM REV CODE 250: Performed by: STUDENT IN AN ORGANIZED HEALTH CARE EDUCATION/TRAINING PROGRAM

## 2024-07-19 PROCEDURE — 21400001 HC TELEMETRY ROOM

## 2024-07-19 PROCEDURE — 63600175 PHARM REV CODE 636 W HCPCS: Performed by: STUDENT IN AN ORGANIZED HEALTH CARE EDUCATION/TRAINING PROGRAM

## 2024-07-19 PROCEDURE — A4216 STERILE WATER/SALINE, 10 ML: HCPCS | Performed by: STUDENT IN AN ORGANIZED HEALTH CARE EDUCATION/TRAINING PROGRAM

## 2024-07-19 PROCEDURE — 63600175 PHARM REV CODE 636 W HCPCS: Performed by: PHYSICIAN ASSISTANT

## 2024-07-19 PROCEDURE — 99223 1ST HOSP IP/OBS HIGH 75: CPT | Mod: ,,, | Performed by: INTERNAL MEDICINE

## 2024-07-19 RX ORDER — TIZANIDINE 4 MG/1
4 TABLET ORAL EVERY 8 HOURS PRN
Status: DISCONTINUED | OUTPATIENT
Start: 2024-07-19 | End: 2024-07-19 | Stop reason: HOSPADM

## 2024-07-19 RX ADMIN — CEFAZOLIN 6000 MG: 3 INJECTION, POWDER, FOR SOLUTION INTRAMUSCULAR; INTRAVENOUS at 11:07

## 2024-07-19 RX ADMIN — SODIUM CHLORIDE, PRESERVATIVE FREE 10 ML: 5 INJECTION INTRAVENOUS at 06:07

## 2024-07-19 RX ADMIN — TIZANIDINE 4 MG: 4 TABLET ORAL at 11:07

## 2024-07-19 RX ADMIN — SODIUM CHLORIDE, PRESERVATIVE FREE 10 ML: 5 INJECTION INTRAVENOUS at 11:07

## 2024-07-19 RX ADMIN — Medication 10 ML: at 06:07

## 2024-07-19 RX ADMIN — HEPARIN SODIUM 5000 UNITS: 5000 INJECTION INTRAVENOUS; SUBCUTANEOUS at 02:07

## 2024-07-19 RX ADMIN — Medication 10 ML: at 02:07

## 2024-07-19 RX ADMIN — SODIUM CHLORIDE, PRESERVATIVE FREE 10 ML: 5 INJECTION INTRAVENOUS at 12:07

## 2024-07-19 RX ADMIN — KETOROLAC TROMETHAMINE 15 MG: 30 INJECTION, SOLUTION INTRAMUSCULAR; INTRAVENOUS at 02:07

## 2024-07-19 RX ADMIN — VANCOMYCIN HYDROCHLORIDE 1250 MG: 1.25 INJECTION, POWDER, LYOPHILIZED, FOR SOLUTION INTRAVENOUS at 02:07

## 2024-07-19 RX ADMIN — HEPARIN SODIUM 5000 UNITS: 5000 INJECTION INTRAVENOUS; SUBCUTANEOUS at 06:07

## 2024-07-19 RX ADMIN — METHOCARBAMOL 500 MG: 500 TABLET ORAL at 02:07

## 2024-07-19 RX ADMIN — KETOROLAC TROMETHAMINE 15 MG: 30 INJECTION, SOLUTION INTRAMUSCULAR; INTRAVENOUS at 08:07

## 2024-07-19 NOTE — PROGRESS NOTES
Active pharmacy to dose vancomycin consult:     Patient reviewed, renal function stable, cultures reviewed, no new levels, continue current therapy; Next levels due: 7/20 @1820    Please contact inpatient pharmacy at [phone] with any questions.     Thank you,  Yudelka Do  07/19/2024

## 2024-07-19 NOTE — SUBJECTIVE & OBJECTIVE
"History reviewed. No pertinent past medical history.    History reviewed. No pertinent surgical history.    Review of patient's allergies indicates:  No Known Allergies    Medications:  Medications Prior to Admission   Medication Sig    busPIRone (BUSPAR) 5 MG Tab Take 5 mg by mouth 2 (two) times daily.    divalproex ER (DEPAKOTE ER) 250 MG 24 hr tablet Take 250 mg by mouth once daily.    ferrous sulfate (FEOSOL) 325 mg (65 mg iron) Tab tablet Take 1 tablet (325 mg total) by mouth 2 (two) times daily.    hydrOXYzine (ATARAX) 50 MG tablet Take 1 tablet (50 mg total) by mouth nightly as needed for Itching (insomnia).    ibuprofen (ADVIL,MOTRIN) 800 MG tablet Take 1 tablet (800 mg total) by mouth every 6 (six) hours as needed for Pain.    naloxone (NARCAN) 4 mg/actuation Spry 4mg by nasal route as needed for opioid overdose; may repeat every 2-3 minutes in alternating nostrils until medical help arrives. Call 911    OLANZapine (ZYPREXA) 5 MG tablet Take 5 mg by mouth every evening.    ondansetron (ZOFRAN-ODT) 8 MG TbDL Take 1 tablet (8 mg total) by mouth every 6 (six) hours as needed (nausea).    sertraline (ZOLOFT) 25 MG tablet Take 25 mg by mouth once daily.     Antibiotics (From admission, onward)      Start     Stop Route Frequency Ordered    07/19/24 0215  vancomycin 1,250 mg in D5W 250 mL IVPB (Vial-Mate)         -- IV Every 12 hours (non-standard times) 07/18/24 1624    07/17/24 1000  ceFAZolin 6 g in D5W 500 mL CONTINUOUS INFUSION         -- IV Every 24 hours (non-standard times) 07/17/24 0941    07/17/24 0811  vancomycin - pharmacy to dose  (vancomycin IVPB (PEDS and ADULTS))        Placed in "And" Linked Group    -- IV pharmacy to manage frequency 07/17/24 0711    07/16/24 2200  piperacillin-tazobactam (ZOSYN) 4.5 gram injection        Note to Pharmacy: Created by cabinet override    07/17/24 1014   07/16/24 2200          Antifungals (From admission, onward)      None          Antivirals (From admission, " onward)      None               There is no immunization history on file for this patient.    Family History    None       Social History     Socioeconomic History    Marital status: Unknown   Tobacco Use    Smoking status: Every Day     Current packs/day: 1.00     Average packs/day: 1 pack/day for 27.5 years (27.5 ttl pk-yrs)     Types: Cigarettes     Start date: 1997    Smokeless tobacco: Never    Tobacco comments:     Smoking cessation education provided. Pt is a 1 pk/day cigarette smoker x 26 yrs. She states that she cut down to 0.5 pk/day approx. 1 yr ago. Pt declines referral to Ambulatory Smoking Cessation clinic, stating not ready to quit. Handout provided.    Substance and Sexual Activity    Alcohol use: Not Currently    Drug use: Yes     Types: IV    Sexual activity: Not Currently     Social Determinants of Health     Financial Resource Strain: High Risk (7/18/2024)    Overall Financial Resource Strain (CARDIA)     Difficulty of Paying Living Expenses: Very hard   Food Insecurity: Food Insecurity Present (6/25/2024)    Hunger Vital Sign     Worried About Running Out of Food in the Last Year: Often true     Ran Out of Food in the Last Year: Often true   Transportation Needs: Unmet Transportation Needs (6/25/2024)    TRANSPORTATION NEEDS     Transportation : Yes, it has kept me from medical appointments or from getting my medications.   Physical Activity: Inactive (6/25/2024)    Exercise Vital Sign     Days of Exercise per Week: 0 days     Minutes of Exercise per Session: 0 min   Stress: Stress Concern Present (6/25/2024)    Citizen of [place] of Occupational Health - Occupational Stress Questionnaire     Feeling of Stress : To some extent   Housing Stability: High Risk (7/18/2024)    Housing Stability Vital Sign     Unable to Pay for Housing in the Last Year: Yes     Homeless in the Last Year: Yes     Review of Systems   Constitutional:  Positive for chills, diaphoresis and fever.   Musculoskeletal:   Positive for arthralgias, back pain and neck pain.   All other systems reviewed and are negative.    Objective:     Vital Signs (Most Recent):  Temp: 98.8 °F (37.1 °C) (07/19/24 0805)  Pulse: (!) 51 (07/19/24 0805)  Resp: 16 (07/19/24 0805)  BP: 137/84 (07/19/24 0805)  SpO2: 98 % (07/19/24 0805) Vital Signs (24h Range):  Temp:  [97.9 °F (36.6 °C)-98.8 °F (37.1 °C)] 98.8 °F (37.1 °C)  Pulse:  [44-68] 51  Resp:  [16-18] 16  SpO2:  [98 %-99 %] 98 %  BP: (123-137)/(71-86) 137/84     Weight: 71.2 kg (156 lb 15.5 oz)  Body mass index is 30.66 kg/m².    Estimated Creatinine Clearance: 68.1 mL/min (based on SCr of 0.9 mg/dL).     Physical Exam  Vitals and nursing note reviewed.   Constitutional:       General: She is not in acute distress.     Appearance: Normal appearance. She is not ill-appearing, toxic-appearing or diaphoretic.   HENT:      Right Ear: External ear normal.      Left Ear: External ear normal.   Eyes:      Extraocular Movements: Extraocular movements intact.      Pupils: Pupils are equal, round, and reactive to light.   Cardiovascular:      Rate and Rhythm: Normal rate.      Heart sounds: Murmur heard.   Abdominal:      Tenderness: There is no abdominal tenderness. There is no guarding.   Skin:     Findings: Lesion present.      Comments: LUE wound (chronic)   Neurological:      General: No focal deficit present.      Mental Status: She is alert and oriented to person, place, and time.   Psychiatric:         Mood and Affect: Mood normal.          Significant Labs: Blood Culture:   Recent Labs   Lab 06/27/24  0851 06/28/24  1040 07/02/24  0800 07/02/24  0825 07/16/24  2157   LABBLOO No growth after 5 days. No growth after 5 days. No growth after 5 days. No growth after 5 days. No Growth to date  No Growth to date  No Growth to date  No Growth to date  No Growth to date  No Growth to date     BMP:   Recent Labs   Lab 07/18/24  0443   *      K 3.7      CO2 23   BUN 11   CREATININE 0.9    CALCIUM 8.0*   MG 1.9     CBC:   Recent Labs   Lab 07/18/24  0716   WBC 6.89   HGB 8.9*   HCT 28.6*          Significant Imaging: I have reviewed all pertinent imaging results/findings within the past 24 hours.   Age appropriate behavior

## 2024-07-19 NOTE — PROGRESS NOTES
[facility] Medicine  Progress Note    Patient Name: Margarita Wiseman  MRN: [id number]  Patient Class: IP- Inpatient   Admission Date: 7/16/2024  Length of Stay: 0 days  Attending Physician: Bonny Bustos MD  Primary Care Provider: Doris, Primary Doctor        Subjective:     Principal Problem:Staphylococcus aureus bacteremia        HPI:  Ms. Margarita Wiseman is a 49 y.o. female, with PMH of IVDA, polysubstance abuse (cocaine and heroin), endocarditis with tricuspid vegetation, septic pulmonary emboli, chronic hep C, chronic anemia, chronic thrombocytopenia, 2nd degree heart block, chronic L arm wound (currently skin popping drugs, present for several years), bipolar disorder, PTSD, anxiety/depression, homelessness, and medication noncompliance who [place] [facility] due to generalized body pain. She was admitted 4/11 - 13/24 for bacteremia 2/2 strep & staph and treated with IV antibiotics until she left AMA. She was brought in by EMS on 6/23 due to hypotension, body aches, fever, and chills, (and was admitted until she left AMA on 7/9). During that admission she has severe sepsis requiring pressor support in the ICU at one point, and was treated for endocarditis, septic pulmonary emboli, MSSA with ertapenem followed by oxacillin. She was also treated for synovitis of the glenohumeral joint, subacromial subdeltoid bursitis and had a joint aspiration for septic joint. After being seen by ID her antibiotic regimen was changed to cefazolin. She was to have access placed for an additional 6 weeks of cefazolin coverage to continue after discharge. She again left AMA prior to her hospital based treatment being completed. In the ED tonight labs showed no leukocytosis or left shift. She is anemia with H&H of 8.5/28.2 on CBC. A metabolic panel showed no acute abnormalities. A drug screen was positive for cocaine. A UA showed a nitrite negative UTI with 24 WBC/hpf, many bacteria, 2+ leuk  esterase. She was treated in the ED with vancomycin and zosyn. She was placed on observation.         Overview/Hospital Course:  Pt reports she did indulge in cocaine as well as heroin. Meds for opiate withdrawal ordered. Restarted IV cefazolin from last admission along with vancomycin in light of drug use. Blood cx in process - no growth to date. Will consult ID - rec rpt CT cervical neck to eval discitis. Anticipate prolonged course of cefazolin. Will d/c vanc.     Interval history - Pt reports feeling malaise.     Review of Systems   Reason unable to perform ROS: Patient does not participate in answering questions. She is somnolent and awaeks momentarily only.   Constitutional:  Positive for chills, fatigue and fever.   Respiratory:  Negative for cough and shortness of breath.    Cardiovascular:  Negative for chest pain and leg swelling.   Gastrointestinal:  Negative for abdominal pain.   Musculoskeletal:  Positive for neck pain.   Psychiatric/Behavioral:  Negative for agitation and confusion.      Objective:     Vital Signs (Most Recent):  Temp: 98.8 °F (37.1 °C) (07/19/24 1109)  Pulse: (!) 58 (07/19/24 1501)  Resp: 16 (07/19/24 1109)  BP: 132/88 (07/19/24 1109)  SpO2: 98 % (07/19/24 1109) Vital Signs (24h Range):  Temp:  [97.9 °F (36.6 °C)-98.8 °F (37.1 °C)] 98.8 °F (37.1 °C)  Pulse:  [44-68] 58  Resp:  [16-18] 16  SpO2:  [98 %-99 %] 98 %  BP: (124-137)/(72-88) 132/88     Weight: 71.2 kg (156 lb 15.5 oz)  Body mass index is 30.66 kg/m².     Physical Exam  Constitutional:       General: She is not in acute distress.     Appearance: She is ill-appearing.   Neck:      Comments: Limited ROM in neck - similar to prior   Pulmonary:      Effort: No respiratory distress.      Breath sounds: No wheezing.   Abdominal:      General: There is no distension.      Palpations: Abdomen is soft.      Tenderness: There is no abdominal tenderness.   Musculoskeletal:      Right lower leg: No edema.      Left lower leg: No edema.    Neurological:      General: No focal deficit present.      Mental Status: She is oriented to person, place, and time.                      Significant Labs: All pertinent labs within the past 24 hours have been reviewed.    Significant Imaging: I have reviewed all pertinent imaging results/findings within the past 24 hours.    Assessment/Plan:      * Staphylococcus aureus bacteremia  Ms. Margarita Wiseman returns after leaving AMA a second time as she is feeling generally unwell   She had been being treated for Bacteremia 2/2 Endocarditis w/ tricuspid vegetation and Septic Emboli to lungs and cervical neck infection  s/p vancomycin & zosyn in ED     Blood cx neg growth to date    Plan -   - ID had changed her antibiotics to 6g cefazolin q24 hours during her last admission, will continue   - will keep vanc on for now in light of drug use till cultures return - NGTD   - Consult ID for abx plan - rec rpt CT cervical neck to eval discitis. Anticipate prolonged course of cefazolin. Will d/c vanc.       Septic embolism  - IV antibiotics as above       Infective endocarditis of tricuspid valve  Echocardiogram with evidence of tricuspid regurgitation that is mild . The patient's most recent echocardiogram result is listed below.   - remainder of treatment plan as above     Echo    Result Date: 6/25/2024    Left Ventricle: The left ventricle is normal in size. Ventricular mass   is normal. Normal wall thickness. There is normal systolic function.   Ejection fraction by visual approximation is 65%. Global longitudinal   strain is -19.0%. Global longitudinal strain is normal. There is normal   diastolic function. Normal left ventricular filling pressure. Tissue   Doppler velocity is normal.    Right Ventricle: Normal right ventricular cavity size. Wall thickness   is normal. Systolic function is normal.    Tricuspid Valve: There is a medium mobile echogenic pedunculated mass   present on the atrial side of the septal leaflet  consistent with   vegetation. There is mild regurgitation with a centrally directed jet.    IVC/SVC: Normal venous pressure at 3 mmHg.    Pericardium: There is a trivial effusion. No indication of cardiac   tamponade.        Echo    Result Date: 4/12/2024    Left Ventricle: There is normal systolic function with a visually   estimated ejection fraction of 65 - 70%. Global longitudinal strain is   normal; -19.9%. There is normal diastolic function.    Right Ventricle: Normal right ventricular cavity size. Wall thickness   is normal. Right ventricle wall motion  is normal. Systolic function is   normal.    IVC/SVC: Normal venous pressure at 3 mmHg.    Pericardium: There is a trivial effusion. No indication of cardiac   tamponade.          Housing instability  - consult case management/social work      Chronic hepatitis C virus infection  - first positive 6/20/19  - not yet treated   - LFTs WNL  - refer to hepatology at discharge     PTSD and Depression  - per psych note last admission - 'Hold off on initiation of all other scheduled psychotropic medications at this time due to patient's hyponatremia. She may benefit from an SSRI however would recommend a continued wash out period from recent and chronic substance use to better assess her psychiatric baseline. This is another reason for pursuing inpatient substance use treatment as patient would be in a controlled setting to facilitate a more thorough and accurate diagnosis. '      Drug abuse, IV  Utox only pos for cocaine but pt endorses cocaine and heroin use     Plan -   - PRN medications for withdrawal symptoms   - COWS q4      Chronic anemia  Patient's anemia is currently stable. Has not received any PRBCs to date.   Current CBC reviewed-   Lab Results   Component Value Date    HGB 7.8 (L) 07/17/2024    HCT 26.1 (L) 07/17/2024     Monitor serial CBC and transfuse if patient becomes hemodynamically unstable, symptomatic or H/H drops below 7/21.      VTE Risk  Mitigation (From admission, onward)           Ordered     heparin (porcine) injection 5,000 Units  Every 8 hours         07/17/24 0047     IP VTE HIGH RISK PATIENT  Once         07/17/24 0047     Place sequential compression device  Until discontinued         07/17/24 0047                    Discharge Planning   ABELARDO: 7/22/2024     Code Status: Full Code   Is the patient medically ready for discharge?:     Reason for patient still in hospital (select all that apply): Treatment  Discharge Plan A: Long-term acute care facility (LTAC)                  Bonny Maradiaga MD  Department of Hospital Medicine   [facility] ([place])

## 2024-07-19 NOTE — ASSESSMENT & PLAN NOTE
Ms. Margarita Wiseman returns after leaving [place] a second time as she is feeling generally unwell   She had been being treated for Bacteremia 2/2 Endocarditis w/ tricuspid vegetation and Septic Emboli to lungs and cervical neck infection  s/p vancomycin & zosyn in ED     Blood cx neg growth to date    Plan -   - ID had changed her antibiotics to 6g cefazolin q24 hours during her last admission, will continue   - will keep vanc on for now in light of drug use till cultures return - NGTD   - Consult ID for abx plan - rec rpt CT cervical neck to eval discitis. Anticipate prolonged course of cefazolin. Will d/c vanc.

## 2024-07-19 NOTE — PLAN OF CARE
Problem: Infection  Goal: Absence of Infection Signs and Symptoms  Outcome: Progressing  Intervention: Prevent or Manage Infection  Flowsheets (Taken 7/18/2024 2043)  Fever Reduction/Comfort Measures: lightweight bedding     Problem: Wound  Goal: Absence of Infection Signs and Symptoms  Outcome: Progressing  Intervention: Prevent or Manage Infection  Flowsheets (Taken 7/18/2024 2043)  Fever Reduction/Comfort Measures: lightweight bedding  Goal: Skin Health and Integrity  Outcome: Progressing

## 2024-07-19 NOTE — NURSING
The patient requested to leave ama; I notified Dr. Bustos; Dr. Bustos came to bedside and discussed leaving ama with the patient; the patient still decided to leave ama; the patient signed ama papers; all iv were removed and patient ambulated off unit

## 2024-07-19 NOTE — CONSULTS
[facility] ([place]  Infectious Disease  Consult Note    Patient Name: Margarita Wiseman  MRN: [id number]  Admission Date: 7/16/2024  Hospital Length of Stay: 0 days  Attending Physician: Bonny Bustos MD  Primary Care Provider: Doris, Primary Doctor     Isolation Status: No active isolations    Patient information was obtained from patient, past medical records, ER records, and primary team.      Inpatient consult to Infectious Diseases  Consult performed by: Jean Carlos Payne MD  Consult ordered by: Bonny Bustos MD        Assessment/Plan:     Cardiac/Vascular  Infective endocarditis of tricuspid valve  48 yo woman who uses drugs and has complicated endocarditis  - left AMA with 30 days of abx remaining, now readmitted after using drugs again  Would continue cefazolin given history of MSSA  - f/u repeat cultures  - would re-image cervical spine to evaluate diskitis  - unfortunately, patient is her own worst enemy      Thank you for your consult. I will follow-up with patient. Please contact us if you have any additional questions.    Jean Carlos Payne MD  Infectious Disease  [facility]    Subjective:     Principal Problem: Staphylococcus aureus bacteremia    HPI: 48 yo woman with history of untreated MSSA bacteremia and TVE, now readmitted with bacteremia and sepsis related to IDU.  She was prescribed a 4-6 week course of oxacillin but left the hospital AMA. She returned to the ED on 7/16 with body aches. She was actively using illicit drugs. The patient was admitted and started on IV abx. Her blood cultures are NGTD. She is felling a little better.     Prior issues: TVE with septic pulmonary emboli and cervical diskitis    History reviewed. No pertinent past medical history.    History reviewed. No pertinent surgical history.    Review of patient's allergies indicates:  No Known Allergies    Medications:  Medications Prior to Admission   Medication Sig    busPIRone (BUSPAR) 5 MG  "Tab Take 5 mg by mouth 2 (two) times daily.    divalproex ER (DEPAKOTE ER) 250 MG 24 hr tablet Take 250 mg by mouth once daily.    ferrous sulfate (FEOSOL) 325 mg (65 mg iron) Tab tablet Take 1 tablet (325 mg total) by mouth 2 (two) times daily.    hydrOXYzine (ATARAX) 50 MG tablet Take 1 tablet (50 mg total) by mouth nightly as needed for Itching (insomnia).    ibuprofen (ADVIL,MOTRIN) 800 MG tablet Take 1 tablet (800 mg total) by mouth every 6 (six) hours as needed for Pain.    naloxone (NARCAN) 4 mg/actuation Spry 4mg by nasal route as needed for opioid overdose; may repeat every 2-3 minutes in alternating nostrils until medical help arrives. Call 911    OLANZapine (ZYPREXA) 5 MG tablet Take 5 mg by mouth every evening.    ondansetron (ZOFRAN-ODT) 8 MG TbDL Take 1 tablet (8 mg total) by mouth every 6 (six) hours as needed (nausea).    sertraline (ZOLOFT) 25 MG tablet Take 25 mg by mouth once daily.     Antibiotics (From admission, onward)      Start     Stop Route Frequency Ordered    07/19/24 0215  vancomycin 1,250 mg in D5W 250 mL IVPB (Vial-Mate)         -- IV Every 12 hours (non-standard times) 07/18/24 1624    07/17/24 1000  ceFAZolin 6 g in D5W 500 mL CONTINUOUS INFUSION         -- IV Every 24 hours (non-standard times) 07/17/24 0941    07/17/24 0811  vancomycin - pharmacy to dose  (vancomycin IVPB (PEDS and ADULTS))        Placed in "And" Linked Group    -- IV pharmacy to manage frequency 07/17/24 0711    07/16/24 2200  piperacillin-tazobactam (ZOSYN) 4.5 gram injection        Note to Pharmacy: Created by cabinet override    07/17/24 1014   07/16/24 2200          Antifungals (From admission, onward)      None          Antivirals (From admission, onward)      None               There is no immunization history on file for this patient.    Family History    None       Social History     Socioeconomic History    Marital status: Unknown   Tobacco Use    Smoking status: Every Day     Current packs/day: 1.00     " Average packs/day: 1 pack/day for 27.5 years (27.5 ttl pk-yrs)     Types: Cigarettes     Start date: 1997    Smokeless tobacco: Never    Tobacco comments:     Smoking cessation education provided. Pt is a 1 pk/day cigarette smoker x 26 yrs. She states that she cut down to 0.5 pk/day approx. 1 yr ago. Pt declines referral to Ambulatory Smoking Cessation clinic, stating not ready to quit. Handout provided.    Substance and Sexual Activity    Alcohol use: Not Currently    Drug use: Yes     Types: IV    Sexual activity: Not Currently     Social Determinants of Health     Financial Resource Strain: High Risk (7/18/2024)    Overall Financial Resource Strain (CARDIA)     Difficulty of Paying Living Expenses: Very hard   Food Insecurity: Food Insecurity Present (6/25/2024)    Hunger Vital Sign     Worried About Running Out of Food in the Last Year: Often true     Ran Out of Food in the Last Year: Often true   Transportation Needs: Unmet Transportation Needs (6/25/2024)    TRANSPORTATION NEEDS     Transportation : Yes, it has kept me from medical appointments or from getting my medications.   Physical Activity: Inactive (6/25/2024)    Exercise Vital Sign     Days of Exercise per Week: 0 days     Minutes of Exercise per Session: 0 min   Stress: Stress Concern Present (6/25/2024)    Citizen of [place] of Occupational Health - Occupational Stress Questionnaire     Feeling of Stress : To some extent   Housing Stability: High Risk (7/18/2024)    Housing Stability Vital Sign     Unable to Pay for Housing in the Last Year: Yes     Homeless in the Last Year: Yes     Review of Systems   Constitutional:  Positive for chills, diaphoresis and fever.   Musculoskeletal:  Positive for arthralgias, back pain and neck pain.   All other systems reviewed and are negative.    Objective:     Vital Signs (Most Recent):  Temp: 98.8 °F (37.1 °C) (07/19/24 0805)  Pulse: (!) 51 (07/19/24 0805)  Resp: 16 (07/19/24 0805)  BP: 137/84 (07/19/24  0805)  SpO2: 98 % (07/19/24 0805) Vital Signs (24h Range):  Temp:  [97.9 °F (36.6 °C)-98.8 °F (37.1 °C)] 98.8 °F (37.1 °C)  Pulse:  [44-68] 51  Resp:  [16-18] 16  SpO2:  [98 %-99 %] 98 %  BP: (123-137)/(71-86) 137/84     Weight: 71.2 kg (156 lb 15.5 oz)  Body mass index is 30.66 kg/m².    Estimated Creatinine Clearance: 68.1 mL/min (based on SCr of 0.9 mg/dL).     Physical Exam  Vitals and nursing note reviewed.   Constitutional:       General: She is not in acute distress.     Appearance: Normal appearance. She is not ill-appearing, toxic-appearing or diaphoretic.   HENT:      Right Ear: External ear normal.      Left Ear: External ear normal.   Eyes:      Extraocular Movements: Extraocular movements intact.      Pupils: Pupils are equal, round, and reactive to light.   Cardiovascular:      Rate and Rhythm: Normal rate.      Heart sounds: Murmur heard.   Abdominal:      Tenderness: There is no abdominal tenderness. There is no guarding.   Skin:     Findings: Lesion present.      Comments: LUE wound (chronic)   Neurological:      General: No focal deficit present.      Mental Status: She is alert and oriented to person, place, and time.   Psychiatric:         Mood and Affect: Mood normal.          Significant Labs: Blood Culture:   Recent Labs   Lab 06/27/24  0851 06/28/24  1040 07/02/24  0800 07/02/24  0825 07/16/24  2157   LABBLOO No growth after 5 days. No growth after 5 days. No growth after 5 days. No growth after 5 days. No Growth to date  No Growth to date  No Growth to date  No Growth to date  No Growth to date  No Growth to date     BMP:   Recent Labs   Lab 07/18/24  0443   *      K 3.7      CO2 23   BUN 11   CREATININE 0.9   CALCIUM 8.0*   MG 1.9     CBC:   Recent Labs   Lab 07/18/24  0716   WBC 6.89   HGB 8.9*   HCT 28.6*          Significant Imaging: I have reviewed all pertinent imaging results/findings within the past 24 hours.

## 2024-07-19 NOTE — DISCHARGE SUMMARY
[facility] Medicine  Discharge Summary      Patient Name: Margarita Wiseman  MRN: [id number]  [facility]: [id number]  Patient Class: IP- Inpatient  Admission Date: 7/16/2024  Hospital Length of Stay: 0 days  Discharge Date and Time:  07/19/2024 4:47 PM  Attending Physician: Bonny Bustos MD   Discharging Provider: Bonny Maradiaga MD  Primary Care Provider: Doris, Primary Doctor    Primary Care Team: Networked reference to record PCT     HPI:   Ms. Margarita Wiseman is a 49 y.o. female, with PMH of IVDA, polysubstance abuse (cocaine and heroin), endocarditis with tricuspid vegetation, septic pulmonary emboli, chronic hep C, chronic anemia, chronic thrombocytopenia, 2nd degree heart block, chronic L arm wound (currently skin popping drugs, present for several years), bipolar disorder, PTSD, anxiety/depression, homelessness, and medication noncompliance who presents to [facility] ED on 7/17/24 due to generalized body pain. She was admitted 4/11 - 13/24 for bacteremia 2/2 strep & staph and treated with IV antibiotics until she left AMA. She was brought in by EMS on 6/23 due to hypotension, body aches, fever, and chills, (and was admitted until she left AMA on 7/9). During that admission she has severe sepsis requiring pressor support in the ICU at one point, and was treated for endocarditis, septic pulmonary emboli, MSSA with ertapenem followed by oxacillin. She was also treated for synovitis of the glenohumeral joint, subacromial subdeltoid bursitis and had a joint aspiration for septic joint. After being seen by ID her antibiotic regimen was changed to cefazolin. She was to have access placed for an additional 6 weeks of cefazolin coverage to continue after discharge. She again left AMA prior to her hospital based treatment being completed. In the ED tonight labs showed no leukocytosis or left shift. She is anemia with H&H of 8.5/28.2 on CBC. A metabolic panel showed no acute abnormalities. A drug screen  was positive for cocaine. A UA showed a nitrite negative UTI with 24 WBC/hpf, many bacteria, 2+ leuk esterase. She was treated in the ED with vancomycin and zosyn. She was placed on observation.           Hospital Course:   Pt reports she did indulge in cocaine as well as heroin. Meds for opiate withdrawal ordered. No meds needed to be given so far other than muscle relaxants for neck pain. Restarted IV cefazolin from last admission along with vancomycin in light of drug use. Blood cx in process - no growth to date. Will consult ID - rec rpt CT cervical neck to eval discitis. Anticipate prolonged course of cefazolin. Will d/c vanc.     Update - pt requesting to leave AMA. Had extensive discussions with patients about continuing care. Discussed risks including but not limited to drug abuse, sepsis and death. Pt expressed understanding. Opts to leave AMA.      Goals of Care Treatment Preferences:  Code Status: Full Code          What is most important right now is to focus on symptom/pain control, curative/life-prolongation (regardless of treatment burdens).  Accordingly, we have decided that the best plan to meet the patient's goals includes continuing with treatment.      Consults:   Consults (From admission, onward)          Status Ordering Provider     Inpatient consult to Midline team  Once        Provider:  (Not yet assigned)    Completed STARR KAUFMAN     Inpatient consult to Infectious Diseases  Once        Provider:  Jean Carlos Payne MD    Completed STARR KAUFMAN     Inpatient consult to Social Work  Once        Provider:  (Not yet assigned)    YOSHI Mckay     Inpatient consult to Midline team  Once        Provider:  (Not yet assigned)    STARR Worrell              Final Active Diagnoses:    Diagnosis Date Noted POA    PRINCIPAL PROBLEM:  Staphylococcus aureus bacteremia [R78.81, B95.61] 04/12/2024 Yes    Septic embolism [I76] 06/23/2024 Yes    Infective  endocarditis of tricuspid valve [I33.0] 06/25/2024 Yes    Housing instability [Z59.819] 06/23/2024 Not Applicable    PTSD and Depression [F43.10] 04/12/2024 Yes    Chronic hepatitis C virus infection [B18.2] 04/12/2024 Yes    Chronic anemia [D64.9] 06/30/2023 Yes    Drug abuse, IV [F19.10] 06/30/2023 Yes      Problems Resolved During this Admission:    Diagnosis Date Noted Date Resolved POA    Polysubstance abuse [F19.10] 06/20/2019 07/17/2024 Yes       Discharged Condition: against medical advice          Time spent on the discharge of patient: 35 minutes         Bonny Maradiaga MD  Department of Hospital Medicine  [facility] ([place])

## 2024-07-19 NOTE — SUBJECTIVE & OBJECTIVE
Interval history - Pt reports feeling malaise.     Review of Systems   Reason unable to perform ROS: Patient does not participate in answering questions. She is somnolent and awaeks momentarily only.   Constitutional:  Positive for chills, fatigue and fever.   Respiratory:  Negative for cough and shortness of breath.    Cardiovascular:  Negative for chest pain and leg swelling.   Gastrointestinal:  Negative for abdominal pain.   Musculoskeletal:  Positive for neck pain.   Psychiatric/Behavioral:  Negative for agitation and confusion.      Objective:     Vital Signs (Most Recent):  Temp: 98.8 °F (37.1 °C) (07/19/24 1109)  Pulse: (!) 58 (07/19/24 1501)  Resp: 16 (07/19/24 1109)  BP: 132/88 (07/19/24 1109)  SpO2: 98 % (07/19/24 1109) Vital Signs (24h Range):  Temp:  [97.9 °F (36.6 °C)-98.8 °F (37.1 °C)] 98.8 °F (37.1 °C)  Pulse:  [44-68] 58  Resp:  [16-18] 16  SpO2:  [98 %-99 %] 98 %  BP: (124-137)/(72-88) 132/88     Weight: 71.2 kg (156 lb 15.5 oz)  Body mass index is 30.66 kg/m².     Physical Exam  Constitutional:       General: She is not in acute distress.     Appearance: She is ill-appearing.   Neck:      Comments: Limited ROM in neck - similar to prior   Pulmonary:      Effort: No respiratory distress.      Breath sounds: No wheezing.   Abdominal:      General: There is no distension.      Palpations: Abdomen is soft.      Tenderness: There is no abdominal tenderness.   Musculoskeletal:      Right lower leg: No edema.      Left lower leg: No edema.   Neurological:      General: No focal deficit present.      Mental Status: She is oriented to person, place, and time.                      Significant Labs: All pertinent labs within the past 24 hours have been reviewed.    Significant Imaging: I have reviewed all pertinent imaging results/findings within the past 24 hours.

## 2024-07-19 NOTE — HPI
48 yo woman with history of untreated MSSA bacteremia and TVE, now readmitted with bacteremia and sepsis related to IDU.  She was prescribed a 4-6 week course of oxacillin but left the hospital AMA. She returned to the ED on 7/16 with body aches. She was actively using illicit drugs. The patient was admitted and started on IV abx. Her blood cultures are NGTD. She is felling a little better.     Prior issues: TVE with septic pulmonary emboli and cervical diskitis

## 2024-07-22 LAB
BACTERIA BLD CULT: NORMAL
BACTERIA BLD CULT: NORMAL

## 2024-08-09 ENCOUNTER — HOSPITAL ENCOUNTER (EMERGENCY)
Facility: OTHER | Age: 49
Discharge: HOME | End: 2024-08-09
Attending: EMERGENCY MEDICINE
Payer: COMMERCIAL

## 2024-08-09 VITALS
BODY MASS INDEX: 30.43 KG/M2 | RESPIRATION RATE: 18 BRPM | SYSTOLIC BLOOD PRESSURE: 104 MMHG | WEIGHT: 155 LBS | HEIGHT: 60 IN | DIASTOLIC BLOOD PRESSURE: 62 MMHG | HEART RATE: 63 BPM | TEMPERATURE: 98 F | OXYGEN SATURATION: 100 %

## 2024-08-09 DIAGNOSIS — E86.0 DEHYDRATION: ICD-10-CM

## 2024-08-09 DIAGNOSIS — T40.601A OPIATE OVERDOSE, ACCIDENTAL OR UNINTENTIONAL, INITIAL ENCOUNTER: Primary | ICD-10-CM

## 2024-08-09 LAB
ALBUMIN SERPL BCP-MCNC: 3 G/DL (ref 3.5–5.2)
ALP SERPL-CCNC: 82 U/L (ref 55–135)
ALT SERPL W/O P-5'-P-CCNC: 7 U/L (ref 10–44)
ANION GAP SERPL CALC-SCNC: 9 MMOL/L (ref 8–16)
AST SERPL-CCNC: 11 U/L (ref 10–40)
BASOPHILS # BLD AUTO: 0.04 K/UL (ref 0–0.2)
BASOPHILS NFR BLD: 0.7 % (ref 0–1.9)
BILIRUB SERPL-MCNC: 0.1 MG/DL (ref 0.1–1)
BUN SERPL-MCNC: 23 MG/DL (ref 6–20)
CALCIUM SERPL-MCNC: 9.2 MG/DL (ref 8.7–10.5)
CHLORIDE SERPL-SCNC: 109 MMOL/L (ref 95–110)
CO2 SERPL-SCNC: 23 MMOL/L (ref 23–29)
CREAT SERPL-MCNC: 1.5 MG/DL (ref 0.5–1.4)
DIFFERENTIAL METHOD BLD: ABNORMAL
EOSINOPHIL # BLD AUTO: 0.2 K/UL (ref 0–0.5)
EOSINOPHIL NFR BLD: 3.5 % (ref 0–8)
ERYTHROCYTE [DISTWIDTH] IN BLOOD BY AUTOMATED COUNT: 16.4 % (ref 11.5–14.5)
EST. GFR  (NO RACE VARIABLE): 43 ML/MIN/1.73 M^2
GLUCOSE SERPL-MCNC: 97 MG/DL (ref 70–110)
HCT VFR BLD AUTO: 33.9 % (ref 37–48.5)
HGB BLD-MCNC: 10.1 G/DL (ref 12–16)
IMM GRANULOCYTES # BLD AUTO: 0.01 K/UL (ref 0–0.04)
IMM GRANULOCYTES NFR BLD AUTO: 0.2 % (ref 0–0.5)
LYMPHOCYTES # BLD AUTO: 2 K/UL (ref 1–4.8)
LYMPHOCYTES NFR BLD: 32.6 % (ref 18–48)
MCH RBC QN AUTO: 23.4 PG (ref 27–31)
MCHC RBC AUTO-ENTMCNC: 29.8 G/DL (ref 32–36)
MCV RBC AUTO: 79 FL (ref 82–98)
MONOCYTES # BLD AUTO: 0.3 K/UL (ref 0.3–1)
MONOCYTES NFR BLD: 4.8 % (ref 4–15)
NEUTROPHILS # BLD AUTO: 3.5 K/UL (ref 1.8–7.7)
NEUTROPHILS NFR BLD: 58.2 % (ref 38–73)
NRBC BLD-RTO: 0 /100 WBC
PLATELET # BLD AUTO: 298 K/UL (ref 150–450)
PMV BLD AUTO: 10.4 FL (ref 9.2–12.9)
POTASSIUM SERPL-SCNC: 4.4 MMOL/L (ref 3.5–5.1)
PROT SERPL-MCNC: 8.7 G/DL (ref 6–8.4)
RBC # BLD AUTO: 4.32 M/UL (ref 4–5.4)
SODIUM SERPL-SCNC: 141 MMOL/L (ref 136–145)
WBC # BLD AUTO: 6.05 K/UL (ref 3.9–12.7)

## 2024-08-09 PROCEDURE — 96361 HYDRATE IV INFUSION ADD-ON: CPT

## 2024-08-09 PROCEDURE — 80053 COMPREHEN METABOLIC PANEL: CPT | Performed by: EMERGENCY MEDICINE

## 2024-08-09 PROCEDURE — 99284 EMERGENCY DEPT VISIT MOD MDM: CPT | Mod: 25

## 2024-08-09 PROCEDURE — 96360 HYDRATION IV INFUSION INIT: CPT

## 2024-08-09 PROCEDURE — 25000003 PHARM REV CODE 250: Performed by: EMERGENCY MEDICINE

## 2024-08-09 PROCEDURE — 85025 COMPLETE CBC W/AUTO DIFF WBC: CPT | Performed by: EMERGENCY MEDICINE

## 2024-08-09 RX ORDER — NALOXONE HYDROCHLORIDE 4 MG/.1ML
SPRAY NASAL
Qty: 1 EACH | Refills: 0 | Status: SHIPPED | OUTPATIENT
Start: 2024-08-09

## 2024-08-09 RX ADMIN — SODIUM CHLORIDE 1000 ML: 9 INJECTION, SOLUTION INTRAVENOUS at 02:08

## 2024-08-09 RX ADMIN — SODIUM CHLORIDE 1000 ML: 9 INJECTION, SOLUTION INTRAVENOUS at 11:08

## 2024-08-09 NOTE — ED PROVIDER NOTES
Encounter Date: 8/9/2024       History     Chief Complaint   Patient presents with    Drug Overdose     Ems reports pt mixed heroine and fentanyl. Concerned bystander called EMS due to pt nodding off behind a house. No narcan on route to ED. Responsive to verbal stimuli. 100% 2L NC     49-year-old female with history of IVDU, polysubstance abuse, bipolar disorder, recent admission for endocarditis and septic emboli, brought by EMS after suspected opiate overdose.  Patient admits to shooting heroin and fentanyl with crack earlier today, was reportedly found unresponsive by bystanders.  Per EMS she had normal O2 sat, they did not give any Narcan.  Patient admits to daily drug use, has not been eating or drinking much due to dental pain but no fevers or other new complaints.      Review of patient's allergies indicates:  No Known Allergies  History reviewed. No pertinent past medical history.  History reviewed. No pertinent surgical history.  No family history on file.  Social History     Tobacco Use    Smoking status: Every Day     Current packs/day: 1.00     Types: Cigarettes    Smokeless tobacco: Never    Tobacco comments:     Smoking cessation education provided. Pt is a 1 pk/day cigarette smoker x 26 yrs. She states that she cut down to 0.5 pk/day approx. 1 yr ago. Pt declines referral to Ambulatory Smoking Cessation clinic, stating not ready to quit. Handout provided.    Substance Use Topics    Alcohol use: Not Currently    Drug use: Yes     Types: IV     Comment: heroin, Fentanyl, crack     Review of Systems   Unable to perform ROS: Mental status change       Physical Exam     Initial Vitals [08/09/24 1044]   BP Pulse Resp Temp SpO2   (!) 90/54 85 14 98.2 °F (36.8 °C) 100 %      MAP       --         Physical Exam    Constitutional: She is not diaphoretic. No distress.   Disheveled, chronically ill-appearing   HENT:   Head: Normocephalic and atraumatic.   Poor dentition.  No sign of dental infection or abscess    Eyes: Conjunctivae are normal.   Neck: Neck supple.   Cardiovascular:  Normal rate, regular rhythm, S1 normal, S2 normal, normal heart sounds and intact distal pulses.           No murmur heard.  Pulmonary/Chest: Breath sounds normal. No respiratory distress. She has no wheezes. She has no rhonchi. She has no rales.   Abdominal: Abdomen is soft. There is no abdominal tenderness. There is no rebound and no guarding.   Musculoskeletal:         General: No edema.      Cervical back: Neck supple.     Neurological: She is alert and oriented to person, place, and time.   Skin: Skin is warm and dry.   Multiple injection sites over extremities with superficial wounds, no drainage or surrounding erythema         ED Course   Procedures  Labs Reviewed   CBC W/ AUTO DIFFERENTIAL - Abnormal       Result Value    WBC 6.05      RBC 4.32      Hemoglobin 10.1 (*)     Hematocrit 33.9 (*)     MCV 79 (*)     MCH 23.4 (*)     MCHC 29.8 (*)     RDW 16.4 (*)     Platelets 298      MPV 10.4      Immature Granulocytes 0.2      Gran # (ANC) 3.5      Immature Grans (Abs) 0.01      Lymph # 2.0      Mono # 0.3      Eos # 0.2      Baso # 0.04      nRBC 0      Gran % 58.2      Lymph % 32.6      Mono % 4.8      Eosinophil % 3.5      Basophil % 0.7      Differential Method Automated     COMPREHENSIVE METABOLIC PANEL - Abnormal    Sodium 141      Potassium 4.4      Chloride 109      CO2 23      Glucose 97      BUN 23 (*)     Creatinine 1.5 (*)     Calcium 9.2      Total Protein 8.7 (*)     Albumin 3.0 (*)     Total Bilirubin 0.1      Alkaline Phosphatase 82      AST 11      ALT 7 (*)     eGFR 43 (*)     Anion Gap 9            Imaging Results    None          Medications   sodium chloride 0.9% bolus 1,000 mL 1,000 mL (0 mLs Intravenous Stopped 8/9/24 1257)   sodium chloride 0.9% bolus 1,000 mL 1,000 mL (0 mLs Intravenous Stopped 8/9/24 1510)     Medical Decision Making      49-year-old female with history of IVDU, polysubstance abuse, bipolar  disorder, recent admission for endocarditis and septic emboli, brought by EMS after suspected opiate overdose.  She admits to shooting heroin and fentanyl today, and uses this daily, thinks she may have used too much, was found unresponsive by bystanders.  Per EMS she had normal O2 sat on their arrival, did not require any Narcan, was drowsy but responsive.  On arrival here patient able to give some history, also mentions recent admission for endocarditis last month.  Per chart review she signed out AMA after admission for endocarditis with septic emboli twice last month after recommendations for long-term IV antibiotics.  She still would prefer not to be admitted at this time, asking about any p.o. antibiotic options.  No recent fevers or other new complaints.  On exam patient disheveled and somnolent but easily arousable, with normal O2 sat on room air.  She does have low normal BP but no tachycardia or fever.  She has multiple extremity wounds related to IVDU but they do not appear infected.  Differential diagnosis includes opiate overdose, polysubstance abuse, dehydration, DONNA.      Initial labs with hemoglobin 10.1, improved from previous.  CMP with mild DONNA with CR 1.5, previous normal baseline.  Patient was initially hypotensive but BP improved above 100 with 2 L IVF.  She states that her cracked teeth are cutting into her tongue making it difficult for her to eat, but no evidence for dental infection.  I discussed her case with admitting hospitalist familiar with her from previous admission, and although her most recent positive culture grew out MSSA that would be susceptible to options like doxycycline, ID previously recommended against this since it may select for more resistant bacteria causing her endocarditis.  They recommend patient either being admitted for long-term IV antibiotics now or coming back for this when she is ready to stop drug use.  I had extensive discussion with her about this, and she  still does not want to be admitted at this time, states that she has some things at home to take care of before she will come back.  Patient was advised on outpatient rehab resources and dental clinic information, and understands need to return for admission for long-term IV antibiotics.            Amount and/or Complexity of Data Reviewed  Labs: ordered.                                      Clinical Impression:  Final diagnoses:  [T40.601A] Opiate overdose, accidental or unintentional, initial encounter (Primary)  [E86.0] Dehydration          ED Disposition Condition    Discharge           ED Prescriptions       Medication Sig Dispense Start Date End Date Auth. Provider    naloxone (NARCAN) 4 mg/actuation Spry 4mg by nasal route as needed for opioid overdose; may repeat every 2-3 minutes in alternating nostrils until medical help arrives. Call 911 1 each 8/9/2024 -- Victorino Diaz MD          Follow-up Information       Follow up With Specialties Details Why Contact Info    [facility] Emergency Dept Emergency Medicine Go to   [address] [address] [phone]             Victorino Diaz MD  08/09/24 4958

## 2024-08-09 NOTE — ED NOTES
Pt sleeping quietly on stretcher; awakens to verbal stimuli then immediately falls back to sleep. Pt remains on continuous cardiac and pulse ox monitoring with non-invasive blood pressure to cycle every 15 minutes. Improvement in blood pressure is noted; NSR noted. Additional IV fluids administered as documented. No acute distress observed.  Pt denies restroom needs at this time; is able to reposition self on stretcher. Bed locked in lowest position; side rails up and locked x 2; call light, bedside table, and personal belongings within reach. Room assessed for safety measures and cleanliness; no action needed at this time. Plan of care discussed.  Pt instructed to alert nurse for assistance and before attempting to get out of bed; verbalizes understanding. Pt denies needs or complaints at this time; monitoring ongoing.

## 2024-08-09 NOTE — ED NOTES
Pt sleeping quietly on stretcher; awakens to verbal stimuli then immediately falls back to sleep. Pt remains on continuous cardiac and pulse ox monitoring with non-invasive blood pressure to cycle every 15 minutes. Pt remains hypotensive though an improvement in blood pressure is noted; NSR noted. No acute distress observed.  Pt denies restroom needs at this time; is able to reposition self on stretcher. Bed locked in lowest position; side rails up and locked x 2; call light, bedside table, and personal belongings within reach. Room assessed for safety measures and cleanliness; no action needed at this time. Plan of care discussed.  Pt instructed to alert nurse for assistance and before attempting to get out of bed; verbalizes understanding. Pt denies needs or complaints at this time; monitoring ongoing.

## 2024-08-09 NOTE — ED NOTES
LOC: The patient is lethargic; awakens to verbal stimuli and is oriented to self, place, time, and situation. Pt is calm and cooperative. Affect is appropriate.  Speech is appropriate and clear.     APPEARANCE: Patient resting comfortably in no acute distress.  Patient is poorly groomed.    SKIN: The skin is warm and dry; color consistent with ethnicity.  Patient has normal skin turgor and moist mucus membranes.  Open wound noted to the left forearm; multiple track marks noted. No bruising noted.     MUSCULOSKELETAL: Patient moving upper and lower extremities without difficulty; denies pain in the extremities or back.  Denies weakness.     RESPIRATORY: Airway is open and patent. Respirations spontaneous, even, easy, and non-labored.  Patient has a normal effort and rate.  No accessory muscle use noted. Denies cough.     CARDIAC: Pt is urgently hypotensive. Normal rate noted.  No peripheral edema noted. No complaints of chest pain.     ABDOMEN: Soft and non tender to palpation.  No distention noted. Pt denies abdominal pain; denies nausea, vomiting, diarrhea, or constipation.    NEUROLOGIC: Eyes open to verbal stimuli. Behavior appropriate to situation.  Follows commands; facial expression symmetrical.  Purposeful motor response noted; normal sensation in all extremities. Pt denies headache; denies lightheadedness or dizziness; denies visual disturbances; denies loss of balance; denies unilateral weakness.

## 2024-08-09 NOTE — ED TRIAGE NOTES
"Pt presents to the ER with complaints of overdose of heroin and Fentanyl; was found "nodding off" by bystanders. Pt did not receive Narcan; is sedated but awakens to verbal stimuli.    "

## 2024-08-09 NOTE — ED NOTES
Pt found to be urgently hypotensive. Dr Saeed made aware. Attempting IV placement to initiate fluid resuscitation.

## 2024-10-10 ENCOUNTER — HOSPITAL ENCOUNTER (EMERGENCY)
Facility: OTHER | Age: 49
Discharge: HOME | End: 2024-10-10
Attending: STUDENT IN AN ORGANIZED HEALTH CARE EDUCATION/TRAINING PROGRAM
Payer: COMMERCIAL

## 2024-10-10 VITALS
TEMPERATURE: 98 F | RESPIRATION RATE: 16 BRPM | OXYGEN SATURATION: 99 % | SYSTOLIC BLOOD PRESSURE: 96 MMHG | DIASTOLIC BLOOD PRESSURE: 58 MMHG | HEART RATE: 62 BPM

## 2024-10-10 DIAGNOSIS — T40.1X1A HEROIN OVERDOSE, ACCIDENTAL OR UNINTENTIONAL, INITIAL ENCOUNTER: Primary | ICD-10-CM

## 2024-10-10 PROCEDURE — 99283 EMERGENCY DEPT VISIT LOW MDM: CPT

## 2024-10-10 RX ORDER — NALOXONE HYDROCHLORIDE 4 MG/.1ML
SPRAY NASAL
Qty: 1 EACH | Refills: 11 | Status: SHIPPED | OUTPATIENT
Start: 2024-10-10

## 2024-10-10 NOTE — ED PROVIDER NOTES
ED Provider Note - 10/10/2024    History     Chief Complaint   Patient presents with    Drug Overdose     Pt arrives via no ems after becoming unresponsive after using Fentanyl. Bystanders gave 4mg narcan IN prior to ems arrival. EMS gave 0.5mg narcan IV. +response.        HPI     Margarita Wiseman is a 49 y.o. year old female with past medical and surgical history as seen below, presenting with chief complaint of altered mental status.  History of IV drug use and prior overdoses.  Responded to Narcan administered prior to arrival.  Denying suicidal ideation or intent of overdose.      History reviewed. No pertinent past medical history.  History reviewed. No pertinent surgical history.      No family history on file.  Social History     Tobacco Use    Smoking status: Every Day     Current packs/day: 1.00     Types: Cigarettes    Smokeless tobacco: Never    Tobacco comments:     Smoking cessation education provided. Pt is a 1 pk/day cigarette smoker x 26 yrs. She states that she cut down to 0.5 pk/day approx. 1 yr ago. Pt declines referral to Ambulatory Smoking Cessation clinic, stating not ready to quit. Handout provided.    Substance Use Topics    Alcohol use: Not Currently    Drug use: Yes     Types: IV     Comment: heroin, Fentanyl, crack     Social Drivers of Health with Concerns     Tobacco Use: High Risk (10/10/2024)    Patient History     Smoking Tobacco Use: Every Day     Smokeless Tobacco Use: Never     Passive Exposure: Not on file   Financial Resource Strain: High Risk (7/18/2024)    Overall Financial Resource Strain (CARDIA)     Difficulty of Paying Living Expenses: Very hard   Food Insecurity: Food Insecurity Present (6/25/2024)    Hunger Vital Sign     Worried About Running Out of Food in the Last Year: Often true     Ran Out of Food in the Last Year: Often true   Transportation Needs: Unmet Transportation Needs (6/25/2024)    TRANSPORTATION NEEDS     Transportation : Yes, it has kept me from medical  appointments or from getting my medications.   Physical Activity: Inactive (6/25/2024)    Exercise Vital Sign     Days of Exercise per Week: 0 days     Minutes of Exercise per Session: 0 min   Stress: Stress Concern Present (6/25/2024)    [employer] - Occupational Stress Questionnaire     Feeling of Stress : To some extent   Housing Stability: High Risk (7/18/2024)    Housing Stability Vital Sign     Unable to Pay for Housing in the Last Year: Yes     Homeless in the Last Year: Yes   Depression: Not on file   Utilities: Patient Declined (6/25/2024)    [facility] Utilities     Threatened with loss of utilities: Patient declined   Health Literacy: Inadequate Health Literacy (6/25/2024)     Health Literacy     Frequency of need for help with medical instructions: Always   Social Isolation: Moderately Isolated (6/25/2024)    Social Isolation     Social Isolation: 4      Review of patient's allergies indicates:  No Known Allergies    Review of Systems     A full Review of Systems (ROS) was performed and was negative unless otherwise stated in the HPI.      Physical Exam     Vitals:    10/10/24 0303 10/10/24 0313 10/10/24 0358 10/10/24 0400   BP: 98/62   (!) 96/58   Pulse: 65 65 60 62   Resp:  15 16    Temp:       TempSrc:       SpO2:  98% 99% 99%        Physical Exam    Nursing note and vitals reviewed.  Constitutional: She appears well-developed and well-nourished. No distress.   HENT:   Head: Normocephalic and atraumatic.   Right Ear: External ear normal.   Left Ear: External ear normal.   Nose: Nose normal. Mouth/Throat: Oropharynx is clear and moist.   Eyes: Conjunctivae and EOM are normal. Pupils are equal, round, and reactive to light.   Neck: Neck supple.   Normal range of motion.  Cardiovascular:  Normal rate, regular rhythm, normal heart sounds and intact distal pulses.           Pulmonary/Chest: Breath sounds normal. No stridor. No respiratory distress. She has no wheezes. She has no  rhonchi. She has no rales.   Abdominal: Abdomen is soft. Bowel sounds are normal. There is no abdominal tenderness.   Musculoskeletal:         General: No tenderness or edema. Normal range of motion.      Cervical back: Normal range of motion and neck supple.     Neurological: She is alert and oriented to person, place, and time. She has normal strength. No cranial nerve deficit or sensory deficit.   Skin: Skin is warm and dry. No rash noted.   Psychiatric: She has a normal mood and affect. Thought content normal.         Lab Results- Independently reviewed by myself      Labs Reviewed - No data to display        Imaging     Imaging Results    None                    ED Course         Procedures         Orders Placed This Encounter    naloxone (NARCAN) 4 mg/actuation Spry                      Medical Decision Making       The patient's list of active medical problems, social history, medications, and allergies as documented per RN staff has been reviewed.           Medical Decision Making  This patient presents for evaluation of coma that resolved with narcan administered prior to arrival. Monitored through 4 hour post Narcan time with continued sobriety. Tolerating PO and ambulating at baseline. Prescribed Naloxone. No suicidal thoughts or intent before or after presumed overdose. Other possible diagnoses include CVA, electrolyte derangement, cardiac arrest, but these are considered to be very unlikely. Close follow-up with PCP. Return to the ED for reevaluation should symptoms worsen, return, or change in character.      Amount and/or Complexity of Data Reviewed  Independent Historian: EMS  External Data Reviewed: notes.    Risk  Diagnosis or treatment significantly limited by social determinants of health.                    ED Prescriptions       Medication Sig Dispense Start Date End Date Auth. Provider    naloxone (NARCAN) 4 mg/actuation Spry 4mg by nasal route as needed for opioid overdose; may repeat every  2-3 minutes in alternating nostrils until medical help arrives. Call 911 1 each 10/10/2024 -- Juan Alberto Hope MD              Clinical Impression       Follow-up Information       Follow up With Specialties Details Why Contact Info    Primary care provider of your choice  Schedule an appointment as soon as possible for a visit in 3 days For follow-up on today's visit.     [facility] Emergency Medicine Go to  As needed, If symptoms worsen [address] [facility] [id number] [phone]            Referrals:  No orders of the defined types were placed in this encounter.      Disposition   ED Disposition Condition    Discharge Stable              Final diagnoses:  [T40.1X1A] Heroin overdose, accidental or unintentional, initial encounter (Primary)        Juan Alberto Hope MD        10/10/2024          DISCLAIMER: This note was prepared with S[employer] voice recognition transcription software. Garbled syntax, mangled pronouns, and other bizarre constructions may be attributed to that software system.       Juan Alberto Hope MD  10/10/24 0523

## 2024-10-10 NOTE — ED TRIAGE NOTES
Margarita Wiseman, an 49 y.o. female presents to the ED reporting that she took fentanyl at home. The patient also said her brother called EMS for her.      Chief Complaint   Patient presents with    Drug Overdose     Pt arrives via no ems after becoming unresponsive after using Fentanyl. Bystanders gave 4mg narcan IN prior to ems arrival. EMS gave 0.5mg narcan IV. +response.      Review of patient's allergies indicates:  No Known Allergies  No past medical history on file.

## 2024-11-26 ENCOUNTER — HOSPITAL ENCOUNTER (EMERGENCY)
Facility: OTHER | Age: 49
Discharge: HOME | End: 2024-11-26
Attending: EMERGENCY MEDICINE

## 2024-11-26 VITALS
TEMPERATURE: 98 F | DIASTOLIC BLOOD PRESSURE: 52 MMHG | RESPIRATION RATE: 16 BRPM | SYSTOLIC BLOOD PRESSURE: 95 MMHG | HEART RATE: 52 BPM | OXYGEN SATURATION: 100 %

## 2024-11-26 DIAGNOSIS — R41.82 ALTERED MENTAL STATUS: ICD-10-CM

## 2024-11-26 DIAGNOSIS — T50.901A ACCIDENTAL OVERDOSE, INITIAL ENCOUNTER: Primary | ICD-10-CM

## 2024-11-26 DIAGNOSIS — T40.601A OPIATE OVERDOSE, ACCIDENTAL OR UNINTENTIONAL, INITIAL ENCOUNTER: ICD-10-CM

## 2024-11-26 DIAGNOSIS — S51.802A OPEN WOUND OF LEFT FOREARM, INITIAL ENCOUNTER: ICD-10-CM

## 2024-11-26 LAB
ALBUMIN SERPL BCP-MCNC: 3.6 G/DL (ref 3.5–5.2)
ALP SERPL-CCNC: 80 U/L (ref 40–150)
ALT SERPL W/O P-5'-P-CCNC: 8 U/L (ref 10–44)
ANION GAP SERPL CALC-SCNC: 9 MMOL/L (ref 8–16)
AST SERPL-CCNC: 15 U/L (ref 10–40)
BASOPHILS # BLD AUTO: 0.02 K/UL (ref 0–0.2)
BASOPHILS NFR BLD: 0.3 % (ref 0–1.9)
BILIRUB SERPL-MCNC: 0.2 MG/DL (ref 0.1–1)
BUN SERPL-MCNC: 14 MG/DL (ref 6–20)
CALCIUM SERPL-MCNC: 9.5 MG/DL (ref 8.7–10.5)
CHLORIDE SERPL-SCNC: 106 MMOL/L (ref 95–110)
CK SERPL-CCNC: 84 U/L (ref 20–180)
CO2 SERPL-SCNC: 23 MMOL/L (ref 23–29)
CREAT SERPL-MCNC: 1 MG/DL (ref 0.5–1.4)
DIFFERENTIAL METHOD BLD: ABNORMAL
EOSINOPHIL # BLD AUTO: 0.1 K/UL (ref 0–0.5)
EOSINOPHIL NFR BLD: 1.1 % (ref 0–8)
ERYTHROCYTE [DISTWIDTH] IN BLOOD BY AUTOMATED COUNT: 15.8 % (ref 11.5–14.5)
EST. GFR  (NO RACE VARIABLE): >60 ML/MIN/1.73 M^2
ETHANOL SERPL-MCNC: <10 MG/DL
GLUCOSE SERPL-MCNC: 83 MG/DL (ref 70–110)
HCT VFR BLD AUTO: 31.5 % (ref 37–48.5)
HCV AB SERPL QL IA: POSITIVE
HGB BLD-MCNC: 9.5 G/DL (ref 12–16)
HIV1+2 IGG SERPL QL IA.RAPID: NORMAL
IMM GRANULOCYTES # BLD AUTO: 0.03 K/UL (ref 0–0.04)
IMM GRANULOCYTES NFR BLD AUTO: 0.4 % (ref 0–0.5)
LYMPHOCYTES # BLD AUTO: 2.3 K/UL (ref 1–4.8)
LYMPHOCYTES NFR BLD: 32.2 % (ref 18–48)
MCH RBC QN AUTO: 22.6 PG (ref 27–31)
MCHC RBC AUTO-ENTMCNC: 30.2 G/DL (ref 32–36)
MCV RBC AUTO: 75 FL (ref 82–98)
MONOCYTES # BLD AUTO: 0.5 K/UL (ref 0.3–1)
MONOCYTES NFR BLD: 7.2 % (ref 4–15)
NEUTROPHILS # BLD AUTO: 4.3 K/UL (ref 1.8–7.7)
NEUTROPHILS NFR BLD: 58.8 % (ref 38–73)
NRBC BLD-RTO: 0 /100 WBC
OHS QRS DURATION: 84 MS
OHS QTC CALCULATION: 447 MS
PLATELET # BLD AUTO: 315 K/UL (ref 150–450)
PMV BLD AUTO: 11.2 FL (ref 9.2–12.9)
POTASSIUM SERPL-SCNC: 3.1 MMOL/L (ref 3.5–5.1)
PROT SERPL-MCNC: 8.9 G/DL (ref 6–8.4)
RBC # BLD AUTO: 4.2 M/UL (ref 4–5.4)
SODIUM SERPL-SCNC: 138 MMOL/L (ref 136–145)
WBC # BLD AUTO: 7.26 K/UL (ref 3.9–12.7)

## 2024-11-26 PROCEDURE — 99284 EMERGENCY DEPT VISIT MOD MDM: CPT | Mod: 25

## 2024-11-26 PROCEDURE — 82550 ASSAY OF CK (CPK): CPT | Performed by: EMERGENCY MEDICINE

## 2024-11-26 PROCEDURE — 86803 HEPATITIS C AB TEST: CPT | Performed by: EMERGENCY MEDICINE

## 2024-11-26 PROCEDURE — 93010 ELECTROCARDIOGRAM REPORT: CPT | Mod: ,,, | Performed by: INTERNAL MEDICINE

## 2024-11-26 PROCEDURE — 85025 COMPLETE CBC W/AUTO DIFF WBC: CPT | Performed by: EMERGENCY MEDICINE

## 2024-11-26 PROCEDURE — 86703 HIV-1/HIV-2 1 RESULT ANTBDY: CPT | Performed by: EMERGENCY MEDICINE

## 2024-11-26 PROCEDURE — 93005 ELECTROCARDIOGRAM TRACING: CPT

## 2024-11-26 PROCEDURE — 80053 COMPREHEN METABOLIC PANEL: CPT | Performed by: EMERGENCY MEDICINE

## 2024-11-26 PROCEDURE — 82077 ASSAY SPEC XCP UR&BREATH IA: CPT | Performed by: EMERGENCY MEDICINE

## 2024-11-26 RX ORDER — NALOXONE HYDROCHLORIDE 4 MG/.1ML
SPRAY NASAL
Qty: 2 EACH | Refills: 11 | Status: SHIPPED | OUTPATIENT
Start: 2024-11-26

## 2024-11-26 NOTE — ED NOTES
Late entry secondary to direct patient care. Report received at the bedside from previous RN. Pt is asleep on resp/cardiac monitor but arousable to voice. States she is feeling well right now.

## 2024-11-26 NOTE — ED NOTES
I spoke with wound care nurse who is willing to see patient and give her supplies for arm wound. Wound is currently dressed with a mepilex.

## 2024-11-26 NOTE — ED PROVIDER NOTES
"Encounter Date: 11/26/2024       History     Chief Complaint   Patient presents with    Drug Overdose     Fentanyl overdose, given 4mg nasal narcan. VSS, pt awake and alert PTA.      49-year-old female with substance abuse, bipolar disorder, presents via [place] [facility] to [facility] status post opiate overdose.  Patient states she used "dope" last night.  After the patient became unresponsive, the person she was with administered intranasal Narcan 2mg x 2 with improvement in mentation; patient was able to ambulate to EMS stretcher.      Patient also has a wound to her left forearm from skin-popping.  She cannot give a good history of this wound but states it has been present for some time.  Per prior notes, she has had this wound for several years.    PMH: endocarditis with tricuspid vegetation, septic pulmonary emboli, chronic hep C, chronic anemia, chronic thrombocytopenia, 2nd degree heart block, chronic L arm wound, synovitis of the glenohumeral joint, subacromial subdeltoid bursitis     Social: homeless, IVDU / substance abuse (cocaine + heroin)    Psych: bipolar disorder, PTSD, anxiety/depression          Review of patient's allergies indicates:  No Known Allergies  History reviewed. No pertinent past medical history.  History reviewed. No pertinent surgical history.  No family history on file.  Social History     Tobacco Use    Smoking status: Every Day     Current packs/day: 1.00     Types: Cigarettes    Smokeless tobacco: Never    Tobacco comments:     Smoking cessation education provided. Pt is a 1 pk/day cigarette smoker x 26 yrs. She states that she cut down to 0.5 pk/day approx. 1 yr ago. Pt declines referral to Ambulatory Smoking Cessation clinic, stating not ready to quit. Handout provided.    Substance Use Topics    Alcohol use: Not Currently    Drug use: Yes     Types: IV, Fentanyl     Comment: heroin, Fentanyl, crack     Review of Systems   Unable to perform ROS: Mental status change "       Physical Exam     Initial Vitals   BP Pulse Resp Temp SpO2   11/26/24 0447 11/26/24 0447 11/26/24 0512 11/26/24 0440 11/26/24 0432   124/60 67 20 99.1 °F (37.3 °C) 100 %      MAP       --                Physical Exam    Nursing note and vitals reviewed.  Constitutional: She appears well-developed and well-nourished. She is not diaphoretic.   Patient is lying asleep on ED stretcher.  She does rouse to some noxious stimuli but falls asleep very rapidly.  She appears to be in no distress.   HENT:   Head: Normocephalic and atraumatic.   Eyes: Conjunctivae are normal.   Neck: Neck supple.   Normal range of motion.  Cardiovascular:  Normal rate, regular rhythm and intact distal pulses.           Pulmonary/Chest: No respiratory distress. She has no wheezes. She has no rhonchi. She has no rales.   Abdominal: Abdomen is soft. There is no abdominal tenderness.   Musculoskeletal:         General: Normal range of motion.      Cervical back: Normal range of motion and neck supple.     Neurological:   Moving all extremities.   Skin: Skin is warm and dry.   Large (4.5cm x 3cm) wound to left dorsal forearm with some purulence and granulation tissue.  See photo.         ED Course   Procedures  Labs Reviewed   COMPREHENSIVE METABOLIC PANEL - Abnormal       Result Value    Sodium 138      Potassium 3.1 (*)     Chloride 106      CO2 23      Glucose 83      BUN 14      Creatinine 1.0      Calcium 9.5      Total Protein 8.9 (*)     Albumin 3.6      Total Bilirubin 0.2      Alkaline Phosphatase 80      AST 15      ALT 8 (*)     eGFR >60      Anion Gap 9      Narrative:     Release to patient->Immediate   CBC W/ AUTO DIFFERENTIAL - Abnormal    WBC 7.26      RBC 4.20      Hemoglobin 9.5 (*)     Hematocrit 31.5 (*)     MCV 75 (*)     MCH 22.6 (*)     MCHC 30.2 (*)     RDW 15.8 (*)     Platelets 315      MPV 11.2      Immature Granulocytes 0.4      Gran # (ANC) 4.3      Immature Grans (Abs) 0.03      Lymph # 2.3      Mono # 0.5      Eos  # 0.1      Baso # 0.02      nRBC 0      Gran % 58.8      Lymph % 32.2      Mono % 7.2      Eosinophil % 1.1      Basophil % 0.3      Differential Method Automated      Narrative:     Release to patient->Immediate   HEPATITIS C ANTIBODY - Abnormal    Hepatitis C Ab Positive (*)     Narrative:     Release to patient->Immediate   CK    CPK 84      Narrative:     Release to patient->Immediate   RAPID HIV    HIV Rapid Testing Non-Reactive      Narrative:     Release to patient->Immediate   ALCOHOL,MEDICAL (ETHANOL)    Alcohol, Serum <10      Narrative:     Release to patient->Immediate   DRUG SCREEN PANEL, URINE EMERGENCY   FENTANYL AND METABOLITE, URINE   POCT URINE PREGNANCY     EKG Readings: (Independently Interpreted)   06:24: Sinus bradycardia, HR 57. Normal axis. TWI in III. No ectopy. No STEMI. Morphology c/w 7/16/24.        Imaging Results    None          Medications - No data to display  Medical Decision Making  48 yo female s/p overdose.    Ddx includes toxidrome, aspiration, rhabdomyolysis, intentional vs unintentional overdose, other.    EKG no STEMI.     Patient's vitals reassuring.    Labs show chronic anemia.  No DONNA.  No rhabdomyolysis.    Left forearm wound dressed by nursing.    Plan to discharge when clinically sober.    Nichole Strauss  6:40 AM      Amount and/or Complexity of Data Reviewed  Labs: ordered.                                      Clinical Impression:  Final diagnoses:  [R41.82] Altered mental status  [T50.901A] Accidental overdose, initial encounter (Primary)  [T40.601A] Opiate overdose, accidental or unintentional, initial encounter  [S51.802A] Open wound of left forearm, initial encounter                 Nichole Strauss MD  11/26/24 0736

## 2024-11-26 NOTE — ED NOTES
Pt to ED with EMS for Overdose of Opiates on the street. Bystander gave pt nasal narcan x 1 dose. Pt is very groggy but arousal and oriented to self and situation. V/S stable and WNL. Sats 100% on RA.

## 2025-05-15 ENCOUNTER — HOSPITAL ENCOUNTER (EMERGENCY)
Facility: OTHER | Age: 50
Discharge: HOME | End: 2025-05-15
Attending: EMERGENCY MEDICINE

## 2025-05-15 VITALS
WEIGHT: 155 LBS | TEMPERATURE: 99 F | OXYGEN SATURATION: 100 % | SYSTOLIC BLOOD PRESSURE: 106 MMHG | BODY MASS INDEX: 30.43 KG/M2 | HEART RATE: 47 BPM | DIASTOLIC BLOOD PRESSURE: 85 MMHG | HEIGHT: 60 IN | RESPIRATION RATE: 20 BRPM

## 2025-05-15 DIAGNOSIS — T40.1X1A HEROIN OVERDOSE: ICD-10-CM

## 2025-05-15 LAB
ALBUMIN SERPL BCP-MCNC: 3.2 G/DL (ref 3.5–5.2)
ALP SERPL-CCNC: 94 UNIT/L (ref 40–150)
ALT SERPL W/O P-5'-P-CCNC: 10 UNIT/L (ref 10–44)
ANION GAP (OHS): 10 MMOL/L (ref 8–16)
AST SERPL-CCNC: 20 UNIT/L (ref 11–45)
BACTERIA #/AREA URNS AUTO: ABNORMAL /HPF
BILIRUB SERPL-MCNC: 0.1 MG/DL (ref 0.1–1)
BILIRUB UR QL STRIP.AUTO: NEGATIVE
BUN SERPL-MCNC: 14 MG/DL (ref 6–20)
CALCIUM SERPL-MCNC: 8.4 MG/DL (ref 8.7–10.5)
CHLORIDE SERPL-SCNC: 106 MMOL/L (ref 95–110)
CLARITY UR: CLEAR
CO2 SERPL-SCNC: 20 MMOL/L (ref 23–29)
COLOR UR AUTO: YELLOW
CREAT SERPL-MCNC: 0.9 MG/DL (ref 0.5–1.4)
ERYTHROCYTE [DISTWIDTH] IN BLOOD BY AUTOMATED COUNT: 16.1 % (ref 11.5–14.5)
GFR SERPLBLD CREATININE-BSD FMLA CKD-EPI: >60 ML/MIN/1.73/M2
GLUCOSE SERPL-MCNC: 72 MG/DL (ref 70–110)
GLUCOSE UR QL STRIP: NEGATIVE
HCT VFR BLD AUTO: 32.2 % (ref 37–48.5)
HGB BLD-MCNC: 9.4 GM/DL (ref 12–16)
HGB UR QL STRIP: ABNORMAL
HOLD SPECIMEN: NORMAL
KETONES UR QL STRIP: NEGATIVE
LEUKOCYTE ESTERASE UR QL STRIP: ABNORMAL
MCH RBC QN AUTO: 21.5 PG (ref 27–31)
MCHC RBC AUTO-ENTMCNC: 29.2 G/DL (ref 32–36)
MCV RBC AUTO: 74 FL (ref 82–98)
MICROSCOPIC COMMENT: ABNORMAL
NITRITE UR QL STRIP: POSITIVE
PH UR STRIP: 7 [PH]
PLATELET # BLD AUTO: 274 K/UL (ref 150–450)
PMV BLD AUTO: 10 FL (ref 9.2–12.9)
POCT GLUCOSE: 81 MG/DL (ref 70–110)
POTASSIUM SERPL-SCNC: 3.8 MMOL/L (ref 3.5–5.1)
PROT SERPL-MCNC: 8.6 GM/DL (ref 6–8.4)
PROT UR QL STRIP: ABNORMAL
RBC # BLD AUTO: 4.38 M/UL (ref 4–5.4)
RBC #/AREA URNS AUTO: 18 /HPF (ref 0–4)
SODIUM SERPL-SCNC: 136 MMOL/L (ref 136–145)
SP GR UR STRIP: 1.01
SQUAMOUS #/AREA URNS AUTO: <1 /HPF
UROBILINOGEN UR STRIP-ACNC: NEGATIVE EU/DL
WBC # BLD AUTO: 9.99 K/UL (ref 3.9–12.7)
WBC #/AREA URNS AUTO: 11 /HPF (ref 0–5)

## 2025-05-15 PROCEDURE — 81003 URINALYSIS AUTO W/O SCOPE: CPT | Performed by: EMERGENCY MEDICINE

## 2025-05-15 PROCEDURE — 93005 ELECTROCARDIOGRAM TRACING: CPT

## 2025-05-15 PROCEDURE — 80053 COMPREHEN METABOLIC PANEL: CPT | Performed by: EMERGENCY MEDICINE

## 2025-05-15 PROCEDURE — 99285 EMERGENCY DEPT VISIT HI MDM: CPT | Mod: 25

## 2025-05-15 PROCEDURE — 93010 ELECTROCARDIOGRAM REPORT: CPT | Mod: ,,, | Performed by: INTERNAL MEDICINE

## 2025-05-15 PROCEDURE — 82962 GLUCOSE BLOOD TEST: CPT

## 2025-05-15 PROCEDURE — 96374 THER/PROPH/DIAG INJ IV PUSH: CPT

## 2025-05-15 PROCEDURE — 87077 CULTURE AEROBIC IDENTIFY: CPT | Performed by: EMERGENCY MEDICINE

## 2025-05-15 PROCEDURE — 63600175 PHARM REV CODE 636 W HCPCS: Performed by: EMERGENCY MEDICINE

## 2025-05-15 PROCEDURE — 85027 COMPLETE CBC AUTOMATED: CPT | Performed by: EMERGENCY MEDICINE

## 2025-05-15 RX ORDER — NALOXONE HYDROCHLORIDE 4 MG/.1ML
SPRAY NASAL
Qty: 2 EACH | Refills: 0 | Status: SHIPPED | OUTPATIENT
Start: 2025-05-15

## 2025-05-15 RX ORDER — CEFTRIAXONE 1 G/1
1 INJECTION, POWDER, FOR SOLUTION INTRAMUSCULAR; INTRAVENOUS
Status: COMPLETED | OUTPATIENT
Start: 2025-05-15 | End: 2025-05-15

## 2025-05-15 RX ADMIN — CEFTRIAXONE 1 G: 1 INJECTION, POWDER, FOR SOLUTION INTRAMUSCULAR; INTRAVENOUS at 06:05

## 2025-05-15 NOTE — ED PROVIDER NOTES
"Emergency Department Encounter  Provider Note    Margarita Wiseman  [id number]  5/15/2025    Evaluation:    History Acquisition:     Chief Complaint   Patient presents with    Drug Overdose     Known history of opiate abuse, received 8 mg IN Narcan by a family member with a positive response. GCS 15 on EMS arrival. Pupils 2mm per EMS. Possible staph infection to LFA, has not been receiving care for it.       History of Present Illness:  Margarita Wiseman is a 49 y.o. female who has no past medical history on file.    The patient presents to the ED due to overdose.   Patient was found unresponsive by family.  8 milligrams of Narcan were given prior to arrival.    On arrival to ER, patient states she "went out" after using heroin.  She states she uses heroin every day.  She denies any other complaints.      Additional historians utilized:  EMS report - patient awake and alert on their arrival. No meds given.     Prior medical records were reviewed:   Visit 11/2024 for fentanyl overdose  Visit 10/2024 for heroin overdose  Visit 08/2024 for opiate overdose    The patient's list of active medical history, family/social history, medications, and allergies as documented has been reviewed.     No past medical history on file.  No past surgical history on file.  No family history on file.  Social History     Socioeconomic History    Marital status: Unknown   Tobacco Use    Smoking status: Every Day     Current packs/day: 1.00     Types: Cigarettes    Smokeless tobacco: Never    Tobacco comments:     Smoking cessation education provided. Pt is a 1 pk/day cigarette smoker x 26 yrs. She states that she cut down to 0.5 pk/day approx. 1 yr ago. Pt declines referral to Ambulatory Smoking Cessation clinic, stating not ready to quit. Handout provided.    Substance and Sexual Activity    Alcohol use: Not Currently    Drug use: Yes     Types: IV, Fentanyl     Comment: heroin, Fentanyl, crack    Sexual activity: Not Currently     Social " Drivers of Health     Financial Resource Strain: High Risk (7/18/2024)    Overall Financial Resource Strain (CARDIA)     Difficulty of Paying Living Expenses: Very hard   Food Insecurity: Food Insecurity Present (6/25/2024)    Hunger Vital Sign     Worried About Running Out of Food in the Last Year: Often true     Ran Out of Food in the Last Year: Often true   Transportation Needs: Unmet Transportation Needs (6/25/2024)    TRANSPORTATION NEEDS     Transportation : Yes, it has kept me from medical appointments or from getting my medications.   Physical Activity: Inactive (6/25/2024)    Exercise Vital Sign     Days of Exercise per Week: 0 days     Minutes of Exercise per Session: 0 min   Stress: Stress Concern Present (6/25/2024)    [employer] - Occupational Stress Questionnaire     Feeling of Stress : To some extent   Housing Stability: High Risk (7/18/2024)    Housing Stability Vital Sign     Unable to Pay for Housing in the Last Year: Yes     Homeless in the Last Year: Yes       Medications:  Medication List with Changes/Refills   New Medications    NALOXONE (NARCAN) 4 MG/ACTUATION SPRY    1 spray into the nose as needed for suspected overdose of opioid.  Proceed to Emergency Department immediately after use.   Current Medications    BUSPIRONE (BUSPAR) 5 MG TAB    Take 5 mg by mouth 2 (two) times daily.    DIVALPROEX ER (DEPAKOTE ER) 250 MG 24 HR TABLET    Take 250 mg by mouth once daily.    FERROUS SULFATE (FEOSOL) 325 MG (65 MG IRON) TAB TABLET    Take 1 tablet (325 mg total) by mouth 2 (two) times daily.    HYDROXYZINE (ATARAX) 50 MG TABLET    Take 1 tablet (50 mg total) by mouth nightly as needed for Itching (insomnia).    IBUPROFEN (ADVIL,MOTRIN) 800 MG TABLET    Take 1 tablet (800 mg total) by mouth every 6 (six) hours as needed for Pain.    NALOXONE (NARCAN) 4 MG/ACTUATION SPRY    4mg by nasal route as needed for opioid overdose; may repeat every 2-3 minutes in alternating  nostrils until medical help arrives. Call 911    OLANZAPINE (ZYPREXA) 5 MG TABLET    Take 5 mg by mouth every evening.    ONDANSETRON (ZOFRAN-ODT) 8 MG TBDL    Take 1 tablet (8 mg total) by mouth every 6 (six) hours as needed (nausea).    SERTRALINE (ZOLOFT) 25 MG TABLET    Take 25 mg by mouth once daily.       Allergies:  Review of patient's allergies indicates:  No Known Allergies      Physical Exam:     Initial Vitals   BP Pulse Resp Temp SpO2   05/15/25 0422 05/15/25 0454 05/15/25 0542 05/15/25 0544 05/15/25 0410   (!) 92/55 64 12 98.5 °F (36.9 °C) 100 %      MAP       --                Physical Exam    Nursing note and vitals reviewed.  Constitutional: She appears well-developed and well-nourished. She is not diaphoretic. No distress.   HENT:   Head: Normocephalic and atraumatic. Mouth/Throat: Oropharynx is clear and moist.   Eyes: EOM are normal. Pupils are equal, round, and reactive to light.   Neck: No tracheal deviation present.   Cardiovascular:  Normal rate, regular rhythm, normal heart sounds and intact distal pulses.           Pulmonary/Chest: Breath sounds normal. No stridor. No respiratory distress. She has no wheezes.   Abdominal: Abdomen is soft. Bowel sounds are normal. She exhibits no distension and no mass. There is no abdominal tenderness.   Musculoskeletal:         General: No edema. Normal range of motion.     Neurological: She is alert and oriented to person, place, and time. She has normal strength. No cranial nerve deficit or sensory deficit.   Skin: Skin is warm and dry. Capillary refill takes less than 2 seconds. No pallor.   Psychiatric: She has a normal mood and affect. Her behavior is normal. Thought content normal.         Differential Diagnoses:   Based on available information and initial assessment, Differential Diagnosis includes, but is not limited to:  Drug overdose, alcohol intoxication, hypoglycemia, cardiac arrhythmia, dehydration, syncope, orthostatic hypotension.       ED  Management:   Procedures    Orders Placed This Encounter    Urine culture    X-Ray Chest AP Portable    Urinalysis, Reflex to Urine Culture Urine, Clean Catch    CBC Without Differential    Comprehensive metabolic panel    GREY TOP URINE HOLD    Urinalysis Microscopic    Nursing communication    EKG 12-lead    POCT glucose    POCT glucose    naloxone (NARCAN) 4 mg/actuation Spry          EKG:   EKG interpretation by ED attending physician:  Sinus bradycardia, rate 51, no ST changes, no ischemia, normal intervals.  Compared with prior EKG dated 11/2024, grossly stable without significant change.      Labs:     Labs Reviewed   URINALYSIS, REFLEX TO URINE CULTURE - Abnormal       Result Value    Color, UA Yellow      Appearance, UA Clear      pH, UA 7.0      Spec Grav UA 1.015      Protein, UA Trace (*)     Glucose, UA Negative      Ketones, UA Negative      Bilirubin, UA Negative      Blood, UA 3+ (*)     Nitrites, UA Positive (*)     Urobilinogen, UA Negative      Leukocyte Esterase, UA 1+ (*)    CBC WITHOUT DIFFERENTIAL - Abnormal    WBC 9.99      RBC 4.38      HGB 9.4 (*)     HCT 32.2 (*)     MCV 74 (*)     MCHC 29.2 (*)     RDW 16.1 (*)     Platelet Count 274      MCH 21.5 (*)     MPV 10.0     COMPREHENSIVE METABOLIC PANEL - Abnormal    Sodium 136      Potassium 3.8      Chloride 106      CO2 20 (*)     Glucose 72      BUN 14      Creatinine 0.9      Calcium 8.4 (*)     Protein Total 8.6 (*)     Albumin 3.2 (*)     Bilirubin Total 0.1      ALP 94      AST 20      ALT 10      Anion Gap 10      eGFR >60     URINALYSIS MICROSCOPIC - Abnormal    RBC, UA 18 (*)     WBC, UA 11 (*)     Bacteria, UA Few (*)     Squamous Epithelial Cells, UA <1      Microscopic Comment       CULTURE, URINE   GREY TOP URINE HOLD   POCT GLUCOSE    POCT Glucose 81     POCT GLUCOSE MONITORING CONTINUOUS     Independent review of the labs ordered include:   See ED course    Imaging:     Imaging Results              X-Ray Chest AP Portable (In  process)  Result time 05/15/25 04:29:45                       Medications Given:   Medications - No data to display     Medical Decision Making:    Additional Consideration:   Additional testing considered during clinical course: labs/imaging considered including:  - none    Social determinants of health considered during development of treatment plan include: poor access to care, opiate abuse, polysubstance abuse    Current co-morbidities considered which impacted clinical decision making: IV drug use, bradycardia, anemia, endocarditis, Hep C, PTSD    Case discussed with additional provider:   At time of shift change, patient's ED workup incomplete. Oncoming ED physician to continue care. All relevant details were discussed, including pending workup and planned disposition. See summary below.    Pending: labs, reassessment  Disposition: anticipate DC after observation period s/p Narcan      ED Course as of 05/15/25 0934   Thu May 15, 2025   0540 SpO2: 99 % [SS]   0540 Pulse(!): 54 [SS]   0540 MAP (mmHg): 84 [SS]   0540 BP: 112/64  Vitals reassuring [SS]   0540 CBC Without Differential(!)  Chronic anemia, stable from prior [SS]   0540 POCT glucose  Glucose normal [SS]   0540 X-Ray Chest AP Portable  CXR independently interpreted: no focal infiltrate, effusion, edema, free air, or other acute process.  Pending radiologist interpretation.    [SS]   0905 Patient awake, alert, oriented without signs of intoxication or withdrawal.  I anticipate discharge pending stable gait. [RC]   0934 Patient had normal steady gait.  She is stable for discharge. [RC]      ED Course User Index  [RC] Maximino Sahu MD  [SS] Joe Benavidez MD            Medical Decision Making  Problems Addressed:  Heroin overdose: acute illness or injury    Amount and/or Complexity of Data Reviewed  Independent Historian: EMS  External Data Reviewed: notes.  Labs: ordered. Decision-making details documented in ED Course.  Radiology: ordered and  independent interpretation performed. Decision-making details documented in ED Course.  ECG/medicine tests: ordered and independent interpretation performed. Decision-making details documented in ED Course.    Risk  Prescription drug management.  Diagnosis or treatment significantly limited by social determinants of health.        Clinical Impression:       ICD-10-CM ICD-9-CM   1. Heroin overdose  T40.1X1A 965.01     E980.0       Discharge Medications:  Current Discharge Medication List        START taking these medications    Details   !! naloxone (NARCAN) 4 mg/actuation Spry 1 spray into the nose as needed for suspected overdose of opioid.  Proceed to Emergency Department immediately after use.  Qty: 2 each, Refills: 0       !! - Potential duplicate medications found. Please discuss with provider.            Follow-up Information    None                Joe Benavidez MD  05/15/25 0539       Joe Benavidez MD  05/15/25 0557       Maximino Sahu MD  05/15/25 0972

## 2025-05-15 NOTE — DISCHARGE INSTRUCTIONS
Residential Substance Abuse Treatment Facilities    [facility]  [address] [facility], [place]   [phone]  Primary treatment, independent living, and long-term aftercare for men    [facility]  [address] [facility], [place]   [phone]  6-12 month, [place]-based program for men    [facility]  [address]  [place]   [phone]  Intensive Outpatient program (men & women)  Residential Treatment 5-6 weeks (men)  Supportive Housing  [email or web address]    [facility] [place]  [place]-based residential treatment program for adult men for up to 6 months.  [address] [address]  [phone]    [facility]  Residential treatment program for adult women up to 6 months.  [address] [facility], [place] [address]  [address] [facility], [place] [address]  [phone]    [place], [place]   Admissions Coordinator: Jose Shultz  [phone]  Provides transportation to the [facility]  Residential 60-day program for [place] residents receiving less than $23,000/year.  [address]  [place] [address] [phone]    [facility] [place]  [place]  Call Juan [phone]  [facility] [phone] (admin)    [address] [place] [id number] [phone]    [facility]  [email or web address]  Residential substance abuse treatment for men, women, women with children, and pregnant women. If dual diagnosis, they must have current psych eval & 30 days worth of medications. Transportation to mental health clinic provided.  [address] [facility], [place] [address]  [phone] (short-term residential)  [phone] (detox, REQUIRED for alcohol, benzodiazepines, & heroin use)    [facility]  Men only, program for alcoholics  [address] [facility], [place]  [phone]    [facility]  [place]-based residential substance abuse treatment program for men for up to 12 months  [address] [facility], [place] [address]  [phone]    [facility]-based program for men. Some restrictions on psych medications.  [address] [place] [address]  [phone]    [facility] at the [facility]-based program for women. Some restrictions on psych medications.  [address] [place] [address]  [phone]    [facility] ([facility]) for males &  Alcohol Drug Unit (ROSS) for females  [address], [place] [address]  [phone]    [facility]  [address], [place], [place] [id number] [phone]    [facility] ([place], [place])  [address] [place], [place] [id number] [phone]    Critical access hospital ([place], [place])  [phone]  Accepts Medicaid, uninsured, and many private insurances.   Usually has a waiting list, call for more info.     Intensive Outpatient Programs for Substance Use    [facility]:    Medically assisted treatment and counseling.   Accepts Medicaid, Medicare, and most major insurance.     [facility]:  [address] [place] [address] [facility]  [phone]    [facility]:   [address] [place] [address], [address], [place] [address]  [phone]      Also locations in [place], [place], [place], [facility]  Accepts Medicaid plans and private insurance.   [address] [facility] [place] [address]  [phone]  [address] [place] [address]  [phone]  [address] [place], [place] [address] [phone]    [facility] ([facility])   Accepts private insurances. [facility] location accepts Medicaid.   [address] [place] [address]   [phone]   [address] [facility], [facility] [address], [place] [address]   [phone]   [address] [facility], [place] [id number] [phone]     [facility] ([facility])  Serves [facility] residents.  Serves uninsured patients,  those with Medicaid and some private plans.  New patients & aftercare appointments available as walk-in.  Primary care services available as well.  [facility]: [address] [facility] [address] [place] [address];   [phone]  [place]: [address] [place] [address];   [phone]  24/7 Crisis Line: [phone]    [facility]  Serves uninsured patients & those with Medicaid, call for more info.  Primary care, pediatrics, HIV treatment, and dentistry services available as well.   Three locations.  [phone]    [facility]  Accepts Medicaid.  [address] [facility] [address] [facility], [address]  [place] [address] [phone]   Monday, Wednesday, Fridays 9:00AM-12:00PM  Provides bus tokens and breakfast.    [facility] ([facility])  Accepts Medicaid and uninsured individuals (uninsured must call [phone] for funding assistance)   [phone]  [address] [facility], [address], [place] [address]    [facility]   Private pay or private insurances, including Blue Cross Blue Shield.   [phone]  [address] [place] [address]    [facility]  Private pay or private insurances  [address] ([facility])  [phone]    Alcoholics Anonymous  Go to www.[email or web address] for locations and times  Locations with multiple meetings per day:  [facility] - [address] ([facility])  [facility] - [address] [facility] ([facility])  [facility] - [address] ([facility])  [place] - [address] ([place])    For more referrals and outpatient treatment, please contact [facility], [phone]    Homeless Resources    [facility]  Emergency shelter for individuals and families  [address] [place]  [phone]    [facility]  Emergency shelter for men only  Meals daily 6am, 2pm, & 6pm  Clothing, case management M-F by appointment  (ID/job/housing/legal assistance), mail  [address] [facility]  [phone]    [facility]  Emergency shelter for men  [address] [facility]  [phone]  Emergency shelter for women  [address] [facility]  [phone]  Breakfast & lunch daily, dinner M-F  Case management, job counseling services     [facility]  Emergency shelter for teens and young adults up to 20yo  [address]  [phone]    [facility]  Emergency shelter for women over 17yo and their kids  [address] [place] [address]  [phone]    [facility]  Day program, meals M-F 1PM (arrive early)  Showers, laundry, hygiene kits, showers, phones, , notary services, case management, ID assisance  [address] [facility]  [phone] M-F 8am-2:30pm    [facility]  Day program  MTW 7:30am-3:30pm,  8:30am-3:30pm  Crisis intervention, employment assistance, food/clothing, hygiene kits, bus tokens, mail  [address] [facility] [address]  [phone]    [facility]  Mobile outreach for homeless persons in [facility]  [phone]    [facility]  Primary healthcare, case management, dental services, TB placement  Call ahead  [address] [facility] [address]  [phone]    [place] [facility]  Connects homeless people with their loved ones in other cities by providing transportation costs   [phone]

## 2025-05-15 NOTE — ED NOTES
Pt sleeping in bed, responds to verbal stimuli. Aaox4. Respirations even and unlabored. Bed rails up x 2 and call light in reach. Visitor at bedside.

## 2025-05-15 NOTE — ED TRIAGE NOTES
BIB EMS found down by daughter with pinpoint pupils. 8mg narcan IN given prior to EMS arrive with + response. Staph infection noted to L ELIAS that is noted in pt's chart x 6 months ago. Pt not receiving treatment for it.

## 2025-05-16 ENCOUNTER — RESULTS FOLLOW-UP (OUTPATIENT)
Dept: EMERGENCY MEDICINE | Facility: OTHER | Age: 50
End: 2025-05-16

## 2025-05-18 LAB
OHS QRS DURATION: 72 MS
OHS QTC CALCULATION: 416 MS

## 2025-05-20 LAB — BACTERIA UR CULT: ABNORMAL

## 2025-06-10 ENCOUNTER — HOSPITAL ENCOUNTER (INPATIENT)
Facility: OTHER | Age: 50
LOS: 1 days | Discharge: LEFT BEFORE DISCHARGE | DRG: 871 | End: 2025-06-12
Admitting: HOSPITALIST

## 2025-06-10 DIAGNOSIS — I33.0 ENDOCARDITIS, BACTERIAL, ACUTE/SUBACUTE: ICD-10-CM

## 2025-06-10 DIAGNOSIS — D64.9 CHRONIC ANEMIA: ICD-10-CM

## 2025-06-10 DIAGNOSIS — T14.8XXA CHRONIC WOUND: ICD-10-CM

## 2025-06-10 DIAGNOSIS — Z86.79 HISTORY OF ENDOCARDITIS: ICD-10-CM

## 2025-06-10 DIAGNOSIS — Z13.6 SCREENING FOR CARDIOVASCULAR CONDITION: Primary | ICD-10-CM

## 2025-06-10 DIAGNOSIS — N39.0 UTI (URINARY TRACT INFECTION): ICD-10-CM

## 2025-06-10 DIAGNOSIS — F19.90 IVDU (INTRAVENOUS DRUG USER): ICD-10-CM

## 2025-06-10 DIAGNOSIS — N39.0 URINARY TRACT INFECTION WITHOUT HEMATURIA, SITE UNSPECIFIED: ICD-10-CM

## 2025-06-10 LAB
LDH SERPL L TO P-CCNC: 0.8 MMOL/L (ref 0.5–2.2)
SAMPLE: NORMAL

## 2025-06-10 PROCEDURE — 87389 HIV-1 AG W/HIV-1&-2 AB AG IA: CPT

## 2025-06-10 PROCEDURE — 80053 COMPREHEN METABOLIC PANEL: CPT

## 2025-06-10 PROCEDURE — 85025 COMPLETE CBC W/AUTO DIFF WBC: CPT

## 2025-06-10 PROCEDURE — 87040 BLOOD CULTURE FOR BACTERIA: CPT | Mod: 91 | Performed by: NURSE PRACTITIONER

## 2025-06-10 PROCEDURE — 86803 HEPATITIS C AB TEST: CPT

## 2025-06-10 PROCEDURE — 87154 CUL TYP ID BLD PTHGN 6+ TRGT: CPT | Performed by: NURSE PRACTITIONER

## 2025-06-10 PROCEDURE — 81025 URINE PREGNANCY TEST: CPT

## 2025-06-10 PROCEDURE — 93005 ELECTROCARDIOGRAM TRACING: CPT

## 2025-06-10 PROCEDURE — 87522 HEPATITIS C REVRS TRNSCRPJ: CPT

## 2025-06-10 PROCEDURE — 99900035 HC TECH TIME PER 15 MIN (STAT)

## 2025-06-10 PROCEDURE — 83605 ASSAY OF LACTIC ACID: CPT

## 2025-06-10 PROCEDURE — 93010 ELECTROCARDIOGRAM REPORT: CPT | Mod: ,,, | Performed by: INTERNAL MEDICINE

## 2025-06-10 RX ORDER — MORPHINE SULFATE 4 MG/ML
4 INJECTION, SOLUTION INTRAMUSCULAR; INTRAVENOUS
Refills: 0 | Status: DISCONTINUED | OUTPATIENT
Start: 2025-06-11 | End: 2025-06-11

## 2025-06-10 RX ORDER — ONDANSETRON HYDROCHLORIDE 2 MG/ML
4 INJECTION, SOLUTION INTRAVENOUS
Status: COMPLETED | OUTPATIENT
Start: 2025-06-11 | End: 2025-06-10

## 2025-06-10 RX ADMIN — ONDANSETRON 4 MG: 2 INJECTION INTRAMUSCULAR; INTRAVENOUS at 11:06

## 2025-06-10 NOTE — Clinical Note
Diagnosis: UTI (urinary tract infection) [510710]   Future Attending Provider: ERICA OROZCO [[id number]]

## 2025-06-11 PROBLEM — Z21 HIV ANTIBODY POSITIVE: Status: ACTIVE | Noted: 2025-06-11

## 2025-06-11 PROBLEM — N39.0 UTI (URINARY TRACT INFECTION): Status: ACTIVE | Noted: 2025-06-11

## 2025-06-11 PROBLEM — R19.7 NAUSEA VOMITING AND DIARRHEA: Status: ACTIVE | Noted: 2025-06-11

## 2025-06-11 PROBLEM — L98.499: Status: ACTIVE | Noted: 2025-06-11

## 2025-06-11 PROBLEM — R11.2 NAUSEA VOMITING AND DIARRHEA: Status: ACTIVE | Noted: 2025-06-11

## 2025-06-11 LAB
ABSOLUTE EOSINOPHIL (OHS): 0.01 K/UL
ABSOLUTE EOSINOPHIL (OHS): 0.01 K/UL
ABSOLUTE MONOCYTE (OHS): 0.22 K/UL (ref 0.3–1)
ABSOLUTE MONOCYTE (OHS): 0.26 K/UL (ref 0.3–1)
ABSOLUTE NEUTROPHIL COUNT (OHS): 2.42 K/UL (ref 1.8–7.7)
ABSOLUTE NEUTROPHIL COUNT (OHS): 3.28 K/UL (ref 1.8–7.7)
ACB COMPLEX DNA BLD POS QL NAA+NON-PROBE: NOT DETECTED
ACB COMPLEX DNA BLD POS QL NAA+NON-PROBE: NOT DETECTED
ALBUMIN SERPL BCP-MCNC: 2.7 G/DL (ref 3.5–5.2)
ALBUMIN SERPL BCP-MCNC: 3.1 G/DL (ref 3.5–5.2)
ALP SERPL-CCNC: 62 UNIT/L (ref 40–150)
ALP SERPL-CCNC: 68 UNIT/L (ref 40–150)
ALT SERPL W/O P-5'-P-CCNC: 14 UNIT/L (ref 10–44)
ALT SERPL W/O P-5'-P-CCNC: 17 UNIT/L (ref 10–44)
AMPHET UR QL SCN: ABNORMAL
ANION GAP (OHS): 11 MMOL/L (ref 8–16)
ANION GAP (OHS): 7 MMOL/L (ref 8–16)
ANISOCYTOSIS BLD QL SMEAR: SLIGHT
AORTIC SIZE INDEX (SOV): 1.6 CM/M2
AORTIC SIZE INDEX: 1.6 CM/M2
ASCENDING AORTA: 2.6 CM
AST SERPL-CCNC: 25 UNIT/L (ref 11–45)
AST SERPL-CCNC: 33 UNIT/L (ref 11–45)
AV INDEX (PROSTH): 0.52
AV MEAN GRADIENT: 9 MMHG
AV PEAK GRADIENT: 18 MMHG
AV VALVE AREA BY VELOCITY RATIO: 1.3 CM²
AV VALVE AREA: 1.3 CM²
AV VELOCITY RATIO: 0.52
B FRAGILIS DNA BLD POS QL NAA+PROBE: NOT DETECTED
B FRAGILIS DNA BLD POS QL NAA+PROBE: NOT DETECTED
B-HCG UR QL: NEGATIVE
BACTERIA #/AREA URNS AUTO: ABNORMAL /HPF
BARBITURATE SCN PRESENT UR: NEGATIVE
BASOPHILS # BLD AUTO: 0 K/UL
BASOPHILS # BLD AUTO: 0.01 K/UL
BASOPHILS NFR BLD AUTO: 0 %
BASOPHILS NFR BLD AUTO: 0.2 %
BENZODIAZ UR QL SCN: NEGATIVE
BILIRUB SERPL-MCNC: 0.2 MG/DL (ref 0.1–1)
BILIRUB SERPL-MCNC: 0.2 MG/DL (ref 0.1–1)
BILIRUB UR QL STRIP.AUTO: NEGATIVE
BSA FOR ECHO PROCEDURE: 1.68 M2
BUN SERPL-MCNC: 12 MG/DL (ref 6–20)
BUN SERPL-MCNC: 16 MG/DL (ref 6–20)
C ALBICANS DNA BLD POS QL NAA+PROBE: NOT DETECTED
C ALBICANS DNA BLD POS QL NAA+PROBE: NOT DETECTED
C AURIS DNA BLD POS QL NAA+NON-PROBE: NOT DETECTED
C AURIS DNA BLD POS QL NAA+NON-PROBE: NOT DETECTED
C GATTII+NEOFOR DNA CSF QL NAA+NON-PROBE: NOT DETECTED
C GATTII+NEOFOR DNA CSF QL NAA+NON-PROBE: NOT DETECTED
C GLABRATA DNA BLD POS QL NAA+PROBE: NOT DETECTED
C GLABRATA DNA BLD POS QL NAA+PROBE: NOT DETECTED
C KRUSEI DNA BLD POS QL NAA+PROBE: NOT DETECTED
C KRUSEI DNA BLD POS QL NAA+PROBE: NOT DETECTED
C PARAP DNA BLD POS QL NAA+PROBE: NOT DETECTED
C PARAP DNA BLD POS QL NAA+PROBE: NOT DETECTED
C TROPICLS DNA BLD POS QL NAA+PROBE: NOT DETECTED
C TROPICLS DNA BLD POS QL NAA+PROBE: NOT DETECTED
CALCIUM SERPL-MCNC: 7.5 MG/DL (ref 8.7–10.5)
CALCIUM SERPL-MCNC: 7.9 MG/DL (ref 8.7–10.5)
CANNABINOIDS UR QL SCN: NEGATIVE
CHLORIDE SERPL-SCNC: 105 MMOL/L (ref 95–110)
CHLORIDE SERPL-SCNC: 114 MMOL/L (ref 95–110)
CLARITY UR: ABNORMAL
CO2 SERPL-SCNC: 19 MMOL/L (ref 23–29)
CO2 SERPL-SCNC: 19 MMOL/L (ref 23–29)
COCAINE UR QL SCN: ABNORMAL
COLISTIN RES MCR-1 ISLT/SPM QL: ABNORMAL
COLISTIN RES MCR-1 ISLT/SPM QL: ABNORMAL
COLOR UR AUTO: YELLOW
CREAT SERPL-MCNC: 0.9 MG/DL (ref 0.5–1.4)
CREAT SERPL-MCNC: 1.1 MG/DL (ref 0.5–1.4)
CREAT UR-MCNC: 88 MG/DL (ref 15–325)
CTP QC/QA: YES
CV ECHO LV RWT: 0.32 CM
DACRYOCYTES BLD QL SMEAR: ABNORMAL
DOP CALC AO PEAK VEL: 2.1 M/S
DOP CALC AO VTI: 47.6 CM
DOP CALC LVOT AREA: 2.5 CM2
DOP CALC LVOT DIAMETER: 1.8 CM
DOP CALC LVOT PEAK VEL: 1.1 M/S
DOP CALC LVOT STROKE VOLUME: 62.8 CM3
DOP CALC RVOT PEAK VEL: 0.73 M/S
DOP CALCLVOT PEAK VEL VTI: 24.7 CM
E CLOAC COMP DNA BLD POS QL NAA+PROBE: NOT DETECTED
E CLOAC COMP DNA BLD POS QL NAA+PROBE: NOT DETECTED
E COLI DNA BLD POS QL NAA+PROBE: NOT DETECTED
E COLI DNA BLD POS QL NAA+PROBE: NOT DETECTED
E FAECALIS+OTHR E SP RRNA BLD POS FISH: NOT DETECTED
E FAECALIS+OTHR E SP RRNA BLD POS FISH: NOT DETECTED
E FAECIUM HSP60 BLD POS QL PROBE: NOT DETECTED
E FAECIUM HSP60 BLD POS QL PROBE: NOT DETECTED
E WAVE DECELERATION TIME: 192 MSEC
E/A RATIO: 1.45
E/E' RATIO: 8 M/S
ECHO LV POSTERIOR WALL: 0.8 CM (ref 0.6–1.1)
ENTEROBACTERALES DNA BLD POS NAA+N-PRB: NOT DETECTED
ENTEROBACTERALES DNA BLD POS NAA+N-PRB: NOT DETECTED
ERYTHROCYTE [DISTWIDTH] IN BLOOD BY AUTOMATED COUNT: 17.8 % (ref 11.5–14.5)
ERYTHROCYTE [DISTWIDTH] IN BLOOD BY AUTOMATED COUNT: 17.9 % (ref 11.5–14.5)
ESBL CFT TO CFT-CLAV IC RTO BD POS IMP: ABNORMAL
ESBL CFT TO CFT-CLAV IC RTO BD POS IMP: ABNORMAL
FRACTIONAL SHORTENING: 44 % (ref 28–44)
GFR SERPLBLD CREATININE-BSD FMLA CKD-EPI: >60 ML/MIN/1.73/M2
GFR SERPLBLD CREATININE-BSD FMLA CKD-EPI: >60 ML/MIN/1.73/M2
GLOBAL LONGITUIDAL STRAIN: 21.3 %
GLUCOSE SERPL-MCNC: 109 MG/DL (ref 70–110)
GLUCOSE SERPL-MCNC: 116 MG/DL (ref 70–110)
GLUCOSE UR QL STRIP: NEGATIVE
GP B STREP DNA CSF QL NAA+NON-PROBE: NOT DETECTED
GP B STREP DNA CSF QL NAA+NON-PROBE: NOT DETECTED
HAEM INFLU DNA CSF QL NAA+NON-PROBE: NOT DETECTED
HAEM INFLU DNA CSF QL NAA+NON-PROBE: NOT DETECTED
HCT VFR BLD AUTO: 28 % (ref 37–48.5)
HCT VFR BLD AUTO: 28.5 % (ref 37–48.5)
HCV AB SERPL QL IA: POSITIVE
HCV RNA SERPL NAA+PROBE-LOG IU: NOT DETECTED {LOG_IU}/ML
HGB BLD-MCNC: 8.2 GM/DL (ref 12–16)
HGB BLD-MCNC: 8.4 GM/DL (ref 12–16)
HGB UR QL STRIP: ABNORMAL
HIV 1 & 2 AB SER-IMP: NORMAL
HIV 1+2 AB+HIV1 P24 AG SERPL QL IA: POSITIVE
HIV 2 AB SERPLBLD QL IA.RAPID: NEGATIVE
HIV1 AB SERPLBLD QL IA.RAPID: NEGATIVE
HOLD SPECIMEN: 1
HOLD SPECIMEN: 1
HOLD SPECIMEN: NORMAL
IMM GRANULOCYTES # BLD AUTO: 0.02 K/UL (ref 0–0.04)
IMM GRANULOCYTES # BLD AUTO: 0.03 K/UL (ref 0–0.04)
IMM GRANULOCYTES NFR BLD AUTO: 0.5 % (ref 0–0.5)
IMM GRANULOCYTES NFR BLD AUTO: 0.6 % (ref 0–0.5)
IMP CARBAPENEMASE ISLT QL IA.RAPID: ABNORMAL
IMP CARBAPENEMASE ISLT QL IA.RAPID: ABNORMAL
INTERVENTRICULAR SEPTUM: 0.6 CM (ref 0.6–1.1)
IVC DIAMETER: 2.1 CM
K OXYTOCA OMPA BLD POS QL PROBE: NOT DETECTED
K OXYTOCA OMPA BLD POS QL PROBE: NOT DETECTED
KETONES UR QL STRIP: NEGATIVE
KLEBSIELLA SP DNA BLD POS QL NAA+NON-PRB: NOT DETECTED
KPC CARBAPENEMASE ISLT QL IA.RAPID: ABNORMAL
KPC CARBAPENEMASE ISLT QL IA.RAPID: ABNORMAL
L MONOCYTOG DNA CSF QL NAA+NON-PROBE: NOT DETECTED
L MONOCYTOG DNA CSF QL NAA+NON-PROBE: NOT DETECTED
LA MAJOR: 5.7 CM
LA MINOR: 5.4 CM
LA WIDTH: 4.1 CM
LACTATE SERPL-SCNC: 0.7 MMOL/L (ref 0.5–2.2)
LEFT ATRIUM AREA SYSTOLIC (APICAL 2 CHAMBER): 19.35 CM2
LEFT ATRIUM AREA SYSTOLIC (APICAL 4 CHAMBER): 19.58 CM2
LEFT ATRIUM SIZE: 3.3 CM
LEFT ATRIUM VOLUME INDEX MOD: 33 ML/M2
LEFT ATRIUM VOLUME INDEX: 39 ML/M2
LEFT ATRIUM VOLUME MOD: 54 ML
LEFT ATRIUM VOLUME: 64 CM3
LEFT INTERNAL DIMENSION IN SYSTOLE: 2.8 CM (ref 2.1–4)
LEFT VENTRICLE DIASTOLIC VOLUME INDEX: 73.17 ML/M2
LEFT VENTRICLE DIASTOLIC VOLUME: 120 ML
LEFT VENTRICLE END SYSTOLIC VOLUME APICAL 2 CHAMBER: 53.18 ML
LEFT VENTRICLE END SYSTOLIC VOLUME APICAL 4 CHAMBER: 53.62 ML
LEFT VENTRICLE MASS INDEX: 69.9 G/M2
LEFT VENTRICLE SYSTOLIC VOLUME INDEX: 17.7 ML/M2
LEFT VENTRICLE SYSTOLIC VOLUME: 29 ML
LEFT VENTRICULAR INTERNAL DIMENSION IN DIASTOLE: 5 CM (ref 3.5–6)
LEFT VENTRICULAR MASS: 114.7 G
LEUKOCYTE ESTERASE UR QL STRIP: ABNORMAL
LV LATERAL E/E' RATIO: 7.7 M/S
LV SEPTAL E/E' RATIO: 8.3 M/S
LVED V (TEICH): 119.96 ML
LVES V (TEICH): 28.92 ML
LVOT MG: 2.37 MMHG
LVOT MV: 0.71 CM/S
LYMPHOCYTES # BLD AUTO: 1.51 K/UL (ref 1–4.8)
LYMPHOCYTES # BLD AUTO: 1.75 K/UL (ref 1–4.8)
MAGNESIUM SERPL-MCNC: 1.8 MG/DL (ref 1.6–2.6)
MCH RBC QN AUTO: 20.9 PG (ref 27–31)
MCH RBC QN AUTO: 21 PG (ref 27–31)
MCHC RBC AUTO-ENTMCNC: 29.3 G/DL (ref 32–36)
MCHC RBC AUTO-ENTMCNC: 29.5 G/DL (ref 32–36)
MCV RBC AUTO: 71 FL (ref 82–98)
MCV RBC AUTO: 71 FL (ref 82–98)
MECA+MECC NOSE QL NAA+PROBE: ABNORMAL
MECA+MECC NOSE QL NAA+PROBE: ABNORMAL
MECA+MECC+MREJ ISLT/SPM QL: ABNORMAL
MECA+MECC+MREJ ISLT/SPM QL: NOT DETECTED
METHADONE UR QL SCN: NEGATIVE
MICROSCOPIC COMMENT: ABNORMAL
MV PEAK A VEL: 0.8 M/S
MV PEAK E VEL: 1.16 M/S
MV STENOSIS PRESSURE HALF TIME: 55.73 MS
MV VALVE AREA P 1/2 METHOD: 3.95 CM2
N MEN DNA CSF QL NAA+NON-PROBE: NOT DETECTED
N MEN DNA CSF QL NAA+NON-PROBE: NOT DETECTED
NDM CARBAPENEMASE ISLT QL IA.RAPID: ABNORMAL
NDM CARBAPENEMASE ISLT QL IA.RAPID: ABNORMAL
NITRITE UR QL STRIP: POSITIVE
NUCLEATED RBC (/100WBC) (OHS): 0 /100 WBC
NUCLEATED RBC (/100WBC) (OHS): 0 /100 WBC
OHS CV RV/LV RATIO: 0.74 CM
OHS QRS DURATION: 74 MS
OHS QRS DURATION: 76 MS
OHS QTC CALCULATION: 409 MS
OHS QTC CALCULATION: 420 MS
OPIATES UR QL SCN: NEGATIVE
OVALOCYTES BLD QL SMEAR: ABNORMAL
OXA-48-LIKE CRBPNASE ISLT QL IA.RAPID: ABNORMAL
OXA-48-LIKE CRBPNASE ISLT QL IA.RAPID: ABNORMAL
P AERUGINOSA DNA BLD POS QL NAA+PROBE: NOT DETECTED
P AERUGINOSA DNA BLD POS QL NAA+PROBE: NOT DETECTED
PCP UR QL: NEGATIVE
PH UR STRIP: 6 [PH]
PHOSPHATE SERPL-MCNC: 2.5 MG/DL (ref 2.7–4.5)
PISA TR MAX VEL: 2.9 M/S
PLATELET # BLD AUTO: 122 K/UL (ref 150–450)
PLATELET # BLD AUTO: 130 K/UL (ref 150–450)
PLATELET BLD QL SMEAR: ABNORMAL
PMV BLD AUTO: ABNORMAL FL
PMV BLD AUTO: ABNORMAL FL
POC MOLECULAR INFLUENZA A AGN: NEGATIVE
POC MOLECULAR INFLUENZA B AGN: NEGATIVE
POIKILOCYTOSIS BLD QL SMEAR: SLIGHT
POTASSIUM SERPL-SCNC: 4 MMOL/L (ref 3.5–5.1)
POTASSIUM SERPL-SCNC: 4.9 MMOL/L (ref 3.5–5.1)
PROCALCITONIN SERPL-MCNC: 0.19 NG/ML
PROT SERPL-MCNC: 7 GM/DL (ref 6–8.4)
PROT SERPL-MCNC: 8.2 GM/DL (ref 6–8.4)
PROT UR QL STRIP: ABNORMAL
PROTEUS SP DNA BLD POS QL NAA+PROBE: NOT DETECTED
PROTEUS SP DNA BLD POS QL NAA+PROBE: NOT DETECTED
PULM VEIN S/D RATIO: 0.84
PV PEAK D VEL: 0.56 M/S
PV PEAK GRADIENT: 4 MMHG
PV PEAK S VEL: 0.47 M/S
PV PEAK VELOCITY: 1.04 M/S
RA MAJOR: 5.13 CM
RA PRESSURE ESTIMATED: 3 MMHG
RA WIDTH: 3.5 CM
RBC # BLD AUTO: 3.93 M/UL (ref 4–5.4)
RBC # BLD AUTO: 4 M/UL (ref 4–5.4)
RBC #/AREA URNS AUTO: 27 /HPF (ref 0–4)
RELATIVE EOSINOPHIL (OHS): 0.2 %
RELATIVE EOSINOPHIL (OHS): 0.2 %
RELATIVE LYMPHOCYTE (OHS): 32.8 % (ref 18–48)
RELATIVE LYMPHOCYTE (OHS): 36.1 % (ref 18–48)
RELATIVE MONOCYTE (OHS): 4.9 % (ref 4–15)
RELATIVE MONOCYTE (OHS): 5.3 % (ref 4–15)
RELATIVE NEUTROPHIL (OHS): 57.9 % (ref 38–73)
RELATIVE NEUTROPHIL (OHS): 61.3 % (ref 38–73)
RIGHT VENTRICLE DIASTOLIC BASEL DIMENSION: 3.7 CM
RV TB RVSP: 6 MMHG
RV TISSUE DOPPLER FREE WALL SYSTOLIC VELOCITY 1 (APICAL 4 CHAMBER VIEW): 17.8 CM/S
S AUREUS DNA BLD POS QL NAA+PROBE: DETECTED
S AUREUS DNA BLD POS QL NAA+PROBE: NOT DETECTED
S ENT+BONG DNA STL QL NAA+NON-PROBE: NOT DETECTED
S ENT+BONG DNA STL QL NAA+NON-PROBE: NOT DETECTED
S EPIDERMIDIS HSP60 BLD POS QL PROBE: NOT DETECTED
S EPIDERMIDIS HSP60 BLD POS QL PROBE: NOT DETECTED
S LUGDUNENSIS SODA BLD POS QL PROBE: NOT DETECTED
S LUGDUNENSIS SODA BLD POS QL PROBE: NOT DETECTED
S MALTOPH DNA BLD POS QL NAA+PROBE: NOT DETECTED
S MALTOPH DNA BLD POS QL NAA+PROBE: NOT DETECTED
S MARCESCENS DNA BLD POS QL NAA+PROBE: NOT DETECTED
S MARCESCENS DNA BLD POS QL NAA+PROBE: NOT DETECTED
S PNEUM DNA CSF QL NAA+NON-PROBE: NOT DETECTED
S PNEUM DNA CSF QL NAA+NON-PROBE: NOT DETECTED
S PYOGENES HSP60 BLD POS QL PROBE: NOT DETECTED
S PYOGENES HSP60 BLD POS QL PROBE: NOT DETECTED
SARS-COV-2 RDRP RESP QL NAA+PROBE: NEGATIVE
SCHISTOCYTES BLD QL SMEAR: ABNORMAL
SINUS: 2.6 CM
SODIUM SERPL-SCNC: 135 MMOL/L (ref 136–145)
SODIUM SERPL-SCNC: 140 MMOL/L (ref 136–145)
SP GR UR STRIP: 1.01
SQUAMOUS #/AREA URNS AUTO: 1 /HPF
STAPH SP TUF BLD POS QL PROBE: ABNORMAL
STAPH SP TUF BLD POS QL PROBE: NOT DETECTED
STJ: 2.3 CM
STREPTOCOCCUS SP TUF BLD POS QL PROBE: DETECTED
STREPTOCOCCUS SP TUF BLD POS QL PROBE: NOT DETECTED
TDI LATERAL: 0.15 M/S
TDI SEPTAL: 0.14 M/S
TDI: 0.15 M/S
TR MAX PG: 33 MMHG
TRICUSPID ANNULAR PLANE SYSTOLIC EXCURSION: 3.1 CM
TSH SERPL-ACNC: 1.53 UIU/ML (ref 0.4–4)
TV REST PULMONARY ARTERY PRESSURE: 37 MMHG
UROBILINOGEN UR STRIP-ACNC: NEGATIVE EU/DL
VAN(A+B+C1+C2) GENES ISLT/SPM: ABNORMAL
VAN(A+B+C1+C2) GENES ISLT/SPM: ABNORMAL
VIM CARBAPENEMASE ISLT QL IA.RAPID: ABNORMAL
VIM CARBAPENEMASE ISLT QL IA.RAPID: ABNORMAL
WBC # BLD AUTO: 4.18 K/UL (ref 3.9–12.7)
WBC # BLD AUTO: 5.34 K/UL (ref 3.9–12.7)
WBC #/AREA URNS AUTO: 13 /HPF (ref 0–5)
Z-SCORE OF LEFT VENTRICULAR DIMENSION IN END DIASTOLE: 0.8
Z-SCORE OF LEFT VENTRICULAR DIMENSION IN END SYSTOLE: -0.17

## 2025-06-11 PROCEDURE — 96372 THER/PROPH/DIAG INJ SC/IM: CPT | Performed by: NURSE PRACTITIONER

## 2025-06-11 PROCEDURE — 87086 URINE CULTURE/COLONY COUNT: CPT

## 2025-06-11 PROCEDURE — 83605 ASSAY OF LACTIC ACID: CPT | Performed by: NURSE PRACTITIONER

## 2025-06-11 PROCEDURE — 84100 ASSAY OF PHOSPHORUS: CPT | Performed by: NURSE PRACTITIONER

## 2025-06-11 PROCEDURE — 99223 1ST HOSP IP/OBS HIGH 75: CPT | Mod: ,,, | Performed by: INTERNAL MEDICINE

## 2025-06-11 PROCEDURE — 36569 INSJ PICC 5 YR+ W/O IMAGING: CPT

## 2025-06-11 PROCEDURE — 25000003 PHARM REV CODE 250

## 2025-06-11 PROCEDURE — 84145 PROCALCITONIN (PCT): CPT | Performed by: HOSPITALIST

## 2025-06-11 PROCEDURE — 02HV33Z INSERTION OF INFUSION DEVICE INTO SUPERIOR VENA CAVA, PERCUTANEOUS APPROACH: ICD-10-PCS | Performed by: HOSPITALIST

## 2025-06-11 PROCEDURE — 63600175 PHARM REV CODE 636 W HCPCS: Performed by: HOSPITALIST

## 2025-06-11 PROCEDURE — 63600175 PHARM REV CODE 636 W HCPCS: Performed by: NURSE PRACTITIONER

## 2025-06-11 PROCEDURE — 81001 URINALYSIS AUTO W/SCOPE: CPT | Mod: XB

## 2025-06-11 PROCEDURE — G0545 PR VISIT INHERENT TO INPT OR OBS CARE, INFECTIOUS DISEASE: HCPCS | Mod: ,,, | Performed by: INTERNAL MEDICINE

## 2025-06-11 PROCEDURE — 84443 ASSAY THYROID STIM HORMONE: CPT | Performed by: HOSPITALIST

## 2025-06-11 PROCEDURE — 87635 SARS-COV-2 COVID-19 AMP PRB: CPT | Performed by: NURSE PRACTITIONER

## 2025-06-11 PROCEDURE — 63600175 PHARM REV CODE 636 W HCPCS

## 2025-06-11 PROCEDURE — 80307 DRUG TEST PRSMV CHEM ANLYZR: CPT

## 2025-06-11 PROCEDURE — 27000207 HC ISOLATION

## 2025-06-11 PROCEDURE — 25000003 PHARM REV CODE 250: Performed by: NURSE PRACTITIONER

## 2025-06-11 PROCEDURE — 20000000 HC ICU ROOM

## 2025-06-11 PROCEDURE — 80053 COMPREHEN METABOLIC PANEL: CPT | Performed by: NURSE PRACTITIONER

## 2025-06-11 PROCEDURE — 83735 ASSAY OF MAGNESIUM: CPT | Performed by: NURSE PRACTITIONER

## 2025-06-11 PROCEDURE — 85025 COMPLETE CBC W/AUTO DIFF WBC: CPT | Performed by: NURSE PRACTITIONER

## 2025-06-11 PROCEDURE — 25000003 PHARM REV CODE 250: Performed by: HOSPITALIST

## 2025-06-11 PROCEDURE — C1751 CATH, INF, PER/CENT/MIDLINE: HCPCS

## 2025-06-11 RX ORDER — MUPIROCIN 20 MG/G
OINTMENT TOPICAL 2 TIMES DAILY
Status: DISCONTINUED | OUTPATIENT
Start: 2025-06-11 | End: 2025-06-12 | Stop reason: HOSPADM

## 2025-06-11 RX ORDER — GLUCAGON 1 MG
1 KIT INJECTION
Status: DISCONTINUED | OUTPATIENT
Start: 2025-06-11 | End: 2025-06-12 | Stop reason: HOSPADM

## 2025-06-11 RX ORDER — ACETAMINOPHEN 325 MG/1
650 TABLET ORAL EVERY 4 HOURS PRN
Status: DISCONTINUED | OUTPATIENT
Start: 2025-06-11 | End: 2025-06-12 | Stop reason: HOSPADM

## 2025-06-11 RX ORDER — SODIUM CHLORIDE 0.9 % (FLUSH) 0.9 %
10 SYRINGE (ML) INJECTION EVERY 8 HOURS PRN
Status: DISCONTINUED | OUTPATIENT
Start: 2025-06-11 | End: 2025-06-12 | Stop reason: HOSPADM

## 2025-06-11 RX ORDER — IBUPROFEN 200 MG
24 TABLET ORAL
Status: DISCONTINUED | OUTPATIENT
Start: 2025-06-11 | End: 2025-06-12 | Stop reason: HOSPADM

## 2025-06-11 RX ORDER — SODIUM CHLORIDE, SODIUM LACTATE, POTASSIUM CHLORIDE, CALCIUM CHLORIDE 600; 310; 30; 20 MG/100ML; MG/100ML; MG/100ML; MG/100ML
INJECTION, SOLUTION INTRAVENOUS CONTINUOUS
Status: DISCONTINUED | OUTPATIENT
Start: 2025-06-11 | End: 2025-06-12 | Stop reason: HOSPADM

## 2025-06-11 RX ORDER — MORPHINE SULFATE 2 MG/ML
2 INJECTION, SOLUTION INTRAMUSCULAR; INTRAVENOUS EVERY 4 HOURS PRN
Status: DISCONTINUED | OUTPATIENT
Start: 2025-06-11 | End: 2025-06-12 | Stop reason: HOSPADM

## 2025-06-11 RX ORDER — IBUPROFEN 200 MG
16 TABLET ORAL
Status: DISCONTINUED | OUTPATIENT
Start: 2025-06-11 | End: 2025-06-12 | Stop reason: HOSPADM

## 2025-06-11 RX ORDER — NOREPINEPHRINE BITARTRATE/D5W 4MG/250ML
0-.2 PLASTIC BAG, INJECTION (ML) INTRAVENOUS CONTINUOUS
Status: DISPENSED | OUTPATIENT
Start: 2025-06-11 | End: 2025-06-12

## 2025-06-11 RX ORDER — NALOXONE HCL 0.4 MG/ML
0.02 VIAL (ML) INJECTION
Status: DISCONTINUED | OUTPATIENT
Start: 2025-06-11 | End: 2025-06-12 | Stop reason: HOSPADM

## 2025-06-11 RX ORDER — MEROPENEM 1 G/1
1 INJECTION, POWDER, FOR SOLUTION INTRAVENOUS
Status: DISCONTINUED | OUTPATIENT
Start: 2025-06-11 | End: 2025-06-12 | Stop reason: HOSPADM

## 2025-06-11 RX ORDER — ONDANSETRON HYDROCHLORIDE 2 MG/ML
4 INJECTION, SOLUTION INTRAVENOUS EVERY 8 HOURS PRN
Status: DISCONTINUED | OUTPATIENT
Start: 2025-06-11 | End: 2025-06-12 | Stop reason: HOSPADM

## 2025-06-11 RX ORDER — HEPARIN SODIUM 5000 [USP'U]/ML
5000 INJECTION, SOLUTION INTRAVENOUS; SUBCUTANEOUS EVERY 8 HOURS
Status: DISCONTINUED | OUTPATIENT
Start: 2025-06-11 | End: 2025-06-12

## 2025-06-11 RX ADMIN — MORPHINE SULFATE 2 MG: 2 INJECTION, SOLUTION INTRAMUSCULAR; INTRAVENOUS at 06:06

## 2025-06-11 RX ADMIN — MORPHINE SULFATE 2 MG: 2 INJECTION, SOLUTION INTRAMUSCULAR; INTRAVENOUS at 11:06

## 2025-06-11 RX ADMIN — HEPARIN SODIUM 5000 UNITS: 5000 INJECTION INTRAVENOUS; SUBCUTANEOUS at 01:06

## 2025-06-11 RX ADMIN — MEROPENEM 1 G: 1 INJECTION, POWDER, FOR SOLUTION INTRAVENOUS at 06:06

## 2025-06-11 RX ADMIN — MEROPENEM 1 G: 1 INJECTION, POWDER, FOR SOLUTION INTRAVENOUS at 02:06

## 2025-06-11 RX ADMIN — MORPHINE SULFATE 2 MG: 2 INJECTION, SOLUTION INTRAMUSCULAR; INTRAVENOUS at 10:06

## 2025-06-11 RX ADMIN — HEPARIN SODIUM 5000 UNITS: 5000 INJECTION INTRAVENOUS; SUBCUTANEOUS at 10:06

## 2025-06-11 RX ADMIN — MORPHINE SULFATE 2 MG: 2 INJECTION, SOLUTION INTRAMUSCULAR; INTRAVENOUS at 05:06

## 2025-06-11 RX ADMIN — MEROPENEM 1 G: 1 INJECTION, POWDER, FOR SOLUTION INTRAVENOUS at 10:06

## 2025-06-11 RX ADMIN — MUPIROCIN: 20 OINTMENT TOPICAL at 09:06

## 2025-06-11 RX ADMIN — VANCOMYCIN HYDROCHLORIDE 1000 MG: 1 INJECTION, POWDER, LYOPHILIZED, FOR SOLUTION INTRAVENOUS at 01:06

## 2025-06-11 RX ADMIN — SODIUM CHLORIDE, POTASSIUM CHLORIDE, SODIUM LACTATE AND CALCIUM CHLORIDE: 600; 310; 30; 20 INJECTION, SOLUTION INTRAVENOUS at 03:06

## 2025-06-11 RX ADMIN — DEXTROSE MONOHYDRATE 6 G: 50 INJECTION, SOLUTION INTRAVENOUS at 05:06

## 2025-06-11 RX ADMIN — PIPERACILLIN AND TAZOBACTAM 4.5 G: 4; .5 INJECTION, POWDER, LYOPHILIZED, FOR SOLUTION INTRAVENOUS at 12:06

## 2025-06-11 RX ADMIN — VANCOMYCIN HYDROCHLORIDE 1750 MG: 1.75 INJECTION, POWDER, LYOPHILIZED, FOR SOLUTION INTRAVENOUS at 12:06

## 2025-06-11 RX ADMIN — SODIUM CHLORIDE 2025 ML: 9 INJECTION, SOLUTION INTRAVENOUS at 12:06

## 2025-06-11 RX ADMIN — HEPARIN SODIUM 5000 UNITS: 5000 INJECTION INTRAVENOUS; SUBCUTANEOUS at 05:06

## 2025-06-11 NOTE — EICU
Virtual ICU Quality Rounds    Admit Date: 6/10/2025  Hospital Day: 0    ICU Day: 3h    24H Vital Sign Range:  Temp:  [98.8 °F (37.1 °C)-99.8 °F (37.7 °C)]   Pulse:  [46-66]   Resp:  [5-30]   BP: ()/(48-61)   SpO2:  [95 %-100 %]     VICU Surveillance Screening    Daily review of  line necessity(optional): Central line(s) in place    Central line type (optional): PICC    Central line indications : Vasopressors (in past 24/hrs)        LDA reconciliation : Yes        Central line best practices : Consider removal if patient has no central line indications for over 24hrs and Consider removal patient has a new fever and/or central line is over 7 days old                  e

## 2025-06-11 NOTE — ASSESSMENT & PLAN NOTE
Patient with positive U/A for nitrites, leukocytes, bacteria.  Has history of ESBL Klebsiella UTI.     Culture pending  Meropenem/Vanc  IVF  - ID consult.   - Crit care consult.

## 2025-06-11 NOTE — EICU
EICU BRIEF INITIAL EVALUATION:    Code Status: Full Code     HISTORY:      50-year-old woman admitted to the ICU with fluid responsive sepsis.  Levophed was ordered but has not been started  History of polysubstance abuse, IV drug abuse, skin popping, tricuspid endocarditis, chronic hepatitis-C, chronic thrombocytopenia, chronic left arm wound,, opioid overdose, second-degree heart block, cholelithiasis, Staph aureus bacteremia, PTSD, depression, tobacco abuse.      DVT prophylaxis heparin, SCDs   GI prophylaxis not indicated     /56 (75), P 49-sinus Alex, RR 21, O2 sat 98 ,   Resting comfortably on room air    Lactate normal, tox screen positive for cocaine and amphetamine, hepatitis C positive, HIV positive, urinalysis with 27 RBCs, 13 WBCs and many bacteria  Urine culture in May positive for E coli sensitive to cefazolin    Chest x-ray without infiltrates or effusions.  Right arm PICC catheter with tip in good position in the region of the cavoatrial junction.  No pneumothorax      CAMERA ASSESSMENT: Yes      TELEMETRY: Reviewed      NOTES: Reviewed     LABS: Reviewed      IMAGING: Personally reviewed.      DISCUSSED with bedside nurse        ASSESSMENT AND PLAN:       Septic shock-UTI and chronic skin ulcer-continue antibiotics, IV hydration.  Check cultures.  Levophed as needed to maintain mean arterial pressure 65 or greater    Cocaine/amphetamine abuse-continue monitoring observation.  Avoid beta-blockers    HIV positive-appears to be new.  Will need counseling and treatment following confirmation    PICC line may be used      I have reviewed the plan of care for the patient and agree with the plan of care unless noted otherwise above.      STEFANIE Boateng MD  eICU Attending  [phone]    This report has been created through the use of M-Modal dictation software. Typographical and content errors may occur with this process. While efforts are made to detect and correct such errors, in some  cases errors will persist. For this reason, wording in this document should be considered in the proper context and not strictly verbatim.

## 2025-06-11 NOTE — SUBJECTIVE & OBJECTIVE
Interval History: Pt new to me.  States she is feeling better currently.  States left FA wound from IVDA.  Counseled on severe adverse health consequences of IVDA.      Review of Systems   Constitutional:  Negative for diaphoresis and fever.   Respiratory:  Negative for cough and shortness of breath.    Cardiovascular:  Negative for chest pain and palpitations.   Gastrointestinal:  Negative for abdominal pain and nausea.   Genitourinary:  Negative for dysuria.   Musculoskeletal:  Negative for gait problem.   Skin:  Positive for wound.   Neurological:  Negative for dizziness and numbness.   Psychiatric/Behavioral:  Negative for agitation and confusion.      Objective:     Vital Signs (Most Recent):  Temp: 98.8 °F (37.1 °C) (06/11/25 0344)  Pulse: (!) 47 (06/11/25 0545)  Resp: (!) 22 (06/11/25 0545)  BP: (!) 100/50 (06/11/25 0545)  SpO2: 100 % (06/11/25 0545) Vital Signs (24h Range):  Temp:  [98.8 °F (37.1 °C)-99.8 °F (37.7 °C)] 98.8 °F (37.1 °C)  Pulse:  [47-66] 47  Resp:  [5-26] 22  SpO2:  [95 %-100 %] 100 %  BP: ()/(48-61) 100/50     Weight: 67 kg (147 lb 11.3 oz)  Body mass index is 28.85 kg/m².    Intake/Output Summary (Last 24 hours) at 6/11/2025 0615  Last data filed at 6/11/2025 0500  Gross per 24 hour   Intake 2629 ml   Output 850 ml   Net 1779 ml         Physical Exam  Constitutional:       General: She is not in acute distress.     Appearance: Normal appearance. She is well-developed and normal weight.   HENT:      Head: Normocephalic and atraumatic.   Eyes:      Conjunctiva/sclera: Conjunctivae normal.      Pupils: Pupils are equal, round, and reactive to light.   Cardiovascular:      Rate and Rhythm: Regular rhythm. Bradycardia present.      Pulses: Normal pulses.      Heart sounds: Murmur heard.      Comments: 2/6  Pulmonary:      Effort: Pulmonary effort is normal.      Breath sounds: Normal breath sounds.   Abdominal:      General: Bowel sounds are normal.      Palpations: Abdomen is soft.       "Tenderness: There is no abdominal tenderness.   Musculoskeletal:      Cervical back: Normal range of motion.      Right lower leg: No edema.      Left lower leg: No edema.   Skin:     General: Skin is warm and dry.      Comments: Left forearm  with ulcerated wound approx 3 cm X 4 cm.     Neurological:      Mental Status: She is alert and oriented to person, place, and time.               Significant Labs: All pertinent labs within the past 24 hours have been reviewed.  Blood Culture: No results for input(s): "LABBLOO" in the last 48 hours.  CBC:   Recent Labs   Lab 06/10/25  2343 06/11/25  0521   WBC 5.34 4.18   HGB 8.4* 8.2*   HCT 28.5* 28.0*   * 122*     CMP:   Recent Labs   Lab 06/10/25  2343 06/11/25  0521   * 140   K 4.9 4.0    114*   CO2 19* 19*    116*   BUN 16 12   CREATININE 1.1 0.9   CALCIUM 7.9* 7.5*   PROT 8.2 7.0   ALBUMIN 3.1* 2.7*   BILITOT 0.2 0.2   ALKPHOS 68 62   AST 33 25   ALT 17 14   ANIONGAP 11 7*     Lactic Acid:   Recent Labs   Lab 06/11/25  0521   LACTATE 0.7     Magnesium:   Recent Labs   Lab 06/11/25  0521   MG 1.8     TSH: No results for input(s): "TSH" in the last 4320 hours.  Urine Culture: No results for input(s): "LABURIN" in the last 48 hours.  Urine Studies:   Recent Labs   Lab 06/11/25  0052   COLORU Yellow   APPEARANCEUA Hazy*   PHUR 6.0   SPECGRAV 1.015   PROTEINUA Trace*   GLUCUA Negative   BILIRUBINUA Negative   OCCULTUA 1+*   NITRITE Positive*   UROBILINOGEN Negative   LEUKOCYTESUR 3+*   RBCUA 27*   WBCUA 13*   BACTERIA Many*       Significant Imaging: I have reviewed all pertinent imaging results/findings within the past 24 hours.    Imaging Results              X-Ray Chest AP Portable (Final result)  Result time 06/11/25 00:47:45      Final result by Lorena Scales MD (06/11/25 00:47:45)                   Impression:      No acute cardiopulmonary process identified.      Electronically signed by: Lorena Scales, " MD  Date:    06/11/2025  Time:    00:47               Narrative:    EXAMINATION:  XR CHEST AP PORTABLE    CLINICAL HISTORY:  Sepsis;    TECHNIQUE:  Single frontal view of the chest was performed.    COMPARISON:  05/15/2025.    FINDINGS:  Cardiac silhouette is normal in size.  Lungs are symmetrically expanded.  No evidence of focal consolidative process, pneumothorax, or significant pleural effusion.  No acute osseous abnormality identified.

## 2025-06-11 NOTE — ED PROVIDER NOTES
Encounter Date: 6/10/2025       History     Chief Complaint   Patient presents with    Hypotension    Wound Infection     BIB EMS as a sepsis alert,      50 y.o. female, with PMH of IVDA, polysubstance abuse (cocaine and heroin), endocarditis with tricuspid vegetation, septic pulmonary emboli, chronic hep C, chronic anemia, chronic thrombocytopenia, 2nd degree heart block, chronic L arm wound (currently skin popping drugs, present for several years), bipolar disorder, PTSD, anxiety/depression, homelessness is presenting today for generalized malaise.  Called EMS.  Was found to be hypotensive with systolic in the 80s.  She reports nausea, vomiting and diarrhea.  Generalized abdominal pain.  States that her last opiate use was administered IV into her left upper extremity where she has a chronic wound.  Denies being in withdrawal at this time.  She denies any chest pain, shortness of breath, cough, congestion.    The history is provided by the patient and the EMS personnel.     Review of patient's allergies indicates:  No Known Allergies  History reviewed. No pertinent past medical history.  History reviewed. No pertinent surgical history.  No family history on file.  Social History[1]  Review of Systems    Physical Exam     Initial Vitals   BP Pulse Resp Temp SpO2   06/10/25 2229 06/10/25 2229 06/10/25 2229 06/10/25 2230 06/10/25 2229   (!) 94/51 66 (!) 22 99.8 °F (37.7 °C) 96 %      MAP       --                Physical Exam    Nursing note and vitals reviewed.  Constitutional: She is not diaphoretic. No distress.   Disheveled, appears unwell   HENT:   Head: Normocephalic and atraumatic.   Eyes: Conjunctivae are normal.   Cardiovascular:  Normal rate and regular rhythm.           Pulmonary/Chest: No respiratory distress. She has no wheezes. She has no rhonchi. She has no rales.   Abdominal: Abdomen is soft. She exhibits no distension. There is no abdominal tenderness. There is no rebound and no guarding.    Musculoskeletal:         General: No edema. Normal range of motion.     Neurological: She is alert.   Skin: Skin is warm and dry.   Chronic wound left upper extremity, partially scabbed, granulation tissue   Psychiatric: She has a normal mood and affect. Thought content normal.         ED Course   Procedures  Labs Reviewed   COMPREHENSIVE METABOLIC PANEL - Abnormal       Result Value    Sodium 135 (*)     Potassium 4.9      Chloride 105      CO2 19 (*)     Glucose 109      BUN 16      Creatinine 1.1      Calcium 7.9 (*)     Protein Total 8.2      Albumin 3.1 (*)     Bilirubin Total 0.2      ALP 68      AST 33      ALT 17      Anion Gap 11      eGFR >60      Narrative:     Specimen moderately hemolyzed    URINALYSIS, REFLEX TO URINE CULTURE - Abnormal    Color, UA Yellow      Appearance, UA Hazy (*)     pH, UA 6.0      Spec Grav UA 1.015      Protein, UA Trace (*)     Glucose, UA Negative      Ketones, UA Negative      Bilirubin, UA Negative      Blood, UA 1+ (*)     Nitrites, UA Positive (*)     Urobilinogen, UA Negative      Leukocyte Esterase, UA 3+ (*)    CBC WITH DIFFERENTIAL - Abnormal    WBC 5.34      RBC 4.00      HGB 8.4 (*)     HCT 28.5 (*)     MCV 71 (*)     MCH 21.0 (*)     MCHC 29.5 (*)     RDW 17.9 (*)     Platelet Count 130 (*)     MPV        Nucleated RBC 0      Neut % 61.3      Lymph % 32.8      Mono % 4.9      Eos % 0.2      Basophil % 0.2      Imm Grans % 0.6 (*)     Neut # 3.28      Lymph # 1.75      Mono # 0.26 (*)     Eos # 0.01      Baso # 0.01      Imm Grans # 0.03     HEPATITIS C ANTIBODY - Abnormal    Hep C Ab Interp Positive (*)    HIV 1 / 2 ANTIBODY - Abnormal    HIV 1/2 Ag/Ab Positive (*)    DRUG SCREEN PANEL, URINE EMERGENCY - Abnormal    Benzodiazepine, Urine Negative      Methadone, Urine Negative      Cocaine, Urine Presumptive Positive (*)     Opiates, Urine Negative      Barbiturates, Urine Negative      Amphetamines, Urine Presumptive Positive (*)     THC Negative       "Phencyclidine, Urine Negative      Urine Creatinine 88.0      Narrative:     This screen includes the following classes of drugs at the listed cut-off:     Benzodiazepines:        200 ng/ml   Methadone:              300 ng/ml   Cocaine metabolite:     300 ng/ml   Opiates:                300 ng/ml   Barbiturates:           200 ng/ml   Amphetamines:           1000 ng/ml   Marijuana metabs (THC): 50 ng/ml   Phencyclidine (PCP):    25 ng/ml     This is a screening test. If results do not correlate with clinical presentation, then a confirmatory send out test is advised.    This report is intended for use in clinical monitoring and management of patients. It is not intended for use in employment related drug testing."   URINALYSIS MICROSCOPIC - Abnormal    RBC, UA 27 (*)     WBC, UA 13 (*)     Bacteria, UA Many (*)     Squamous Epithelial Cells, UA 1      Microscopic Comment       CULTURE, URINE   CLOSTRIDIOIDES DIFFICILE   CBC W/ AUTO DIFFERENTIAL    Narrative:     The following orders were created for panel order CBC auto differential.  Procedure                               Abnormality         Status                     ---------                               -----------         ------                     CBC with Differential[[id number]]       Abnormal            Final result                 Please view results for these tests on the individual orders.   EXTRA TUBES    Narrative:     The following orders were created for panel order EXTRA TUBES.  Procedure                               Abnormality         Status                     ---------                               -----------         ------                     Light Green Top Hold[[id number]]                            Final result                 Please view results for these tests on the individual orders.   LIGHT GREEN TOP HOLD    Extra Tube 1     GREY TOP URINE HOLD    Extra Tube Hold for add-ons.     POCT URINE PREGNANCY    POC Preg Test, Ur Negative   "     Acceptable Yes     POCT INFLUENZA A/B MOLECULAR    POC Molecular Influenza A Ag Negative      POC Molecular Influenza B Ag Negative       Acceptable Yes     SARS-COV-2 RDRP GENE    POC Rapid COVID Negative       Acceptable Yes     ISTAT LACTATE    POC Lactate 0.80      Sample VENOUS       EKG Readings: (Independently Interpreted)   Initial Reading: No STEMI. Previous EKG: Compared with most recent EKG Rhythm: Normal Sinus Rhythm. Heart Rate: 67. ST Segments: Normal ST Segments. T Waves: Normal.     ECG Results              EKG 12-lead (Final result)        Collection Time Result Time QRS Duration OHS QTC Calculation    06/10/25 22:28:12 06/11/25 08:03:49 76 420                     Final result by Interface, Lab [facility] (06/11/25 08:04:00)                   Narrative:    Test Reason : Z13.6,    Vent. Rate :  67 BPM     Atrial Rate :  67 BPM     P-R Int : 128 ms          QRS Dur :  76 ms      QT Int : 398 ms       P-R-T Axes :  69  74  48 degrees    QTcB Int : 420 ms    Normal sinus rhythm  Normal ECG    Confirmed by Perry Butler ([id number]) on 6/11/2025 8:03:48 AM    Referred By: AAAREFERRAL SELF           Confirmed By: Perry Butler                                  Imaging Results              X-Ray Chest AP Portable (Final result)  Result time 06/11/25 00:47:45      Final result by Lorena Scales MD (06/11/25 00:47:45)                   Impression:      No acute cardiopulmonary process identified.      Electronically signed by: Lorena Scales MD  Date:    06/11/2025  Time:    00:47               Narrative:    EXAMINATION:  XR CHEST AP PORTABLE    CLINICAL HISTORY:  Sepsis;    TECHNIQUE:  Single frontal view of the chest was performed.    COMPARISON:  05/15/2025.    FINDINGS:  Cardiac silhouette is normal in size.  Lungs are symmetrically expanded.  No evidence of focal consolidative process, pneumothorax, or significant pleural effusion.  No acute  osseous abnormality identified.                                       Medications   NORepinephrine 4 mg in dextrose 5% 250 mL infusion (premix) (0 mcg/kg/min × 67.5 kg Intravenous Hold 6/11/25 0145)   sodium chloride 0.9% flush 10 mL (has no administration in time range)   acetaminophen tablet 650 mg (has no administration in time range)   naloxone 0.4 mg/mL injection 0.02 mg (has no administration in time range)   glucose chewable tablet 16 g (has no administration in time range)   glucose chewable tablet 24 g (has no administration in time range)   dextrose 50% injection 12.5 g (has no administration in time range)   dextrose 50% injection 25 g (has no administration in time range)   glucagon (human recombinant) injection 1 mg (has no administration in time range)   heparin (porcine) injection 5,000 Units (5,000 Units Subcutaneous Given 6/11/25 1329)   ondansetron injection 4 mg (has no administration in time range)   vancomycin - pharmacy to dose (has no administration in time range)   vancomycin (VANCOCIN) 1,000 mg in 0.9% NaCl 250 mL IVPB (admixture device) (0 mg Intravenous Stopped 6/11/25 1448)   lactated ringers infusion ( Intravenous Verify Only 6/11/25 0600)   morphine injection 2 mg (2 mg Intravenous Given 6/11/25 1117)   meropenem injection 1 g (1 g Intravenous Given 6/11/25 1412)   collagenase ointment (has no administration in time range)   mupirocin 2 % ointment (has no administration in time range)   sodium chloride 0.9% bolus 2,025 mL 2,025 mL (0 mLs Intravenous Stopped 6/11/25 0101)   vancomycin 1,750 mg in 0.9% NaCl 500 mL IVPB (admixture device) (0 mg Intravenous Stopped 6/11/25 0258)   ondansetron injection 4 mg (4 mg Intravenous Given 6/10/25 7364)     Medical Decision Making  50 y.o. female, with PMH of IVDA, polysubstance abuse (cocaine and heroin), endocarditis with tricuspid vegetation, septic pulmonary emboli, chronic hep C, chronic anemia, chronic thrombocytopenia, 2nd degree heart block,  chronic L arm wound (currently skin popping drugs, present for several years), bipolar disorder, PTSD, anxiety/depression, homelessness is presenting today for generalized malaise.     Sepsis orders placed. See ED course.     Amount and/or Complexity of Data Reviewed  Labs: ordered. Decision-making details documented in ED Course.  Radiology: ordered and independent interpretation performed. Decision-making details documented in ED Course.  ECG/medicine tests: ordered and independent interpretation performed. Decision-making details documented in ED Course.    Risk  Prescription drug management.               ED Course as of 06/11/25 1814 Tue Scout 10, 2025   2232 From patient's most recent hospitalization where she was treated for endocarditis, bacteremia.    49 y.o. female, with PMH of IVDA, polysubstance abuse (cocaine and heroin), endocarditis with tricuspid vegetation, septic pulmonary emboli, chronic hep C, chronic anemia, chronic thrombocytopenia, 2nd degree heart block, chronic L arm wound (currently skin popping drugs, present for several years), bipolar disorder, PTSD, anxiety/depression, homelessness, and medication noncompliance who presents to [facility] ED on 7/17/24 due to generalized body pain. She was admitted 4/11 - 13/24 for bacteremia 2/2 strep & staph and treated with IV antibiotics until she left AMA.     She was brought in by EMS on 6/23 due to hypotension, body aches, fever, and chills, (and was admitted until she left AMA on 7/9). During that admission she has severe sepsis requiring pressor support in the ICU at one point, and was treated for endocarditis, septic pulmonary emboli, MSSA with ertapenem followed by oxacillin. She was also treated for synovitis of the glenohumeral joint, subacromial subdeltoid bursitis and had a joint aspiration for septic joint. After being seen by ID her antibiotic regimen was changed to cefazolin. She was to have access placed for an additional 6 weeks of  cefazolin coverage to continue after discharge. She again left AMA prior to her hospital based treatment being completed. In the ED tonight labs showed no leukocytosis or left shift. She is anemia with H&H of 8.5/28.2 on CBC. A metabolic panel showed no acute abnormalities. A drug screen was positive for cocaine. A UA showed a nitrite negative UTI with 24 WBC/hpf, many bacteria, 2+ leuk esterase. She was treated in the ED with vancomycin and zosyn. She was placed on observation.  [KL]   2236 Echo from June 2024:  Summary  Show Result Comparison   ·  Left Ventricle: The left ventricle is normal in size. Ventricular mass is normal. Normal wall thickness. There is normal systolic function. Ejection fraction by visual approximation is 65%. Global longitudinal strain is -19.0%. Global longitudinal strain is normal. There is normal diastolic function. Normal left ventricular filling pressure. Tissue Doppler velocity is normal.  ·  Right Ventricle: Normal right ventricular cavity size. Wall thickness is normal. Systolic function is normal.  ·  Tricuspid Valve: There is a medium mobile echogenic pedunculated mass present on the atrial side of the septal leaflet consistent with vegetation. There is mild regurgitation with a centrally directed jet.  ·  IVC/SVC: Normal venous pressure at 3 mmHg.  ·  Pericardium: There is a trivial effusion. No indication of cardiac tamponade.   [KL]   2352 POC Lactate: 0.80 [KL]   Wed Jun 11, 2025   0022 CO2(!): 19  Metabolic acidosis, suspect due to dehydration.  No anion gap.  Normal kidney function. [KL]   0033 CBC auto differential(!)  CBC is grossly unremarkable.  No leukocytosis. Chronic, stable anemia.  [KL]   0038 X-Ray Chest AP Portable  CXR shows no acute disease per my independent interpretation. [KL]   0046 This patient does not have evidence of infective focus  My overall impression is possible bacteremia/endocarditis given history and not completing therapy.  Source: Skin and  Soft Tissue Infection: Deep Wound Infection and Endocarditis  Antibiotics given- Antibiotics (72h ago, onward)    Start     Stop Route Frequency Ordered    06/11/25 0015  vancomycin 1,750 mg in 0.9% NaCl 500 mL IVPB (admixture   device)  (ED Adult Sepsis Treatment)         -- IV Once 06/10/25 2237    06/10/25 2245  piperacillin-tazobactam (ZOSYN) 4.5 g in D5W 100 mL IVPB   (MB+)  (ED Adult Sepsis Treatment)         06/11/25 2244 IV Every 8 hours (non-standard times) 06/10/25 2237      Latest lactate reviewed-  @[email or web address](lactate,poclac)@  Organ dysfunction indicated by none     Fluid challenge Actual Body Weight- The patient's actual body weight will be used to calculate the 30 ml/kg fluid bolus.     Post- resuscitation assessment Yes - I attest a sepsis perfusion exam was performed within 6 hours of sepsis, severe sepsis, or septic shock presentation, following fluid resuscitation.      Will Start Pressors- Levophed for MAP of 65  Source control achieved by: Vanc and Zosyn   [KL]   0048 BP(!): 93/52  BP improving with IVF.  [KL]   0059 hCG Qualitative, Urine: Negative [KL]   0059 Pt will be admitted to the hospital. Have to reassess BP after receiving 30cc/kg IVF bolus to determine need for ICU/peripheral pressors. BP improving slightly from fluids. Broad spectrum abx being administered.  [KL]   1814 Critical care time spent on the evaluation and treatment of severe organ dysfunction, review of pertinent labs and imaging studies, discussions with consulting providers and discussions with patient/family: 60 minutes.   [KL]      ED Course User Index  [KL] Roberta Mcgovern MD                           Clinical Impression:  Final diagnoses:  [Z13.6] Screening for cardiovascular condition (Primary)  [N39.0] Urinary tract infection without hematuria, site unspecified  [D64.9] Chronic anemia  [F19.90] IVDU (intravenous drug user)  [T14.8XXA] Chronic wound  [N39.0] UTI (urinary tract infection)          ED Disposition  "Condition    Observation              Launch [facility] MDM  [facility] MDM Module  Jun 11 2025 6:13 PM [Roberta Mcgovern]  Data:  - Discussed with external professional: Case discussed with Admitting Provider or a provider/trainee on their team. See MDM section and/or ED Course for additional details on the discussion.  - Independent interpretation: I independently reviewed the XR port Chest AP 1 view, EKG 12-lead. It showed no acute abnormality. See MDM section and/or ED Course for my interpretation. [Roberta Mcgovern]  - Test/documents/historian: 3+ tests ordered  Additional encounter diagnoses: Screening for cardiovascular condition, Urinary tract infection without hematuria, site unspecified, Chronic anemia, IVDU (intravenous drug user), Chronic wound, UTI (urinary tract infection), Endocarditis, bacterial, acute/subacute, History of endocarditis  Risk: NORepinephrine in dextrose infusion + 1 more (Require intensive monitoring), Admitted (Decision regarding hospitalization)             [1]   Social History  Tobacco Use    Smoking status: Every Day     Current packs/day: 1.00     Types: Cigarettes    Smokeless tobacco: Never    Tobacco comments:     Smoking cessation education provided. Pt is a 1 pk/day cigarette smoker x 26 yrs. She states that she cut down to 0.5 pk/day approx. 1 yr ago. Pt declines referral to Ambulatory Smoking Cessation clinic, stating not ready to quit. Handout provided.    Substance Use Topics    Alcohol use: Not Currently    Drug use: Yes     Types: IV, Fentanyl, Heroin, "Crack" cocaine     Comment: heroin, Fentanyl, crack        Roberta Mcgovern MD  06/11/25 2457    "

## 2025-06-11 NOTE — PLAN OF CARE
Case Management Assessment     PCP: [facility]  Pharmacy: bedside [facility]    Patient Arrived From: home  Existing Help at Home: lives with daughter and friend    Barriers to Discharge: substance abuse and underinsured    Discharge Plan:    A. Home with family   B. Home with family      Assessment complete with patient at bedside--patient alert and oriented. Patient lives with daughter and friend. Patient denies HH/HD/DME. Patient friend will transport patient home at discharge.          [facility] ([place])  Initial Discharge Assessment       Primary Care Provider: Doris, Primary Doctor    Admission Diagnosis: UTI (urinary tract infection) [N39.0]  Screening for cardiovascular condition [Z13.6]  IVDU (intravenous drug user) [F19.90]  Endocarditis, bacterial, acute/subacute [I33.0]  Chronic anemia [D64.9]  Urinary tract infection without hematuria, site unspecified [N39.0]  Chronic wound [T14.8XXA]    Admission Date: 6/10/2025  Expected Discharge Date:     Transition of Care Barriers: Substance Abuse, Unisured    Payor: /     Extended Emergency Contact Information  Primary Emergency Contact: Eryn Wiseman  Mobile Phone: [phone]  Relation: Daughter  Secondary Emergency Contact: Fernando Laurent  Address: [address] [place] [id number]  Mobile Phone: [phone]  Relation: Friend  Mother: Ashley Wiseman  Mobile Phone: [phone]    Discharge Plan A: Home with family  Discharge Plan B: Home with family      [id number] - [place], [place] - [address] AT [facility] [address]  [address]  [facility] [facility] [place] [address]  Phone: [phone] Fax: [phone]      Initial Assessment (most recent)       Adult Discharge Assessment - 06/11/25 1343          Discharge Assessment    Assessment Type Discharge Planning Assessment     Confirmed/corrected address, phone number and insurance Yes     Confirmed Demographics Correct on Facesheet     Source of Information  patient     Communicated ABELARDO with patient/caregiver Date not available/Unable to determine     People in Home child(ford), adult;friend(s)     Name(s) of People in Home Eryn Wiseman (Daughter)     Do you expect to return to your current living situation? Yes     Do you have help at home or someone to help you manage your care at home? No     Prior to hospitilization cognitive status: Alert/Oriented     Current cognitive status: Alert/Oriented     Walking or Climbing Stairs Difficulty no     Dressing/Bathing Difficulty no     Equipment Currently Used at Home none     Readmission within 30 days? No     Patient currently being followed by outpatient case management? No     Do you currently have service(s) that help you manage your care at home? No     Do you take prescription medications? No     Do you have prescription coverage? No     Who is going to help you get home at discharge? friend     How do you get to doctors appointments? family or friend will provide;public transportation     Are you on dialysis? No     Do you take coumadin? No     Discharge Plan A Home with family     Discharge Plan B Home with family     DME Needed Upon Discharge  none     Discharge Plan discussed with: Patient     Transition of Care Barriers Substance Abuse;Unisured

## 2025-06-11 NOTE — PROCEDURES
Margarita Wiseman is a 50 y.o. female patient.    Temp: 98.8 °F (37.1 °C) (06/11/25 0344)  Pulse: (!) 54 (06/11/25 0313)  Resp: 18 (06/11/25 0315)  BP: (!) 98/55 (06/11/25 0311)  SpO2: 100 % (06/11/25 0311)  Weight: 67 kg (147 lb 11.3 oz) (06/11/25 0344)  Height: 5' (152.4 cm) (06/11/25 0344)    PICC  Date/Time: 6/11/2025 4:05 AM  Performed by: Cornelio Dominguez RN  Consent Done: Yes  Time out: Immediately prior to procedure a time out was called to verify the correct patient, procedure, equipment, support staff and site/side marked as required  Indications: med administration and vascular access  Anesthesia: local infiltration  Local anesthetic: lidocaine 1% without epinephrine  Anesthetic Total (mL): 1  Description of findings: PICC insertion  Preparation: skin prepped with ChloraPrep  Skin prep agent dried: skin prep agent completely dried prior to procedure  Sterile barriers: all five maximum sterile barriers used - cap, mask, sterile gown, sterile gloves, and large sterile sheet  Hand hygiene: hand hygiene performed prior to central venous catheter insertion  Location details: right brachial  Catheter type: triple lumen  Catheter size: 5 Fr  Catheter Length: 32cm    Ultrasound guidance: yes  Vessel Caliber: medium and patent, compressibility normal  Vascular Doppler: not done  Needle advanced into vessel with real time Ultrasound guidance.  Guidewire confirmed in vessel.  Image recorded and saved.  Sterile sheath used.  Manometry: esophageal manometry  Number of attempts: 1  Post-procedure: blood return through all ports, chlorhexidine patch and sterile dressing applied  Technical procedures used: PICC insertion  Significant surgical tasks conducted by the assistant(s): NA  Estimated blood loss (mL): 0  Specimens: No  Implants: No  Assessment: placement verified by x-ray, free fluid flow, no pneumothorax on x-ray, tip termination and successful placement  Complications: none          Name Cornelio Dominguez  FLORINDA  6/11/2025
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
## 2025-06-11 NOTE — ASSESSMENT & PLAN NOTE
- Left forearm wound approx 3 cm X 4 cm.   - XRAY LEFT FA to eval for presence of any FB.   - Wound care consult.       HIV Ab pos / Hep C Ab pos   - ID to see

## 2025-06-11 NOTE — ASSESSMENT & PLAN NOTE
Patient reports drug use today. Positive for amphetamines and cocaine. Reports heroin use.  Discussed cessation with patient   - Emphasized severe health potential consequences including endocarditis, risk for spinal abscess.

## 2025-06-11 NOTE — PROGRESS NOTES
"Pharmacokinetic Initial Assessment: IV Vancomycin    Assessment/Plan:    Initiate intravenous vancomycin with loading dose of 1750 mg once followed by a maintenance dose of vancomycin 1000 mg IV every 12 hours  Desired empiric serum trough concentration is 15 to 20 mcg/mL  Draw vancomycin trough level 60 min prior to fourth dose on 06/12/25 at approximately 1200  Pharmacy will continue to follow and monitor vancomycin.      Please contact pharmacy at extension [phone] with any questions regarding this assessment.     Thank you for the consult,   Maryam Benavidez       Patient brief summary:  Margarita Wiseman is a 50 y.o. female initiated on antimicrobial therapy with IV Vancomycin for treatment of suspected bacteremia    Drug Allergies:   Review of patient's allergies indicates:  No Known Allergies    Actual Body Weight:   67.5 kg    Renal Function:   Estimated Creatinine Clearance: 53.6 mL/min (based on SCr of 1.1 mg/dL).,     Dialysis Method (if applicable):  N/A    CBC (last 72 hours):  Recent Labs   Lab Result Units 06/10/25  2343   WBC K/uL 5.34   HGB gm/dL 8.4*   HCT % 28.5*   Platelet Count K/uL 130*   Lymph % % 32.8   Mono % % 4.9   Eos % % 0.2   Basophil % % 0.2       Metabolic Panel (last 72 hours):  Recent Labs   Lab Result Units 06/10/25  2343 06/11/25  0052   Sodium mmol/L 135*  --    Potassium mmol/L 4.9  --    Chloride mmol/L 105  --    CO2 mmol/L 19*  --    Glucose mg/dL 109  --    Glucose, UA   --  Negative   BUN mg/dL 16  --    Creatinine mg/dL 1.1  --    Urine Creatinine mg/dL  --  88.0   Albumin g/dL 3.1*  --    Bilirubin Total mg/dL 0.2  --    ALP unit/L 68  --    AST unit/L 33  --    ALT unit/L 17  --        Drug levels (last 3 results):  No results for input(s): "VANCOMYCINRA", "VANCORANDOM", "VANCOMYCINPE", "VANCOPEAK", "VANCOMYCINTR", "VANCOTROUGH" in the last 72 hours.    Microbiologic Results:  Microbiology Results (last 7 days)       Procedure Component Value Units Date/Time    Clostridium " difficile EIA [[id number]]     Order Status: Sent Specimen: Stool     Urine culture [[id number]] Collected: 06/11/25 0052    Order Status: Sent Specimen: Urine Updated: 06/11/25 0117    Blood culture x two cultures. Draw prior to antibiotics. [[id number]] Collected: 06/10/25 2345    Order Status: Sent Specimen: Blood from Peripheral, Antecubital, Right Updated: 06/10/25 2349    Blood culture x two cultures. Draw prior to antibiotics. [[id number]] Collected: 06/10/25 2341    Order Status: Sent Specimen: Blood from Peripheral, Hand, Left Updated: 06/10/25 2348

## 2025-06-11 NOTE — ASSESSMENT & PLAN NOTE
Patient with positive U/A for nitrites, leukocytes, bacteria.    Culture pending  Cefazolin/Vanc  IVF

## 2025-06-11 NOTE — SUBJECTIVE & OBJECTIVE
"History reviewed. No pertinent past medical history.    History reviewed. No pertinent surgical history.    Review of patient's allergies indicates:  No Known Allergies    Medications:  Medications Prior to Admission   Medication Sig    busPIRone (BUSPAR) 5 MG Tab Take 5 mg by mouth 2 (two) times daily.    divalproex ER (DEPAKOTE ER) 250 MG 24 hr tablet Take 250 mg by mouth once daily.    ferrous sulfate (FEOSOL) 325 mg (65 mg iron) Tab tablet Take 1 tablet (325 mg total) by mouth 2 (two) times daily.    hydrOXYzine (ATARAX) 50 MG tablet Take 1 tablet (50 mg total) by mouth nightly as needed for Itching (insomnia).    ibuprofen (ADVIL,MOTRIN) 800 MG tablet Take 1 tablet (800 mg total) by mouth every 6 (six) hours as needed for Pain.    naloxone (NARCAN) 4 mg/actuation Spry 4mg by nasal route as needed for opioid overdose; may repeat every 2-3 minutes in alternating nostrils until medical help arrives. Call 911    naloxone (NARCAN) 4 mg/actuation Spry 1 spray into the nose as needed for suspected overdose of opioid.  Proceed to Emergency Department immediately after use.    OLANZapine (ZYPREXA) 5 MG tablet Take 5 mg by mouth every evening.    ondansetron (ZOFRAN-ODT) 8 MG TbDL Take 1 tablet (8 mg total) by mouth every 6 (six) hours as needed (nausea).    sertraline (ZOLOFT) 25 MG tablet Take 25 mg by mouth once daily.     Antibiotics (From admission, onward)      Start     Stop Route Frequency Ordered    06/11/25 2100  mupirocin 2 % ointment         06/16/25 2059 Nasl 2 times daily 06/11/25 1206    06/11/25 1300  vancomycin (VANCOCIN) 1,000 mg in 0.9% NaCl 250 mL IVPB (admixture device)         -- IV Every 12 hours (non-standard times) 06/11/25 0305    06/11/25 0700  meropenem injection 1 g         -- IV Every 8 hours (non-standard times) 06/11/25 0554    06/11/25 0358  vancomycin - pharmacy to dose  (vancomycin IVPB (PEDS and ADULTS))        Placed in "And" Linked Group    -- IV pharmacy to manage frequency 06/11/25 " "0300          Antifungals (From admission, onward)      None          Antivirals (From admission, onward)      None               There is no immunization history on file for this patient.    Family History    None       Social History     Socioeconomic History    Marital status: Unknown   Tobacco Use    Smoking status: Every Day     Current packs/day: 1.00     Types: Cigarettes    Smokeless tobacco: Never    Tobacco comments:     Smoking cessation education provided. Pt is a 1 pk/day cigarette smoker x 26 yrs. She states that she cut down to 0.5 pk/day approx. 1 yr ago. Pt declines referral to Ambulatory Smoking Cessation clinic, stating not ready to quit. Handout provided.    Substance and Sexual Activity    Alcohol use: Not Currently    Drug use: Yes     Types: IV, Fentanyl, Heroin, "Crack" cocaine     Comment: heroin, Fentanyl, crack    Sexual activity: Not Currently     Social Drivers of Health     Financial Resource Strain: High Risk (6/11/2025)    Overall Financial Resource Strain (CARDIA)     Difficulty of Paying Living Expenses: Very hard   Food Insecurity: Food Insecurity Present (6/11/2025)    Hunger Vital Sign     Worried About Running Out of Food in the Last Year: Often true     Ran Out of Food in the Last Year: Often true   Transportation Needs: Unmet Transportation Needs (6/11/2025)    PRAPARE - Transportation     Lack of Transportation (Medical): Yes     Lack of Transportation (Non-Medical): Yes   Physical Activity: Inactive (6/11/2025)    Exercise Vital Sign     Days of Exercise per Week: 0 days     Minutes of Exercise per Session: 0 min   Stress: Stress Concern Present (6/11/2025)    [employer] - Occupational Stress Questionnaire     Feeling of Stress : To some extent   Housing Stability: High Risk (6/11/2025)    Housing Stability Vital Sign     Unable to Pay for Housing in the Last Year: Yes     Number of Times Moved in the Last Year: 1     Homeless in the Last Year: " "Yes     Review of Systems   Skin:  Positive for wound.   All other systems reviewed and are negative.    Objective:     Vital Signs (Most Recent):  Temp: 98.4 °F (36.9 °C) (06/11/25 1515)  Pulse: (!) 56 (06/11/25 1515)  Resp: (!) 42 (06/11/25 1515)  BP: (!) 104/59 (06/11/25 1500)  SpO2: 100 % (06/11/25 1515) Vital Signs (24h Range):  Temp:  [97.4 °F (36.3 °C)-99.8 °F (37.7 °C)] 98.4 °F (36.9 °C)  Pulse:  [44-66] 56  Resp:  [5-42] 42  SpO2:  [69 %-100 %] 100 %  BP: ()/(48-65) 104/59     Weight: 67 kg (147 lb 11.3 oz)  Body mass index is 28.85 kg/m².    Estimated Creatinine Clearance: 65.3 mL/min (based on SCr of 0.9 mg/dL).     Physical Exam  Vitals and nursing note reviewed.   Constitutional:       General: She is not in acute distress.     Appearance: Normal appearance. She is not ill-appearing, toxic-appearing or diaphoretic.   HENT:      Head: Normocephalic.   Eyes:      Pupils: Pupils are equal, round, and reactive to light.   Cardiovascular:      Rate and Rhythm: Bradycardia present.      Heart sounds: No murmur heard.  Pulmonary:      Effort: Pulmonary effort is normal.   Abdominal:      Tenderness: There is no abdominal tenderness. There is no guarding.   Musculoskeletal:      Comments: Wound LUE   Neurological:      Mental Status: She is alert.   Psychiatric:         Mood and Affect: Mood normal.         Thought Content: Thought content normal.          Significant Labs: BMP:   Recent Labs   Lab 06/11/25  0521   *      K 4.0   *   CO2 19*   BUN 12   CREATININE 0.9   CALCIUM 7.5*   MG 1.8     CBC:   Recent Labs   Lab 06/10/25  2343 06/11/25  0521   WBC 5.34 4.18   HGB 8.4* 8.2*   HCT 28.5* 28.0*   * 122*     HIV 1/2 Antibodies:   Recent Labs   Lab 06/10/25  2343   [id number] Positive*     HIV Rapid: No results for input(s): "HIVRAPID" in the last 48 hours.  Microbiology Results (last 7 days)       Procedure Component Value Units Date/Time    Blood culture x two cultures. Draw " prior to antibiotics. [[id number]]  (Normal) Collected: 06/10/25 2341    Order Status: Completed Specimen: Blood from Peripheral, Hand, Left Updated: 06/11/25 1002     Blood Culture No Growth After 6 Hours    Blood culture x two cultures. Draw prior to antibiotics. [[id number]]  (Normal) Collected: 06/10/25 2345    Order Status: Completed Specimen: Blood from Peripheral, Antecubital, Right Updated: 06/11/25 1002     Blood Culture No Growth After 6 Hours    Clostridium difficile EIA [[id number]]     Order Status: Sent Specimen: Stool     Urine culture [[id number]] Collected: 06/11/25 0052    Order Status: Sent Specimen: Urine Updated: 06/11/25 0117            Significant Imaging: I have reviewed all pertinent imaging results/findings within the past 24 hours.

## 2025-06-11 NOTE — CONSULTS
[facility]  Intensive Care ([place])  Infectious Disease  Consult Note    Patient Name: Margarita Wiseman  MRN: [id number]  Admission Date: 6/10/2025  Hospital Length of Stay: 0 days  Attending Physician: Ricki Watt MD  Primary Care Provider: Doris, Primary Doctor     Isolation Status: Special Contact    Patient information was obtained from patient, past medical records, ER records, and primary team.      Inpatient consult to Infectious Diseases  Consult performed by: Jean Carlos Payne MD  Consult ordered by: Ricki Watt MD        Assessment/Plan:       Infective endocarditis of tricuspid valve  Ms. Wiseman is a pleasant 49 yo woman who uses IV drugs and has a history of TVE (June 2024). She seems to have cleared her infection without full treatment. If her blood cultures remain negative, okay to stop her vancomycin (she had MSSA previously as well).     Recommendations  FU blood cultures  If sterile, can stop her vancomycin      * UTI (urinary tract infection)  - history of ESBL UTI in the past  - FU urine culture and adjust abx as indicated      HIV antibody positive  - supplemental assay is NR  - given her pre-test probability, I will check her RNA and CD4 count      Thank you for your consult. I will follow-up with patient. Please contact us if you have any additional questions.    Jean Carlos Payne MD  Infectious Disease  [facility]ensive [facility] ([place])    Subjective:     Principal Problem: UTI (urinary tract infection)    HPI: Ms. Wiseman is a 49 yo woman with history of untreated MSSA bacteremia and TVE, polysubstance abuse (cocaine and heroin), chronic hep C, chronic thrombocytopenia, and chronic L arm wound (currently skin popping drugs, present for several years) who presents with arm wound infection and hypotension. Last used the day PTA administered IV into her left upper extremity where she has a chronic wound. She was started on empiric abx. Urine culture grew E.coli and blood cultures are  pending. The patient denies any prodromal fever or rigors. TTE was performed which did not demonstrate the vegetation noted on her prior TTE. ID is consulted given her history of untreated IE.     History reviewed. No pertinent past medical history.    History reviewed. No pertinent surgical history.    Review of patient's allergies indicates:  No Known Allergies    Medications:  Medications Prior to Admission   Medication Sig    busPIRone (BUSPAR) 5 MG Tab Take 5 mg by mouth 2 (two) times daily.    divalproex ER (DEPAKOTE ER) 250 MG 24 hr tablet Take 250 mg by mouth once daily.    ferrous sulfate (FEOSOL) 325 mg (65 mg iron) Tab tablet Take 1 tablet (325 mg total) by mouth 2 (two) times daily.    hydrOXYzine (ATARAX) 50 MG tablet Take 1 tablet (50 mg total) by mouth nightly as needed for Itching (insomnia).    ibuprofen (ADVIL,MOTRIN) 800 MG tablet Take 1 tablet (800 mg total) by mouth every 6 (six) hours as needed for Pain.    naloxone (NARCAN) 4 mg/actuation Spry 4mg by nasal route as needed for opioid overdose; may repeat every 2-3 minutes in alternating nostrils until medical help arrives. Call 911    naloxone (NARCAN) 4 mg/actuation Spry 1 spray into the nose as needed for suspected overdose of opioid.  Proceed to Emergency Department immediately after use.    OLANZapine (ZYPREXA) 5 MG tablet Take 5 mg by mouth every evening.    ondansetron (ZOFRAN-ODT) 8 MG TbDL Take 1 tablet (8 mg total) by mouth every 6 (six) hours as needed (nausea).    sertraline (ZOLOFT) 25 MG tablet Take 25 mg by mouth once daily.     Antibiotics (From admission, onward)      Start     Stop Route Frequency Ordered    06/11/25 2100  mupirocin 2 % ointment         06/16/25 2059 Nasl 2 times daily 06/11/25 1206    06/11/25 1300  vancomycin (VANCOCIN) 1,000 mg in 0.9% NaCl 250 mL IVPB (admixture device)         -- IV Every 12 hours (non-standard times) 06/11/25 0305    06/11/25 0700  meropenem injection 1 g         -- IV Every 8 hours  "(non-standard times) 06/11/25 0554    06/11/25 0358  vancomycin - pharmacy to dose  (vancomycin IVPB (PEDS and ADULTS))        Placed in "And" Linked Group    -- IV pharmacy to manage frequency 06/11/25 0300          Antifungals (From admission, onward)      None          Antivirals (From admission, onward)      None               There is no immunization history on file for this patient.    Family History    None       Social History     Socioeconomic History    Marital status: Unknown   Tobacco Use    Smoking status: Every Day     Current packs/day: 1.00     Types: Cigarettes    Smokeless tobacco: Never    Tobacco comments:     Smoking cessation education provided. Pt is a 1 pk/day cigarette smoker x 26 yrs. She states that she cut down to 0.5 pk/day approx. 1 yr ago. Pt declines referral to Ambulatory Smoking Cessation clinic, stating not ready to quit. Handout provided.    Substance and Sexual Activity    Alcohol use: Not Currently    Drug use: Yes     Types: IV, Fentanyl, Heroin, "Crack" cocaine     Comment: heroin, Fentanyl, crack    Sexual activity: Not Currently     Social Drivers of Health     Financial Resource Strain: High Risk (6/11/2025)    Overall Financial Resource Strain (CARDIA)     Difficulty of Paying Living Expenses: Very hard   Food Insecurity: Food Insecurity Present (6/11/2025)    Hunger Vital Sign     Worried About Running Out of Food in the Last Year: Often true     Ran Out of Food in the Last Year: Often true   Transportation Needs: Unmet Transportation Needs (6/11/2025)    PRAPARE - Transportation     Lack of Transportation (Medical): Yes     Lack of Transportation (Non-Medical): Yes   Physical Activity: Inactive (6/11/2025)    Exercise Vital Sign     Days of Exercise per Week: 0 days     Minutes of Exercise per Session: 0 min   Stress: Stress Concern Present (6/11/2025)    [place] [employer] - Occupational Stress Questionnaire     Feeling of Stress : To some extent "   Housing Stability: High Risk (6/11/2025)    Housing Stability Vital Sign     Unable to Pay for Housing in the Last Year: Yes     Number of Times Moved in the Last Year: 1     Homeless in the Last Year: Yes     Review of Systems   Skin:  Positive for wound.   All other systems reviewed and are negative.    Objective:     Vital Signs (Most Recent):  Temp: 98.4 °F (36.9 °C) (06/11/25 1515)  Pulse: (!) 56 (06/11/25 1515)  Resp: (!) 42 (06/11/25 1515)  BP: (!) 104/59 (06/11/25 1500)  SpO2: 100 % (06/11/25 1515) Vital Signs (24h Range):  Temp:  [97.4 °F (36.3 °C)-99.8 °F (37.7 °C)] 98.4 °F (36.9 °C)  Pulse:  [44-66] 56  Resp:  [5-42] 42  SpO2:  [69 %-100 %] 100 %  BP: ()/(48-65) 104/59     Weight: 67 kg (147 lb 11.3 oz)  Body mass index is 28.85 kg/m².    Estimated Creatinine Clearance: 65.3 mL/min (based on SCr of 0.9 mg/dL).     Physical Exam  Vitals and nursing note reviewed.   Constitutional:       General: She is not in acute distress.     Appearance: Normal appearance. She is not ill-appearing, toxic-appearing or diaphoretic.   HENT:      Head: Normocephalic.   Eyes:      Pupils: Pupils are equal, round, and reactive to light.   Cardiovascular:      Rate and Rhythm: Bradycardia present.      Heart sounds: No murmur heard.  Pulmonary:      Effort: Pulmonary effort is normal.   Abdominal:      Tenderness: There is no abdominal tenderness. There is no guarding.   Musculoskeletal:      Comments: Wound LUE   Neurological:      Mental Status: She is alert.   Psychiatric:         Mood and Affect: Mood normal.         Thought Content: Thought content normal.          Significant Labs: BMP:   Recent Labs   Lab 06/11/25  0521   *      K 4.0   *   CO2 19*   BUN 12   CREATININE 0.9   CALCIUM 7.5*   MG 1.8     CBC:   Recent Labs   Lab 06/10/25  2343 06/11/25  0521   WBC 5.34 4.18   HGB 8.4* 8.2*   HCT 28.5* 28.0*   * 122*     HIV 1/2 Antibodies:   Recent Labs   Lab 06/10/25  2343   [id number]  "Positive*     HIV Rapid: No results for input(s): "HIVRAPID" in the last 48 hours.  Microbiology Results (last 7 days)       Procedure Component Value Units Date/Time    Blood culture x two cultures. Draw prior to antibiotics. [[id number]]  (Normal) Collected: 06/10/25 2341    Order Status: Completed Specimen: Blood from Peripheral, Hand, Left Updated: 06/11/25 1002     Blood Culture No Growth After 6 Hours    Blood culture x two cultures. Draw prior to antibiotics. [[id number]]  (Normal) Collected: 06/10/25 2345    Order Status: Completed Specimen: Blood from Peripheral, Antecubital, Right Updated: 06/11/25 1002     Blood Culture No Growth After 6 Hours    Clostridium difficile EIA [[id number]]     Order Status: Sent Specimen: Stool     Urine culture [[id number]] Collected: 06/11/25 0052    Order Status: Sent Specimen: Urine Updated: 06/11/25 0117            Significant Imaging: I have reviewed all pertinent imaging results/findings within the past 24 hours.              "

## 2025-06-11 NOTE — ASSESSMENT & PLAN NOTE
Echocardiogram with evidence of tricuspid regurgitation that is moderate . The patient's most recent echocardiogram result is listed below.    Echo  Result Date: 6/25/2024    Left Ventricle: The left ventricle is normal in size. Ventricular mass   is normal. Normal wall thickness. There is normal systolic function.   Ejection fraction by visual approximation is 65%. Global longitudinal   strain is -19.0%. Global longitudinal strain is normal. There is normal   diastolic function. Normal left ventricular filling pressure. Tissue   Doppler velocity is normal.    Right Ventricle: Normal right ventricular cavity size. Wall thickness   is normal. Systolic function is normal.    Tricuspid Valve: There is a medium mobile echogenic pedunculated mass   present on the atrial side of the septal leaflet consistent with   vegetation. There is mild regurgitation with a centrally directed jet.    IVC/SVC: Normal venous pressure at 3 mmHg.    Pericardium: There is a trivial effusion. No indication of cardiac   tamponade.       Restart Cefazolin as prescribed on prior AMA visit.  Continue Vanc   Echo pending

## 2025-06-11 NOTE — H&P
[facility] Intensive [place] [facility] Medicine  History & Physical    Patient Name: Margarita Wiseman  MRN: [id number]  Patient Class: OP- Observation  Admission Date: 6/10/2025  Attending Physician: Ricki Watt MD   Primary Care Provider: Doris Primary Doctor         Patient information was obtained from patient, past medical records, and ER records.     Subjective:     Principal Problem:UTI (urinary tract infection)    Chief Complaint:   Chief Complaint   Patient presents with    Hypotension    Wound Infection     BIB EMS as a sepsis alert,         HPI: The patient is a 50 y.o. female with a past medical history of IVDA, polysubstance abuse (cocaine and heroin), endocarditis with tricuspid vegetation, chronic hep C, chronic thrombocytopenia, and chronic L arm wound (currently skin popping drugs, present for several years) who presents today for generalized malaise. Was found to be hypotensive with systolic in the 80s. She reports nausea, vomiting and diarrhea. Generalized abdominal pain. States that her last opiate use was administered IV into her left upper extremity where she has a chronic wound. On initial workup, the patient has a positive U/A for bacteria, nitrites, and leukocytes. Reports no urinary symptoms.  Patient remains hypotensive in ER.  SHe will be admitted for further management of her likely developing sepsis and continued treatment of her endocarditis.    History reviewed. No pertinent past medical history.    History reviewed. No pertinent surgical history.    Review of patient's allergies indicates:  No Known Allergies    No current facility-administered medications on file prior to encounter.     Current Outpatient Medications on File Prior to Encounter   Medication Sig    busPIRone (BUSPAR) 5 MG Tab Take 5 mg by mouth 2 (two) times daily.    divalproex ER (DEPAKOTE ER) 250 MG 24 hr tablet Take 250 mg by mouth once daily.    ferrous sulfate (FEOSOL) 325 mg (65 mg iron) Tab tablet Take  "1 tablet (325 mg total) by mouth 2 (two) times daily.    hydrOXYzine (ATARAX) 50 MG tablet Take 1 tablet (50 mg total) by mouth nightly as needed for Itching (insomnia).    ibuprofen (ADVIL,MOTRIN) 800 MG tablet Take 1 tablet (800 mg total) by mouth every 6 (six) hours as needed for Pain.    naloxone (NARCAN) 4 mg/actuation Spry 4mg by nasal route as needed for opioid overdose; may repeat every 2-3 minutes in alternating nostrils until medical help arrives. Call 911    naloxone (NARCAN) 4 mg/actuation Spry 1 spray into the nose as needed for suspected overdose of opioid.  Proceed to Emergency Department immediately after use.    OLANZapine (ZYPREXA) 5 MG tablet Take 5 mg by mouth every evening.    ondansetron (ZOFRAN-ODT) 8 MG TbDL Take 1 tablet (8 mg total) by mouth every 6 (six) hours as needed (nausea).    sertraline (ZOLOFT) 25 MG tablet Take 25 mg by mouth once daily.     Family History    None       Tobacco Use    Smoking status: Every Day     Current packs/day: 1.00     Types: Cigarettes    Smokeless tobacco: Never    Tobacco comments:     Smoking cessation education provided. Pt is a 1 pk/day cigarette smoker x 26 yrs. She states that she cut down to 0.5 pk/day approx. 1 yr ago. Pt declines referral to Ambulatory Smoking Cessation clinic, stating not ready to quit. Handout provided.    Substance and Sexual Activity    Alcohol use: Not Currently    Drug use: Yes     Types: IV, Fentanyl, Heroin, "Crack" cocaine     Comment: heroin, Fentanyl, crack    Sexual activity: Not Currently     Review of Systems   Constitutional:  Positive for activity change and fatigue. Negative for appetite change and fever.   HENT:  Negative for congestion, ear pain, rhinorrhea and sinus pressure.    Eyes:  Negative for pain and discharge.   Respiratory:  Negative for cough, chest tightness, shortness of breath and wheezing.    Cardiovascular:  Negative for chest pain and leg swelling.   Gastrointestinal:  Positive for abdominal " pain, diarrhea, nausea and vomiting. Negative for abdominal distention.   Endocrine: Negative for cold intolerance and heat intolerance.   Genitourinary:  Negative for difficulty urinating, flank pain, frequency, hematuria and urgency.   Musculoskeletal:  Negative for arthralgias, joint swelling and myalgias.   Allergic/Immunologic: Negative for environmental allergies and food allergies.   Neurological:  Negative for dizziness, weakness, light-headedness and headaches.   Hematological:  Does not bruise/bleed easily.   Psychiatric/Behavioral:  Negative for agitation, behavioral problems and decreased concentration.      Objective:     Vital Signs (Most Recent):  Temp: 99.8 °F (37.7 °C) (06/10/25 2230)  Pulse: (!) 54 (06/11/25 0313)  Resp: 18 (06/11/25 0315)  BP: (!) 98/55 (06/11/25 0311)  SpO2: 100 % (06/11/25 0311) Vital Signs (24h Range):  Temp:  [99.8 °F (37.7 °C)] 99.8 °F (37.7 °C)  Pulse:  [51-66] 54  Resp:  [12-23] 18  SpO2:  [95 %-100 %] 100 %  BP: ()/(48-59) 98/55     Weight: 67.5 kg (148 lb 13 oz)  Body mass index is 29.06 kg/m².     Physical Exam  Constitutional:       Appearance: She is well-developed.   HENT:      Head: Normocephalic.   Eyes:      General:         Right eye: No discharge.         Left eye: No discharge.      Conjunctiva/sclera: Conjunctivae normal.   Cardiovascular:      Rate and Rhythm: Regular rhythm. Bradycardia present.      Pulses:           Radial pulses are 1+ on the right side and 1+ on the left side.      Heart sounds: Normal heart sounds.   Pulmonary:      Effort: Pulmonary effort is normal. No respiratory distress.      Breath sounds: Examination of the right-lower field reveals decreased breath sounds. Examination of the left-lower field reveals decreased breath sounds. Decreased breath sounds present.   Abdominal:      General: Bowel sounds are decreased. There is no distension.      Palpations: Abdomen is soft.      Tenderness: There is generalized abdominal  tenderness.   Musculoskeletal:         General: Normal range of motion.      Cervical back: Normal range of motion and neck supple.   Skin:     General: Skin is warm and dry.      Findings: Erythema and wound present.      Comments: Bilateral arms with open wounds. Does not appear overtly infected   Neurological:      Mental Status: She is oriented to person, place, and time and easily aroused. She is lethargic.      GCS: GCS eye subscore is 2. GCS verbal subscore is 5. GCS motor subscore is 6.      Motor: Motor function is intact.   Psychiatric:         Mood and Affect: Mood normal.         Speech: Speech normal.         Behavior: Behavior normal.                Significant Labs: All pertinent labs within the past 24 hours have been reviewed.  CBC:   Recent Labs   Lab 06/10/25  2343   WBC 5.34   HGB 8.4*   HCT 28.5*   *     CMP:   Recent Labs   Lab 06/10/25  2343   *   K 4.9      CO2 19*      BUN 16   CREATININE 1.1   CALCIUM 7.9*   PROT 8.2   ALBUMIN 3.1*   BILITOT 0.2   ALKPHOS 68   AST 33   ALT 17   ANIONGAP 11       Significant Imaging: I have reviewed all pertinent imaging results/findings within the past 24 hours.  Assessment/Plan:     Assessment & Plan  UTI (urinary tract infection)  Patient with positive U/A for nitrites, leukocytes, bacteria.    Culture pending  Cefazolin/Vanc  IVF    Drug abuse, IV  Patient reports drug use today. Positive for amphetamines and cocaine. Reports heroin use.    Discuss cessation needs when patient more awake/alert    Chronic hepatitis C virus infection  Noted. Mildly thrombocytopenia. Patient not aware of completing treatment.    Infective endocarditis of tricuspid valve  Echocardiogram with evidence of tricuspid regurgitation that is moderate . The patient's most recent echocardiogram result is listed below.    Echo  Result Date: 6/25/2024    Left Ventricle: The left ventricle is normal in size. Ventricular mass   is normal. Normal wall thickness.  There is normal systolic function.   Ejection fraction by visual approximation is 65%. Global longitudinal   strain is -19.0%. Global longitudinal strain is normal. There is normal   diastolic function. Normal left ventricular filling pressure. Tissue   Doppler velocity is normal.    Right Ventricle: Normal right ventricular cavity size. Wall thickness   is normal. Systolic function is normal.    Tricuspid Valve: There is a medium mobile echogenic pedunculated mass   present on the atrial side of the septal leaflet consistent with   vegetation. There is mild regurgitation with a centrally directed jet.    IVC/SVC: Normal venous pressure at 3 mmHg.    Pericardium: There is a trivial effusion. No indication of cardiac   tamponade.       Restart Cefazolin as prescribed on prior AMA visit.  Continue Vanc   Echo pending  Nausea vomiting and diarrhea  Patient hypotensive. Likely due to fluid losses.    IVF  C diff pending  Zofran PRN    VTE Risk Mitigation (From admission, onward)           Ordered     heparin (porcine) injection 5,000 Units  Every 8 hours         06/11/25 0258     IP VTE LOW RISK PATIENT  Once         06/11/25 0258     Place sequential compression device  Until discontinued         06/11/25 0258                         On 06/11/2025, patient should be placed in hospital observation services under my care in collaboration with Dr. Watt.      Pharmacokinetic Initial Assessment: IV Vancomycin    Assessment/Plan:    Initiate intravenous vancomycin with loading dose of 1750 mg once followed by a maintenance dose of vancomycin 1000 mg IV every 12 hours  Desired empiric serum trough concentration is 15 to 20 mcg/mL  Draw vancomycin trough level 60 min prior to fourth dose on 06/12/25 at approximately 1200  Pharmacy will continue to follow and monitor vancomycin.      Please contact pharmacy at extension [phone] with any questions regarding this assessment.     Thank you for the consult,   Maryam Benavidez      "  Patient brief summary:  Margarita Wiseman is a 50 y.o. female initiated on antimicrobial therapy with IV Vancomycin for treatment of suspected bacteremia    Drug Allergies:   Review of patient's allergies indicates:  No Known Allergies    Actual Body Weight:   67.5 kg    Renal Function:   Estimated Creatinine Clearance: 53.6 mL/min (based on SCr of 1.1 mg/dL).,     Dialysis Method (if applicable):  N/A    CBC (last 72 hours):  Recent Labs   Lab Result Units 06/10/25  2343   WBC K/uL 5.34   HGB gm/dL 8.4*   HCT % 28.5*   Platelet Count K/uL 130*   Lymph % % 32.8   Mono % % 4.9   Eos % % 0.2   Basophil % % 0.2       Metabolic Panel (last 72 hours):  Recent Labs   Lab Result Units 06/10/25  2343 06/11/25  0052   Sodium mmol/L 135*  --    Potassium mmol/L 4.9  --    Chloride mmol/L 105  --    CO2 mmol/L 19*  --    Glucose mg/dL 109  --    Glucose, UA   --  Negative   BUN mg/dL 16  --    Creatinine mg/dL 1.1  --    Urine Creatinine mg/dL  --  88.0   Albumin g/dL 3.1*  --    Bilirubin Total mg/dL 0.2  --    ALP unit/L 68  --    AST unit/L 33  --    ALT unit/L 17  --        Drug levels (last 3 results):  No results for input(s): "VANCOMYCINRA", "VANCORANDOM", "VANCOMYCINPE", "VANCOPEAK", "VANCOMYCINTR", "VANCOTROUGH" in the last 72 hours.    Microbiologic Results:  Microbiology Results (last 7 days)       Procedure Component Value Units Date/Time    Clostridium difficile EIA [[id number]]     Order Status: Sent Specimen: Stool     Urine culture [[id number]] Collected: 06/11/25 0052    Order Status: Sent Specimen: Urine Updated: 06/11/25 0117    Blood culture x two cultures. Draw prior to antibiotics. [[id number]] Collected: 06/10/25 2345    Order Status: Sent Specimen: Blood from Peripheral, Antecubital, Right Updated: 06/10/25 2349    Blood culture x two cultures. Draw prior to antibiotics. [[id number]] Collected: 06/10/25 2341    Order Status: Sent Specimen: Blood from Peripheral, Hand, Left Updated: 06/10/25 2348      "         Enrique Fofana NP  Department of [facility]  [facility] - Intensive Care ([place])

## 2025-06-11 NOTE — EICU
Virtual ICU Admission    Admit Date: 6/10/2025  LOS: 0  Code Status: Full Code   : 1975  Bed: BICU 04/BICU 04A:     Diagnosis: UTI (urinary tract infection)    Patient  has no past medical history on file.    Last VS: BP (!) 98/55   Pulse (!) 54   Temp 98.8 °F (37.1 °C) (Oral)   Resp 18   Ht 5' (1.524 m)   Wt 67 kg (147 lb 11.3 oz)   SpO2 100%   Breastfeeding No   BMI 28.85 kg/m²         Picture recorded in media and Wound care consult placed      VICU nurse assessment :  [place] completed, LDA documentation reconciliation completed, Skin care/wounds LDA reconciliation, and Sign and held orders reviewed/released    Skin breakdown present on admission    Nursing orders placed : IP NATALIE Peripheral IV Access

## 2025-06-11 NOTE — HPI
Ms. Wiseman is a 51 yo woman with history of untreated MSSA bacteremia and TVE, polysubstance abuse (cocaine and heroin), chronic hep C, chronic thrombocytopenia, and chronic L arm wound (currently skin popping drugs, present for several years) who presents with arm wound infection and hypotension. Last used the day PTA administered IV into her left upper extremity where she has a chronic wound. She was started on empiric abx. Urine culture grew E.coli and blood cultures are pending. The patient denies any prodromal fever or rigors. TTE was performed which did not demonstrate the vegetation noted on her prior TTE. ID is consulted given her history of untreated IE.

## 2025-06-11 NOTE — ASSESSMENT & PLAN NOTE
- 6/10 HIV Ab pos.  Hep C Ab positive.     - ID noted:  HIV antibody positive  - supplemental assay is NR  - given her pre-test probability, I will check her RNA and CD4 count

## 2025-06-11 NOTE — PROGRESS NOTES
[facility] Intensive [facility] Medicine  Progress Note    Patient Name: Margarita Wiseman  MRN: [id number]  Patient Class: OP- Observation   Admission Date: 6/10/2025  Length of Stay: 0 days  Attending Physician: Ricki Watt MD  Primary Care Provider: Doris, Primary Doctor        Subjective     Principal Problem:UTI (urinary tract infection)        HPI:  The patient is a 50 y.o. female with a past medical history of IVDA, polysubstance abuse (cocaine and heroin), endocarditis with tricuspid vegetation, chronic hep C, chronic thrombocytopenia, and chronic L arm wound (currently skin popping drugs, present for several years) who presents today for generalized malaise. Was found to be hypotensive with systolic in the 80s. She reports nausea, vomiting and diarrhea. Generalized abdominal pain. States that her last opiate use was administered IV into her left upper extremity where she has a chronic wound. On initial workup, the patient has a positive U/A for bacteria, nitrites, and leukocytes. Reports no urinary symptoms.  Patient remains hypotensive in ER.  SHe will be admitted for further management of her likely developing sepsis and continued treatment of her endocarditis.    Overview/Hospital Course:  HIV Ab positive.  Hepatitis C Ab positive.   Wound  care consulted for left forearm ulcer (states from IVDA).  On empiric antibiotic for UTI, urine culture pending.  Empiric antibiotic for history of endocarditis, states did not  complete previous treatment.  ECHO pending.  BP controlled off pressor, has bradycardia on telemetry, EKG sinus celio otherwise normal.  Pulm Crit Care consulted.   ID consulted.       Interval History: Pt new to me.  States she is feeling better currently.  States left FA wound from IVDA.  Counseled on severe adverse health consequences of IVDA.      Review of Systems   Constitutional:  Negative for diaphoresis and fever.   Respiratory:  Negative for cough and shortness of breath.     Cardiovascular:  Negative for chest pain and palpitations.   Gastrointestinal:  Negative for abdominal pain and nausea.   Genitourinary:  Negative for dysuria.   Musculoskeletal:  Negative for gait problem.   Skin:  Positive for wound.   Neurological:  Negative for dizziness and numbness.   Psychiatric/Behavioral:  Negative for agitation and confusion.      Objective:     Vital Signs (Most Recent):  Temp: 98.8 °F (37.1 °C) (06/11/25 0344)  Pulse: (!) 47 (06/11/25 0545)  Resp: (!) 22 (06/11/25 0545)  BP: (!) 100/50 (06/11/25 0545)  SpO2: 100 % (06/11/25 0545) Vital Signs (24h Range):  Temp:  [98.8 °F (37.1 °C)-99.8 °F (37.7 °C)] 98.8 °F (37.1 °C)  Pulse:  [47-66] 47  Resp:  [5-26] 22  SpO2:  [95 %-100 %] 100 %  BP: ()/(48-61) 100/50     Weight: 67 kg (147 lb 11.3 oz)  Body mass index is 28.85 kg/m².    Intake/Output Summary (Last 24 hours) at 6/11/2025 0615  Last data filed at 6/11/2025 0500  Gross per 24 hour   Intake 2629 ml   Output 850 ml   Net 1779 ml         Physical Exam  Constitutional:       General: She is not in acute distress.     Appearance: Normal appearance. She is well-developed and normal weight.   HENT:      Head: Normocephalic and atraumatic.   Eyes:      Conjunctiva/sclera: Conjunctivae normal.      Pupils: Pupils are equal, round, and reactive to light.   Cardiovascular:      Rate and Rhythm: Regular rhythm. Bradycardia present.      Pulses: Normal pulses.      Heart sounds: Murmur heard.      Comments: 2/6  Pulmonary:      Effort: Pulmonary effort is normal.      Breath sounds: Normal breath sounds.   Abdominal:      General: Bowel sounds are normal.      Palpations: Abdomen is soft.      Tenderness: There is no abdominal tenderness.   Musculoskeletal:      Cervical back: Normal range of motion.      Right lower leg: No edema.      Left lower leg: No edema.   Skin:     General: Skin is warm and dry.      Comments: Left forearm  with ulcerated wound approx 3 cm X 4 cm.     Neurological:  "     Mental Status: She is alert and oriented to person, place, and time.               Significant Labs: All pertinent labs within the past 24 hours have been reviewed.  Blood Culture: No results for input(s): "LABBLOO" in the last 48 hours.  CBC:   Recent Labs   Lab 06/10/25  2343 06/11/25  0521   WBC 5.34 4.18   HGB 8.4* 8.2*   HCT 28.5* 28.0*   * 122*     CMP:   Recent Labs   Lab 06/10/25  2343 06/11/25  0521   * 140   K 4.9 4.0    114*   CO2 19* 19*    116*   BUN 16 12   CREATININE 1.1 0.9   CALCIUM 7.9* 7.5*   PROT 8.2 7.0   ALBUMIN 3.1* 2.7*   BILITOT 0.2 0.2   ALKPHOS 68 62   AST 33 25   ALT 17 14   ANIONGAP 11 7*     Lactic Acid:   Recent Labs   Lab 06/11/25  0521   LACTATE 0.7     Magnesium:   Recent Labs   Lab 06/11/25 0521   MG 1.8     TSH: No results for input(s): "TSH" in the last 4320 hours.  Urine Culture: No results for input(s): "LABURIN" in the last 48 hours.  Urine Studies:   Recent Labs   Lab 06/11/25  0052   COLORU Yellow   APPEARANCEUA Hazy*   PHUR 6.0   SPECGRAV 1.015   PROTEINUA Trace*   GLUCUA Negative   BILIRUBINUA Negative   OCCULTUA 1+*   NITRITE Positive*   UROBILINOGEN Negative   LEUKOCYTESUR 3+*   RBCUA 27*   WBCUA 13*   BACTERIA Many*       Significant Imaging: I have reviewed all pertinent imaging results/findings within the past 24 hours.    Imaging Results              X-Ray Chest AP Portable (Final result)  Result time 06/11/25 00:47:45      Final result by Lorena Scales MD (06/11/25 00:47:45)                   Impression:      No acute cardiopulmonary process identified.      Electronically signed by: Lorena Scales MD  Date:    06/11/2025  Time:    00:47               Narrative:    EXAMINATION:  XR CHEST AP PORTABLE    CLINICAL HISTORY:  Sepsis;    TECHNIQUE:  Single frontal view of the chest was performed.    COMPARISON:  05/15/2025.    FINDINGS:  Cardiac silhouette is normal in size.  Lungs are symmetrically expanded.  No evidence of focal " consolidative process, pneumothorax, or significant pleural effusion.  No acute osseous abnormality identified.                                           Assessment & Plan  UTI (urinary tract infection)  Patient with positive U/A for nitrites, leukocytes, bacteria.  Has history of ESBL Klebsiella UTI.     Culture pending  Meropenem/Vanc  IVF  - ID consult.   - Crit care consult.   Drug abuse, IV  Patient reports drug use today. Positive for amphetamines and cocaine. Reports heroin use.  Discussed cessation with patient   - Emphasized severe health potential consequences including endocarditis, risk for spinal abscess.    Chronic hepatitis C virus infection  Noted. Mildly thrombocytopenia. Patient not aware of completing treatment.    Infective endocarditis of tricuspid valve  Echocardiogram with evidence of tricuspid regurgitation that is moderate . The patient's most recent echocardiogram result is listed below.    Echo  Result Date: 6/25/2024    Left Ventricle: The left ventricle is normal in size. Ventricular mass   is normal. Normal wall thickness. There is normal systolic function.   Ejection fraction by visual approximation is 65%. Global longitudinal   strain is -19.0%. Global longitudinal strain is normal. There is normal   diastolic function. Normal left ventricular filling pressure. Tissue   Doppler velocity is normal.    Right Ventricle: Normal right ventricular cavity size. Wall thickness   is normal. Systolic function is normal.    Tricuspid Valve: There is a medium mobile echogenic pedunculated mass   present on the atrial side of the septal leaflet consistent with   vegetation. There is mild regurgitation with a centrally directed jet.    IVC/SVC: Normal venous pressure at 3 mmHg.    Pericardium: There is a trivial effusion. No indication of cardiac   tamponade.      - Given h/o ESBL UTI, hold Cefazolin as prescribed on prior AMA visit pending ID evaluation.  - Continue Vanc and Meropenem given  history of ESBL UTI.   - ECHO.  - ID consult.   Nausea vomiting and diarrhea  Patient hypotensive. Likely due to fluid losses.    IVF  C diff pending  Zofran PRN    Bradycardia  - Sinus celio on tele.  EKG sinus bradycardia, otherwise normal.   - Monitor.     Polysubstance abuse  Counseled to cease.      Tobacco abuse  Counseled to cease.  States does not need a nicotine patch.      Arm ulcer, with unspecified severity  - Left forearm wound approx 3 cm X 4 cm.   - XRAY LEFT FA to eval for presence of any FB.   - Wound care consult.       HIV Ab pos / Hep C Ab pos   - ID to see    VTE Risk Mitigation (From admission, onward)           Ordered     heparin (porcine) injection 5,000 Units  Every 8 hours         06/11/25 0258     IP VTE LOW RISK PATIENT  Once         06/11/25 0258     Place sequential compression device  Until discontinued         06/11/25 0258                    Discharge Planning   ABELARDO:      Code Status: Full Code   Medical Readiness for Discharge Date:                            Ricki Watt MD  Department of Hospital Medicine   [facility] Intensive [facility]

## 2025-06-11 NOTE — HOSPITAL COURSE
HIV Ab positive, supplemental assay non-reactive, RNA and CD4 pending.  Hepatitis C Ab positive.   Wound  care consulted for left forearm ulcer (states from IVDA).  On empiric antibiotic for UTI, urine culture pending.  Empiric antibiotic for history of endocarditis, states did not  complete previous treatment.  ECHO EF 60-65%, no mention vegetation.  BP controlled off pressor, has bradycardia on telemetry, EKG sinus celio otherwise normal.  Pulm Crit Care consulted.   ID consulted.   Blood culture noting Strep, Staph aureus, pending. 6/12.  Repeat blood culture pending.  Left FA ulcer XRAY noted possible FB, ortho consulted for eval, discussed with patient..  Cardiology consulted for ARMAND.  To telemetry.     6/12.  Informed by RN that patient wanted to leave hospital against medical advice.  Discussed with patient positive blood cultures growing Streptococcus and Staphylococcus aureus, emphasized that her infection is not likely cleared, and she should remain in the hospital for treatment.  Discussed that if untreated, infection will likely come back and could do significant damage to her heart, lead to epidural abscess and paralysis, as well as death.  Patient denied any symptoms of withdrawal, and stated she understood the consequences but wanted to leave anyway.  Discussed with patient if feeling bad to return to hospital and she expressed understanding.    Patient left hospital against medical advice (AMA).

## 2025-06-11 NOTE — SUBJECTIVE & OBJECTIVE
History reviewed. No pertinent past medical history.    History reviewed. No pertinent surgical history.    Review of patient's allergies indicates:  No Known Allergies    No current facility-administered medications on file prior to encounter.     Current Outpatient Medications on File Prior to Encounter   Medication Sig    busPIRone (BUSPAR) 5 MG Tab Take 5 mg by mouth 2 (two) times daily.    divalproex ER (DEPAKOTE ER) 250 MG 24 hr tablet Take 250 mg by mouth once daily.    ferrous sulfate (FEOSOL) 325 mg (65 mg iron) Tab tablet Take 1 tablet (325 mg total) by mouth 2 (two) times daily.    hydrOXYzine (ATARAX) 50 MG tablet Take 1 tablet (50 mg total) by mouth nightly as needed for Itching (insomnia).    ibuprofen (ADVIL,MOTRIN) 800 MG tablet Take 1 tablet (800 mg total) by mouth every 6 (six) hours as needed for Pain.    naloxone (NARCAN) 4 mg/actuation Spry 4mg by nasal route as needed for opioid overdose; may repeat every 2-3 minutes in alternating nostrils until medical help arrives. Call 911    naloxone (NARCAN) 4 mg/actuation Spry 1 spray into the nose as needed for suspected overdose of opioid.  Proceed to Emergency Department immediately after use.    OLANZapine (ZYPREXA) 5 MG tablet Take 5 mg by mouth every evening.    ondansetron (ZOFRAN-ODT) 8 MG TbDL Take 1 tablet (8 mg total) by mouth every 6 (six) hours as needed (nausea).    sertraline (ZOLOFT) 25 MG tablet Take 25 mg by mouth once daily.     Family History    None       Tobacco Use    Smoking status: Every Day     Current packs/day: 1.00     Types: Cigarettes    Smokeless tobacco: Never    Tobacco comments:     Smoking cessation education provided. Pt is a 1 pk/day cigarette smoker x 26 yrs. She states that she cut down to 0.5 pk/day approx. 1 yr ago. Pt declines referral to Ambulatory Smoking Cessation clinic, stating not ready to quit. Handout provided.    Substance and Sexual Activity    Alcohol use: Not Currently    Drug use: Yes     Types: IV,  "Fentanyl, Heroin, "Crack" cocaine     Comment: heroin, Fentanyl, crack    Sexual activity: Not Currently     Review of Systems   Constitutional:  Positive for activity change and fatigue. Negative for appetite change and fever.   HENT:  Negative for congestion, ear pain, rhinorrhea and sinus pressure.    Eyes:  Negative for pain and discharge.   Respiratory:  Negative for cough, chest tightness, shortness of breath and wheezing.    Cardiovascular:  Negative for chest pain and leg swelling.   Gastrointestinal:  Positive for abdominal pain, diarrhea, nausea and vomiting. Negative for abdominal distention.   Endocrine: Negative for cold intolerance and heat intolerance.   Genitourinary:  Negative for difficulty urinating, flank pain, frequency, hematuria and urgency.   Musculoskeletal:  Negative for arthralgias, joint swelling and myalgias.   Allergic/Immunologic: Negative for environmental allergies and food allergies.   Neurological:  Negative for dizziness, weakness, light-headedness and headaches.   Hematological:  Does not bruise/bleed easily.   Psychiatric/Behavioral:  Negative for agitation, behavioral problems and decreased concentration.      Objective:     Vital Signs (Most Recent):  Temp: 99.8 °F (37.7 °C) (06/10/25 2230)  Pulse: (!) 54 (06/11/25 0313)  Resp: 18 (06/11/25 0315)  BP: (!) 98/55 (06/11/25 0311)  SpO2: 100 % (06/11/25 0311) Vital Signs (24h Range):  Temp:  [99.8 °F (37.7 °C)] 99.8 °F (37.7 °C)  Pulse:  [51-66] 54  Resp:  [12-23] 18  SpO2:  [95 %-100 %] 100 %  BP: ()/(48-59) 98/55     Weight: 67.5 kg (148 lb 13 oz)  Body mass index is 29.06 kg/m².     Physical Exam  Constitutional:       Appearance: She is well-developed.   HENT:      Head: Normocephalic.   Eyes:      General:         Right eye: No discharge.         Left eye: No discharge.      Conjunctiva/sclera: Conjunctivae normal.   Cardiovascular:      Rate and Rhythm: Regular rhythm. Bradycardia present.      Pulses:           Radial " pulses are 1+ on the right side and 1+ on the left side.      Heart sounds: Normal heart sounds.   Pulmonary:      Effort: Pulmonary effort is normal. No respiratory distress.      Breath sounds: Examination of the right-lower field reveals decreased breath sounds. Examination of the left-lower field reveals decreased breath sounds. Decreased breath sounds present.   Abdominal:      General: Bowel sounds are decreased. There is no distension.      Palpations: Abdomen is soft.      Tenderness: There is generalized abdominal tenderness.   Musculoskeletal:         General: Normal range of motion.      Cervical back: Normal range of motion and neck supple.   Skin:     General: Skin is warm and dry.      Findings: Erythema and wound present.      Comments: Bilateral arms with open wounds. Does not appear overtly infected   Neurological:      Mental Status: She is oriented to person, place, and time and easily aroused. She is lethargic.      GCS: GCS eye subscore is 2. GCS verbal subscore is 5. GCS motor subscore is 6.      Motor: Motor function is intact.   Psychiatric:         Mood and Affect: Mood normal.         Speech: Speech normal.         Behavior: Behavior normal.                Significant Labs: All pertinent labs within the past 24 hours have been reviewed.  CBC:   Recent Labs   Lab 06/10/25  2343   WBC 5.34   HGB 8.4*   HCT 28.5*   *     CMP:   Recent Labs   Lab 06/10/25  2343   *   K 4.9      CO2 19*      BUN 16   CREATININE 1.1   CALCIUM 7.9*   PROT 8.2   ALBUMIN 3.1*   BILITOT 0.2   ALKPHOS 68   AST 33   ALT 17   ANIONGAP 11       Significant Imaging: I have reviewed all pertinent imaging results/findings within the past 24 hours.

## 2025-06-11 NOTE — ASSESSMENT & PLAN NOTE
Ms. Wiseman is a pleasant 51 yo woman who uses IV drugs and has a history of TVE (June 2024). She seems to have cleared her infection without full treatment. If her blood cultures remain negative, okay to stop her vancomycin (she had MSSA previously as well).     Recommendations  FU blood cultures  If sterile, can stop her vancomycin

## 2025-06-11 NOTE — HPI
The patient is a 50 y.o. female with a past medical history of IVDA, polysubstance abuse (cocaine and heroin), endocarditis with tricuspid vegetation, chronic hep C, chronic thrombocytopenia, and chronic L arm wound (currently skin popping drugs, present for several years) who presents today for generalized malaise. Was found to be hypotensive with systolic in the 80s. She reports nausea, vomiting and diarrhea. Generalized abdominal pain. States that her last opiate use was administered IV into her left upper extremity where she has a chronic wound. On initial workup, the patient has a positive U/A for bacteria, nitrites, and leukocytes. Reports no urinary symptoms.  Patient remains hypotensive in ER.  SHe will be admitted for further management of her likely developing sepsis and continued treatment of her endocarditis.

## 2025-06-11 NOTE — ED TRIAGE NOTES
"Pt presents to the ED via EMS with complaints of Wound infection. Per EMS "pt is a sepsis alert with an initial systolic blood pressure in the 80's." Pt has wounds on bilateral forearms. AAOx4. Workup in progress.   "

## 2025-06-11 NOTE — PLAN OF CARE

## 2025-06-11 NOTE — NURSING
0705- Bedside EKG performed. Resting quietly with eyes closed. Easily aroused with verbal stimuli. Oriented x 4. Denies pain, shortness of breath, or difficulty at this time.  Denies need for further assistance at this time. Rn at bedside. Safety and comfort measures ongoing with continuous monitoring.     0805- X ray performed of L arm at the bedside as per MD order. No acute changes in assessment.     0845- Awake, alert, oriented x 4.  Sitting up at the bedside consuming breakfast. Afebrile. No pain reported.  RR even and unlabored.  Ongoing monitoring  Call light within reach. HOB elevated. Calm, cooperative. No apparent distress.    0853- Echo at bedside for testing. Pt remain free of questions. No acute changes in assessment. Denies pain or discomfort.    0859- Consult placed to Pulm/Critical Care as per MD orders. Dr. Viral Wood notified.     0901- Consult placed to Infectious Disease as per MD orders.   Dr. Jean Carlos Payne notified.

## 2025-06-11 NOTE — ASSESSMENT & PLAN NOTE
Echocardiogram with evidence of tricuspid regurgitation that is moderate . The patient's most recent echocardiogram result is listed below.    Echo  Result Date: 6/25/2024    Left Ventricle: The left ventricle is normal in size. Ventricular mass   is normal. Normal wall thickness. There is normal systolic function.   Ejection fraction by visual approximation is 65%. Global longitudinal   strain is -19.0%. Global longitudinal strain is normal. There is normal   diastolic function. Normal left ventricular filling pressure. Tissue   Doppler velocity is normal.    Right Ventricle: Normal right ventricular cavity size. Wall thickness   is normal. Systolic function is normal.    Tricuspid Valve: There is a medium mobile echogenic pedunculated mass   present on the atrial side of the septal leaflet consistent with   vegetation. There is mild regurgitation with a centrally directed jet.    IVC/SVC: Normal venous pressure at 3 mmHg.    Pericardium: There is a trivial effusion. No indication of cardiac   tamponade.      - Given h/o ESBL UTI, hold Cefazolin as prescribed on prior AMA visit pending ID evaluation.  - Continue Vanc and Meropenem given history of ESBL UTI.   - ECHO.  - ID consult.

## 2025-06-11 NOTE — ASSESSMENT & PLAN NOTE
Patient reports drug use today. Positive for amphetamines and cocaine. Reports heroin use.    Discuss cessation needs when patient more awake/alert

## 2025-06-11 NOTE — CONSULTS
Pulmonary & Critical Care Medicine Consult Note    Primary Attending Physician: Dr. Ricki Watt   Consultant Attending: Dr. Bob Sparrow  Consultant Fellow: Dr. Viral Wood    Reason for Consult:     Severe Sepsis due to UTI VS wound infection    Subjective:      History of Present Illness:  Margarita Wiseman is a 50 y.o. AA female with a past medical history of IVDA, polysubstance abuse (cocaine and heroin), endocarditis with tricuspid vegetation, chronic hep C, chronic thrombocytopenia, and chronic L arm wound (currently skin popping drugs, present for several years) . The patient presented to the [facility] on 6/10/2025 with a primary complaint of Hypotension and Wound Infection (BIB EMS as a sepsis alert, ).  Patient admitted to ICU for severe sepsis secondary to UTI.  Critical Care team consulted for evaluation.    Patient seen and examined at bedside.  Patient resting in bed comfortably in no acute distress.  Patient states that she feels well this morning.  Patient's vitals are stable.  Patient did not require any vasopressors and her blood pressure improved with just fluids and antibiotics.  She does have some sinus bradycardia but she is asymptomatic.  Patient states that she feels improved this morning and stating that she would like to go home.  Explained to her that her infection needs to be treated.  Patient has history of ESBL UTI in the past.  Patient is currently on vancomycin and meropenem.  Patient also has a left arm wound infection from IV drug use.Patient denies ay headache, fever, chills, chest pain, shortness of breath, abd pain, nausea, vomiting, diarrhea, constipation, hemoptysis, unusual weight loss, or night sweats.       Past Medical History:  See above HPI    Past Surgical History:  See above HPI    Allergies:  Review of patient's allergies indicates:  No Known Allergies    Medications:   In-Hospital Scheduled Medications:   [START ON 6/12/2025] collagenase   Topical (Top) Daily     heparin (porcine)  5,000 Units Subcutaneous Q8H    meropenem IV (PEDS and ADULTS)  1 g Intravenous Q8H    mupirocin   Nasal BID    vancomycin (VANCOCIN) IV (PEDS and ADULTS)  15 mg/kg Intravenous Q12H      In-Hospital PRN Medications:    Current Facility-Administered Medications:     acetaminophen, 650 mg, Oral, Q4H PRN    dextrose 50%, 12.5 g, Intravenous, PRN    dextrose 50%, 25 g, Intravenous, PRN    glucagon (human recombinant), 1 mg, Intramuscular, PRN    glucose, 16 g, Oral, PRN    glucose, 24 g, Oral, PRN    morphine, 2 mg, Intravenous, Q4H PRN    naloxone, 0.02 mg, Intravenous, PRN    ondansetron, 4 mg, Intravenous, Q8H PRN    sodium chloride 0.9%, 10 mL, Intravenous, Q8H PRN    Pharmacy to dose Vancomycin consult, , , Once **AND** vancomycin - pharmacy to dose, , Intravenous, pharmacy to manage frequency   In-Hospital IV Infusion Medications:   lactated ringers   Intravenous Continuous 100 mL/hr at 06/11/25 0600 Rate Verify at 06/11/25 0600    NORepinephrine bitartrate-D5W  0-0.2 mcg/kg/min Intravenous Continuous   Held at 06/11/25 0145      Home Medications:  Prior to Admission medications    Medication Sig Start Date End Date Taking? Authorizing Provider   busPIRone (BUSPAR) 5 MG Tab Take 5 mg by mouth 2 (two) times daily. 12/20/23   Provider, Historical   divalproex ER (DEPAKOTE ER) 250 MG 24 hr tablet Take 250 mg by mouth once daily. 12/20/23   Provider, Historical   ferrous sulfate (FEOSOL) 325 mg (65 mg iron) Tab tablet Take 1 tablet (325 mg total) by mouth 2 (two) times daily. 7/3/23   Shelbie He MD   hydrOXYzine (ATARAX) 50 MG tablet Take 1 tablet (50 mg total) by mouth nightly as needed for Itching (insomnia). 7/3/23   Shelbie He MD   ibuprofen (ADVIL,MOTRIN) 800 MG tablet Take 1 tablet (800 mg total) by mouth every 6 (six) hours as needed for Pain. 3/25/23   Rajwinder Alvares MD   naloxone (NARCAN) 4 mg/actuation Spry 4mg by nasal route as needed for opioid  overdose; may repeat every 2-3 minutes in alternating nostrils until medical help arrives. Call 911 24   Nichole Strauss MD   naloxone (NARCAN) 4 mg/actuation Spry 1 spray into the nose as needed for suspected overdose of opioid.  Proceed to Emergency Department immediately after use. 5/15/25   Joe Benavidez MD   OLANZapine (ZYPREXA) 5 MG tablet Take 5 mg by mouth every evening. 23   Provider, Historical   ondansetron (ZOFRAN-ODT) 8 MG TbDL Take 1 tablet (8 mg total) by mouth every 6 (six) hours as needed (nausea). 23   Yamilex Sanderson MD   sertraline (ZOLOFT) 25 MG tablet Take 25 mg by mouth once daily. 23   Provider, Historical       Family History:  No family history on file.    Social History:  Social History[1]    Review of Systems:  Pertinent items are noted in HPI. All other systems are reviewed and are negative.     Objective:   Last 24 Hour Vital Signs:  BP  Min: 81/50  Max: 132/56  Temp  Av.5 °F (36.9 °C)  Min: 97.4 °F (36.3 °C)  Max: 99.8 °F (37.7 °C)  Pulse  Av  Min: 44  Max: 66  Resp  Av.2  Min: 5  Max: 30  SpO2  Av.2 %  Min: 69 %  Max: 100 %  Height  Av' (152.4 cm)  Min: 5' (152.4 cm)  Max: 5' (152.4 cm)  Weight  Av.3 kg (148 lb 4.2 oz)  Min: 67 kg (147 lb 11.3 oz)  Max: 67.5 kg (148 lb 13 oz)  I/O last 3 completed shifts:  In: 2713.7 [I.V.:74; IV Piggyback:2639.7]  Out: 850 [Urine:850]    Physical Examination:  GEN: Patient A&O X 3, well-developed, NAD.  HEENT:  -Head: NC/AT;  -Eyes: No discharge or redness;  -Ears: External ears are normal.  -Nose: Normal nares.  -Mouth and throat: MMM.  CV: RRR, no m/r/g.  LUNGS:Crackles at bilateral bases, no wheezing  ABD: Soft, NT/ND, NBS, no masses or organomegaly.  SKIN: Warm, well perfused. No skin rashes or abnormal lesions.  MSK: Normal gait. No deformities.  EXT: Left arm large necrotic wound dressed in clean and dry dressing, No clubbing, cyanosis, or edema.  NEURO: No focal deficits   "noted      Laboratory:  Trended Lab Data:  Recent Labs     06/10/25  2343 06/11/25  0521   WBC 5.34 4.18   HGB 8.4* 8.2*   HCT 28.5* 28.0*   * 122*   * 140   K 4.9 4.0    114*   CO2 19* 19*   BUN 16 12   CREATININE 1.1 0.9    116*   BILITOT 0.2 0.2   AST 33 25   ALT 17 14   ALKPHOS 68 62   CALCIUM 7.9* 7.5*   ALBUMIN 3.1* 2.7*   PROT 8.2 7.0   MG  --  1.8   PHOS  --  2.5*       Cardiac: No results for input(s): "TROPONINI", "CKTOTAL", "CKMB", "BNP" in the last 168 hours.    Urinalysis:   Lab Results   Component Value Date    LABURIN >100,000 cfu/ml Escherichia coli (A) 05/15/2025    COLORU Yellow 06/11/2025    SPECGRAV 1.015 06/11/2025    NITRITE Positive (A) 06/11/2025    KETONESU Negative 07/17/2024    UROBILINOGEN Negative 06/11/2025       Microbiology:  Microbiology Results (last 7 days)       Procedure Component Value Units Date/Time    Blood culture x two cultures. Draw prior to antibiotics. [[id number]]  (Normal) Collected: 06/10/25 2341    Order Status: Completed Specimen: Blood from Peripheral, Hand, Left Updated: 06/11/25 1002     Blood Culture No Growth After 6 Hours    Blood culture x two cultures. Draw prior to antibiotics. [[id number]]  (Normal) Collected: 06/10/25 2345    Order Status: Completed Specimen: Blood from Peripheral, Antecubital, Right Updated: 06/11/25 1002     Blood Culture No Growth After 6 Hours    Clostridium difficile EIA [[id number]]     Order Status: Sent Specimen: Stool     Urine culture [[id number]] Collected: 06/11/25 0052    Order Status: Sent Specimen: Urine Updated: 06/11/25 0117            Radiology:  CXR shows no acute abnormalities    I have personally reviewed the above labs and imaging.    Current Medications:     Infusions:   lactated ringers   Intravenous Continuous 100 mL/hr at 06/11/25 0600 Rate Verify at 06/11/25 0600    NORepinephrine bitartrate-D5W  0-0.2 mcg/kg/min Intravenous Continuous   Held at 06/11/25 0145        Scheduled:   [START " ON 6/12/2025] collagenase   Topical (Top) Daily    heparin (porcine)  5,000 Units Subcutaneous Q8H    meropenem IV (PEDS and ADULTS)  1 g Intravenous Q8H    mupirocin   Nasal BID    vancomycin (VANCOCIN) IV (PEDS and ADULTS)  15 mg/kg Intravenous Q12H        PRN:    Current Facility-Administered Medications:     acetaminophen, 650 mg, Oral, Q4H PRN    dextrose 50%, 12.5 g, Intravenous, PRN    dextrose 50%, 25 g, Intravenous, PRN    glucagon (human recombinant), 1 mg, Intramuscular, PRN    glucose, 16 g, Oral, PRN    glucose, 24 g, Oral, PRN    morphine, 2 mg, Intravenous, Q4H PRN    naloxone, 0.02 mg, Intravenous, PRN    ondansetron, 4 mg, Intravenous, Q8H PRN    sodium chloride 0.9%, 10 mL, Intravenous, Q8H PRN    Pharmacy to dose Vancomycin consult, , , Once **AND** vancomycin - pharmacy to dose, , Intravenous, pharmacy to manage frequency     Assessment:     Margarita Wiseman is a 50 y.o. 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/30/2024    Encounter for palliative care 06/28/2024    Infective endocarditis of tricuspid valve 06/25/2024    Septic embolism 06/23/2024    Housing instability 06/23/2024    Staphylococcus aureus bacteremia 04/12/2024    PTSD and Depression 04/12/2024    Tobacco abuse 04/12/2024    Chronic hepatitis C virus infection 04/12/2024    Acute cystitis 04/11/2024    Trichomoniasis 04/11/2024    Iron deficiency anemia 07/01/2023    Opioid overdose 06/30/2023    Chronic anemia 06/30/2023    Arm abscess 06/30/2023    Drug abuse, IV 06/30/2023    Abnormal CT of the chest 06/30/2023    Bradycardia 06/30/2023    Second degree heart block 06/30/2023    Cholelithiasis without cholecystitis 06/20/2019    Polysubstance abuse 06/20/2019        Plan:   50 y.o. AA  female with a past medical history of IVDA, polysubstance abuse (cocaine and heroin), endocarditis with tricuspid vegetation, chronic hep C, chronic thrombocytopenia, and chronic L arm wound (currently skin popping drugs, present for several years) . The patient presented to the [facility] on 6/10/2025 and was admitted to the ICU for treatment of severe sepsis secondary to UTI and wound infection.     Neuro:  Patient is alert and awake and oriented x3.  Patient has no focal neurologic deficits at this time  Patient has no neurological issues that are noted    Cardiovascular:   Patient has sinus bradycardia  Patient's blood pressure is stable  Echocardiogram done this admission showing EF of 60-65% with grade 1 diastolic dysfunction    Pulmonary  Patient has no acute pulmonary issues at this time  Patient is saturating well on room air    GI  Patient has no acute GI issues at this time  Advance diet as tolerated  MiraLax and senna p.r.n. for constipation  Recommend obtaining ultrasound of the right upper quadrant    Renal  Patient has normal renal function  Monitor daily BMP  Maintain K >4 and Mag > 2    ID  Patient has chronic hepatitis-C that is untreated  Counseled patient on discontinue IV drug use and seeking treatment for hepatitis-C  Patient also now positive for HIV  Recommend obtaining a CD4 count  UA looks suspicious for infection and patient having urinary symptoms  Left arm wound also looks infected  Continue patient on vancomycin and meropenem  Recommend wound care for left arm wound  Recommend Infectious Disease consult for evaluation of patient's multiple infectious processes    Heme  Patient has a microcytic anemia  Recommend getting a iron panel  No signs or symptoms of acute bleeding at this time  Chronic thrombocytopenia likely due to her hep C infection    Endocrine  Keep blood glucose less than 180     PPX  Recommend Lovenox for DVT prophylaxis      Thank you for allowing us to participate  "in the care of this patient. Please contact me if you have any questions regarding this consult.    Viral Wood DO  [facility] Pulmonary & Critical Care Medicine Fellow          [1]   Social History  Tobacco Use    Smoking status: Every Day     Current packs/day: 1.00     Types: Cigarettes    Smokeless tobacco: Never    Tobacco comments:     Smoking cessation education provided. Pt is a 1 pk/day cigarette smoker x 26 yrs. She states that she cut down to 0.5 pk/day approx. 1 yr ago. Pt declines referral to Ambulatory Smoking Cessation clinic, stating not ready to quit. Handout provided.    Substance Use Topics    Alcohol use: Not Currently    Drug use: Yes     Types: IV, Fentanyl, Heroin, "Crack" cocaine     Comment: heroin, Fentanyl, crack     "

## 2025-06-12 VITALS
OXYGEN SATURATION: 100 % | WEIGHT: 147.69 LBS | SYSTOLIC BLOOD PRESSURE: 111 MMHG | HEART RATE: 41 BPM | BODY MASS INDEX: 28.99 KG/M2 | DIASTOLIC BLOOD PRESSURE: 65 MMHG | RESPIRATION RATE: 18 BRPM | TEMPERATURE: 98 F | HEIGHT: 60 IN

## 2025-06-12 PROBLEM — R78.81 STREPTOCOCCAL BACTEREMIA: Status: ACTIVE | Noted: 2025-06-12

## 2025-06-12 PROBLEM — E83.39 HYPOPHOSPHATEMIA: Status: ACTIVE | Noted: 2025-06-12

## 2025-06-12 PROBLEM — B95.5 STREPTOCOCCAL BACTEREMIA: Status: ACTIVE | Noted: 2025-06-12

## 2025-06-12 PROBLEM — E83.42 HYPOMAGNESEMIA: Status: ACTIVE | Noted: 2025-06-12

## 2025-06-12 LAB
ABSOLUTE EOSINOPHIL (OHS): 0.02 K/UL
ABSOLUTE MONOCYTE (OHS): 0.19 K/UL (ref 0.3–1)
ABSOLUTE NEUTROPHIL COUNT (OHS): 2.16 K/UL (ref 1.8–7.7)
ALBUMIN SERPL BCP-MCNC: 2.3 G/DL (ref 3.5–5.2)
ALP SERPL-CCNC: 48 UNIT/L (ref 40–150)
ALT SERPL W/O P-5'-P-CCNC: 10 UNIT/L (ref 10–44)
ANION GAP (OHS): 10 MMOL/L (ref 8–16)
AST SERPL-CCNC: 17 UNIT/L (ref 11–45)
BACTERIA UR CULT: NORMAL
BASOPHILS # BLD AUTO: 0.01 K/UL
BASOPHILS NFR BLD AUTO: 0.3 %
BILIRUB SERPL-MCNC: 0.2 MG/DL (ref 0.1–1)
BUN SERPL-MCNC: 6 MG/DL (ref 6–20)
CALCIUM SERPL-MCNC: 7.5 MG/DL (ref 8.7–10.5)
CHLORIDE SERPL-SCNC: 114 MMOL/L (ref 95–110)
CO2 SERPL-SCNC: 19 MMOL/L (ref 23–29)
CREAT SERPL-MCNC: 0.6 MG/DL (ref 0.5–1.4)
ERYTHROCYTE [DISTWIDTH] IN BLOOD BY AUTOMATED COUNT: 18.3 % (ref 11.5–14.5)
GFR SERPLBLD CREATININE-BSD FMLA CKD-EPI: >60 ML/MIN/1.73/M2
GLUCOSE SERPL-MCNC: 94 MG/DL (ref 70–110)
HCT VFR BLD AUTO: 26.6 % (ref 37–48.5)
HGB BLD-MCNC: 7.7 GM/DL (ref 12–16)
HIV1 RNA # SERPL NAA+PROBE: ABNORMAL COPIES/ML
HIV1 RNA SERPL NAA+PROBE-LOG#: 6.3 LOG COPIES/ML
HIV1 RNA SERPL NAA+PROBE-LOG#: DETECTED {LOG_COPIES}/ML
IMM GRANULOCYTES # BLD AUTO: 0.02 K/UL (ref 0–0.04)
IMM GRANULOCYTES NFR BLD AUTO: 0.6 % (ref 0–0.5)
LYMPHOCYTES # BLD AUTO: 1.18 K/UL (ref 1–4.8)
MAGNESIUM SERPL-MCNC: 1.6 MG/DL (ref 1.6–2.6)
MCH RBC QN AUTO: 20.9 PG (ref 27–31)
MCHC RBC AUTO-ENTMCNC: 28.9 G/DL (ref 32–36)
MCV RBC AUTO: 72 FL (ref 82–98)
NUCLEATED RBC (/100WBC) (OHS): 0 /100 WBC
OHS QRS DURATION: 78 MS
OHS QTC CALCULATION: 410 MS
PHOSPHATE SERPL-MCNC: 2 MG/DL (ref 2.7–4.5)
PLATELET # BLD AUTO: 134 K/UL (ref 150–450)
PMV BLD AUTO: ABNORMAL FL
POTASSIUM SERPL-SCNC: 3.8 MMOL/L (ref 3.5–5.1)
PROCALCITONIN SERPL-MCNC: 0.12 NG/ML
PROT SERPL-MCNC: 6.1 GM/DL (ref 6–8.4)
RBC # BLD AUTO: 3.69 M/UL (ref 4–5.4)
RELATIVE EOSINOPHIL (OHS): 0.6 %
RELATIVE LYMPHOCYTE (OHS): 33 % (ref 18–48)
RELATIVE MONOCYTE (OHS): 5.3 % (ref 4–15)
RELATIVE NEUTROPHIL (OHS): 60.2 % (ref 38–73)
SODIUM SERPL-SCNC: 143 MMOL/L (ref 136–145)
WBC # BLD AUTO: 3.58 K/UL (ref 3.9–12.7)

## 2025-06-12 PROCEDURE — 84100 ASSAY OF PHOSPHORUS: CPT | Performed by: NURSE PRACTITIONER

## 2025-06-12 PROCEDURE — 83735 ASSAY OF MAGNESIUM: CPT | Performed by: NURSE PRACTITIONER

## 2025-06-12 PROCEDURE — 86361 T CELL ABSOLUTE COUNT: CPT | Performed by: INTERNAL MEDICINE

## 2025-06-12 PROCEDURE — 63600175 PHARM REV CODE 636 W HCPCS: Performed by: HOSPITALIST

## 2025-06-12 PROCEDURE — 85025 COMPLETE CBC W/AUTO DIFF WBC: CPT | Performed by: NURSE PRACTITIONER

## 2025-06-12 PROCEDURE — 84145 PROCALCITONIN (PCT): CPT | Performed by: HOSPITALIST

## 2025-06-12 PROCEDURE — 36415 COLL VENOUS BLD VENIPUNCTURE: CPT | Performed by: HOSPITALIST

## 2025-06-12 PROCEDURE — 80053 COMPREHEN METABOLIC PANEL: CPT | Performed by: NURSE PRACTITIONER

## 2025-06-12 PROCEDURE — 25000003 PHARM REV CODE 250: Performed by: HOSPITALIST

## 2025-06-12 PROCEDURE — 87040 BLOOD CULTURE FOR BACTERIA: CPT | Performed by: HOSPITALIST

## 2025-06-12 PROCEDURE — 63600175 PHARM REV CODE 636 W HCPCS: Performed by: NURSE PRACTITIONER

## 2025-06-12 PROCEDURE — 87536 HIV-1 QUANT&REVRSE TRNSCRPJ: CPT | Performed by: INTERNAL MEDICINE

## 2025-06-12 RX ORDER — ENOXAPARIN SODIUM 100 MG/ML
40 INJECTION SUBCUTANEOUS DAILY
Status: DISCONTINUED | OUTPATIENT
Start: 2025-06-12 | End: 2025-06-12 | Stop reason: HOSPADM

## 2025-06-12 RX ORDER — LANOLIN ALCOHOL/MO/W.PET/CERES
400 CREAM (GRAM) TOPICAL DAILY
Status: DISCONTINUED | OUTPATIENT
Start: 2025-06-12 | End: 2025-06-12 | Stop reason: HOSPADM

## 2025-06-12 RX ORDER — SODIUM,POTASSIUM PHOSPHATES 280-250MG
1 POWDER IN PACKET (EA) ORAL
Status: COMPLETED | OUTPATIENT
Start: 2025-06-12 | End: 2025-06-12

## 2025-06-12 RX ADMIN — MUPIROCIN: 20 OINTMENT TOPICAL at 08:06

## 2025-06-12 RX ADMIN — VANCOMYCIN HYDROCHLORIDE 1000 MG: 1 INJECTION, POWDER, LYOPHILIZED, FOR SOLUTION INTRAVENOUS at 01:06

## 2025-06-12 RX ADMIN — COLLAGENASE SANTYL: 250 OINTMENT TOPICAL at 08:06

## 2025-06-12 RX ADMIN — POTASSIUM & SODIUM PHOSPHATES POWDER PACK 280-160-250 MG 1 PACKET: 280-160-250 PACK at 10:06

## 2025-06-12 RX ADMIN — MEROPENEM 1 G: 1 INJECTION, POWDER, FOR SOLUTION INTRAVENOUS at 06:06

## 2025-06-12 RX ADMIN — Medication 400 MG: at 08:06

## 2025-06-12 RX ADMIN — SODIUM CHLORIDE, POTASSIUM CHLORIDE, SODIUM LACTATE AND CALCIUM CHLORIDE: 600; 310; 30; 20 INJECTION, SOLUTION INTRAVENOUS at 02:06

## 2025-06-12 RX ADMIN — MORPHINE SULFATE 2 MG: 2 INJECTION, SOLUTION INTRAMUSCULAR; INTRAVENOUS at 06:06

## 2025-06-12 RX ADMIN — MORPHINE SULFATE 2 MG: 2 INJECTION, SOLUTION INTRAMUSCULAR; INTRAVENOUS at 10:06

## 2025-06-12 RX ADMIN — POTASSIUM & SODIUM PHOSPHATES POWDER PACK 280-160-250 MG 1 PACKET: 280-160-250 PACK at 08:06

## 2025-06-12 RX ADMIN — HEPARIN SODIUM 5000 UNITS: 5000 INJECTION INTRAVENOUS; SUBCUTANEOUS at 06:06

## 2025-06-12 NOTE — EICU
Intervention Initiated From:  [facility] / [facility]    Aaron intervened regarding:  Rounding (Video assessment)VICU Night Rounds Checklist  24H Vital Sign Range:  Temp:  [97.4 °F (36.3 °C)-99.8 °F (37.7 °C)]   Pulse:  [42-66]   Resp:  [5-42]   BP: ()/(48-66)   SpO2:  [69 %-100 %]     Video rounds

## 2025-06-12 NOTE — ASSESSMENT & PLAN NOTE
Patient has Abnormal Magnesium: hypomagnesemia. Will continue to monitor electrolytes closely. Will replace the affected electrolytes and repeat labs to be done after interventions completed. The patient's magnesium results have been reviewed and are listed below.  Recent Labs   Lab 06/12/25  0443   MG 1.6

## 2025-06-12 NOTE — SUBJECTIVE & OBJECTIVE
Interval History:   States she is feeling better currently.  Discussed blood cultures positive and treatment.  Counseled on severe adverse health consequences of IVDA.  Discussed with Dr. Payne.  Discussed with PCC.      Review of Systems   Constitutional:  Negative for diaphoresis and fever.   Respiratory:  Negative for cough and shortness of breath.    Cardiovascular:  Negative for chest pain and palpitations.   Gastrointestinal:  Negative for abdominal pain and nausea.   Musculoskeletal:  Negative for gait problem.   Skin:  Positive for wound.   Neurological:  Negative for headaches.   Psychiatric/Behavioral:  Negative for agitation and confusion.      Objective:     Vital Signs (Most Recent):  Temp: 97.7 °F (36.5 °C) (06/12/25 0900)  Pulse: (!) 41 (06/12/25 0945)  Resp: 18 (06/12/25 1013)  BP: 111/65 (06/12/25 0945)  SpO2: 100 % (06/12/25 0945) Vital Signs (24h Range):  Temp:  [97.7 °F (36.5 °C)-98.4 °F (36.9 °C)] 97.7 °F (36.5 °C)  Pulse:  [41-56] 41  Resp:  [7-42] 18  SpO2:  [91 %-100 %] 100 %  BP: ()/(50-66) 111/65     Weight: 67 kg (147 lb 11.3 oz)  Body mass index is 28.85 kg/m².    Intake/Output Summary (Last 24 hours) at 6/12/2025 1445  Last data filed at 6/12/2025 0900  Gross per 24 hour   Intake 2935.97 ml   Output 300 ml   Net 2635.97 ml         Physical Exam  Constitutional:       General: She is not in acute distress.     Appearance: Normal appearance. She is well-developed and normal weight.   HENT:      Head: Normocephalic and atraumatic.   Eyes:      Conjunctiva/sclera: Conjunctivae normal.   Cardiovascular:      Rate and Rhythm: Regular rhythm. Bradycardia present.      Pulses: Normal pulses.      Heart sounds: Murmur heard.      Comments: 2/6  Pulmonary:      Effort: Pulmonary effort is normal.      Breath sounds: Normal breath sounds.   Abdominal:      General: Bowel sounds are normal.      Palpations: Abdomen is soft.      Tenderness: There is no abdominal tenderness.   Musculoskeletal:  "     Right lower leg: No edema.      Left lower leg: No edema.   Skin:     General: Skin is warm and dry.      Comments: Left forearm  with ulcerated wound approx 3 cm X 4 cm.     Neurological:      Mental Status: She is alert and oriented to person, place, and time.               Significant Labs: All pertinent labs within the past 24 hours have been reviewed.  Blood Culture: No results for input(s): "LABBLOO" in the last 48 hours.  CBC:   Recent Labs   Lab 06/10/25  2343 06/11/25  0521 06/12/25  0443   WBC 5.34 4.18 3.58*   HGB 8.4* 8.2* 7.7*   HCT 28.5* 28.0* 26.6*   * 122* 134*     CMP:   Recent Labs   Lab 06/10/25  2343 06/11/25  0521 06/12/25  0443   * 140 143   K 4.9 4.0 3.8    114* 114*   CO2 19* 19* 19*    116* 94   BUN 16 12 6   CREATININE 1.1 0.9 0.6   CALCIUM 7.9* 7.5* 7.5*   PROT 8.2 7.0 6.1   ALBUMIN 3.1* 2.7* 2.3*   BILITOT 0.2 0.2 0.2   ALKPHOS 68 62 48   AST 33 25 17   ALT 17 14 10   ANIONGAP 11 7* 10     Lactic Acid:   Recent Labs   Lab 06/11/25  0521   LACTATE 0.7     Magnesium:   Recent Labs   Lab 06/11/25 0521 06/12/25 0443   MG 1.8 1.6     TSH:   Recent Labs   Lab 06/11/25  0521   TSH 1.533     Urine Culture: No results for input(s): "LABURIN" in the last 48 hours.  Urine Studies:   Recent Labs   Lab 06/11/25  0052   COLORU Yellow   APPEARANCEUA Hazy*   PHUR 6.0   SPECGRAV 1.015   PROTEINUA Trace*   GLUCUA Negative   BILIRUBINUA Negative   OCCULTUA 1+*   NITRITE Positive*   UROBILINOGEN Negative   LEUKOCYTESUR 3+*   RBCUA 27*   WBCUA 13*   BACTERIA Many*       Significant Imaging: I have reviewed all pertinent imaging results/findings within the past 24 hours.    Imaging Results              X-Ray Chest AP Portable (Final result)  Result time 06/11/25 00:47:45      Final result by Lorena Scales MD (06/11/25 00:47:45)                   Impression:      No acute cardiopulmonary process identified.      Electronically signed by: Lorena Scales, " MD  Date:    06/11/2025  Time:    00:47               Narrative:    EXAMINATION:  XR CHEST AP PORTABLE    CLINICAL HISTORY:  Sepsis;    TECHNIQUE:  Single frontal view of the chest was performed.    COMPARISON:  05/15/2025.    FINDINGS:  Cardiac silhouette is normal in size.  Lungs are symmetrically expanded.  No evidence of focal consolidative process, pneumothorax, or significant pleural effusion.  No acute osseous abnormality identified.

## 2025-06-12 NOTE — SUBJECTIVE & OBJECTIVE
Interval History:   States she is feeling better currently.  Discussed blood cultures positive and treatment.  Counseled on severe adverse health consequences of IVDA.  Discussed with Dr. Payne.      Review of Systems   Constitutional:  Negative for diaphoresis and fever.   Respiratory:  Negative for cough and shortness of breath.    Cardiovascular:  Negative for chest pain and palpitations.   Gastrointestinal:  Negative for abdominal pain and nausea.   Musculoskeletal:  Negative for gait problem.   Skin:  Positive for wound.   Neurological:  Negative for headaches.   Psychiatric/Behavioral:  Negative for agitation and confusion.      Objective:     Vital Signs (Most Recent):  Temp: 98.1 °F (36.7 °C) (06/12/25 0315)  Pulse: (!) 44 (06/12/25 0604)  Resp: 17 (06/12/25 0604)  BP: (!) 102/51 (06/12/25 0604)  SpO2: 99 % (06/12/25 0604) Vital Signs (24h Range):  Temp:  [97.4 °F (36.3 °C)-98.4 °F (36.9 °C)] 98.1 °F (36.7 °C)  Pulse:  [41-58] 44  Resp:  [9-42] 17  SpO2:  [69 %-100 %] 99 %  BP: ()/(50-66) 102/51     Weight: 67 kg (147 lb 11.3 oz)  Body mass index is 28.85 kg/m².    Intake/Output Summary (Last 24 hours) at 6/12/2025 0735  Last data filed at 6/11/2025 1121  Gross per 24 hour   Intake --   Output 400 ml   Net -400 ml         Physical Exam  Constitutional:       General: She is not in acute distress.     Appearance: Normal appearance. She is well-developed and normal weight.   HENT:      Head: Normocephalic and atraumatic.   Eyes:      Conjunctiva/sclera: Conjunctivae normal.   Cardiovascular:      Rate and Rhythm: Regular rhythm. Bradycardia present.      Pulses: Normal pulses.      Heart sounds: Murmur heard.      Comments: 2/6  Pulmonary:      Effort: Pulmonary effort is normal.      Breath sounds: Normal breath sounds.   Abdominal:      General: Bowel sounds are normal.      Palpations: Abdomen is soft.      Tenderness: There is no abdominal tenderness.   Musculoskeletal:      Right lower leg: No  "edema.      Left lower leg: No edema.   Skin:     General: Skin is warm and dry.      Comments: Left forearm  with ulcerated wound approx 3 cm X 4 cm.     Neurological:      Mental Status: She is alert and oriented to person, place, and time.               Significant Labs: All pertinent labs within the past 24 hours have been reviewed.  Blood Culture: No results for input(s): "LABBLOO" in the last 48 hours.  CBC:   Recent Labs   Lab 06/10/25  2343 06/11/25 0521 06/12/25  0443   WBC 5.34 4.18 3.58*   HGB 8.4* 8.2* 7.7*   HCT 28.5* 28.0* 26.6*   * 122* 134*     CMP:   Recent Labs   Lab 06/10/25  2343 06/11/25  0521 06/12/25  0443   * 140 143   K 4.9 4.0 3.8    114* 114*   CO2 19* 19* 19*    116* 94   BUN 16 12 6   CREATININE 1.1 0.9 0.6   CALCIUM 7.9* 7.5* 7.5*   PROT 8.2 7.0 6.1   ALBUMIN 3.1* 2.7* 2.3*   BILITOT 0.2 0.2 0.2   ALKPHOS 68 62 48   AST 33 25 17   ALT 17 14 10   ANIONGAP 11 7* 10     Lactic Acid:   Recent Labs   Lab 06/11/25  0521   LACTATE 0.7     Magnesium:   Recent Labs   Lab 06/11/25 0521 06/12/25 0443   MG 1.8 1.6     TSH:   Recent Labs   Lab 06/11/25  0521   TSH 1.533     Urine Culture: No results for input(s): "LABURIN" in the last 48 hours.  Urine Studies:   Recent Labs   Lab 06/11/25  0052   COLORU Yellow   APPEARANCEUA Hazy*   PHUR 6.0   SPECGRAV 1.015   PROTEINUA Trace*   GLUCUA Negative   BILIRUBINUA Negative   OCCULTUA 1+*   NITRITE Positive*   UROBILINOGEN Negative   LEUKOCYTESUR 3+*   RBCUA 27*   WBCUA 13*   BACTERIA Many*       Significant Imaging: I have reviewed all pertinent imaging results/findings within the past 24 hours.    Imaging Results              X-Ray Chest AP Portable (Final result)  Result time 06/11/25 00:47:45      Final result by Lorena Scales MD (06/11/25 00:47:45)                   Impression:      No acute cardiopulmonary process identified.      Electronically signed by: Lorena Scales MD  Date:    06/11/2025  Time:    00:47       "         Narrative:    EXAMINATION:  XR CHEST AP PORTABLE    CLINICAL HISTORY:  Sepsis;    TECHNIQUE:  Single frontal view of the chest was performed.    COMPARISON:  05/15/2025.    FINDINGS:  Cardiac silhouette is normal in size.  Lungs are symmetrically expanded.  No evidence of focal consolidative process, pneumothorax, or significant pleural effusion.  No acute osseous abnormality identified.

## 2025-06-12 NOTE — ASSESSMENT & PLAN NOTE
Patient's most recent phosphorus results are listed below.   Recent Labs     06/11/25  0521 06/12/25  0443   PHOS 2.5* 2.0*     Plan  - Will treat hypophosphatemia with replacement.  - Patient's hypophosphatemia is stable  - Follow labs.

## 2025-06-12 NOTE — PROGRESS NOTES
[facility] Intensive [facility] Medicine  Progress Note    Patient Name: Margarita Wiseman  MRN: [id number]  Patient Class: IP- Inpatient   Admission Date: 6/10/2025  Length of Stay: 1 days  Attending Physician: Doris att. providers found  Primary Care Provider: Doris, Primary Doctor        Subjective     Principal Problem:UTI (urinary tract infection)        HPI:  The patient is a 50 y.o. female with a past medical history of IVDA, polysubstance abuse (cocaine and heroin), endocarditis with tricuspid vegetation, chronic hep C, chronic thrombocytopenia, and chronic L arm wound (currently skin popping drugs, present for several years) who presents today for generalized malaise. Was found to be hypotensive with systolic in the 80s. She reports nausea, vomiting and diarrhea. Generalized abdominal pain. States that her last opiate use was administered IV into her left upper extremity where she has a chronic wound. On initial workup, the patient has a positive U/A for bacteria, nitrites, and leukocytes. Reports no urinary symptoms.  Patient remains hypotensive in ER.  SHe will be admitted for further management of her likely developing sepsis and continued treatment of her endocarditis.    Overview/Hospital Course:  HIV Ab positive, supplemental assay non-reactive, RNA and CD4 pending.  Hepatitis C Ab positive.   Wound  care consulted for left forearm ulcer (states from IVDA).  On empiric antibiotic for UTI, urine culture pending.  Empiric antibiotic for history of endocarditis, states did not  complete previous treatment.  ECHO EF 60-65%, no mention vegetation.  BP controlled off pressor, has bradycardia on telemetry, EKG sinus celio otherwise normal.  Pulm Crit Care consulted.   ID consulted.   Blood culture noting Strep, Staph aureus, pending. 6/12.  Repeat blood culture pending.  Left FA ulcer XRAY noted possible FB, ortho consulted for eval.  Cardiology consulted for ARMAND.  To telemetry.     6/12.  Informed that  patient wanted to leave hospital against medical advice.  Discussed with patient positive blood cultures growing Streptococcus and Staphylococcus aureus, emphasized that her infection is not likely cleared, and she should remain in the hospital for treatment.  Discussed that if untreated, infection will likely come back and could do significant damage to her heart, lead to epidural abscess and paralysis, as well as death.  Patient denied any symptoms of withdrawal, and stated she understood the consequences but wanted to leave anyway.  Discussed with patient if feeling bad to return to hospital and she expressed understanding.    Patient left hospital against medical advice (AMA).    Interval History:   States she is feeling better currently.  Discussed blood cultures positive and treatment.  Counseled on severe adverse health consequences of IVDA.  Discussed with Dr. Payne.  Discussed with PCC.      Review of Systems   Constitutional:  Negative for diaphoresis and fever.   Respiratory:  Negative for cough and shortness of breath.    Cardiovascular:  Negative for chest pain and palpitations.   Gastrointestinal:  Negative for abdominal pain and nausea.   Musculoskeletal:  Negative for gait problem.   Skin:  Positive for wound.   Neurological:  Negative for headaches.   Psychiatric/Behavioral:  Negative for agitation and confusion.      Objective:     Vital Signs (Most Recent):  Temp: 97.7 °F (36.5 °C) (06/12/25 0900)  Pulse: (!) 41 (06/12/25 0945)  Resp: 18 (06/12/25 1013)  BP: 111/65 (06/12/25 0945)  SpO2: 100 % (06/12/25 0945) Vital Signs (24h Range):  Temp:  [97.7 °F (36.5 °C)-98.4 °F (36.9 °C)] 97.7 °F (36.5 °C)  Pulse:  [41-56] 41  Resp:  [7-42] 18  SpO2:  [91 %-100 %] 100 %  BP: ()/(50-66) 111/65     Weight: 67 kg (147 lb 11.3 oz)  Body mass index is 28.85 kg/m².    Intake/Output Summary (Last 24 hours) at 6/12/2025 1445  Last data filed at 6/12/2025 0900  Gross per 24 hour   Intake 2935.97 ml   Output  "300 ml   Net 2635.97 ml         Physical Exam  Constitutional:       General: She is not in acute distress.     Appearance: Normal appearance. She is well-developed and normal weight.   HENT:      Head: Normocephalic and atraumatic.   Eyes:      Conjunctiva/sclera: Conjunctivae normal.   Cardiovascular:      Rate and Rhythm: Regular rhythm. Bradycardia present.      Pulses: Normal pulses.      Heart sounds: Murmur heard.      Comments: 2/6  Pulmonary:      Effort: Pulmonary effort is normal.      Breath sounds: Normal breath sounds.   Abdominal:      General: Bowel sounds are normal.      Palpations: Abdomen is soft.      Tenderness: There is no abdominal tenderness.   Musculoskeletal:      Right lower leg: No edema.      Left lower leg: No edema.   Skin:     General: Skin is warm and dry.      Comments: Left forearm  with ulcerated wound approx 3 cm X 4 cm.     Neurological:      Mental Status: She is alert and oriented to person, place, and time.               Significant Labs: All pertinent labs within the past 24 hours have been reviewed.  Blood Culture: No results for input(s): "LABBLOO" in the last 48 hours.  CBC:   Recent Labs   Lab 06/10/25  2343 06/11/25  0521 06/12/25  0443   WBC 5.34 4.18 3.58*   HGB 8.4* 8.2* 7.7*   HCT 28.5* 28.0* 26.6*   * 122* 134*     CMP:   Recent Labs   Lab 06/10/25  2343 06/11/25  0521 06/12/25  0443   * 140 143   K 4.9 4.0 3.8    114* 114*   CO2 19* 19* 19*    116* 94   BUN 16 12 6   CREATININE 1.1 0.9 0.6   CALCIUM 7.9* 7.5* 7.5*   PROT 8.2 7.0 6.1   ALBUMIN 3.1* 2.7* 2.3*   BILITOT 0.2 0.2 0.2   ALKPHOS 68 62 48   AST 33 25 17   ALT 17 14 10   ANIONGAP 11 7* 10     Lactic Acid:   Recent Labs   Lab 06/11/25  0521   LACTATE 0.7     Magnesium:   Recent Labs   Lab 06/11/25 0521 06/12/25  0443   MG 1.8 1.6     TSH:   Recent Labs   Lab 06/11/25  0521   TSH 1.533     Urine Culture: No results for input(s): "LABURIN" in the last 48 hours.  Urine Studies: "   Recent Labs   Lab 06/11/25  0052   COLORU Yellow   APPEARANCEUA Hazy*   PHUR 6.0   SPECGRAV 1.015   PROTEINUA Trace*   GLUCUA Negative   BILIRUBINUA Negative   OCCULTUA 1+*   NITRITE Positive*   UROBILINOGEN Negative   LEUKOCYTESUR 3+*   RBCUA 27*   WBCUA 13*   BACTERIA Many*       Significant Imaging: I have reviewed all pertinent imaging results/findings within the past 24 hours.    Imaging Results              X-Ray Chest AP Portable (Final result)  Result time 06/11/25 00:47:45      Final result by Lorena Scales MD (06/11/25 00:47:45)                   Impression:      No acute cardiopulmonary process identified.      Electronically signed by: Lorena Scales MD  Date:    06/11/2025  Time:    00:47               Narrative:    EXAMINATION:  XR CHEST AP PORTABLE    CLINICAL HISTORY:  Sepsis;    TECHNIQUE:  Single frontal view of the chest was performed.    COMPARISON:  05/15/2025.    FINDINGS:  Cardiac silhouette is normal in size.  Lungs are symmetrically expanded.  No evidence of focal consolidative process, pneumothorax, or significant pleural effusion.  No acute osseous abnormality identified.                                           Assessment & Plan  UTI (urinary tract infection)  Patient with positive U/A for nitrites, leukocytes, bacteria.  Has history of ESBL Klebsiella UTI.     Culture pending  Meropenem/Vanc  IVF  - ID consult.   - Crit care consult.   Drug abuse, IV  Patient reports drug use today. Positive for amphetamines and cocaine. Reports heroin use.  Discussed cessation with patient   - Emphasized severe health potential consequences including endocarditis, risk for spinal abscess.    Chronic hepatitis C virus infection  Noted. Mildly thrombocytopenia. Patient not aware of completing treatment.  Hep C Ab positive.   Infective endocarditis of tricuspid valve  Echocardiogram with evidence of tricuspid regurgitation that is moderate . The patient's most recent echocardiogram result is listed  below.    Echo  Result Date: 6/11/2025    Left Ventricle: The left ventricle is normal in size. Normal wall   thickness. There is normal systolic function with a visually estimated   ejection fraction of 60 - 65%.  measuring -21.3% Grade I diastolic   dysfunction.    Right Ventricle: The right ventricle is normal in size Wall thickness   is normal. Systolic function is normal.    Left Atrium: The left atrium is mildly dilated    Tricuspid Valve: There is mild regurgitation.        Echo  Result Date: 6/25/2024    Left Ventricle: The left ventricle is normal in size. Ventricular mass   is normal. Normal wall thickness. There is normal systolic function.   Ejection fraction by visual approximation is 65%. Global longitudinal   strain is -19.0%. Global longitudinal strain is normal. There is normal   diastolic function. Normal left ventricular filling pressure. Tissue   Doppler velocity is normal.    Right Ventricle: Normal right ventricular cavity size. Wall thickness   is normal. Systolic function is normal.    Tricuspid Valve: There is a medium mobile echogenic pedunculated mass   present on the atrial side of the septal leaflet consistent with   vegetation. There is mild regurgitation with a centrally directed jet.    IVC/SVC: Normal venous pressure at 3 mmHg.    Pericardium: There is a trivial effusion. No indication of cardiac   tamponade.      - Given h/o ESBL UTI, hold Cefazolin as prescribed on prior AMA visit pending ID evaluation.  - Continue Vanc and Meropenem given history of ESBL UTI.   - ID consult.   - Cardiology consulted for ARMAND per ID rec.    Nausea vomiting and diarrhea  Patient hypotensive. Likely due to fluid losses.    IVF  C diff pending  Zofran PRN    Bradycardia  - Sinus celio on tele.  EKG sinus bradycardia, otherwise normal.   - Monitor.     Polysubstance abuse  Counseled to cease.      Tobacco abuse  Counseled to cease.  States does not need a nicotine patch.      Arm ulcer, with unspecified  severity  - Left forearm wound approx 3 cm X 4 cm.   - XRAY LEFT FA may be a few tiny high-density foci at the level of the defect which could represent calcifications or potentially radiopaque retained foreign body.    - Wound care consult.   - Ortho eval.     HIV antibody positive  - 6/10 HIV Ab pos.  Hep C Ab positive.     - ID noted:  HIV antibody positive  - supplemental assay is NR  - given her pre-test probability, I will check her RNA and CD4 count    Hypophosphatemia  Patient's most recent phosphorus results are listed below.   Recent Labs     06/11/25  0521 06/12/25  0443   PHOS 2.5* 2.0*     Plan  - Will treat hypophosphatemia with replacement.  - Patient's hypophosphatemia is stable  - Follow labs.   Hypomagnesemia  Patient has Abnormal Magnesium: hypomagnesemia. Will continue to monitor electrolytes closely. Will replace the affected electrolytes and repeat labs to be done after interventions completed. The patient's magnesium results have been reviewed and are listed below.  Recent Labs   Lab 06/12/25  0443   MG 1.6      Gram-positive cocci bacteremia  - Blood culture growing Strep, Staph aureus pending.   - Repeat Blood Culture 6/12 pending.       VTE Risk Mitigation (From admission, onward)           Ordered     enoxaparin injection 40 mg  Daily         06/12/25 0649     IP VTE LOW RISK PATIENT  Once         06/11/25 0258     Place sequential compression device  Until discontinued         06/11/25 0258                    Discharge Planning   ABELARDO: 6/14/2025     Code Status: Full Code   Medical Readiness for Discharge Date:   Discharge Plan A: Home with family                        Ricki Watt MD  Department of Hospital Medicine   [facility] Intensive Care ([facility]

## 2025-06-12 NOTE — DISCHARGE SUMMARY
[facility] Intensive [facility] Medicine  Discharge Summary      PATIENT LEFT AGAINST MEDICAL ADVICE    Patient Name: Margarita Wiseman  MRN: [id number]  PALLAVI: [id number]  Patient Class: IP- Inpatient  Admission Date: 6/10/2025  Hospital Length of Stay: 1 days  Discharge Date and Time: 06/12/2025 2:51 PM  Attending Physician: Doris att. providers found   Discharging Provider: Ricki Watt MD  Primary Care Provider: Doris, Primary Doctor    Primary Care Team: Networked reference to record PCT     HPI:   The patient is a 50 y.o. female with a past medical history of IVDA, polysubstance abuse (cocaine and heroin), endocarditis with tricuspid vegetation, chronic hep C, chronic thrombocytopenia, and chronic L arm wound (currently skin popping drugs, present for several years) who presents today for generalized malaise. Was found to be hypotensive with systolic in the 80s. She reports nausea, vomiting and diarrhea. Generalized abdominal pain. States that her last opiate use was administered IV into her left upper extremity where she has a chronic wound. On initial workup, the patient has a positive U/A for bacteria, nitrites, and leukocytes. Reports no urinary symptoms.  Patient remains hypotensive in ER.  SHe will be admitted for further management of her likely developing sepsis and continued treatment of her endocarditis.    * No surgery found *      Hospital Course:   HIV Ab positive, supplemental assay non-reactive, RNA and CD4 pending.  Hepatitis C Ab positive.   Wound  care consulted for left forearm ulcer (states from IVDA).  On empiric antibiotic for UTI, urine culture pending.  Empiric antibiotic for history of endocarditis, states did not  complete previous treatment.  ECHO EF 60-65%, no mention vegetation.  BP controlled off pressor, has bradycardia on telemetry, EKG sinus celio otherwise normal.  Pulm Crit Care consulted.   ID consulted.   Blood culture noting Strep, Staph aureus, pending. 6/12.  Repeat  blood culture pending.  Left FA ulcer XRAY noted possible FB, ortho consulted for eval, discussed with patient..  Cardiology consulted for ARMAND.  To telemetry.     6/12.  Informed by RN that patient wanted to leave hospital against medical advice.  Discussed with patient positive blood cultures growing Streptococcus and Staphylococcus aureus, emphasized that her infection is not likely cleared, and she should remain in the hospital for treatment.  Discussed that if untreated, infection will likely come back and could do significant damage to her heart, lead to epidural abscess and paralysis, as well as death.  Patient denied any symptoms of withdrawal, and stated she understood the consequences but wanted to leave anyway.  Discussed with patient if feeling bad to return to hospital and she expressed understanding.    Patient left hospital against medical advice (AMA).     Goals of Care Treatment Preferences:  Code Status: Full Code          What is most important right now is to focus on symptom/pain control, curative/life-prolongation (regardless of treatment burdens).  Accordingly, we have decided that the best plan to meet the patient's goals includes continuing with treatment.      SDOH Screening:  The patient was screened for food insecurity, housing instability, transportation needs, utility difficulties, and interpersonal safety. The social determinant(s) of health identified as a concern this admission are:  Housing instability  Food insecurity  Transportation difficulties      Social Drivers of Health with Concerns     Food Insecurity: Food Insecurity Present (6/11/2025)   Housing Stability: High Risk (6/11/2025)   Transportation Needs: Unmet Transportation Needs (6/11/2025)        Consults:   Consults (From admission, onward)          Status Ordering Provider     Inpatient consult to Cardiology  Once        Provider:  Gary Gonzalez MD    Acknowledged ERICA OROZCO     Inpatient consult to Orthopedic  "Surgery  Once        Provider:  (Not yet assigned)    Acknowledged ERICA OROZCO     Inpatient consult to Infectious Diseases  Once        Provider:  Jean Carlos Payne MD    Completed ERICA OROZCO     Inpatient consult to Pulmonary Critical Care  Once        Provider:  Vineet Perry MD    Acknowledged ERICA OROZCO     Inpatient consult to PICC team (hospitals)  Once        Provider:  (Not yet assigned)    Completed CALE HONEYCUTT     Pharmacy to dose Vancomycin consult  Once        Provider:  (Not yet assigned)   Placed in "And" Linked Group    Acknowledged CALE HONEYCUTT            Assessment & Plan  UTI (urinary tract infection)  Patient with positive U/A for nitrites, leukocytes, bacteria.  Has history of ESBL Klebsiella UTI.     Culture pending  Meropenem/Vanc  IVF  - ID consult.   - Crit care consult.   Drug abuse, IV  Patient reports drug use today. Positive for amphetamines and cocaine. Reports heroin use.  Discussed cessation with patient   - Emphasized severe health potential consequences including endocarditis, risk for spinal abscess.    Chronic hepatitis C virus infection  Noted. Mildly thrombocytopenia. Patient not aware of completing treatment.  Hep C Ab positive.   Infective endocarditis of tricuspid valve  Echocardiogram with evidence of tricuspid regurgitation that is moderate . The patient's most recent echocardiogram result is listed below.    Echo  Result Date: 6/11/2025    Left Ventricle: The left ventricle is normal in size. Normal wall   thickness. There is normal systolic function with a visually estimated   ejection fraction of 60 - 65%.  measuring -21.3% Grade I diastolic   dysfunction.    Right Ventricle: The right ventricle is normal in size Wall thickness   is normal. Systolic function is normal.    Left Atrium: The left atrium is mildly dilated    Tricuspid Valve: There is mild regurgitation.        Echo  Result Date: 6/25/2024    Left Ventricle: The left ventricle is " normal in size. Ventricular mass   is normal. Normal wall thickness. There is normal systolic function.   Ejection fraction by visual approximation is 65%. Global longitudinal   strain is -19.0%. Global longitudinal strain is normal. There is normal   diastolic function. Normal left ventricular filling pressure. Tissue   Doppler velocity is normal.    Right Ventricle: Normal right ventricular cavity size. Wall thickness   is normal. Systolic function is normal.    Tricuspid Valve: There is a medium mobile echogenic pedunculated mass   present on the atrial side of the septal leaflet consistent with   vegetation. There is mild regurgitation with a centrally directed jet.    IVC/SVC: Normal venous pressure at 3 mmHg.    Pericardium: There is a trivial effusion. No indication of cardiac   tamponade.      - Given h/o ESBL UTI, hold Cefazolin as prescribed on prior AMA visit pending ID evaluation.  - Continue Vanc and Meropenem given history of ESBL UTI.   - ID consult.   - Cardiology consulted for ARMAND per ID rec.    Nausea vomiting and diarrhea  Patient hypotensive. Likely due to fluid losses.    IVF  C diff pending  Zofran PRN    Bradycardia  - Sinus celio on tele.  EKG sinus bradycardia, otherwise normal.   - Monitor.     Polysubstance abuse  Counseled to cease.      Tobacco abuse  Counseled to cease.  States does not need a nicotine patch.      Arm ulcer, with unspecified severity  - Left forearm wound approx 3 cm X 4 cm.   - XRAY LEFT FA may be a few tiny high-density foci at the level of the defect which could represent calcifications or potentially radiopaque retained foreign body.    - Wound care consult.   - Ortho eval.     HIV antibody positive  - 6/10 HIV Ab pos.  Hep C Ab positive.     - ID noted:  HIV antibody positive  - supplemental assay is NR  - given her pre-test probability, I will check her RNA and CD4 count    Hypophosphatemia  Patient's most recent phosphorus results are listed below.   Recent Labs      06/11/25  0521 06/12/25  0443   PHOS 2.5* 2.0*     Plan  - Will treat hypophosphatemia with replacement.  - Patient's hypophosphatemia is stable  - Follow labs.   Hypomagnesemia  Patient has Abnormal Magnesium: hypomagnesemia. Will continue to monitor electrolytes closely. Will replace the affected electrolytes and repeat labs to be done after interventions completed. The patient's magnesium results have been reviewed and are listed below.  Recent Labs   Lab 06/12/25  0443   MG 1.6      Gram-positive cocci bacteremia  - Blood culture growing Strep, Staph aureus pending.   - Repeat Blood Culture 6/12 pending.     Final Active Diagnoses:    Diagnosis Date Noted POA    PRINCIPAL PROBLEM:  UTI (urinary tract infection) [N39.0] 06/11/2025 Yes    Gram-positive cocci bacteremia [R78.81] 06/12/2025 Yes    Infective endocarditis of tricuspid valve [I33.0] 06/25/2024 Yes    Nausea vomiting and diarrhea [R11.2, R19.7] 06/11/2025 Yes    Bradycardia [R00.1] 06/30/2023 Yes    Tobacco abuse [Z72.0] 04/12/2024 Yes    Polysubstance abuse [F19.10] 06/20/2019 Yes    Arm ulcer, with unspecified severity [L98.499] 06/11/2025 Yes    Chronic hepatitis C virus infection [B18.2] 04/12/2024 Yes    Drug abuse, IV [F19.10] 06/30/2023 Yes    Hypophosphatemia [E83.39] 06/12/2025 Yes    Hypomagnesemia [E83.42] 06/12/2025 Yes    HIV antibody positive [Z21] 06/11/2025 Yes      Problems Resolved During this Admission:       Discharged Condition: against medical advice    Disposition: Left Against Medical Adv*    Follow Up:    Patient Instructions:   No discharge procedures on file.    Significant Diagnostic Studies:   Imaging Results              X-Ray Chest AP Portable (Final result)  Result time 06/11/25 00:47:45      Final result by Lorena Scales MD (06/11/25 00:47:45)                   Impression:      No acute cardiopulmonary process identified.      Electronically signed by: Lorena Scales MD  Date:    06/11/2025  Time:    00:47                Narrative:    EXAMINATION:  XR CHEST AP PORTABLE    CLINICAL HISTORY:  Sepsis;    TECHNIQUE:  Single frontal view of the chest was performed.    COMPARISON:  05/15/2025.    FINDINGS:  Cardiac silhouette is normal in size.  Lungs are symmetrically expanded.  No evidence of focal consolidative process, pneumothorax, or significant pleural effusion.  No acute osseous abnormality identified.                                       Pending Diagnostic Studies:       Procedure Component Value Units Date/Time    CD4 T-[place] Cells [[id number]] Collected: 06/12/25 0443    Order Status: Sent Lab Status: In process Updated: 06/12/25 0450    Specimen: Blood     VANCOMYCIN, TROUGH [[id number]]     Order Status: Sent Lab Status: No result     Specimen: Blood            Medications:  (list reflects prior to admission medications.  Patient left hospital against medical advice AMA.    Reconciled Home Medications:      Medication List        CONTINUE taking these medications      busPIRone 5 MG Tab  Commonly known as: BUSPAR  Take 5 mg by mouth 2 (two) times daily.     divalproex  MG 24 hr tablet  Commonly known as: DEPAKOTE ER  Take 250 mg by mouth once daily.     FeroSuL 325 mg (65 mg iron) Tab tablet  Generic drug: ferrous sulfate  Take 1 tablet (325 mg total) by mouth 2 (two) times daily.     hydrOXYzine 50 MG tablet  Commonly known as: ATARAX  Take 1 tablet (50 mg total) by mouth nightly as needed for Itching (insomnia).     ibuprofen 800 MG tablet  Commonly known as: ADVIL,MOTRIN  Take 1 tablet (800 mg total) by mouth every 6 (six) hours as needed for Pain.     * naloxone 4 mg/actuation Spry  Commonly known as: NARCAN  4mg by nasal route as needed for opioid overdose; may repeat every 2-3 minutes in alternating nostrils until medical help arrives. Call 911     * naloxone 4 mg/actuation Spry  Commonly known as: NARCAN  1 spray into the nose as needed for suspected overdose of opioid.  Proceed to Emergency Department  immediately after use.     OLANZapine 5 MG tablet  Commonly known as: ZyPREXA  Take 5 mg by mouth every evening.     ondansetron 8 MG Tbdl  Commonly known as: ZOFRAN-ODT  Take 1 tablet (8 mg total) by mouth every 6 (six) hours as needed (nausea).     sertraline 25 MG tablet  Commonly known as: ZOLOFT  Take 25 mg by mouth once daily.           * This list has 2 medication(s) that are the same as other medications prescribed for you. Read the directions carefully, and ask your doctor or other care provider to review them with you.                  Indwelling Lines/Drains at time of discharge:   Lines/Drains/Airways       None                   Time spent on the discharge of patient: 25 minutes         Ricki Watt MD  Department of Hospital Medicine  [facility], [place], operated by [facility] - [facility])

## 2025-06-12 NOTE — NURSING
Pt has poor vascular. Multiple attempts to collect second set of blood cultures. Collection form PICC line okayed by Ricki Rosario.    1040: Spoke with MD Capps, Orthopedic Surgery consult, regarding the patient.      1200: Patient wants to go AMA. Risks discussed with the patient. MD Watt contacted.    1206: MD Watt bedside, dicussed risk. Patient still ready to leave.    1220: PICC and IV removed. Security walking her down.

## 2025-06-12 NOTE — PLAN OF CARE
Problem: Adult Inpatient Plan of Care  Goal: Plan of Care Review  Outcome: Progressing  Goal: Patient-Specific Goal (Individualized)  Outcome: Progressing  Goal: Optimal Comfort and Wellbeing  Outcome: Progressing  Goal: Readiness for Transition of Care  Outcome: Progressing     Problem: Wound  Goal: Optimal Pain Control and Function  Outcome: Progressing  Goal: Optimal Wound Healing  Outcome: Progressing

## 2025-06-12 NOTE — PROGRESS NOTES
[facility] Intensive [facility] Medicine  Progress Note    Patient Name: Margarita Wiseman  MRN: [id number]  Patient Class: IP- Inpatient   Admission Date: 6/10/2025  Length of Stay: 1 days  Attending Physician: Ricki Watt MD  Primary Care Provider: Doris, Primary Doctor        Subjective     Principal Problem:UTI (urinary tract infection)        HPI:  The patient is a 50 y.o. female with a past medical history of IVDA, polysubstance abuse (cocaine and heroin), endocarditis with tricuspid vegetation, chronic hep C, chronic thrombocytopenia, and chronic L arm wound (currently skin popping drugs, present for several years) who presents today for generalized malaise. Was found to be hypotensive with systolic in the 80s. She reports nausea, vomiting and diarrhea. Generalized abdominal pain. States that her last opiate use was administered IV into her left upper extremity where she has a chronic wound. On initial workup, the patient has a positive U/A for bacteria, nitrites, and leukocytes. Reports no urinary symptoms.  Patient remains hypotensive in ER.  SHe will be admitted for further management of her likely developing sepsis and continued treatment of her endocarditis.    Overview/Hospital Course:  HIV Ab positive, supplemental assay non-reactive, RNA and CD4 pending.  Hepatitis C Ab positive.   Wound  care consulted for left forearm ulcer (states from IVDA).  On empiric antibiotic for UTI, urine culture pending.  Empiric antibiotic for history of endocarditis, states did not  complete previous treatment.  ECHO EF 60-65%, no mention vegetation.  BP controlled off pressor, has bradycardia on telemetry, EKG sinus celio otherwise normal.  Pulm Crit Care consulted.   ID consulted.   Blood culture noting Strep, Staph aureus, pending. 6/12.  Repeat blood culture pending.  Left FA ulcer XRAY noted possible FB, ortho consulted for eval.  Cardiology consulted for ARMAND.      Interval History:   States she is  feeling better currently.  Discussed blood cultures positive and treatment.  Counseled on severe adverse health consequences of IVDA.  Discussed with Dr. Payne.      Review of Systems   Constitutional:  Negative for diaphoresis and fever.   Respiratory:  Negative for cough and shortness of breath.    Cardiovascular:  Negative for chest pain and palpitations.   Gastrointestinal:  Negative for abdominal pain and nausea.   Musculoskeletal:  Negative for gait problem.   Skin:  Positive for wound.   Neurological:  Negative for headaches.   Psychiatric/Behavioral:  Negative for agitation and confusion.      Objective:     Vital Signs (Most Recent):  Temp: 98.1 °F (36.7 °C) (06/12/25 0315)  Pulse: (!) 44 (06/12/25 0604)  Resp: 17 (06/12/25 0604)  BP: (!) 102/51 (06/12/25 0604)  SpO2: 99 % (06/12/25 0604) Vital Signs (24h Range):  Temp:  [97.4 °F (36.3 °C)-98.4 °F (36.9 °C)] 98.1 °F (36.7 °C)  Pulse:  [41-58] 44  Resp:  [9-42] 17  SpO2:  [69 %-100 %] 99 %  BP: ()/(50-66) 102/51     Weight: 67 kg (147 lb 11.3 oz)  Body mass index is 28.85 kg/m².    Intake/Output Summary (Last 24 hours) at 6/12/2025 0735  Last data filed at 6/11/2025 1121  Gross per 24 hour   Intake --   Output 400 ml   Net -400 ml         Physical Exam  Constitutional:       General: She is not in acute distress.     Appearance: Normal appearance. She is well-developed and normal weight.   HENT:      Head: Normocephalic and atraumatic.   Eyes:      Conjunctiva/sclera: Conjunctivae normal.   Cardiovascular:      Rate and Rhythm: Regular rhythm. Bradycardia present.      Pulses: Normal pulses.      Heart sounds: Murmur heard.      Comments: 2/6  Pulmonary:      Effort: Pulmonary effort is normal.      Breath sounds: Normal breath sounds.   Abdominal:      General: Bowel sounds are normal.      Palpations: Abdomen is soft.      Tenderness: There is no abdominal tenderness.   Musculoskeletal:      Right lower leg: No edema.      Left lower leg: No edema.  "  Skin:     General: Skin is warm and dry.      Comments: Left forearm  with ulcerated wound approx 3 cm X 4 cm.     Neurological:      Mental Status: She is alert and oriented to person, place, and time.               Significant Labs: All pertinent labs within the past 24 hours have been reviewed.  Blood Culture: No results for input(s): "LABBLOO" in the last 48 hours.  CBC:   Recent Labs   Lab 06/10/25  2343 06/11/25  0521 06/12/25  0443   WBC 5.34 4.18 3.58*   HGB 8.4* 8.2* 7.7*   HCT 28.5* 28.0* 26.6*   * 122* 134*     CMP:   Recent Labs   Lab 06/10/25  2343 06/11/25  0521 06/12/25  0443   * 140 143   K 4.9 4.0 3.8    114* 114*   CO2 19* 19* 19*    116* 94   BUN 16 12 6   CREATININE 1.1 0.9 0.6   CALCIUM 7.9* 7.5* 7.5*   PROT 8.2 7.0 6.1   ALBUMIN 3.1* 2.7* 2.3*   BILITOT 0.2 0.2 0.2   ALKPHOS 68 62 48   AST 33 25 17   ALT 17 14 10   ANIONGAP 11 7* 10     Lactic Acid:   Recent Labs   Lab 06/11/25 0521   LACTATE 0.7     Magnesium:   Recent Labs   Lab 06/11/25 0521 06/12/25 0443   MG 1.8 1.6     TSH:   Recent Labs   Lab 06/11/25 0521   TSH 1.533     Urine Culture: No results for input(s): "LABURIN" in the last 48 hours.  Urine Studies:   Recent Labs   Lab 06/11/25  0052   COLORU Yellow   APPEARANCEUA Hazy*   PHUR 6.0   SPECGRAV 1.015   PROTEINUA Trace*   GLUCUA Negative   BILIRUBINUA Negative   OCCULTUA 1+*   NITRITE Positive*   UROBILINOGEN Negative   LEUKOCYTESUR 3+*   RBCUA 27*   WBCUA 13*   BACTERIA Many*       Significant Imaging: I have reviewed all pertinent imaging results/findings within the past 24 hours.    Imaging Results              X-Ray Chest AP Portable (Final result)  Result time 06/11/25 00:47:45      Final result by Lorena Scales MD (06/11/25 00:47:45)                   Impression:      No acute cardiopulmonary process identified.      Electronically signed by: Lorena Scales MD  Date:    06/11/2025  Time:    00:47               Narrative:    EXAMINATION:  XR " CHEST AP PORTABLE    CLINICAL HISTORY:  Sepsis;    TECHNIQUE:  Single frontal view of the chest was performed.    COMPARISON:  05/15/2025.    FINDINGS:  Cardiac silhouette is normal in size.  Lungs are symmetrically expanded.  No evidence of focal consolidative process, pneumothorax, or significant pleural effusion.  No acute osseous abnormality identified.                                           Assessment & Plan  UTI (urinary tract infection)  Patient with positive U/A for nitrites, leukocytes, bacteria.  Has history of ESBL Klebsiella UTI.     Culture pending  Meropenem/Vanc  IVF  - ID consult.   - Crit care consult.   Drug abuse, IV  Patient reports drug use today. Positive for amphetamines and cocaine. Reports heroin use.  Discussed cessation with patient   - Emphasized severe health potential consequences including endocarditis, risk for spinal abscess.    Chronic hepatitis C virus infection  Noted. Mildly thrombocytopenia. Patient not aware of completing treatment.  Hep C Ab positive.   Infective endocarditis of tricuspid valve  Echocardiogram with evidence of tricuspid regurgitation that is moderate . The patient's most recent echocardiogram result is listed below.    Echo  Result Date: 6/11/2025    Left Ventricle: The left ventricle is normal in size. Normal wall   thickness. There is normal systolic function with a visually estimated   ejection fraction of 60 - 65%.  measuring -21.3% Grade I diastolic   dysfunction.    Right Ventricle: The right ventricle is normal in size Wall thickness   is normal. Systolic function is normal.    Left Atrium: The left atrium is mildly dilated    Tricuspid Valve: There is mild regurgitation.        Echo  Result Date: 6/25/2024    Left Ventricle: The left ventricle is normal in size. Ventricular mass   is normal. Normal wall thickness. There is normal systolic function.   Ejection fraction by visual approximation is 65%. Global longitudinal   strain is -19.0%. Global  longitudinal strain is normal. There is normal   diastolic function. Normal left ventricular filling pressure. Tissue   Doppler velocity is normal.    Right Ventricle: Normal right ventricular cavity size. Wall thickness   is normal. Systolic function is normal.    Tricuspid Valve: There is a medium mobile echogenic pedunculated mass   present on the atrial side of the septal leaflet consistent with   vegetation. There is mild regurgitation with a centrally directed jet.    IVC/SVC: Normal venous pressure at 3 mmHg.    Pericardium: There is a trivial effusion. No indication of cardiac   tamponade.      - Given h/o ESBL UTI, hold Cefazolin as prescribed on prior AMA visit pending ID evaluation.  - Continue Vanc and Meropenem given history of ESBL UTI.   - ID consult.   - Cardiology consulted for ARMAND per ID rec.    Nausea vomiting and diarrhea  Patient hypotensive. Likely due to fluid losses.    IVF  C diff pending  Zofran PRN    Bradycardia  - Sinus celio on tele.  EKG sinus bradycardia, otherwise normal.   - Monitor.     Polysubstance abuse  Counseled to cease.      Tobacco abuse  Counseled to cease.  States does not need a nicotine patch.      Arm ulcer, with unspecified severity  - Left forearm wound approx 3 cm X 4 cm.   - XRAY LEFT FA may be a few tiny high-density foci at the level of the defect which could represent calcifications or potentially radiopaque retained foreign body.    - Wound care consult.   - Ortho eval.     HIV antibody positive  - 6/10 HIV Ab pos.  Hep C Ab positive.     - ID noted:  HIV antibody positive  - supplemental assay is NR  - given her pre-test probability, I will check her RNA and CD4 count    Hypophosphatemia  Patient's most recent phosphorus results are listed below.   Recent Labs     06/11/25  0521 06/12/25  0443   PHOS 2.5* 2.0*     Plan  - Will treat hypophosphatemia with replacement.  - Patient's hypophosphatemia is stable  - Follow labs.   Hypomagnesemia  Patient has Abnormal  Magnesium: hypomagnesemia. Will continue to monitor electrolytes closely. Will replace the affected electrolytes and repeat labs to be done after interventions completed. The patient's magnesium results have been reviewed and are listed below.  Recent Labs   Lab 06/12/25  0443   MG 1.6      Gram-positive cocci bacteremia  - Blood culture growing Strep, Staph aureus pending.   - Repeat Blood Culture 6/12 pending.       VTE Risk Mitigation (From admission, onward)           Ordered     enoxaparin injection 40 mg  Daily         06/12/25 0649     IP VTE LOW RISK PATIENT  Once         06/11/25 0258     Place sequential compression device  Until discontinued         06/11/25 0258                    Discharge Planning   ABELARDO: 6/14/2025     Code Status: Full Code   Medical Readiness for Discharge Date:   Discharge Plan A: Home with family                        Ricki Watt MD  Department of Hospital Medicine   [facility] Intensive Care ([facility]

## 2025-06-12 NOTE — EICU
Virtual ICU Quality Rounds    Admit Date: 6/10/2025  Hospital Day: 1    ICU Day: 1d 4h    24H Vital Sign Range:  Temp:  [97.4 °F (36.3 °C)-98.4 °F (36.9 °C)]   Pulse:  [41-58]   Resp:  [9-42]   BP: ()/(50-66)   SpO2:  [69 %-100 %]     VICU Surveillance Screening    Daily review of  line necessity(optional): Central line(s) in place    Central line type (optional): PICC    Central line indications : Poor IV access        LDA reconciliation : Yes

## 2025-06-12 NOTE — ASSESSMENT & PLAN NOTE
Echocardiogram with evidence of tricuspid regurgitation that is moderate . The patient's most recent echocardiogram result is listed below.    Echo  Result Date: 6/11/2025    Left Ventricle: The left ventricle is normal in size. Normal wall   thickness. There is normal systolic function with a visually estimated   ejection fraction of 60 - 65%.  measuring -21.3% Grade I diastolic   dysfunction.    Right Ventricle: The right ventricle is normal in size Wall thickness   is normal. Systolic function is normal.    Left Atrium: The left atrium is mildly dilated    Tricuspid Valve: There is mild regurgitation.        Echo  Result Date: 6/25/2024    Left Ventricle: The left ventricle is normal in size. Ventricular mass   is normal. Normal wall thickness. There is normal systolic function.   Ejection fraction by visual approximation is 65%. Global longitudinal   strain is -19.0%. Global longitudinal strain is normal. There is normal   diastolic function. Normal left ventricular filling pressure. Tissue   Doppler velocity is normal.    Right Ventricle: Normal right ventricular cavity size. Wall thickness   is normal. Systolic function is normal.    Tricuspid Valve: There is a medium mobile echogenic pedunculated mass   present on the atrial side of the septal leaflet consistent with   vegetation. There is mild regurgitation with a centrally directed jet.    IVC/SVC: Normal venous pressure at 3 mmHg.    Pericardium: There is a trivial effusion. No indication of cardiac   tamponade.      - Given h/o ESBL UTI, hold Cefazolin as prescribed on prior AMA visit pending ID evaluation.  - Continue Vanc and Meropenem given history of ESBL UTI.   - ID consult.   - Cardiology consulted for ARMAND per ID rec.

## 2025-06-12 NOTE — ASSESSMENT & PLAN NOTE
- Left forearm wound approx 3 cm X 4 cm.   - XRAY LEFT FA may be a few tiny high-density foci at the level of the defect which could represent calcifications or potentially radiopaque retained foreign body.    - Wound care consult.   - Ortho eval.

## 2025-06-12 NOTE — PLAN OF CARE
06/12/25 1238   Final Note   Assessment Type Final Discharge Note   Anticipated Discharge Disposition Left Against

## 2025-06-13 LAB
BACTERIA BLD CULT: ABNORMAL
BACTERIA BLD CULT: ABNORMAL
CD3+CD4+ CELLS # SPEC: 360 CELLS/UL (ref 300–1400)
CD3+CD4+ CELLS NFR BLD: 27.58 % (ref 28–57)
GRAM STN SPEC: ABNORMAL
LABORATORY COMMENT REPORT: ABNORMAL

## 2025-06-14 LAB
BACTERIA BLD CULT: NORMAL
BACTERIA BLD CULT: NORMAL

## 2025-06-19 ENCOUNTER — RESULTS FOLLOW-UP (OUTPATIENT)
Dept: EMERGENCY MEDICINE | Facility: OTHER | Age: 50
End: 2025-06-19